# Patient Record
Sex: MALE | Race: WHITE | NOT HISPANIC OR LATINO
[De-identification: names, ages, dates, MRNs, and addresses within clinical notes are randomized per-mention and may not be internally consistent; named-entity substitution may affect disease eponyms.]

---

## 2017-01-09 ENCOUNTER — OTHER (OUTPATIENT)
Age: 67
End: 2017-01-09

## 2017-01-19 VITALS
HEART RATE: 63 BPM | WEIGHT: 189.93 LBS | OXYGEN SATURATION: 100 % | RESPIRATION RATE: 16 BRPM | BODY MASS INDEX: 26.59 KG/M2 | SYSTOLIC BLOOD PRESSURE: 160 MMHG | HEIGHT: 71 IN | DIASTOLIC BLOOD PRESSURE: 83 MMHG

## 2017-01-26 ENCOUNTER — APPOINTMENT (OUTPATIENT)
Dept: HEMATOLOGY ONCOLOGY | Facility: CLINIC | Age: 67
End: 2017-01-26

## 2017-01-26 VITALS
HEART RATE: 60 BPM | OXYGEN SATURATION: 98 % | SYSTOLIC BLOOD PRESSURE: 138 MMHG | DIASTOLIC BLOOD PRESSURE: 66 MMHG | BODY MASS INDEX: 26.64 KG/M2 | TEMPERATURE: 97.4 F | WEIGHT: 191 LBS

## 2017-02-16 ENCOUNTER — APPOINTMENT (OUTPATIENT)
Dept: RADIATION ONCOLOGY | Facility: CLINIC | Age: 67
End: 2017-02-16

## 2017-02-16 VITALS
OXYGEN SATURATION: 100 % | SYSTOLIC BLOOD PRESSURE: 156 MMHG | WEIGHT: 191.8 LBS | BODY MASS INDEX: 26.75 KG/M2 | HEART RATE: 65 BPM | DIASTOLIC BLOOD PRESSURE: 84 MMHG | RESPIRATION RATE: 18 BRPM

## 2017-03-12 ENCOUNTER — FORM ENCOUNTER (OUTPATIENT)
Age: 67
End: 2017-03-12

## 2017-03-13 ENCOUNTER — OUTPATIENT (OUTPATIENT)
Dept: OUTPATIENT SERVICES | Facility: HOSPITAL | Age: 67
LOS: 1 days | End: 2017-03-13
Payer: MEDICARE

## 2017-03-13 ENCOUNTER — APPOINTMENT (OUTPATIENT)
Dept: CT IMAGING | Facility: HOSPITAL | Age: 67
End: 2017-03-13

## 2017-03-13 DIAGNOSIS — Z98.89 OTHER SPECIFIED POSTPROCEDURAL STATES: Chronic | ICD-10-CM

## 2017-03-13 PROCEDURE — 71250 CT THORAX DX C-: CPT

## 2017-03-20 ENCOUNTER — APPOINTMENT (OUTPATIENT)
Dept: RADIATION ONCOLOGY | Facility: CLINIC | Age: 67
End: 2017-03-20

## 2017-03-20 VITALS
SYSTOLIC BLOOD PRESSURE: 154 MMHG | OXYGEN SATURATION: 98 % | HEART RATE: 65 BPM | DIASTOLIC BLOOD PRESSURE: 84 MMHG | BODY MASS INDEX: 26.64 KG/M2 | RESPIRATION RATE: 16 BRPM | WEIGHT: 191 LBS

## 2017-03-21 ENCOUNTER — LABORATORY RESULT (OUTPATIENT)
Age: 67
End: 2017-03-21

## 2017-03-21 LAB
BASOPHILS # BLD AUTO: 0.03 K/UL
BASOPHILS NFR BLD AUTO: 0.6 %
CALCIUM SERPL-MCNC: 9.7 MG/DL
CREAT SERPL-MCNC: 1.17 MG/DL
EOSINOPHIL # BLD AUTO: 0.08 K/UL
EOSINOPHIL NFR BLD AUTO: 1.6 %
HCT VFR BLD CALC: 46.5 %
HGB BLD-MCNC: 15 G/DL
IMM GRANULOCYTES NFR BLD AUTO: 0.8 %
LDH SERPL-CCNC: 192 U/L
LYMPHOCYTES # BLD AUTO: 0.86 K/UL
LYMPHOCYTES NFR BLD AUTO: 16.9 %
MAN DIFF?: NORMAL
MCHC RBC-ENTMCNC: 30.9 PG
MCHC RBC-ENTMCNC: 32.3 GM/DL
MCV RBC AUTO: 95.9 FL
MONOCYTES # BLD AUTO: 0.62 K/UL
MONOCYTES NFR BLD AUTO: 12.2 %
NEUTROPHILS # BLD AUTO: 3.47 K/UL
NEUTROPHILS NFR BLD AUTO: 67.9 %
PLATELET # BLD AUTO: 169 K/UL
RBC # BLD: 4.85 M/UL
RBC # FLD: 13.9 %
WBC # FLD AUTO: 5.1 K/UL

## 2017-03-22 LAB
DEPRECATED KAPPA LC FREE/LAMBDA SER: 16.14 RATIO
DEPRECATED KAPPA LC FREE/LAMBDA SER: 16.14 RATIO
IGA SER QL IEP: 55 MG/DL
IGG SER QL IEP: 562 MG/DL
IGM SER QL IEP: 1520 MG/DL
KAPPA LC CSF-MCNC: 0.35 MG/DL
KAPPA LC CSF-MCNC: 0.35 MG/DL
KAPPA LC SERPL-MCNC: 5.65 MG/DL
KAPPA LC SERPL-MCNC: 5.65 MG/DL

## 2017-03-27 ENCOUNTER — APPOINTMENT (OUTPATIENT)
Dept: THORACIC SURGERY | Facility: CLINIC | Age: 67
End: 2017-03-27

## 2017-03-27 VITALS
BODY MASS INDEX: 26.74 KG/M2 | OXYGEN SATURATION: 99 % | HEART RATE: 72 BPM | DIASTOLIC BLOOD PRESSURE: 68 MMHG | RESPIRATION RATE: 16 BRPM | WEIGHT: 191 LBS | HEIGHT: 71 IN | SYSTOLIC BLOOD PRESSURE: 120 MMHG

## 2017-03-29 ENCOUNTER — APPOINTMENT (OUTPATIENT)
Dept: HEMATOLOGY ONCOLOGY | Facility: CLINIC | Age: 67
End: 2017-03-29

## 2017-03-29 VITALS
SYSTOLIC BLOOD PRESSURE: 132 MMHG | WEIGHT: 194 LBS | BODY MASS INDEX: 27.06 KG/M2 | DIASTOLIC BLOOD PRESSURE: 62 MMHG | TEMPERATURE: 97.9 F | HEART RATE: 76 BPM

## 2017-05-30 LAB
BASOPHILS # BLD AUTO: 0.02 K/UL
BASOPHILS NFR BLD AUTO: 0.4 %
CALCIUM SERPL-MCNC: 9.3 MG/DL
CREAT SERPL-MCNC: 1.09 MG/DL
EOSINOPHIL # BLD AUTO: 0.08 K/UL
EOSINOPHIL NFR BLD AUTO: 1.6 %
HCT VFR BLD CALC: 47.8 %
HGB BLD-MCNC: 15.3 G/DL
IMM GRANULOCYTES NFR BLD AUTO: 0.4 %
LDH SERPL-CCNC: 156 U/L
LYMPHOCYTES # BLD AUTO: 0.97 K/UL
LYMPHOCYTES NFR BLD AUTO: 18.8 %
MAN DIFF?: NORMAL
MCHC RBC-ENTMCNC: 30.7 PG
MCHC RBC-ENTMCNC: 32 GM/DL
MCV RBC AUTO: 96 FL
MONOCYTES # BLD AUTO: 0.74 K/UL
MONOCYTES NFR BLD AUTO: 14.4 %
NEUTROPHILS # BLD AUTO: 3.32 K/UL
NEUTROPHILS NFR BLD AUTO: 64.4 %
PLATELET # BLD AUTO: 176 K/UL
RBC # BLD: 4.98 M/UL
RBC # FLD: 14.2 %
WBC # FLD AUTO: 5.15 K/UL

## 2017-05-31 LAB
DEPRECATED KAPPA LC FREE/LAMBDA SER: 18.5 RATIO
DEPRECATED KAPPA LC FREE/LAMBDA SER: 18.5 RATIO
FLOW H2O: 49.1 SEC
FLOW PT: 89.9 SEC
IGA SER QL IEP: 55 MG/DL
IGG SER QL IEP: 578 MG/DL
IGM SER QL IEP: 1760 MG/DL
KAPPA LC CSF-MCNC: 0.28 MG/DL
KAPPA LC CSF-MCNC: 0.28 MG/DL
KAPPA LC SERPL-MCNC: 5.18 MG/DL
KAPPA LC SERPL-MCNC: 5.18 MG/DL
VISC SER QL: 1.8 RATIO

## 2017-06-05 ENCOUNTER — APPOINTMENT (OUTPATIENT)
Dept: HEMATOLOGY ONCOLOGY | Facility: CLINIC | Age: 67
End: 2017-06-05

## 2017-06-05 VITALS
WEIGHT: 195 LBS | TEMPERATURE: 98.3 F | BODY MASS INDEX: 27.2 KG/M2 | SYSTOLIC BLOOD PRESSURE: 138 MMHG | DIASTOLIC BLOOD PRESSURE: 70 MMHG | HEART RATE: 74 BPM | OXYGEN SATURATION: 99 %

## 2017-07-09 ENCOUNTER — FORM ENCOUNTER (OUTPATIENT)
Age: 67
End: 2017-07-09

## 2017-07-10 ENCOUNTER — OUTPATIENT (OUTPATIENT)
Dept: OUTPATIENT SERVICES | Facility: HOSPITAL | Age: 67
LOS: 1 days | End: 2017-07-10
Payer: MEDICARE

## 2017-07-10 ENCOUNTER — APPOINTMENT (OUTPATIENT)
Dept: CT IMAGING | Facility: HOSPITAL | Age: 67
End: 2017-07-10

## 2017-07-10 DIAGNOSIS — Z98.89 OTHER SPECIFIED POSTPROCEDURAL STATES: Chronic | ICD-10-CM

## 2017-07-10 PROCEDURE — 71250 CT THORAX DX C-: CPT

## 2017-07-17 ENCOUNTER — APPOINTMENT (OUTPATIENT)
Dept: THORACIC SURGERY | Facility: CLINIC | Age: 67
End: 2017-07-17

## 2017-07-17 VITALS
WEIGHT: 195 LBS | OXYGEN SATURATION: 98 % | SYSTOLIC BLOOD PRESSURE: 150 MMHG | HEIGHT: 71 IN | DIASTOLIC BLOOD PRESSURE: 70 MMHG | RESPIRATION RATE: 16 BRPM | HEART RATE: 72 BPM | BODY MASS INDEX: 27.3 KG/M2

## 2017-10-03 ENCOUNTER — LABORATORY RESULT (OUTPATIENT)
Age: 67
End: 2017-10-03

## 2017-10-03 LAB
BASOPHILS # BLD AUTO: 0.02 K/UL
BASOPHILS NFR BLD AUTO: 0.4 %
CALCIUM SERPL-MCNC: 9.7 MG/DL
CREAT SERPL-MCNC: 1.18 MG/DL
EOSINOPHIL # BLD AUTO: 0.13 K/UL
EOSINOPHIL NFR BLD AUTO: 2.7 %
HCT VFR BLD CALC: 45.2 %
HGB BLD-MCNC: 14.5 G/DL
IMM GRANULOCYTES NFR BLD AUTO: 0.8 %
LDH SERPL-CCNC: 149 U/L
LYMPHOCYTES # BLD AUTO: 0.89 K/UL
LYMPHOCYTES NFR BLD AUTO: 18.3 %
MAN DIFF?: NORMAL
MCHC RBC-ENTMCNC: 30.5 PG
MCHC RBC-ENTMCNC: 32.1 GM/DL
MCV RBC AUTO: 95.2 FL
MONOCYTES # BLD AUTO: 0.7 K/UL
MONOCYTES NFR BLD AUTO: 14.4 %
NEUTROPHILS # BLD AUTO: 3.08 K/UL
NEUTROPHILS NFR BLD AUTO: 63.4 %
PLATELET # BLD AUTO: 177 K/UL
RBC # BLD: 4.75 M/UL
RBC # FLD: 14.5 %
WBC # FLD AUTO: 4.86 K/UL

## 2017-10-04 LAB
DEPRECATED KAPPA LC FREE/LAMBDA SER: 6.7 RATIO
DEPRECATED KAPPA LC FREE/LAMBDA SER: 6.7 RATIO
IGA SER QL IEP: 56 MG/DL
IGG SER QL IEP: 595 MG/DL
IGM SER QL IEP: 1560 MG/DL
KAPPA LC CSF-MCNC: 0.92 MG/DL
KAPPA LC CSF-MCNC: 0.92 MG/DL
KAPPA LC SERPL-MCNC: 6.16 MG/DL
KAPPA LC SERPL-MCNC: 6.16 MG/DL

## 2017-10-12 ENCOUNTER — APPOINTMENT (OUTPATIENT)
Dept: HEMATOLOGY ONCOLOGY | Facility: CLINIC | Age: 67
End: 2017-10-12
Payer: MEDICARE

## 2017-10-12 VITALS
HEART RATE: 72 BPM | SYSTOLIC BLOOD PRESSURE: 120 MMHG | DIASTOLIC BLOOD PRESSURE: 72 MMHG | BODY MASS INDEX: 27.62 KG/M2 | WEIGHT: 198 LBS | TEMPERATURE: 97.6 F

## 2017-10-12 PROCEDURE — 99214 OFFICE O/P EST MOD 30 MIN: CPT

## 2017-11-08 ENCOUNTER — OUTPATIENT (OUTPATIENT)
Dept: OUTPATIENT SERVICES | Facility: HOSPITAL | Age: 67
LOS: 1 days | End: 2017-11-08
Payer: MEDICARE

## 2017-11-08 ENCOUNTER — APPOINTMENT (OUTPATIENT)
Dept: CT IMAGING | Facility: HOSPITAL | Age: 67
End: 2017-11-08
Payer: MEDICARE

## 2017-11-08 DIAGNOSIS — Z98.89 OTHER SPECIFIED POSTPROCEDURAL STATES: Chronic | ICD-10-CM

## 2017-11-08 PROCEDURE — 71250 CT THORAX DX C-: CPT

## 2017-11-08 PROCEDURE — 71250 CT THORAX DX C-: CPT | Mod: 26

## 2017-11-20 ENCOUNTER — APPOINTMENT (OUTPATIENT)
Dept: THORACIC SURGERY | Facility: CLINIC | Age: 67
End: 2017-11-20
Payer: MEDICARE

## 2017-11-20 VITALS
HEIGHT: 71 IN | BODY MASS INDEX: 27.02 KG/M2 | WEIGHT: 193 LBS | DIASTOLIC BLOOD PRESSURE: 80 MMHG | SYSTOLIC BLOOD PRESSURE: 140 MMHG | OXYGEN SATURATION: 97 % | HEART RATE: 94 BPM

## 2017-11-20 PROCEDURE — 99214 OFFICE O/P EST MOD 30 MIN: CPT

## 2017-12-05 ENCOUNTER — APPOINTMENT (OUTPATIENT)
Dept: FAMILY MEDICINE | Facility: CLINIC | Age: 67
End: 2017-12-05
Payer: MEDICARE

## 2017-12-05 ENCOUNTER — APPOINTMENT (OUTPATIENT)
Dept: FAMILY MEDICINE | Facility: CLINIC | Age: 67
End: 2017-12-05

## 2017-12-05 VITALS
BODY MASS INDEX: 27.3 KG/M2 | DIASTOLIC BLOOD PRESSURE: 71 MMHG | SYSTOLIC BLOOD PRESSURE: 170 MMHG | WEIGHT: 195 LBS | HEART RATE: 86 BPM | HEIGHT: 71 IN

## 2017-12-05 PROCEDURE — G0008: CPT

## 2017-12-05 PROCEDURE — 90688 IIV4 VACCINE SPLT 0.5 ML IM: CPT

## 2017-12-05 PROCEDURE — 36415 COLL VENOUS BLD VENIPUNCTURE: CPT

## 2017-12-14 LAB
25(OH)D3 SERPL-MCNC: 20.9 NG/ML
ALBUMIN SERPL ELPH-MCNC: 4 G/DL
ALP BLD-CCNC: 64 U/L
ALT SERPL-CCNC: 9 U/L
ANION GAP SERPL CALC-SCNC: 14 MMOL/L
AST SERPL-CCNC: 12 U/L
BASOPHILS # BLD AUTO: 0.02 K/UL
BASOPHILS NFR BLD AUTO: 0.4 %
BILIRUB SERPL-MCNC: 0.3 MG/DL
BUN SERPL-MCNC: 15 MG/DL
CALCIUM SERPL-MCNC: 9.1 MG/DL
CHLORIDE SERPL-SCNC: 104 MMOL/L
CHOLEST SERPL-MCNC: 121 MG/DL
CHOLEST/HDLC SERPL: 3.7 RATIO
CO2 SERPL-SCNC: 26 MMOL/L
CREAT SERPL-MCNC: 0.93 MG/DL
EOSINOPHIL # BLD AUTO: 0.07 K/UL
EOSINOPHIL NFR BLD AUTO: 1.3 %
GLUCOSE SERPL-MCNC: 101 MG/DL
HBA1C MFR BLD HPLC: 5.9 %
HCT VFR BLD CALC: 40.7 %
HDLC SERPL-MCNC: 33 MG/DL
HGB BLD-MCNC: 12.8 G/DL
IMM GRANULOCYTES NFR BLD AUTO: 0.4 %
LDLC SERPL CALC-MCNC: 73 MG/DL
LYMPHOCYTES # BLD AUTO: 0.78 K/UL
LYMPHOCYTES NFR BLD AUTO: 14.1 %
MAN DIFF?: NORMAL
MCHC RBC-ENTMCNC: 30.5 PG
MCHC RBC-ENTMCNC: 31.4 GM/DL
MCV RBC AUTO: 96.9 FL
MONOCYTES # BLD AUTO: 0.65 K/UL
MONOCYTES NFR BLD AUTO: 11.8 %
NEUTROPHILS # BLD AUTO: 3.98 K/UL
NEUTROPHILS NFR BLD AUTO: 72 %
PLATELET # BLD AUTO: 233 K/UL
POTASSIUM SERPL-SCNC: 4.3 MMOL/L
PROT SERPL-MCNC: 7.6 G/DL
PSA FREE FLD-MCNC: 35.3
PSA FREE SERPL-MCNC: 0.23 NG/ML
PSA SERPL-MCNC: 0.65 NG/ML
RBC # BLD: 4.2 M/UL
RBC # FLD: 14.5 %
SODIUM SERPL-SCNC: 144 MMOL/L
TRIGL SERPL-MCNC: 74 MG/DL
TSH SERPL-ACNC: 2.69 UIU/ML
WBC # FLD AUTO: 5.52 K/UL

## 2018-01-05 ENCOUNTER — APPOINTMENT (OUTPATIENT)
Dept: FAMILY MEDICINE | Facility: CLINIC | Age: 68
End: 2018-01-05
Payer: MEDICARE

## 2018-01-05 VITALS
BODY MASS INDEX: 27.16 KG/M2 | SYSTOLIC BLOOD PRESSURE: 132 MMHG | HEART RATE: 78 BPM | RESPIRATION RATE: 20 BRPM | HEIGHT: 71 IN | DIASTOLIC BLOOD PRESSURE: 70 MMHG | WEIGHT: 194 LBS

## 2018-01-05 PROCEDURE — 99213 OFFICE O/P EST LOW 20 MIN: CPT | Mod: 25

## 2018-01-05 PROCEDURE — G0009: CPT

## 2018-01-05 PROCEDURE — 90670 PCV13 VACCINE IM: CPT

## 2018-01-31 ENCOUNTER — FORM ENCOUNTER (OUTPATIENT)
Age: 68
End: 2018-01-31

## 2018-02-01 ENCOUNTER — APPOINTMENT (OUTPATIENT)
Dept: NUCLEAR MEDICINE | Facility: CLINIC | Age: 68
End: 2018-02-01

## 2018-02-01 ENCOUNTER — OUTPATIENT (OUTPATIENT)
Dept: OUTPATIENT SERVICES | Facility: HOSPITAL | Age: 68
LOS: 1 days | End: 2018-02-01
Payer: MEDICARE

## 2018-02-01 DIAGNOSIS — Z00.8 ENCOUNTER FOR OTHER GENERAL EXAMINATION: ICD-10-CM

## 2018-02-01 DIAGNOSIS — Z98.89 OTHER SPECIFIED POSTPROCEDURAL STATES: Chronic | ICD-10-CM

## 2018-02-01 PROCEDURE — 78815 PET IMAGE W/CT SKULL-THIGH: CPT | Mod: 26,KX,PS

## 2018-02-01 PROCEDURE — 78815 PET IMAGE W/CT SKULL-THIGH: CPT

## 2018-02-01 PROCEDURE — A9552: CPT

## 2018-02-02 ENCOUNTER — LABORATORY RESULT (OUTPATIENT)
Age: 68
End: 2018-02-02

## 2018-02-02 LAB
BASOPHILS # BLD AUTO: 0.01 K/UL
BASOPHILS NFR BLD AUTO: 0.2 %
EOSINOPHIL # BLD AUTO: 0.13 K/UL
EOSINOPHIL NFR BLD AUTO: 2.6 %
HCT VFR BLD CALC: 44.8 %
HGB BLD-MCNC: 14.3 G/DL
IMM GRANULOCYTES NFR BLD AUTO: 0.6 %
LYMPHOCYTES # BLD AUTO: 0.81 K/UL
LYMPHOCYTES NFR BLD AUTO: 16.3 %
MAN DIFF?: NORMAL
MCHC RBC-ENTMCNC: 29.8 PG
MCHC RBC-ENTMCNC: 31.9 GM/DL
MCV RBC AUTO: 93.3 FL
MONOCYTES # BLD AUTO: 0.71 K/UL
MONOCYTES NFR BLD AUTO: 14.3 %
NEUTROPHILS # BLD AUTO: 3.28 K/UL
NEUTROPHILS NFR BLD AUTO: 66 %
PLATELET # BLD AUTO: 175 K/UL
RBC # BLD: 4.8 M/UL
RBC # FLD: 14.7 %
WBC # FLD AUTO: 4.97 K/UL

## 2018-02-03 LAB
ALBUMIN SERPL ELPH-MCNC: 4.3 G/DL
ALP BLD-CCNC: 78 U/L
ALT SERPL-CCNC: 8 U/L
ANION GAP SERPL CALC-SCNC: 15 MMOL/L
AST SERPL-CCNC: 11 U/L
BILIRUB SERPL-MCNC: 0.3 MG/DL
BUN SERPL-MCNC: 19 MG/DL
CALCIUM SERPL-MCNC: 9.6 MG/DL
CHLORIDE SERPL-SCNC: 105 MMOL/L
CO2 SERPL-SCNC: 25 MMOL/L
CREAT SERPL-MCNC: 1.26 MG/DL
DEPRECATED KAPPA LC FREE/LAMBDA SER: 8.76 RATIO
DEPRECATED KAPPA LC FREE/LAMBDA SER: 8.76 RATIO
GLUCOSE SERPL-MCNC: 104 MG/DL
IGA SER QL IEP: 58 MG/DL
IGG SER QL IEP: 687 MG/DL
IGM SER QL IEP: 1920 MG/DL
KAPPA LC CSF-MCNC: 0.72 MG/DL
KAPPA LC CSF-MCNC: 0.72 MG/DL
KAPPA LC SERPL-MCNC: 6.31 MG/DL
KAPPA LC SERPL-MCNC: 6.31 MG/DL
LDH SERPL-CCNC: 129 U/L
POTASSIUM SERPL-SCNC: 4.6 MMOL/L
PROT SERPL-MCNC: 7.8 G/DL
SODIUM SERPL-SCNC: 145 MMOL/L

## 2018-02-05 ENCOUNTER — APPOINTMENT (OUTPATIENT)
Dept: THORACIC SURGERY | Facility: CLINIC | Age: 68
End: 2018-02-05
Payer: MEDICARE

## 2018-02-05 VITALS
RESPIRATION RATE: 16 BRPM | OXYGEN SATURATION: 100 % | HEART RATE: 77 BPM | HEIGHT: 71 IN | WEIGHT: 196 LBS | SYSTOLIC BLOOD PRESSURE: 140 MMHG | DIASTOLIC BLOOD PRESSURE: 81 MMHG | BODY MASS INDEX: 27.44 KG/M2

## 2018-02-05 PROCEDURE — 99214 OFFICE O/P EST MOD 30 MIN: CPT

## 2018-02-09 ENCOUNTER — OUTPATIENT (OUTPATIENT)
Dept: OUTPATIENT SERVICES | Facility: HOSPITAL | Age: 68
LOS: 1 days | End: 2018-02-09
Payer: MEDICARE

## 2018-02-09 DIAGNOSIS — Z98.89 OTHER SPECIFIED POSTPROCEDURAL STATES: Chronic | ICD-10-CM

## 2018-02-09 DIAGNOSIS — C34.90 MALIGNANT NEOPLASM OF UNSPECIFIED PART OF UNSPECIFIED BRONCHUS OR LUNG: ICD-10-CM

## 2018-02-09 DIAGNOSIS — R91.1 SOLITARY PULMONARY NODULE: ICD-10-CM

## 2018-02-09 PROCEDURE — 94060 EVALUATION OF WHEEZING: CPT

## 2018-02-09 PROCEDURE — 94060 EVALUATION OF WHEEZING: CPT | Mod: 26

## 2018-02-12 ENCOUNTER — APPOINTMENT (OUTPATIENT)
Dept: HEMATOLOGY ONCOLOGY | Facility: CLINIC | Age: 68
End: 2018-02-12
Payer: MEDICARE

## 2018-02-12 VITALS
TEMPERATURE: 97.8 F | DIASTOLIC BLOOD PRESSURE: 58 MMHG | HEART RATE: 64 BPM | BODY MASS INDEX: 27.62 KG/M2 | SYSTOLIC BLOOD PRESSURE: 120 MMHG | WEIGHT: 198 LBS | RESPIRATION RATE: 16 BRPM

## 2018-02-12 PROCEDURE — 99214 OFFICE O/P EST MOD 30 MIN: CPT

## 2018-02-13 ENCOUNTER — APPOINTMENT (OUTPATIENT)
Dept: FAMILY MEDICINE | Facility: CLINIC | Age: 68
End: 2018-02-13
Payer: MEDICARE

## 2018-02-13 VITALS
SYSTOLIC BLOOD PRESSURE: 138 MMHG | HEART RATE: 69 BPM | DIASTOLIC BLOOD PRESSURE: 80 MMHG | WEIGHT: 194 LBS | TEMPERATURE: 98 F | OXYGEN SATURATION: 100 % | BODY MASS INDEX: 27.16 KG/M2 | HEIGHT: 71 IN

## 2018-02-13 PROCEDURE — 99214 OFFICE O/P EST MOD 30 MIN: CPT | Mod: 25

## 2018-02-13 PROCEDURE — 93000 ELECTROCARDIOGRAM COMPLETE: CPT | Mod: 59

## 2018-02-14 ENCOUNTER — OUTPATIENT (OUTPATIENT)
Dept: OUTPATIENT SERVICES | Facility: HOSPITAL | Age: 68
LOS: 1 days | End: 2018-02-14

## 2018-02-14 ENCOUNTER — APPOINTMENT (OUTPATIENT)
Dept: CARDIOLOGY | Facility: CLINIC | Age: 68
End: 2018-02-14
Payer: MEDICARE

## 2018-02-14 ENCOUNTER — NON-APPOINTMENT (OUTPATIENT)
Age: 68
End: 2018-02-14

## 2018-02-14 VITALS
DIASTOLIC BLOOD PRESSURE: 78 MMHG | HEIGHT: 71.25 IN | SYSTOLIC BLOOD PRESSURE: 140 MMHG | OXYGEN SATURATION: 99 % | WEIGHT: 192.9 LBS | RESPIRATION RATE: 16 BRPM | TEMPERATURE: 98 F | HEART RATE: 70 BPM

## 2018-02-14 VITALS
BODY MASS INDEX: 37 KG/M2 | OXYGEN SATURATION: 99 % | SYSTOLIC BLOOD PRESSURE: 168 MMHG | WEIGHT: 196 LBS | HEART RATE: 70 BPM | RESPIRATION RATE: 17 BRPM | HEIGHT: 61 IN | DIASTOLIC BLOOD PRESSURE: 96 MMHG

## 2018-02-14 VITALS — DIASTOLIC BLOOD PRESSURE: 80 MMHG | HEART RATE: 72 BPM | SYSTOLIC BLOOD PRESSURE: 132 MMHG

## 2018-02-14 DIAGNOSIS — Z98.89 OTHER SPECIFIED POSTPROCEDURAL STATES: Chronic | ICD-10-CM

## 2018-02-14 DIAGNOSIS — C34.90 MALIGNANT NEOPLASM OF UNSPECIFIED PART OF UNSPECIFIED BRONCHUS OR LUNG: ICD-10-CM

## 2018-02-14 DIAGNOSIS — Z01.818 ENCOUNTER FOR OTHER PREPROCEDURAL EXAMINATION: ICD-10-CM

## 2018-02-14 DIAGNOSIS — N40.1 BENIGN PROSTATIC HYPERPLASIA WITH LOWER URINARY TRACT SYMPTOMS: ICD-10-CM

## 2018-02-14 DIAGNOSIS — R91.1 SOLITARY PULMONARY NODULE: ICD-10-CM

## 2018-02-14 DIAGNOSIS — Z98.890 OTHER SPECIFIED POSTPROCEDURAL STATES: Chronic | ICD-10-CM

## 2018-02-14 LAB
BLD GP AB SCN SERPL QL: NEGATIVE — SIGNIFICANT CHANGE UP
BUN SERPL-MCNC: 20 MG/DL — SIGNIFICANT CHANGE UP (ref 7–23)
CALCIUM SERPL-MCNC: 9.1 MG/DL — SIGNIFICANT CHANGE UP (ref 8.4–10.5)
CHLORIDE SERPL-SCNC: 103 MMOL/L — SIGNIFICANT CHANGE UP (ref 98–107)
CO2 SERPL-SCNC: 27 MMOL/L — SIGNIFICANT CHANGE UP (ref 22–31)
CREAT SERPL-MCNC: 1.01 MG/DL — SIGNIFICANT CHANGE UP (ref 0.5–1.3)
GLUCOSE SERPL-MCNC: 83 MG/DL — SIGNIFICANT CHANGE UP (ref 70–99)
HCT VFR BLD CALC: 46.4 % — SIGNIFICANT CHANGE UP (ref 39–50)
HGB BLD-MCNC: 14.7 G/DL — SIGNIFICANT CHANGE UP (ref 13–17)
MCHC RBC-ENTMCNC: 29.6 PG — SIGNIFICANT CHANGE UP (ref 27–34)
MCHC RBC-ENTMCNC: 31.7 % — LOW (ref 32–36)
MCV RBC AUTO: 93.4 FL — SIGNIFICANT CHANGE UP (ref 80–100)
NRBC # FLD: 0 — SIGNIFICANT CHANGE UP
PLATELET # BLD AUTO: 188 K/UL — SIGNIFICANT CHANGE UP (ref 150–400)
PMV BLD: 9.5 FL — SIGNIFICANT CHANGE UP (ref 7–13)
POTASSIUM SERPL-MCNC: 4.1 MMOL/L — SIGNIFICANT CHANGE UP (ref 3.5–5.3)
POTASSIUM SERPL-SCNC: 4.1 MMOL/L — SIGNIFICANT CHANGE UP (ref 3.5–5.3)
RBC # BLD: 4.97 M/UL — SIGNIFICANT CHANGE UP (ref 4.2–5.8)
RBC # FLD: 14.7 % — HIGH (ref 10.3–14.5)
RH IG SCN BLD-IMP: POSITIVE — SIGNIFICANT CHANGE UP
SODIUM SERPL-SCNC: 143 MMOL/L — SIGNIFICANT CHANGE UP (ref 135–145)
WBC # BLD: 5.79 K/UL — SIGNIFICANT CHANGE UP (ref 3.8–10.5)
WBC # FLD AUTO: 5.79 K/UL — SIGNIFICANT CHANGE UP (ref 3.8–10.5)

## 2018-02-14 PROCEDURE — 93306 TTE W/DOPPLER COMPLETE: CPT

## 2018-02-14 PROCEDURE — 99214 OFFICE O/P EST MOD 30 MIN: CPT

## 2018-02-14 PROCEDURE — 93000 ELECTROCARDIOGRAM COMPLETE: CPT

## 2018-02-14 RX ORDER — SODIUM CHLORIDE 9 MG/ML
1000 INJECTION, SOLUTION INTRAVENOUS
Qty: 0 | Refills: 0 | Status: DISCONTINUED | OUTPATIENT
Start: 2018-02-23 | End: 2018-02-24

## 2018-02-14 NOTE — H&P PST ADULT - NSANTHOSAYNRD_GEN_A_CORE
No. JANUARY screening performed.  STOP BANG Legend: 0-2 = LOW Risk; 3-4 = INTERMEDIATE Risk; 5-8 = HIGH Risk

## 2018-02-14 NOTE — H&P PST ADULT - PMH
BPH (benign prostatic hypertrophy)    Hydronephrosis    Lung cancer  Dx'd 2015 treated with neoadjuvant chemo/RT followed by pneumonectomy  Nephrolithiasis BPH (benign prostatic hypertrophy)    Hydronephrosis    Lung cancer  Dx'd 2015 treated with neoadjuvant chemo/RT followed by pneumonectomy (June 2015), SBRT to RUL nodule in Jan 2017  Lung nodule    Nephrolithiasis

## 2018-02-14 NOTE — H&P PST ADULT - HISTORY OF PRESENT ILLNESS
66 y/o male with history of lung cancer presents to [facility] today for presurgical evaluation  He had a follow up scan done which revealed a lung nodule. He denies any scheduled for     65 y/o M PMH obstructive BPH with hydronephrosis, nephrolithiasis, S/P lithotripsy many years ago, c/o left scapular pain starting 1/2015, 15 lb. weight loss over the past 4-5 months, found to left lung mass on CXR.  Presents today for laparoscopic robotic assisted left pneumonectomy, possible left thoracotomy. 66 y/o male with history of lung cancer presents to [facility] today for presurgical evaluation  He was diagnosed with lung cancer in 2015 and treated with neoadjuvant chemo/RT followed by left pneumonectomy in June 2015.  He was followed with CT Scans and was treated with SBRT for a right upper lobe nodule in January 2017.  Recent CT Scan revealed an increase in the size of the right upper lobe lung nodule.  PET Scan was done.  He denies any fever, chills, weight loss, fatigue, shortness of breath, cough and hemoptysis.  He is scheduled for right thoracotomy, right upper lobe wedge resection on 2/23/18.

## 2018-02-14 NOTE — H&P PST ADULT - PROBLEM SELECTOR PLAN 1
Pt. is scheduled for right thoracotomy, right upper lobe wedge resection on 2/23/18.  Preoperative instructions reviewed, pt verbalized understanding.  Preop Famotidine provided.  Lab results pending.  Pt. instructed to obtain medical clearance by surgeon, please obtain copy for PST chart

## 2018-02-20 ENCOUNTER — OTHER (OUTPATIENT)
Age: 68
End: 2018-02-20

## 2018-02-21 ENCOUNTER — OUTPATIENT (OUTPATIENT)
Dept: OUTPATIENT SERVICES | Facility: HOSPITAL | Age: 68
LOS: 1 days | End: 2018-02-21
Payer: MEDICARE

## 2018-02-21 DIAGNOSIS — R91.1 SOLITARY PULMONARY NODULE: ICD-10-CM

## 2018-02-21 DIAGNOSIS — Z98.89 OTHER SPECIFIED POSTPROCEDURAL STATES: Chronic | ICD-10-CM

## 2018-02-21 DIAGNOSIS — Z98.890 OTHER SPECIFIED POSTPROCEDURAL STATES: Chronic | ICD-10-CM

## 2018-02-21 DIAGNOSIS — C34.90 MALIGNANT NEOPLASM OF UNSPECIFIED PART OF UNSPECIFIED BRONCHUS OR LUNG: ICD-10-CM

## 2018-02-21 PROCEDURE — 94726 PLETHYSMOGRAPHY LUNG VOLUMES: CPT | Mod: 26

## 2018-02-21 PROCEDURE — 94726 PLETHYSMOGRAPHY LUNG VOLUMES: CPT

## 2018-02-21 PROCEDURE — 94060 EVALUATION OF WHEEZING: CPT | Mod: 26

## 2018-02-21 PROCEDURE — 94729 DIFFUSING CAPACITY: CPT

## 2018-02-21 PROCEDURE — 94729 DIFFUSING CAPACITY: CPT | Mod: 26

## 2018-02-21 PROCEDURE — 94060 EVALUATION OF WHEEZING: CPT

## 2018-02-23 ENCOUNTER — APPOINTMENT (OUTPATIENT)
Dept: THORACIC SURGERY | Facility: HOSPITAL | Age: 68
End: 2018-02-23
Payer: MEDICARE

## 2018-02-23 ENCOUNTER — INPATIENT (INPATIENT)
Facility: HOSPITAL | Age: 68
LOS: 1 days | Discharge: HOME | End: 2018-02-25
Attending: THORACIC SURGERY (CARDIOTHORACIC VASCULAR SURGERY) | Admitting: THORACIC SURGERY (CARDIOTHORACIC VASCULAR SURGERY)
Payer: MEDICARE

## 2018-02-23 ENCOUNTER — RESULT REVIEW (OUTPATIENT)
Age: 68
End: 2018-02-23

## 2018-02-23 VITALS
SYSTOLIC BLOOD PRESSURE: 150 MMHG | TEMPERATURE: 98 F | RESPIRATION RATE: 16 BRPM | OXYGEN SATURATION: 100 % | WEIGHT: 192.9 LBS | DIASTOLIC BLOOD PRESSURE: 76 MMHG | HEIGHT: 71.25 IN | HEART RATE: 73 BPM

## 2018-02-23 DIAGNOSIS — Z98.890 OTHER SPECIFIED POSTPROCEDURAL STATES: Chronic | ICD-10-CM

## 2018-02-23 DIAGNOSIS — C34.90 MALIGNANT NEOPLASM OF UNSPECIFIED PART OF UNSPECIFIED BRONCHUS OR LUNG: ICD-10-CM

## 2018-02-23 DIAGNOSIS — Z98.89 OTHER SPECIFIED POSTPROCEDURAL STATES: Chronic | ICD-10-CM

## 2018-02-23 LAB — RH IG SCN BLD-IMP: POSITIVE — SIGNIFICANT CHANGE UP

## 2018-02-23 PROCEDURE — 99233 SBSQ HOSP IP/OBS HIGH 50: CPT

## 2018-02-23 PROCEDURE — 88331 PATH CONSLTJ SURG 1 BLK 1SPC: CPT | Mod: 26

## 2018-02-23 PROCEDURE — 32666 THORACOSCOPY W/WEDGE RESECT: CPT | Mod: 80

## 2018-02-23 PROCEDURE — 88307 TISSUE EXAM BY PATHOLOGIST: CPT | Mod: 26

## 2018-02-23 PROCEDURE — 71045 X-RAY EXAM CHEST 1 VIEW: CPT | Mod: 26

## 2018-02-23 PROCEDURE — 88332 PATH CONSLTJ SURG EA ADD BLK: CPT | Mod: 26

## 2018-02-23 PROCEDURE — 32666 THORACOSCOPY W/WEDGE RESECT: CPT

## 2018-02-23 RX ORDER — HEPARIN SODIUM 5000 [USP'U]/ML
5000 INJECTION INTRAVENOUS; SUBCUTANEOUS EVERY 8 HOURS
Qty: 0 | Refills: 0 | Status: DISCONTINUED | OUTPATIENT
Start: 2018-02-23 | End: 2018-02-25

## 2018-02-23 RX ORDER — TAMSULOSIN HYDROCHLORIDE 0.4 MG/1
0.4 CAPSULE ORAL AT BEDTIME
Qty: 0 | Refills: 0 | Status: DISCONTINUED | OUTPATIENT
Start: 2018-02-23 | End: 2018-02-23

## 2018-02-23 RX ORDER — DIPHENHYDRAMINE HCL 50 MG
25 CAPSULE ORAL EVERY 4 HOURS
Qty: 0 | Refills: 0 | Status: DISCONTINUED | OUTPATIENT
Start: 2018-02-23 | End: 2018-02-25

## 2018-02-23 RX ORDER — ONDANSETRON 8 MG/1
4 TABLET, FILM COATED ORAL EVERY 6 HOURS
Qty: 0 | Refills: 0 | Status: DISCONTINUED | OUTPATIENT
Start: 2018-02-23 | End: 2018-02-25

## 2018-02-23 RX ORDER — NALOXONE HYDROCHLORIDE 4 MG/.1ML
0.1 SPRAY NASAL
Qty: 0 | Refills: 0 | Status: DISCONTINUED | OUTPATIENT
Start: 2018-02-23 | End: 2018-02-25

## 2018-02-23 RX ORDER — HYDROMORPHONE HYDROCHLORIDE 2 MG/ML
0.5 INJECTION INTRAMUSCULAR; INTRAVENOUS; SUBCUTANEOUS
Qty: 0 | Refills: 0 | Status: DISCONTINUED | OUTPATIENT
Start: 2018-02-23 | End: 2018-02-24

## 2018-02-23 RX ORDER — TAMSULOSIN HYDROCHLORIDE 0.4 MG/1
0.4 CAPSULE ORAL
Qty: 0 | Refills: 0 | Status: DISCONTINUED | OUTPATIENT
Start: 2018-02-23 | End: 2018-02-25

## 2018-02-23 RX ORDER — DOCUSATE SODIUM 100 MG
100 CAPSULE ORAL THREE TIMES A DAY
Qty: 0 | Refills: 0 | Status: DISCONTINUED | OUTPATIENT
Start: 2018-02-23 | End: 2018-02-25

## 2018-02-23 RX ORDER — HYDROMORPHONE HYDROCHLORIDE 2 MG/ML
30 INJECTION INTRAMUSCULAR; INTRAVENOUS; SUBCUTANEOUS
Qty: 0 | Refills: 0 | Status: DISCONTINUED | OUTPATIENT
Start: 2018-02-23 | End: 2018-02-24

## 2018-02-23 RX ADMIN — SODIUM CHLORIDE 10 MILLILITER(S): 9 INJECTION, SOLUTION INTRAVENOUS at 15:33

## 2018-02-23 RX ADMIN — Medication 100 MILLIGRAM(S): at 21:09

## 2018-02-23 RX ADMIN — TAMSULOSIN HYDROCHLORIDE 0.4 MILLIGRAM(S): 0.4 CAPSULE ORAL at 18:47

## 2018-02-23 RX ADMIN — HYDROMORPHONE HYDROCHLORIDE 30 MILLILITER(S): 2 INJECTION INTRAMUSCULAR; INTRAVENOUS; SUBCUTANEOUS at 19:12

## 2018-02-23 RX ADMIN — HEPARIN SODIUM 5000 UNIT(S): 5000 INJECTION INTRAVENOUS; SUBCUTANEOUS at 21:09

## 2018-02-23 RX ADMIN — Medication 100 MILLIGRAM(S): at 15:58

## 2018-02-23 RX ADMIN — SODIUM CHLORIDE 30 MILLILITER(S): 9 INJECTION, SOLUTION INTRAVENOUS at 14:03

## 2018-02-23 RX ADMIN — SODIUM CHLORIDE 10 MILLILITER(S): 9 INJECTION, SOLUTION INTRAVENOUS at 19:11

## 2018-02-23 RX ADMIN — HYDROMORPHONE HYDROCHLORIDE 30 MILLILITER(S): 2 INJECTION INTRAMUSCULAR; INTRAVENOUS; SUBCUTANEOUS at 14:03

## 2018-02-23 RX ADMIN — HEPARIN SODIUM 5000 UNIT(S): 5000 INJECTION INTRAVENOUS; SUBCUTANEOUS at 15:59

## 2018-02-23 NOTE — BRIEF OPERATIVE NOTE - PROCEDURE
<<-----Click on this checkbox to enter Procedure Wedge resection of lung with video-assisted thoracoscopic surgery (VATS)  02/23/2018  Right Lung  Active  [id number]

## 2018-02-23 NOTE — ASU PATIENT PROFILE, ADULT - PMH
BPH (benign prostatic hypertrophy)    Hydronephrosis    Lung cancer  Dx'd 2015 treated with neoadjuvant chemo/RT followed by pneumonectomy (June 2015), SBRT to RUL nodule in Jan 2017  Lung nodule    Nephrolithiasis

## 2018-02-23 NOTE — PROGRESS NOTE ADULT - SUBJECTIVE AND OBJECTIVE BOX
ALANNA JADE            [id number]         No Known Allergies                 HPI:  68 y/o male with history of lung cancer presents to [facility] today for presurgical evaluation  He was diagnosed with lung cancer in 2015 and treated with neoadjuvant chemo/RT followed by left pneumonectomy in June 2015.  He was followed with CT Scans and was treated with SBRT for a right upper lobe nodule in January 2017.  Recent CT Scan revealed an increase in the size of the right upper lobe lung nodule.  PET Scan was done.  He denies any fever, chills, weight loss, fatigue, shortness of breath, cough and hemoptysis.  He is scheduled for right thoracotomy, right upper lobe wedge resection on 2/23/18. (14 Feb 2018 11:02)      Procedure: RVATS / Wedge resection  POD# 0    Issues:  Lung nodule  Postop pain  BPH               Home Medications:  CoQ 10 100 mg orally  daily  last dose 2/16:  (23 Feb 2018 08:40)  tamsulosin 0.4 mg oral capsule: 1 cap(s) orally 2x day (23 Feb 2018 08:40)      PAST MEDICAL & SURGICAL HISTORY:  Lung nodule  Lung cancer: Dx&#x27;d 2015 treated with neoadjuvant chemo/RT followed by pneumonectomy (June 2015), SBRT to RUL nodule in Jan 2017  Hydronephrosis  Nephrolithiasis  BPH (benign prostatic hypertrophy)  H/O pneumonectomy: Left June 2015  S/P tonsillectomy  H/O lithotripsy        ICU Vital Signs Last 24 Hrs  T(C): 36.1 (23 Feb 2018 14:00), Max: 36.6 (23 Feb 2018 08:25)  T(F): 97 (23 Feb 2018 14:00), Max: 97.9 (23 Feb 2018 08:25)  HR: 67 (23 Feb 2018 15:30) (61 - 73)  BP: 127/78 (23 Feb 2018 15:00) (96/55 - 150/76)  BP(mean): 90 (23 Feb 2018 15:00) (63 - 90)  ABP: 126/59 (23 Feb 2018 15:30) (126/59 - 150/70)  ABP(mean): 80 (23 Feb 2018 15:30) (80 - 97)  RR: 21 (23 Feb 2018 15:30) (16 - 21)  SpO2: 96% (23 Feb 2018 15:30) (94% - 100%)    I&O's Detail    23 Feb 2018 07:01  -  23 Feb 2018 15:52  --------------------------------------------------------  IN:    lactated ringers.: 90 mL    Oral Fluid: 100 mL  Total IN: 190 mL    OUT:    Chest Tube: 15 mL  Total OUT: 15 mL    Total NET: 175 mL      CAPILLARY BLOOD GLUCOSE      Home Medications:  CoQ 10 100 mg orally  daily  last dose 2/16:  (23 Feb 2018 08:40)  tamsulosin 0.4 mg oral capsule: 1 cap(s) orally 2x day (23 Feb 2018 08:40)      MEDICATIONS  (STANDING):  docusate sodium 100 milliGRAM(s) Oral three times a day  heparin  Injectable 5000 Unit(s) SubCutaneous every 8 hours  HYDROmorphone PCA (1 mG/mL) 30 milliLiter(s) PCA Continuous PCA Continuous  lactated ringers. 1000 milliLiter(s) (10 mL/Hr) IV Continuous <Continuous>    MEDICATIONS  (PRN):  diphenhydrAMINE   Injectable 25 milliGRAM(s) IV Push every 4 hours PRN Pruritus  HYDROmorphone PCA (1 mG/mL) Rescue Clinician Bolus 0.5 milliGRAM(s) IV Push every 15 minutes PRN for Pain Scale GREATER THAN 6  naloxone Injectable 0.1 milliGRAM(s) IV Push every 3 minutes PRN For ANY of the following changes in patient status:  A. RR LESS THAN 10 breaths per minute, B. Oxygen saturation LESS THAN 90%, C. Sedation score of 6  ondansetron Injectable 4 milliGRAM(s) IV Push every 6 hours PRN Nausea        Physical exam:                             General:               Pt is awake, alert, OOB in chair not in any distress                                                  Neuro:                  Nonfocal                             Cardiovascular:   S1 & S2, regular                           Respiratory:         Air entry is fair and equal on both sides, has bilateral conducted sounds                           GI:                          Soft, nondistended and nontender, Bowel sounds active                            Ext:                        No cyanosis or edema     Labs:                                                                 CXR:  Left pneumonectomy changes. Rt chest tube in place. Mild congestion      Plan:    General: 67y Male s/p RVATS / Wedge resection POD#0  progressing well, experiencing  pain with deep breathing.                             Neuro:                                         Pain control with PCA / Tylenol IV                            Cardiovascular:                                          Continue hemodynamic monitoring.                            Respiratory:                                         Pt is on 2L  nasal canula,                                           Comfortable, not in any distress.                                         Using incentive spirometry                                          Monitor chest tube output.                                          Chest tube to water seal. Has some air leak                                                                  Continue bronchodilators, pulmonary toilet     Minimize IVF                            GI                                         On clear liquids, advance to regular diet as tolerated                                         Continue Zofran / Reglan for nausea - PRN	                                                                 Renal:                                         Continue LR 10cc/hr                                         Monitor I/Os and electrolytes                                         BPH: Continue Flomax 0.4mg BID                                                 Hem/ Onc:                                                                                  Monitor chest tube output &  signs of bleeding.                                          Follow CBC in AM                           Infectious disease:                                            No signs of infection. Monitor for fever / leukocytosis.                                          All surgical incision / chest tube  sites look clean                            Endocrine                                             Continue Accu-Checks with coverage    Pt is on SQ Heparin and Venodyne boots for DVT prophylaxis.     Pertinent clinical, laboratory, radiographic, hemodynamic, echocardiographic, respiratory data, microbiologic data and chart were reviewed and analyzed frequently throughout the course of the day and night  Patient seen, examined and plan discussed with CT Surgeon / CTICU team during rounds.    Pt's status discussed with family at bedside, updated status              Vincent Eduardo MD ALANNA JADE            [id number]         No Known Allergies                 HPI:  68 y/o male with history of lung cancer presents to [facility] today for presurgical evaluation  He was diagnosed with lung cancer in 2015 and treated with neoadjuvant chemo/RT followed by left pneumonectomy in June 2015.  He was followed with CT Scans and was treated with SBRT for a right upper lobe nodule in January 2017.  Recent CT Scan revealed an increase in the size of the right upper lobe lung nodule.  PET Scan was done.  He denies any fever, chills, weight loss, fatigue, shortness of breath, cough and hemoptysis.  He is scheduled for right thoracotomy, right upper lobe wedge resection on 2/23/18. (14 Feb 2018 11:02)      Procedure: RVATS / Wedge resection  POD# 0    Issues:  Lung nodule  Postop pain  Urinary retention  BPH               Home Medications:  CoQ 10 100 mg orally  daily  last dose 2/16:  (23 Feb 2018 08:40)  tamsulosin 0.4 mg oral capsule: 1 cap(s) orally 2x day (23 Feb 2018 08:40)      PAST MEDICAL & SURGICAL HISTORY:  Lung nodule  Lung cancer: Dx&#x27;d 2015 treated with neoadjuvant chemo/RT followed by pneumonectomy (June 2015), SBRT to RUL nodule in Jan 2017  Hydronephrosis  Nephrolithiasis  BPH (benign prostatic hypertrophy)  H/O pneumonectomy: Left June 2015  S/P tonsillectomy  H/O lithotripsy        ICU Vital Signs Last 24 Hrs  T(C): 36.1 (23 Feb 2018 14:00), Max: 36.6 (23 Feb 2018 08:25)  T(F): 97 (23 Feb 2018 14:00), Max: 97.9 (23 Feb 2018 08:25)  HR: 67 (23 Feb 2018 15:30) (61 - 73)  BP: 127/78 (23 Feb 2018 15:00) (96/55 - 150/76)  BP(mean): 90 (23 Feb 2018 15:00) (63 - 90)  ABP: 126/59 (23 Feb 2018 15:30) (126/59 - 150/70)  ABP(mean): 80 (23 Feb 2018 15:30) (80 - 97)  RR: 21 (23 Feb 2018 15:30) (16 - 21)  SpO2: 96% (23 Feb 2018 15:30) (94% - 100%)    I&O's Detail    23 Feb 2018 07:01  -  23 Feb 2018 15:52  --------------------------------------------------------  IN:    lactated ringers.: 90 mL    Oral Fluid: 100 mL  Total IN: 190 mL    OUT:    Chest Tube: 15 mL  Total OUT: 15 mL    Total NET: 175 mL      CAPILLARY BLOOD GLUCOSE      Home Medications:  CoQ 10 100 mg orally  daily  last dose 2/16:  (23 Feb 2018 08:40)  tamsulosin 0.4 mg oral capsule: 1 cap(s) orally 2x day (23 Feb 2018 08:40)      MEDICATIONS  (STANDING):  docusate sodium 100 milliGRAM(s) Oral three times a day  heparin  Injectable 5000 Unit(s) SubCutaneous every 8 hours  HYDROmorphone PCA (1 mG/mL) 30 milliLiter(s) PCA Continuous PCA Continuous  lactated ringers. 1000 milliLiter(s) (10 mL/Hr) IV Continuous <Continuous>    MEDICATIONS  (PRN):  diphenhydrAMINE   Injectable 25 milliGRAM(s) IV Push every 4 hours PRN Pruritus  HYDROmorphone PCA (1 mG/mL) Rescue Clinician Bolus 0.5 milliGRAM(s) IV Push every 15 minutes PRN for Pain Scale GREATER THAN 6  naloxone Injectable 0.1 milliGRAM(s) IV Push every 3 minutes PRN For ANY of the following changes in patient status:  A. RR LESS THAN 10 breaths per minute, B. Oxygen saturation LESS THAN 90%, C. Sedation score of 6  ondansetron Injectable 4 milliGRAM(s) IV Push every 6 hours PRN Nausea        Physical exam:                             General:               Pt is awake, alert, OOB in chair not in any distress                                                  Neuro:                  Nonfocal                             Cardiovascular:   S1 & S2, regular                           Respiratory:         Air entry is fair and equal on both sides, has bilateral conducted sounds                           GI:                          Soft, nondistended and nontender, Bowel sounds active                            Ext:                        No cyanosis or edema     Labs:                                                                 CXR:  Left pneumonectomy changes. Rt chest tube in place. Mild congestion      Plan:    General: 67y Male s/p RVATS / Wedge resection POD#0  progressing well, experiencing  pain with deep breathing.                             Neuro:                                         Pain control with PCA / Tylenol IV                            Cardiovascular:                                          Continue hemodynamic monitoring.                            Respiratory:                                         Pt is on 2L  nasal canula,                                           Comfortable, not in any distress.                                         Using incentive spirometry                                          Monitor chest tube output.                                          Chest tube to water seal. Has some air leak                                                                  Continue bronchodilators, pulmonary toilet     Minimize IVF                            GI                                         On clear liquids, advance to regular diet as tolerated                                         Continue Zofran / Reglan for nausea - PRN	                                                                 Renal:                                         Continue LR 10cc/hr                                         Monitor I/Os and electrolytes     Urinary retention: Crum inserted & evacuated 1100 cc                                          BPH: Continue Flomax 0.4mg BID                                                 Hem/ Onc:                                                                                  Monitor chest tube output &  signs of bleeding.                                          Follow CBC in AM                           Infectious disease:                                            No signs of infection. Monitor for fever / leukocytosis.                                          All surgical incision / chest tube  sites look clean                            Endocrine                                             Continue Accu-Checks with coverage    Pt is on SQ Heparin and Venodyne boots for DVT prophylaxis.     Pertinent clinical, laboratory, radiographic, hemodynamic, echocardiographic, respiratory data, microbiologic data and chart were reviewed and analyzed frequently throughout the course of the day and night  Patient seen, examined and plan discussed with CT Surgeon / CTICU team during rounds.    Pt's status discussed with family at bedside, updated status              Vincent Eduardo MD

## 2018-02-24 ENCOUNTER — TRANSCRIPTION ENCOUNTER (OUTPATIENT)
Age: 68
End: 2018-02-24

## 2018-02-24 LAB
BUN SERPL-MCNC: 16 MG/DL — SIGNIFICANT CHANGE UP (ref 7–23)
CALCIUM SERPL-MCNC: 8.1 MG/DL — LOW (ref 8.4–10.5)
CHLORIDE SERPL-SCNC: 104 MMOL/L — SIGNIFICANT CHANGE UP (ref 98–107)
CO2 SERPL-SCNC: 26 MMOL/L — SIGNIFICANT CHANGE UP (ref 22–31)
CREAT SERPL-MCNC: 1.05 MG/DL — SIGNIFICANT CHANGE UP (ref 0.5–1.3)
GLUCOSE SERPL-MCNC: 122 MG/DL — HIGH (ref 70–99)
HCT VFR BLD CALC: 40 % — SIGNIFICANT CHANGE UP (ref 39–50)
HGB BLD-MCNC: 12.2 G/DL — LOW (ref 13–17)
MCHC RBC-ENTMCNC: 28.2 PG — SIGNIFICANT CHANGE UP (ref 27–34)
MCHC RBC-ENTMCNC: 30.5 % — LOW (ref 32–36)
MCV RBC AUTO: 92.6 FL — SIGNIFICANT CHANGE UP (ref 80–100)
NRBC # FLD: 0 — SIGNIFICANT CHANGE UP
PLATELET # BLD AUTO: 155 K/UL — SIGNIFICANT CHANGE UP (ref 150–400)
PMV BLD: 9.2 FL — SIGNIFICANT CHANGE UP (ref 7–13)
POTASSIUM SERPL-MCNC: 4.1 MMOL/L — SIGNIFICANT CHANGE UP (ref 3.5–5.3)
POTASSIUM SERPL-SCNC: 4.1 MMOL/L — SIGNIFICANT CHANGE UP (ref 3.5–5.3)
RBC # BLD: 4.32 M/UL — SIGNIFICANT CHANGE UP (ref 4.2–5.8)
RBC # FLD: 15.1 % — HIGH (ref 10.3–14.5)
SODIUM SERPL-SCNC: 140 MMOL/L — SIGNIFICANT CHANGE UP (ref 135–145)
WBC # BLD: 6.55 K/UL — SIGNIFICANT CHANGE UP (ref 3.8–10.5)
WBC # FLD AUTO: 6.55 K/UL — SIGNIFICANT CHANGE UP (ref 3.8–10.5)

## 2018-02-24 PROCEDURE — 71045 X-RAY EXAM CHEST 1 VIEW: CPT | Mod: 26

## 2018-02-24 PROCEDURE — 99233 SBSQ HOSP IP/OBS HIGH 50: CPT

## 2018-02-24 RX ORDER — ACETAMINOPHEN 500 MG
650 TABLET ORAL EVERY 6 HOURS
Qty: 0 | Refills: 0 | Status: DISCONTINUED | OUTPATIENT
Start: 2018-02-24 | End: 2018-02-25

## 2018-02-24 RX ORDER — PHENYLEPHRINE HYDROCHLORIDE 10 MG/ML
0.5 INJECTION INTRAVENOUS
Qty: 80 | Refills: 0 | Status: DISCONTINUED | OUTPATIENT
Start: 2018-02-24 | End: 2018-02-24

## 2018-02-24 RX ORDER — OXYCODONE AND ACETAMINOPHEN 5; 325 MG/1; MG/1
1 TABLET ORAL EVERY 4 HOURS
Qty: 0 | Refills: 0 | Status: DISCONTINUED | OUTPATIENT
Start: 2018-02-24 | End: 2018-02-25

## 2018-02-24 RX ADMIN — HYDROMORPHONE HYDROCHLORIDE 30 MILLILITER(S): 2 INJECTION INTRAMUSCULAR; INTRAVENOUS; SUBCUTANEOUS at 07:27

## 2018-02-24 RX ADMIN — SODIUM CHLORIDE 10 MILLILITER(S): 9 INJECTION, SOLUTION INTRAVENOUS at 07:28

## 2018-02-24 RX ADMIN — Medication 100 MILLIGRAM(S): at 05:28

## 2018-02-24 RX ADMIN — HEPARIN SODIUM 5000 UNIT(S): 5000 INJECTION INTRAVENOUS; SUBCUTANEOUS at 05:28

## 2018-02-24 RX ADMIN — Medication 100 MILLIGRAM(S): at 21:16

## 2018-02-24 RX ADMIN — HYDROMORPHONE HYDROCHLORIDE 0.5 MILLIGRAM(S): 2 INJECTION INTRAMUSCULAR; INTRAVENOUS; SUBCUTANEOUS at 00:00

## 2018-02-24 RX ADMIN — TAMSULOSIN HYDROCHLORIDE 0.4 MILLIGRAM(S): 0.4 CAPSULE ORAL at 05:28

## 2018-02-24 RX ADMIN — TAMSULOSIN HYDROCHLORIDE 0.4 MILLIGRAM(S): 0.4 CAPSULE ORAL at 17:48

## 2018-02-24 RX ADMIN — HEPARIN SODIUM 5000 UNIT(S): 5000 INJECTION INTRAVENOUS; SUBCUTANEOUS at 13:01

## 2018-02-24 RX ADMIN — Medication 100 MILLIGRAM(S): at 13:02

## 2018-02-24 RX ADMIN — HEPARIN SODIUM 5000 UNIT(S): 5000 INJECTION INTRAVENOUS; SUBCUTANEOUS at 21:17

## 2018-02-24 NOTE — DISCHARGE NOTE ADULT - PATIENT PORTAL LINK FT
You can access the [facility] Patient Portal, offered by [facility], by registering with the following website: [email or web address]

## 2018-02-24 NOTE — DISCHARGE NOTE ADULT - CONDITIONS AT DISCHARGE
Patient alert and oriented. Stable condition. Incision site is clean, dry, and intact. IV discontinued.

## 2018-02-24 NOTE — DISCHARGE NOTE ADULT - MEDICATION SUMMARY - MEDICATIONS TO TAKE
I will START or STAY ON the medications listed below when I get home from the hospital:    CoQ 10 100 mg orally  daily  last dose 2/16  -- May resume  -- Indication: For supplement    acetaminophen 325 mg oral tablet  -- 2 tab(s) by mouth every 6 hours, As needed, Mild Pain (1 - 3)  -- Indication: For pain    oxyCODONE-acetaminophen 5 mg-325 mg oral tablet  -- 1 tab(s) by mouth every 4 hours, As needed, Moderate Pain (4 - 6) or severe  pain (7-10) MDD:6  -- Indication: For pain    tamsulosin 0.4 mg oral capsule  -- 1 cap(s) by mouth 2x day  -- Indication: For urinary retention    docusate sodium 100 mg oral capsule  -- 1 cap(s) by mouth 3 times a day  -- Indication: For constipation

## 2018-02-24 NOTE — DISCHARGE NOTE ADULT - CARE PROVIDER_API CALL
Jan Sims), Thoracic Surgery  [address] [place] [address]  [place] [address]  Phone: [phone]  Fax: [phone]    Kendrick Deleon), Medicine  [address] [id number]  Phone: [phone]  Fax: [phone]

## 2018-02-24 NOTE — DISCHARGE NOTE ADULT - CARE PROVIDERS DIRECT ADDRESSES
[email or web address]

## 2018-02-24 NOTE — PROGRESS NOTE ADULT - SUBJECTIVE AND OBJECTIVE BOX
ALANNA JADE            MRN-[id number]         No Known Allergies             66 y/o male with history of lung cancer presents to [facility] today for presurgical evaluation  He was diagnosed with lung cancer in 2015 and treated with neoadjuvant chemo/RT followed by left pneumonectomy in June 2015.  He was followed with CT Scans and was treated with SBRT for a right upper lobe nodule in January 2017.  Recent CT Scan revealed an increase in the size of the right upper lobe lung nodule.  PET Scan was done.  He denies any fever, chills, weight loss, fatigue, shortness of breath, cough and hemoptysis.  He is scheduled for right thoracotomy, right upper lobe wedge resection on 2/23/18. (14 Feb 2018 11:02)      Procedure: RVATS / Wedge resection  POD# 1    Issues:  Lung nodule  Postop pain  Urinary retention  BPH                   Home Medications:  CoQ 10 100 mg orally  daily  last dose 2/16:  (23 Feb 2018 08:40)  tamsulosin 0.4 mg oral capsule: 1 cap(s) orally 2x day (23 Feb 2018 08:40)      PAST MEDICAL & SURGICAL HISTORY:  Lung nodule  Lung cancer: Dx&#x27;d 2015 treated with neoadjuvant chemo/RT followed by pneumonectomy (June 2015), SBRT to RUL nodule in Jan 2017  Hydronephrosis  Nephrolithiasis  BPH (benign prostatic hypertrophy)  H/O pneumonectomy: Left June 2015  S/P tonsillectomy  H/O lithotripsy        ICU Vital Signs Last 24 Hrs  T(C): 36.6 (24 Feb 2018 04:00), Max: 37.3 (23 Feb 2018 20:00)  T(F): 97.9 (24 Feb 2018 04:00), Max: 99.2 (23 Feb 2018 20:00)  HR: 68 (24 Feb 2018 06:00) (61 - 90)  BP: 120/62 (24 Feb 2018 06:00) (96/55 - 150/76)  BP(mean): 77 (24 Feb 2018 06:00) (63 - 90)  ABP: 105/40 (24 Feb 2018 06:00) (96/38 - 150/70)  ABP(mean): 62 (24 Feb 2018 06:00) (57 - 97)  RR: 16 (24 Feb 2018 06:00) (14 - 23)  SpO2: 94% (24 Feb 2018 06:00) (92% - 100%)    I&O's Detail    23 Feb 2018 07:01  -  24 Feb 2018 06:24  --------------------------------------------------------  IN:    lactated ringers.: 280 mL    Oral Fluid: 920 mL  Total IN: 1200 mL    OUT:    Chest Tube: 60 mL    Indwelling Catheter - Urethral: 1400 mL  Total OUT: 1460 mL    Total NET: -260 mL      CAPILLARY BLOOD GLUCOSE        Home Medications:  CoQ 10 100 mg orally  daily  last dose 2/16:  (23 Feb 2018 08:40)  tamsulosin 0.4 mg oral capsule: 1 cap(s) orally 2x day (23 Feb 2018 08:40)      MEDICATIONS  (STANDING):  docusate sodium 100 milliGRAM(s) Oral three times a day  heparin  Injectable 5000 Unit(s) SubCutaneous every 8 hours  HYDROmorphone PCA (1 mG/mL) 30 milliLiter(s) PCA Continuous PCA Continuous  lactated ringers. 1000 milliLiter(s) (10 mL/Hr) IV Continuous <Continuous>  phenylephrine    Infusion 0.5 MICROgram(s)/kG/Min (16.406 mL/Hr) IV Continuous <Continuous>  tamsulosin 0.4 milliGRAM(s) Oral two times a day    MEDICATIONS  (PRN):  diphenhydrAMINE   Injectable 25 milliGRAM(s) IV Push every 4 hours PRN Pruritus  HYDROmorphone PCA (1 mG/mL) Rescue Clinician Bolus 0.5 milliGRAM(s) IV Push every 15 minutes PRN for Pain Scale GREATER THAN 6  naloxone Injectable 0.1 milliGRAM(s) IV Push every 3 minutes PRN For ANY of the following changes in patient status:  A. RR LESS THAN 10 breaths per minute, B. Oxygen saturation LESS THAN 90%, C. Sedation score of 6  ondansetron Injectable 4 milliGRAM(s) IV Push every 6 hours PRN Nausea          Physical exam:                  General:               Pt is awake, alert, OOB in chair not in any distress                                                  Neuro:                  Nonfocal                             Cardiovascular:   S1 & S2, regular                           Respiratory:         Air entry is fair on right side, has some conducted sounds. No BS on the left                           GI:                          Soft, nondistended and nontender, Bowel sounds active                            Ext:                        No cyanosis or edema              Labs:                                                                           12.2   6.55  )-----------( 155      ( 24 Feb 2018 04:36 )             40.0             02-24    140  |  104  |  16  ----------------------------<  122<H>  4.1   |  26  |  1.05    Ca    8.1<L>      24 Feb 2018 04:36      CXR:  Postop changes. Left chest is consistent with pneumonectomy.      Plan:    General: 67y Male s/p RVATS / Wedge resection POD#1  progressing well, experiencing  pain with deep breathing.                             Neuro:                                         Pain control with PCA / Tylenol IV                            Cardiovascular:                                          Continue hemodynamic monitoring.                            Respiratory:                                         Pt is on 2L  nasal canula,                                           Comfortable, not in any distress.                                         Using incentive spirometry                                          Monitor chest tube output.                                          Chest tube to water seal. Has some air leak                                                                  Continue bronchodilators, pulmonary toilet     Minimize IVF.                            GI                                         On clear liquids, advance to regular diet as tolerated                                         Continue Zofran / Reglan for nausea - PRN	                                                                 Renal:                                         Continue LR 10cc/hr                                         Monitor I/Os and electrolytes     Urinary retention: Crum inserted & evacuated 1100 cc                                          BPH: Continue Flomax 0.4mg BID                                                 Hem/ Onc:                                                                                  Monitor chest tube output &  signs of bleeding.                                          Follow CBC in AM                           Infectious disease:                                            No signs of infection. Monitor for fever / leukocytosis.                                          All surgical incision / chest tube  sites look clean                            Endocrine                                             Continue Accu-Checks with coverage    Pt is on SQ Heparin and Venodyne boots for DVT prophylaxis.     Pertinent clinical, laboratory, radiographic, hemodynamic, echocardiographic, respiratory data, microbiologic data and chart were reviewed and analyzed frequently throughout the course of the day and night  Patient seen, examined and plan discussed with CT Surgeon / CTICU team during rounds.        Vincent Eduardo MD

## 2018-02-24 NOTE — DISCHARGE NOTE ADULT - ADDITIONAL INSTRUCTIONS
Keep the wound clean and dry.  Wash with soap and water and monitor for signs of infection.  See Dr Sims in 2 weeks.  Call for an appointment and bring a new chest X-ray with it when you come to see him.  See your PCP as well. Keep the wound clean and dry.  Wash with soap and water and monitor for signs of infection.  See Dr Sims in 7-10 days weeks.  Call for an appointment and bring a new chest X-ray with it when you come to see him.  See your PCP as well.

## 2018-02-24 NOTE — PROGRESS NOTE ADULT - SUBJECTIVE AND OBJECTIVE BOX
Anesthesia Pain Management Service    SUBJECTIVE: Patient is doing well with IV PCA and no significant problems reported.    Pain Scale Score	At rest: ___ 	With Activity: ___ 	[X ] Refer to charted pain scores    THERAPY:    [ ] IV PCA Morphine		[ ] 5 mg/mL	[ ] 1 mg/mL  [X ] IV PCA Hydromorphone	[ ] 5 mg/mL	[X ] 1 mg/mL  [ ] IV PCA Fentanyl		[ ] 50 micrograms/mL    Demand dose __0.2_ lockout __6_ (minutes) Continuous Rate _0__ Total: _3.5__  Daily      MEDICATIONS  (STANDING):  docusate sodium 100 milliGRAM(s) Oral three times a day  heparin  Injectable 5000 Unit(s) SubCutaneous every 8 hours  HYDROmorphone PCA (1 mG/mL) 30 milliLiter(s) PCA Continuous PCA Continuous  lactated ringers. 1000 milliLiter(s) (10 mL/Hr) IV Continuous <Continuous>  tamsulosin 0.4 milliGRAM(s) Oral two times a day    MEDICATIONS  (PRN):  diphenhydrAMINE   Injectable 25 milliGRAM(s) IV Push every 4 hours PRN Pruritus  HYDROmorphone PCA (1 mG/mL) Rescue Clinician Bolus 0.5 milliGRAM(s) IV Push every 15 minutes PRN for Pain Scale GREATER THAN 6  naloxone Injectable 0.1 milliGRAM(s) IV Push every 3 minutes PRN For ANY of the following changes in patient status:  A. RR LESS THAN 10 breaths per minute, B. Oxygen saturation LESS THAN 90%, C. Sedation score of 6  ondansetron Injectable 4 milliGRAM(s) IV Push every 6 hours PRN Nausea      OBJECTIVE:    Sedation Score:	[ X] Alert	[ ] Drowsy 	[ ] Arousable	[ ] Asleep	[ ] Unresponsive    Side Effects:	[X ] None	[ ] Nausea	[ ] Vomiting	[ ] Pruritus  		[ ] Other:    Vital Signs Last 24 Hrs  T(C): 36.8 (24 Feb 2018 08:00), Max: 37.3 (23 Feb 2018 20:00)  T(F): 98.2 (24 Feb 2018 08:00), Max: 99.2 (23 Feb 2018 20:00)  HR: 79 (24 Feb 2018 08:00) (61 - 90)  BP: 130/59 (24 Feb 2018 08:00) (96/55 - 139/63)  BP(mean): 76 (24 Feb 2018 08:00) (63 - 90)  RR: 23 (24 Feb 2018 08:00) (14 - 23)  SpO2: 95% (24 Feb 2018 08:00) (92% - 100%)    ASSESSMENT/ PLAN    Therapy to  be:	[ X] Continue   [ ] Discontinued   [ ] Change to prn Analgesics    Documentation and Verification of current medications:   [X] Done	[ ] Not done, not elligible    Comments:

## 2018-02-24 NOTE — DISCHARGE NOTE ADULT - INSTRUCTIONS
Resume usual diet If you are having feelings of chest pain or shortness of breath, call 911 immediately. Follow up with all doctor appointments as explained above. Do not do any heavy lifting. Continue to keep site clean and dry.

## 2018-02-24 NOTE — DISCHARGE NOTE ADULT - CARE PLAN
Principal Discharge DX:	Lung nodule  Goal:	Wound healing; Follow up final pathology for treatment plan development  Assessment and plan of treatment:	pending final pathology report Principal Discharge DX:	Lung nodule  Goal:	Wound healing; Follow up final pathology for treatment plan development  Assessment and plan of treatment:	Walk 4-5 x per day. Increase as tolerated. You may climb stairs. Continue to use incentive spirometer.   You can remove all dressings and keep wound uncovered. Suture will be removed in office. You may shower.   See Dr. Sims in office in 7-10 days. Call for an apt. [phone]. Have chest x ray prior to that apt and then bring those images with you.

## 2018-02-25 VITALS
DIASTOLIC BLOOD PRESSURE: 74 MMHG | TEMPERATURE: 98 F | HEART RATE: 82 BPM | SYSTOLIC BLOOD PRESSURE: 150 MMHG | RESPIRATION RATE: 18 BRPM | OXYGEN SATURATION: 96 %

## 2018-02-25 LAB
BUN SERPL-MCNC: 15 MG/DL — SIGNIFICANT CHANGE UP (ref 7–23)
CALCIUM SERPL-MCNC: 8.4 MG/DL — SIGNIFICANT CHANGE UP (ref 8.4–10.5)
CHLORIDE SERPL-SCNC: 102 MMOL/L — SIGNIFICANT CHANGE UP (ref 98–107)
CO2 SERPL-SCNC: 23 MMOL/L — SIGNIFICANT CHANGE UP (ref 22–31)
CREAT SERPL-MCNC: 0.88 MG/DL — SIGNIFICANT CHANGE UP (ref 0.5–1.3)
GLUCOSE SERPL-MCNC: 104 MG/DL — HIGH (ref 70–99)
POTASSIUM SERPL-MCNC: 3.8 MMOL/L — SIGNIFICANT CHANGE UP (ref 3.5–5.3)
POTASSIUM SERPL-SCNC: 3.8 MMOL/L — SIGNIFICANT CHANGE UP (ref 3.5–5.3)
SODIUM SERPL-SCNC: 139 MMOL/L — SIGNIFICANT CHANGE UP (ref 135–145)

## 2018-02-25 PROCEDURE — 71045 X-RAY EXAM CHEST 1 VIEW: CPT | Mod: 26

## 2018-02-25 PROCEDURE — 99238 HOSP IP/OBS DSCHRG MGMT 30/<: CPT

## 2018-02-25 RX ORDER — DOCUSATE SODIUM 100 MG
1 CAPSULE ORAL
Qty: 0 | Refills: 0 | DISCHARGE
Start: 2018-02-25

## 2018-02-25 RX ORDER — ACETAMINOPHEN 500 MG
2 TABLET ORAL
Qty: 0 | Refills: 0 | DISCHARGE
Start: 2018-02-25

## 2018-02-25 RX ADMIN — HEPARIN SODIUM 5000 UNIT(S): 5000 INJECTION INTRAVENOUS; SUBCUTANEOUS at 05:12

## 2018-02-25 RX ADMIN — Medication 100 MILLIGRAM(S): at 14:21

## 2018-02-25 RX ADMIN — TAMSULOSIN HYDROCHLORIDE 0.4 MILLIGRAM(S): 0.4 CAPSULE ORAL at 05:11

## 2018-02-25 RX ADMIN — Medication 100 MILLIGRAM(S): at 05:11

## 2018-02-25 RX ADMIN — HEPARIN SODIUM 5000 UNIT(S): 5000 INJECTION INTRAVENOUS; SUBCUTANEOUS at 14:21

## 2018-02-27 LAB — SURGICAL PATHOLOGY STUDY: SIGNIFICANT CHANGE UP

## 2018-02-28 ENCOUNTER — OUTPATIENT (OUTPATIENT)
Dept: OUTPATIENT SERVICES | Facility: HOSPITAL | Age: 68
LOS: 1 days | End: 2018-02-28
Payer: MEDICARE

## 2018-02-28 ENCOUNTER — APPOINTMENT (OUTPATIENT)
Dept: RADIOLOGY | Facility: HOSPITAL | Age: 68
End: 2018-02-28

## 2018-02-28 ENCOUNTER — TRANSCRIPTION ENCOUNTER (OUTPATIENT)
Age: 68
End: 2018-02-28

## 2018-02-28 DIAGNOSIS — Z98.89 OTHER SPECIFIED POSTPROCEDURAL STATES: Chronic | ICD-10-CM

## 2018-02-28 DIAGNOSIS — Z00.8 ENCOUNTER FOR OTHER GENERAL EXAMINATION: ICD-10-CM

## 2018-02-28 DIAGNOSIS — Z98.890 OTHER SPECIFIED POSTPROCEDURAL STATES: Chronic | ICD-10-CM

## 2018-02-28 PROCEDURE — 71046 X-RAY EXAM CHEST 2 VIEWS: CPT | Mod: 26

## 2018-02-28 PROCEDURE — 71046 X-RAY EXAM CHEST 2 VIEWS: CPT

## 2018-03-05 ENCOUNTER — APPOINTMENT (OUTPATIENT)
Dept: THORACIC SURGERY | Facility: CLINIC | Age: 68
End: 2018-03-05
Payer: MEDICARE

## 2018-03-05 VITALS
HEART RATE: 78 BPM | BODY MASS INDEX: 26.88 KG/M2 | RESPIRATION RATE: 15 BRPM | TEMPERATURE: 98.3 F | DIASTOLIC BLOOD PRESSURE: 78 MMHG | SYSTOLIC BLOOD PRESSURE: 149 MMHG | HEIGHT: 71 IN | WEIGHT: 192 LBS | OXYGEN SATURATION: 98 %

## 2018-03-05 DIAGNOSIS — R59.9 ENLARGED LYMPH NODES, UNSPECIFIED: ICD-10-CM

## 2018-03-05 PROCEDURE — 99024 POSTOP FOLLOW-UP VISIT: CPT

## 2018-03-21 ENCOUNTER — APPOINTMENT (OUTPATIENT)
Dept: HEMATOLOGY ONCOLOGY | Facility: CLINIC | Age: 68
End: 2018-03-21
Payer: MEDICARE

## 2018-03-21 VITALS
TEMPERATURE: 97.5 F | DIASTOLIC BLOOD PRESSURE: 74 MMHG | BODY MASS INDEX: 27.34 KG/M2 | WEIGHT: 196 LBS | SYSTOLIC BLOOD PRESSURE: 130 MMHG | HEART RATE: 72 BPM | OXYGEN SATURATION: 97 %

## 2018-03-21 PROCEDURE — 99214 OFFICE O/P EST MOD 30 MIN: CPT

## 2018-06-27 ENCOUNTER — FORM ENCOUNTER (OUTPATIENT)
Age: 68
End: 2018-06-27

## 2018-06-28 ENCOUNTER — OUTPATIENT (OUTPATIENT)
Dept: OUTPATIENT SERVICES | Facility: HOSPITAL | Age: 68
LOS: 1 days | End: 2018-06-28
Payer: MEDICARE

## 2018-06-28 ENCOUNTER — APPOINTMENT (OUTPATIENT)
Dept: CT IMAGING | Facility: HOSPITAL | Age: 68
End: 2018-06-28
Payer: MEDICARE

## 2018-06-28 DIAGNOSIS — Z98.89 OTHER SPECIFIED POSTPROCEDURAL STATES: Chronic | ICD-10-CM

## 2018-06-28 DIAGNOSIS — C34.90 MALIGNANT NEOPLASM OF UNSPECIFIED PART OF UNSPECIFIED BRONCHUS OR LUNG: ICD-10-CM

## 2018-06-28 DIAGNOSIS — Z98.890 OTHER SPECIFIED POSTPROCEDURAL STATES: Chronic | ICD-10-CM

## 2018-06-28 DIAGNOSIS — R91.1 SOLITARY PULMONARY NODULE: ICD-10-CM

## 2018-06-28 PROCEDURE — 71250 CT THORAX DX C-: CPT | Mod: 26

## 2018-06-28 PROCEDURE — 71250 CT THORAX DX C-: CPT

## 2018-06-29 ENCOUNTER — MESSAGE (OUTPATIENT)
Age: 68
End: 2018-06-29

## 2018-07-03 LAB
BASOPHILS # BLD AUTO: 0.02 K/UL
BASOPHILS NFR BLD AUTO: 0.4 %
CALCIUM SERPL-MCNC: 9.7 MG/DL
CREAT SERPL-MCNC: 1.21 MG/DL
EOSINOPHIL # BLD AUTO: 0.15 K/UL
EOSINOPHIL NFR BLD AUTO: 2.7 %
HCT VFR BLD CALC: 43.3 %
HGB BLD-MCNC: 13.8 G/DL
IMM GRANULOCYTES NFR BLD AUTO: 0.5 %
LDH SERPL-CCNC: 235 U/L
LYMPHOCYTES # BLD AUTO: 1.08 K/UL
LYMPHOCYTES NFR BLD AUTO: 19.5 %
MAN DIFF?: NORMAL
MCHC RBC-ENTMCNC: 29.7 PG
MCHC RBC-ENTMCNC: 31.9 GM/DL
MCV RBC AUTO: 93.1 FL
MONOCYTES # BLD AUTO: 0.82 K/UL
MONOCYTES NFR BLD AUTO: 14.8 %
NEUTROPHILS # BLD AUTO: 3.43 K/UL
NEUTROPHILS NFR BLD AUTO: 62.1 %
PLATELET # BLD AUTO: 189 K/UL
RBC # BLD: 4.65 M/UL
RBC # FLD: 15 %
WBC # FLD AUTO: 5.53 K/UL

## 2018-07-04 LAB
DEPRECATED KAPPA LC FREE/LAMBDA SER: 9.85 RATIO
DEPRECATED KAPPA LC FREE/LAMBDA SER: 9.85 RATIO
IGA SER QL IEP: 57 MG/DL
IGG SER QL IEP: 603 MG/DL
IGM SER QL IEP: 1820 MG/DL
KAPPA LC CSF-MCNC: 0.75 MG/DL
KAPPA LC CSF-MCNC: 0.75 MG/DL
KAPPA LC SERPL-MCNC: 7.39 MG/DL
KAPPA LC SERPL-MCNC: 7.39 MG/DL

## 2018-07-08 LAB — VISCOSITY, SERUM: 1.9

## 2018-07-09 ENCOUNTER — APPOINTMENT (OUTPATIENT)
Dept: THORACIC SURGERY | Facility: CLINIC | Age: 68
End: 2018-07-09
Payer: MEDICARE

## 2018-07-09 VITALS
DIASTOLIC BLOOD PRESSURE: 89 MMHG | SYSTOLIC BLOOD PRESSURE: 150 MMHG | RESPIRATION RATE: 16 BRPM | OXYGEN SATURATION: 98 % | BODY MASS INDEX: 27.72 KG/M2 | WEIGHT: 198 LBS | TEMPERATURE: 98.5 F | HEART RATE: 67 BPM | HEIGHT: 71 IN

## 2018-07-09 PROCEDURE — 99214 OFFICE O/P EST MOD 30 MIN: CPT

## 2018-07-12 ENCOUNTER — APPOINTMENT (OUTPATIENT)
Dept: HEMATOLOGY ONCOLOGY | Facility: CLINIC | Age: 68
End: 2018-07-12
Payer: MEDICARE

## 2018-07-12 VITALS
SYSTOLIC BLOOD PRESSURE: 154 MMHG | WEIGHT: 198 LBS | HEART RATE: 70 BPM | OXYGEN SATURATION: 97 % | BODY MASS INDEX: 27.62 KG/M2 | TEMPERATURE: 97.5 F | DIASTOLIC BLOOD PRESSURE: 80 MMHG

## 2018-07-12 PROCEDURE — 99214 OFFICE O/P EST MOD 30 MIN: CPT

## 2018-07-17 PROBLEM — C34.90 MALIGNANT NEOPLASM OF UNSPECIFIED PART OF UNSPECIFIED BRONCHUS OR LUNG: Chronic | Status: ACTIVE | Noted: 2018-02-14

## 2018-09-18 PROBLEM — R91.1 SOLITARY PULMONARY NODULE: Chronic | Status: ACTIVE | Noted: 2018-02-14

## 2018-09-25 ENCOUNTER — APPOINTMENT (OUTPATIENT)
Dept: FAMILY MEDICINE | Facility: CLINIC | Age: 68
End: 2018-09-25
Payer: MEDICARE

## 2018-09-25 VITALS
SYSTOLIC BLOOD PRESSURE: 130 MMHG | WEIGHT: 202 LBS | BODY MASS INDEX: 28.28 KG/M2 | HEIGHT: 71 IN | RESPIRATION RATE: 20 BRPM | DIASTOLIC BLOOD PRESSURE: 70 MMHG | HEART RATE: 68 BPM

## 2018-09-25 DIAGNOSIS — Z00.00 ENCOUNTER FOR GENERAL ADULT MEDICAL EXAMINATION W/OUT ABNORMAL FINDINGS: ICD-10-CM

## 2018-09-25 DIAGNOSIS — Z87.891 PERSONAL HISTORY OF NICOTINE DEPENDENCE: ICD-10-CM

## 2018-09-25 DIAGNOSIS — Z23 ENCOUNTER FOR IMMUNIZATION: ICD-10-CM

## 2018-09-25 PROCEDURE — 99214 OFFICE O/P EST MOD 30 MIN: CPT | Mod: 25

## 2018-09-25 PROCEDURE — 36415 COLL VENOUS BLD VENIPUNCTURE: CPT

## 2018-09-25 PROCEDURE — 90686 IIV4 VACC NO PRSV 0.5 ML IM: CPT

## 2018-09-25 PROCEDURE — G0008: CPT

## 2018-09-25 NOTE — HEALTH RISK ASSESSMENT
[No falls in past year] : Patient reported no falls in the past year [Fully functional (bathing, dressing, toileting, transferring, walking, feeding)] : Fully functional (bathing, dressing, toileting, transferring, walking, feeding) [Fully functional (using the telephone, shopping, preparing meals, housekeeping, doing laundry, using] : Fully functional and needs no help or supervision to perform IADLs (using the telephone, shopping, preparing meals, housekeeping, doing laundry, using transportation, managing medications and managing finances) [Discussed at today's visit] : Advance Directives Discussed at today's visit [] : No [FreeTextEntry4] : has not designated HCP nor has decided on preferences

## 2018-09-25 NOTE — PHYSICAL EXAM
[No Acute Distress] : no acute distress [Normal Sclera/Conjunctiva] : normal sclera/conjunctiva [PERRL] : pupils equal round and reactive to light [EOMI] : extraocular movements intact [No JVD] : no jugular venous distention [Supple] : supple [No Lymphadenopathy] : no lymphadenopathy [Thyroid Normal, No Nodules] : the thyroid was normal and there were no nodules present [No Respiratory Distress] : no respiratory distress  [Clear to Auscultation] : lungs were clear to auscultation bilaterally [No Accessory Muscle Use] : no accessory muscle use [Normal Rate] : normal rate  [Regular Rhythm] : with a regular rhythm [Normal S1, S2] : normal S1 and S2 [No Murmur] : no murmur heard [No Edema] : there was no peripheral edema [Soft] : abdomen soft [Non Tender] : non-tender [Non-distended] : non-distended [No Masses] : no abdominal mass palpated [No HSM] : no HSM [Normal Bowel Sounds] : normal bowel sounds [Normal Posterior Cervical Nodes] : no posterior cervical lymphadenopathy [Normal Anterior Cervical Nodes] : no anterior cervical lymphadenopathy [No Focal Deficits] : no focal deficits [Alert and Oriented x3] : oriented to person, place, and time [de-identified] : decreased BS L lung field consistent with history

## 2018-09-25 NOTE — ASSESSMENT
[FreeTextEntry1] : Hemodynamically stable with acceptable BP\par Pulmonary exam stable\par Lab profiles sent\par Flu vaccine given L deltoid

## 2018-09-26 LAB
ALBUMIN SERPL ELPH-MCNC: 4.2 G/DL
ALP BLD-CCNC: 69 U/L
ALT SERPL-CCNC: 14 U/L
ANION GAP SERPL CALC-SCNC: 14 MMOL/L
AST SERPL-CCNC: 16 U/L
BASOPHILS # BLD AUTO: 0.01 K/UL
BASOPHILS NFR BLD AUTO: 0.2 %
BILIRUB SERPL-MCNC: 0.3 MG/DL
BUN SERPL-MCNC: 15 MG/DL
CALCIUM SERPL-MCNC: 9.3 MG/DL
CHLORIDE SERPL-SCNC: 107 MMOL/L
CHOLEST SERPL-MCNC: 119 MG/DL
CHOLEST/HDLC SERPL: 3.4 RATIO
CO2 SERPL-SCNC: 26 MMOL/L
CREAT SERPL-MCNC: 1.01 MG/DL
EOSINOPHIL # BLD AUTO: 0.12 K/UL
EOSINOPHIL NFR BLD AUTO: 2.6 %
GLUCOSE SERPL-MCNC: 138 MG/DL
HBA1C MFR BLD HPLC: 5.9 %
HCT VFR BLD CALC: 44.3 %
HCV AB SER QL: NONREACTIVE
HCV S/CO RATIO: 0.13 S/CO
HDLC SERPL-MCNC: 35 MG/DL
HGB BLD-MCNC: 14.3 G/DL
IMM GRANULOCYTES NFR BLD AUTO: 0.9 %
LDLC SERPL CALC-MCNC: 63 MG/DL
LYMPHOCYTES # BLD AUTO: 0.66 K/UL
LYMPHOCYTES NFR BLD AUTO: 14.4 %
MAN DIFF?: NORMAL
MCHC RBC-ENTMCNC: 31 PG
MCHC RBC-ENTMCNC: 32.3 GM/DL
MCV RBC AUTO: 95.9 FL
MONOCYTES # BLD AUTO: 0.72 K/UL
MONOCYTES NFR BLD AUTO: 15.7 %
NEUTROPHILS # BLD AUTO: 3.03 K/UL
NEUTROPHILS NFR BLD AUTO: 66.2 %
PLATELET # BLD AUTO: 194 K/UL
POTASSIUM SERPL-SCNC: 4.6 MMOL/L
PROT SERPL-MCNC: 7.4 G/DL
PSA FREE FLD-MCNC: 40
PSA FREE SERPL-MCNC: 0.26 NG/ML
PSA SERPL-MCNC: 0.65 NG/ML
RBC # BLD: 4.62 M/UL
RBC # FLD: 14.5 %
SODIUM SERPL-SCNC: 146 MMOL/L
TRIGL SERPL-MCNC: 107 MG/DL
WBC # FLD AUTO: 4.58 K/UL

## 2018-09-30 ENCOUNTER — FORM ENCOUNTER (OUTPATIENT)
Age: 68
End: 2018-09-30

## 2018-10-01 ENCOUNTER — OUTPATIENT (OUTPATIENT)
Dept: OUTPATIENT SERVICES | Facility: HOSPITAL | Age: 68
LOS: 1 days | End: 2018-10-01
Payer: MEDICARE

## 2018-10-01 ENCOUNTER — APPOINTMENT (OUTPATIENT)
Dept: CT IMAGING | Facility: HOSPITAL | Age: 68
End: 2018-10-01
Payer: MEDICARE

## 2018-10-01 DIAGNOSIS — Z98.890 OTHER SPECIFIED POSTPROCEDURAL STATES: Chronic | ICD-10-CM

## 2018-10-01 DIAGNOSIS — C34.90 MALIGNANT NEOPLASM OF UNSPECIFIED PART OF UNSPECIFIED BRONCHUS OR LUNG: ICD-10-CM

## 2018-10-01 DIAGNOSIS — Z98.89 OTHER SPECIFIED POSTPROCEDURAL STATES: Chronic | ICD-10-CM

## 2018-10-01 PROCEDURE — 71250 CT THORAX DX C-: CPT

## 2018-10-01 PROCEDURE — 71250 CT THORAX DX C-: CPT | Mod: 26

## 2018-10-23 NOTE — ASU PATIENT PROFILE, ADULT - BILL OF RIGHTS/ADMISSION INFORMATION PROVIDED TO:
"Requested Prescriptions   Pending Prescriptions Disp Refills     indomethacin (INDOCIN) 25 MG capsule 42 capsule 1    Last Written Prescription Date:  2/26/18  Last Fill Quantity: 42,  # refills: 0   Last office visit: 3/14/2018 with prescribing provider:  3/14/18 Haresh   Future Office Visit:   Sig: Take 2 tabs three times daily x2 days, then 1 tab three times daily until symptoms have resolved x48 hours    Gout Agents Protocol Failed    10/23/2018  4:27 PM       Failed - ALT on file in past 12 months    Recent Labs   Lab Test  04/19/11   0930   ALT  51            Failed - Has Uric Acid on file in past 12 months and value is less than 6    Recent Labs   Lab Test  09/04/18   1703   URIC  9.9*     If level is 6mg/dL or greater, ok to refill one time and refer to provider.          Failed - Normal serum creatinine on file in the past 12 months    Recent Labs   Lab Test  09/04/18   1703   CR  2.82*            Passed - CBC on file in past 12 months    Recent Labs   Lab Test  09/04/18   1703   WBC  5.3   RBC  3.82*   HGB  11.0*   HCT  33.8*   PLT  160                Passed - Recent (12 mo) or future (30 days) visit within the authorizing provider's specialty    Patient had office visit in the last 12 months or has a visit in the next 30 days with authorizing provider or within the authorizing provider's specialty.  See \"Patient Info\" tab in inbasket, or \"Choose Columns\" in Meds & Orders section of the refill encounter.             Passed - Patient is age 18 or older        " Patient

## 2018-11-07 LAB
BASOPHILS # BLD AUTO: 0.02 K/UL
BASOPHILS NFR BLD AUTO: 0.3 %
CALCIUM SERPL-MCNC: 9.2 MG/DL
CREAT SERPL-MCNC: 1.14 MG/DL
EOSINOPHIL # BLD AUTO: 0.13 K/UL
EOSINOPHIL NFR BLD AUTO: 2.2 %
HCT VFR BLD CALC: 45.4 %
HGB BLD-MCNC: 14.4 G/DL
IMM GRANULOCYTES NFR BLD AUTO: 0.7 %
LDH SERPL-CCNC: 148 U/L
LYMPHOCYTES # BLD AUTO: 1.06 K/UL
LYMPHOCYTES NFR BLD AUTO: 18.3 %
MAN DIFF?: NORMAL
MCHC RBC-ENTMCNC: 30.9 PG
MCHC RBC-ENTMCNC: 31.7 GM/DL
MCV RBC AUTO: 97.4 FL
MONOCYTES # BLD AUTO: 0.87 K/UL
MONOCYTES NFR BLD AUTO: 15.1 %
NEUTROPHILS # BLD AUTO: 3.66 K/UL
NEUTROPHILS NFR BLD AUTO: 63.4 %
PLATELET # BLD AUTO: 185 K/UL
RBC # BLD: 4.66 M/UL
RBC # FLD: 14.3 %
WBC # FLD AUTO: 5.78 K/UL

## 2018-11-08 LAB
DEPRECATED KAPPA LC FREE/LAMBDA SER: 12.23 RATIO
DEPRECATED KAPPA LC FREE/LAMBDA SER: 8.59 RATIO
IGA SER QL IEP: 45 MG/DL
IGG SER QL IEP: 578 MG/DL
IGM SER QL IEP: 1949 MG/DL
KAPPA LC CSF-MCNC: 0.66 MG/DL
KAPPA LC CSF-MCNC: 0.87 MG/DL
KAPPA LC SERPL-MCNC: 7.47 MG/DL
KAPPA LC SERPL-MCNC: 8.07 MG/DL

## 2018-11-10 LAB — VISCOSITY, SERUM: 1.9

## 2018-11-19 ENCOUNTER — APPOINTMENT (OUTPATIENT)
Dept: HEMATOLOGY ONCOLOGY | Facility: CLINIC | Age: 68
End: 2018-11-19
Payer: MEDICARE

## 2018-11-19 VITALS
TEMPERATURE: 97.9 F | SYSTOLIC BLOOD PRESSURE: 160 MMHG | HEART RATE: 68 BPM | WEIGHT: 205 LBS | BODY MASS INDEX: 28.59 KG/M2 | DIASTOLIC BLOOD PRESSURE: 84 MMHG

## 2018-11-19 PROCEDURE — 99214 OFFICE O/P EST MOD 30 MIN: CPT

## 2018-11-19 NOTE — ASSESSMENT
[FreeTextEntry1] : #1) lung cancer-clinically ARCHIE. Continue expectant surveillance.\par #2) Waldenström's macroglobulinemia-clinically stable. Labwork reviewed with patient. Continue hematologic surveillance.\par #3) history of anemia-hemoglobin currently within normal limits. Follow clinically.\par Patient was given the opportunity to ask questions. His questions have been answered to his satisfaction.

## 2018-11-19 NOTE — RESULTS/DATA
[FreeTextEntry1] : 10/2018-CT chest-postoperative changes. Patchy opacities within the right middle and right lower lobes no longer present, resolved.

## 2018-11-19 NOTE — HISTORY OF PRESENT ILLNESS
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 He received neoadjuvant radiation and Taxol/Carboplatin chemotherapy followed by left pneumonectomy-No residual tumor at time of surgery. \par 11/2016-PET/CT scan showed minimally hypermetabolic right apical lung nodule, increasing in size on serial diagnostic CT scans. Poor visualization/nonvisualization of ground glass right upper lung opacities.  Several hypermetabolic left internal mammary nodes, increased in size and number with new FDG activity compared to prior PET/CT scan 1/2017- FNA of left internal thoracic lymph node was negative for malignant cells. Cytology suggestive, but not diagnostic of reactive process.  Patient underwent SRS of right upper lung nodule.\par 2/2017-PET/CT scan-marked increased size and FDG avidity of right apical nodule, concerning for malignancy. The nodule has increased in solidity since CT chest 7/2017, and not significantly changed since 11/2017. Decreased FDG avidity of left internal mammary lymph node. No evidence of distant FDG avid metastatic disease.\par 2/2018- S/P right VATS lung wedge resection for enlarging right apical lung nodule on imaging with pathology revealing scar with reactive changes c/w radiation atypia.\par \par 10/2016-Serum immunofixation showed 3 IgM kappa bands, with urine immunofixation showing a weak IgM kappa band identified, and Bence-Steen protein kappa type.  Bone marrow biopsy, and bone marrow aspirate showed a patchy, normocellular bone marrow with trilineage hematopoiesis and maturation, iron stores present. 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  Cytogenetics showed a normal male karyotype.  [de-identified] : squamous cell cancer [de-identified] : Overall, feels well without new complaints.\par No current c/o SOB. No complaints of cough. No hemoptysis. No chest pain. No fevers. No LN complaints. No visual complaints.\par \par

## 2018-11-19 NOTE — CONSULT LETTER
[Dear  ___] : Dear  [unfilled], [Courtesy Letter:] : I had the pleasure of seeing your patient, [unfilled], in my office today. [Please see my note below.] : Please see my note below. [Consult Closing:] : Thank you very much for allowing me to participate in the care of this patient.  If you have any questions, please do not hesitate to contact me. [Sincerely,] : Sincerely, [FreeTextEntry3] : Clara Recio M.D.

## 2018-11-19 NOTE — PHYSICAL EXAM
[Normal] : affect appropriate [Fully active, able to carry on all pre-disease performance without restriction] : Status 0 - Fully active, able to carry on all pre-disease performance without restriction [de-identified] : no left breath sounds.  CTA on right

## 2019-01-01 ENCOUNTER — FORM ENCOUNTER (OUTPATIENT)
Age: 69
End: 2019-01-01

## 2019-01-02 ENCOUNTER — APPOINTMENT (OUTPATIENT)
Dept: CT IMAGING | Facility: HOSPITAL | Age: 69
End: 2019-01-02
Payer: MEDICARE

## 2019-01-02 ENCOUNTER — OUTPATIENT (OUTPATIENT)
Dept: OUTPATIENT SERVICES | Facility: HOSPITAL | Age: 69
LOS: 1 days | End: 2019-01-02
Payer: MEDICARE

## 2019-01-02 DIAGNOSIS — Z98.89 OTHER SPECIFIED POSTPROCEDURAL STATES: Chronic | ICD-10-CM

## 2019-01-02 DIAGNOSIS — C34.90 MALIGNANT NEOPLASM OF UNSPECIFIED PART OF UNSPECIFIED BRONCHUS OR LUNG: ICD-10-CM

## 2019-01-02 DIAGNOSIS — Z98.890 OTHER SPECIFIED POSTPROCEDURAL STATES: Chronic | ICD-10-CM

## 2019-01-02 PROCEDURE — 71250 CT THORAX DX C-: CPT

## 2019-01-02 PROCEDURE — 71250 CT THORAX DX C-: CPT | Mod: 26

## 2019-01-07 ENCOUNTER — APPOINTMENT (OUTPATIENT)
Dept: THORACIC SURGERY | Facility: CLINIC | Age: 69
End: 2019-01-07
Payer: MEDICARE

## 2019-01-07 PROCEDURE — 99214 OFFICE O/P EST MOD 30 MIN: CPT

## 2019-01-10 NOTE — CONSULT LETTER
[Dear  ___] : Dear  [unfilled], [Courtesy Letter:] : I had the pleasure of seeing your patient, [unfilled], in my office today. [Please see my note below.] : Please see my note below. [Sincerely,] : Sincerely, [FreeTextEntry2] : Dr. Clara Estrella\par Dr. Mauro Mckeon \par  [FreeTextEntry3] : Jan Sims MD \par Attending Surgeon \par Division of Thoracic Surgery \par , Cardiovascular and Thoracic Surgery \par [facility]\par

## 2019-01-10 NOTE — DATA REVIEWED
[FreeTextEntry1] : CT scan Chest 6/28/18:\par Left-sided pneumonectomy. Right upper lobe wedge resection with associated probable postsurgical changes. Patchy and nodular opacity within the right middle and lower lobe is new from the prior study. \par Images of the upper abdomen demonstrate cholelithiasis.\par \par

## 2019-01-10 NOTE — HISTORY OF PRESENT ILLNESS
[FreeTextEntry1] : 68 year old male with past medical history to include s/p Right VATS lung wedge resection on 2/23/18 with pathology revealing scar with reactive changes consistent with radiation atypia. 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 He received neoadjuvant radiation and Taxol/Carboplatin chemotherapy followed by left pneumonectomy via left thoracotomy in 2015.  The patient presents today for a follow up appointment.\par \par CT Scan of the Chest 1/2/2019 revealed:\par -6 mm nodular opacity, right minor fissure, unchanged\par -No new lung nodules\par \par The patient denies chest pain, shortness of breath, cough, hemoptysis, fever, palpitations, syncope, URI  or recent illness.\par    \par  \par

## 2019-01-10 NOTE — ASSESSMENT
[FreeTextEntry1] : 68 year old male with past medical history to include s/p Right VATS lung wedge resection on 2/23/18 with pathology revealing scar with reactive changes consistent with radiation atypia. 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 He received neoadjuvant radiation and Taxol/Carboplatin chemotherapy followed by left pneumonectomy via left thoracotomy in 2015.  The patient presents today for a follow up appointment.\par \par CT Scan of the Chest 1/2/2019 revealed:\par -6 mm nodular opacity, right minor fissure, unchanged\par -No new lung nodules\par \par I have reviewed the patient's medical records, diagnostic images during the time of this office consultation and have made the following recommendation.\par Plan:\par 1. The patient is doing well.    The CT Scan is stable.  I will see the patient in the office with a follow up CT Scan of the Chest in 6 months.\par \par Written by Duarte Craft NP, acting as a scribe for .\par “The documentation recorded by the scribe accurately reflects the service I personally performed and the decisions made by me.” Signature Jan Sims MD\par

## 2019-01-10 NOTE — PHYSICAL EXAM
[Sclera] : the sclera and conjunctiva were normal [Extraocular Movements] : extraocular movements were intact [Neck Appearance] : the appearance of the neck was normal [Jugular Venous Distention Increased] : there was no jugular-venous distention [Apical Impulse] : the apical impulse was normal [Heart Rate And Rhythm] : heart rate was normal and rhythm regular [Heart Sounds] : normal S1 and S2 [Examination Of The Chest] : the chest was normal in appearance [2+] : left 2+ [Bowel Sounds] : normal bowel sounds [Abdomen Soft] : soft [Abdomen Tenderness] : non-tender [Cervical Lymph Nodes Enlarged Posterior Bilaterally] : posterior cervical [Supraclavicular Lymph Nodes Enlarged Bilaterally] : supraclavicular [No CVA Tenderness] : no ~M costovertebral angle tenderness [Abnormal Walk] : normal gait [Musculoskeletal - Swelling] : no joint swelling seen [Motor Tone] : muscle strength and tone were normal [Skin Color & Pigmentation] : normal skin color and pigmentation [Skin Turgor] : normal skin turgor [] : no rash [No Focal Deficits] : no focal deficits [Oriented To Time, Place, And Person] : oriented to person, place, and time [Impaired Insight] : insight and judgment were intact [Affect] : the affect was normal [FreeTextEntry1] : Voids without difficulty

## 2019-01-15 ENCOUNTER — APPOINTMENT (OUTPATIENT)
Dept: FAMILY MEDICINE | Facility: CLINIC | Age: 69
End: 2019-01-15
Payer: MEDICARE

## 2019-01-15 PROCEDURE — 90732 PPSV23 VACC 2 YRS+ SUBQ/IM: CPT

## 2019-01-15 PROCEDURE — G0009: CPT

## 2019-03-20 LAB
BASOPHILS # BLD AUTO: 0.03 K/UL
BASOPHILS NFR BLD AUTO: 0.6 %
CALCIUM SERPL-MCNC: 9.4 MG/DL
CREAT SERPL-MCNC: 1.18 MG/DL
EOSINOPHIL # BLD AUTO: 0.08 K/UL
EOSINOPHIL NFR BLD AUTO: 1.6 %
HCT VFR BLD CALC: 47.3 %
HGB BLD-MCNC: 14.9 G/DL
IMM GRANULOCYTES NFR BLD AUTO: 0.8 %
LDH SERPL-CCNC: 149 U/L
LYMPHOCYTES # BLD AUTO: 0.95 K/UL
LYMPHOCYTES NFR BLD AUTO: 18.7 %
MAN DIFF?: NORMAL
MCHC RBC-ENTMCNC: 30.1 PG
MCHC RBC-ENTMCNC: 31.5 GM/DL
MCV RBC AUTO: 95.6 FL
MONOCYTES # BLD AUTO: 0.68 K/UL
MONOCYTES NFR BLD AUTO: 13.4 %
NEUTROPHILS # BLD AUTO: 3.3 K/UL
NEUTROPHILS NFR BLD AUTO: 64.9 %
PLATELET # BLD AUTO: 164 K/UL
RBC # BLD: 4.95 M/UL
RBC # FLD: 13.6 %
WBC # FLD AUTO: 5.08 K/UL

## 2019-03-21 LAB
DEPRECATED KAPPA LC FREE/LAMBDA SER: 8.45 RATIO
DEPRECATED KAPPA LC FREE/LAMBDA SER: 8.45 RATIO
IGA SER QL IEP: 34 MG/DL
IGG SER QL IEP: 554 MG/DL
IGM SER QL IEP: 2028 MG/DL
KAPPA LC CSF-MCNC: 0.76 MG/DL
KAPPA LC CSF-MCNC: 0.76 MG/DL
KAPPA LC SERPL-MCNC: 6.42 MG/DL
KAPPA LC SERPL-MCNC: 6.42 MG/DL

## 2019-03-22 LAB — VISCOSITY, SERUM: 2.1

## 2019-03-27 ENCOUNTER — APPOINTMENT (OUTPATIENT)
Dept: HEMATOLOGY ONCOLOGY | Facility: CLINIC | Age: 69
End: 2019-03-27
Payer: MEDICARE

## 2019-03-27 ENCOUNTER — OUTPATIENT (OUTPATIENT)
Dept: OUTPATIENT SERVICES | Facility: HOSPITAL | Age: 69
LOS: 1 days | Discharge: HOME | End: 2019-03-27

## 2019-03-27 VITALS
TEMPERATURE: 97.4 F | WEIGHT: 208.99 LBS | DIASTOLIC BLOOD PRESSURE: 81 MMHG | RESPIRATION RATE: 16 BRPM | BODY MASS INDEX: 29.15 KG/M2 | HEART RATE: 64 BPM | SYSTOLIC BLOOD PRESSURE: 209 MMHG | OXYGEN SATURATION: 98 %

## 2019-03-27 DIAGNOSIS — Z98.89 OTHER SPECIFIED POSTPROCEDURAL STATES: Chronic | ICD-10-CM

## 2019-03-27 DIAGNOSIS — C34.90 MALIGNANT NEOPLASM OF UNSPECIFIED PART OF UNSPECIFIED BRONCHUS OR LUNG: ICD-10-CM

## 2019-03-27 DIAGNOSIS — Z98.890 OTHER SPECIFIED POSTPROCEDURAL STATES: Chronic | ICD-10-CM

## 2019-03-27 PROCEDURE — 99214 OFFICE O/P EST MOD 30 MIN: CPT

## 2019-03-27 NOTE — HISTORY OF PRESENT ILLNESS
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 He received neoadjuvant radiation and Taxol/Carboplatin chemotherapy followed by left pneumonectomy-No residual tumor at time of surgery. \par 11/2016-PET/CT scan showed minimally hypermetabolic right apical lung nodule, increasing in size on serial diagnostic CT scans. Poor visualization/nonvisualization of ground glass right upper lung opacities.  Several hypermetabolic left internal mammary nodes, increased in size and number with new FDG activity compared to prior PET/CT scan 1/2017- FNA of left internal thoracic lymph node was negative for malignant cells. Cytology suggestive, but not diagnostic of reactive process.  Patient underwent SRS of right upper lung nodule.\par 2/2017-PET/CT scan-marked increased size and FDG avidity of right apical nodule, concerning for malignancy. The nodule has increased in solidity since CT chest 7/2017, and not significantly changed since 11/2017. Decreased FDG avidity of left internal mammary lymph node. No evidence of distant FDG avid metastatic disease.\par 2/2018- S/P right VATS lung wedge resection for enlarging right apical lung nodule on imaging with pathology revealing scar with reactive changes c/w radiation atypia.\par \par 10/2016-Serum immunofixation showed 3 IgM kappa bands, with urine immunofixation showing a weak IgM kappa band identified, and Bence-Steen protein kappa type.  Bone marrow biopsy, and bone marrow aspirate showed a patchy, normocellular bone marrow with trilineage hematopoiesis and maturation, iron stores present. 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  Cytogenetics showed a normal male karyotype.  [de-identified] : squamous cell cancer [de-identified] : Feels well . \par No current c/o SOB. No complaints of cough. No hemoptysis. No chest pain. No fevers. No LN complaints. No visual complaints. No abnormal bruising.\par \par

## 2019-03-27 NOTE — PHYSICAL EXAM
[Fully active, able to carry on all pre-disease performance without restriction] : Status 0 - Fully active, able to carry on all pre-disease performance without restriction [Normal] : affect appropriate [de-identified] : no left breath sounds.  CTA on right

## 2019-03-27 NOTE — ASSESSMENT
[FreeTextEntry1] : #1) lung cancer-clinically ARCHIE. Continue expectant surveillance. Patient will have next CAT scan of the chest 7/2019 per thoracic surgeon.\par #2) Waldenström's macroglobulinemia-lab work reviewed with patient-increased IgM/viscosity discussed with interval followup recommended. Holding on treatment for now. Patient without related symptoms currently.\par #3) history of anemia-hemoglobin remains within normal limits. Follow clinically.\par #4) patient advised of elevated blood pressure-followup PCP.\par Patient and his wife were given the opportunity to ask questions. Their questions have been answered to their apparent satisfaction at this time.\par

## 2019-05-23 ENCOUNTER — FORM ENCOUNTER (OUTPATIENT)
Age: 69
End: 2019-05-23

## 2019-05-24 ENCOUNTER — APPOINTMENT (OUTPATIENT)
Dept: CT IMAGING | Facility: CLINIC | Age: 69
End: 2019-05-24
Payer: MEDICARE

## 2019-05-24 ENCOUNTER — OUTPATIENT (OUTPATIENT)
Dept: OUTPATIENT SERVICES | Facility: HOSPITAL | Age: 69
LOS: 1 days | End: 2019-05-24
Payer: MEDICARE

## 2019-05-24 DIAGNOSIS — Z00.8 ENCOUNTER FOR OTHER GENERAL EXAMINATION: ICD-10-CM

## 2019-05-24 DIAGNOSIS — Z98.890 OTHER SPECIFIED POSTPROCEDURAL STATES: Chronic | ICD-10-CM

## 2019-05-24 DIAGNOSIS — Z98.89 OTHER SPECIFIED POSTPROCEDURAL STATES: Chronic | ICD-10-CM

## 2019-05-24 PROCEDURE — 71250 CT THORAX DX C-: CPT

## 2019-05-24 PROCEDURE — 71250 CT THORAX DX C-: CPT | Mod: 26

## 2019-06-03 ENCOUNTER — APPOINTMENT (OUTPATIENT)
Dept: THORACIC SURGERY | Facility: CLINIC | Age: 69
End: 2019-06-03
Payer: MEDICARE

## 2019-06-03 VITALS
SYSTOLIC BLOOD PRESSURE: 170 MMHG | HEART RATE: 70 BPM | OXYGEN SATURATION: 99 % | RESPIRATION RATE: 15 BRPM | DIASTOLIC BLOOD PRESSURE: 100 MMHG

## 2019-06-03 PROCEDURE — 99214 OFFICE O/P EST MOD 30 MIN: CPT

## 2019-06-04 NOTE — PATIENT PROFILE ADULT. - [facility]
medications for this encounter. Pre-Sedation:    Pre-Sedation Documentation and Exam:  I have assessed the patient and agree with the H&P present on the chart. Prior History of Anesthesia Complications:   none    Modified Mallampati:  II (soft palate, uvula, fauces visible)    ASA Classification:  Class 3 - A patient with severe systemic disease that limits activity but is not incapacitating    Karina Scale: Activity:  2 - Able to move 4 extremities voluntarily on command  Respiration:  2 - Able to breathe deeply and cough freely  Circulation:  2 - BP+/- 20mmHg of normal  Consciousness:  2 - Fully awake  Oxygen Saturation (color):  2 - Able to maintain oxygen saturation >92% on room air    Sedation/Anesthesia Plan:  Guard the patient's safety and welfare. Minimize physical discomfort and pain. Minimize negative psychological responses to treatment by providing sedation and analgesia and maximize the potential amnesia. Patient to meet pre-procedure discharge plan.     Medication Planned:  midazolam intravenously, fentanyl intravenously    Patient is an appropriate candidate for plan of sedation: yes      Electronically signed by Ed Grey MD on 6/4/2019 at 12:49 PM none

## 2019-06-06 NOTE — PHYSICAL EXAM
[Sclera] : the sclera and conjunctiva were normal [PERRL With Normal Accommodation] : pupils were equal in size, round, and reactive to light [Neck Appearance] : the appearance of the neck was normal [] : the neck was supple [Heart Sounds] : normal S1 and S2 [Murmurs] : no murmurs [Abdomen Soft] : soft [Cervical Lymph Nodes Enlarged Posterior Bilaterally] : posterior cervical [Abdomen Tenderness] : non-tender [Cervical Lymph Nodes Enlarged Anterior Bilaterally] : anterior cervical [Supraclavicular Lymph Nodes Enlarged Bilaterally] : supraclavicular [Abnormal Walk] : normal gait [Skin Color & Pigmentation] : normal skin color and pigmentation [Skin Turgor] : normal skin turgor [No Focal Deficits] : no focal deficits [Oriented To Time, Place, And Person] : oriented to person, place, and time [Affect] : the affect was normal [Mood] : the mood was normal [FreeTextEntry1] : clear to auscultation on right, absent breath sounds on left consistent with pneumonectomy

## 2019-06-06 NOTE — CONSULT LETTER
[Courtesy Letter:] : I had the pleasure of seeing your patient, [unfilled], in my office today. [Please see my note below.] : Please see my note below. [Sincerely,] : Sincerely, [FreeTextEntry3] : \par \par \par Jan Sims MD\par Attending Surgeon\par Division of Thoracic Surgery\par , Cardiovascular and Thoracic Surgery\par [facility] [FreeTextEntry2] : Dr. Clara Estrella (Onc)\par Dr. Mauro Mckeon (RadOnc)\par Dr. Kendrick Deleon (PCP)

## 2019-06-06 NOTE — HISTORY OF PRESENT ILLNESS
[FreeTextEntry1] : Mr. Treadwell is a 68 year old male who presents today for follow up.  He has a history of squamous cell lung Ca stage III (T3 N2) of LETY with neoadjuvant chemo/RT, followed by left thoracotomy, pneumonectomy on 6/23/15. He is s/p right VATS, RUL lung wedge resection on 2/23/18, pathology revealed scar with reactive changes consistent with radiation atypia.\par \par  CT Chest on 5/24/19:\par - Stable postsurgical changes\par - Stable 4 mm nodule vs. intrapulmonary lymph node along right minor fissure\par - Emphysema\par \par He feels well and denies any fever, chills, cough, shortness of breath, chest pain, hemoptysis, or recent illness.

## 2019-06-06 NOTE — ASSESSMENT
[FreeTextEntry1] : 68 year old male, follow up.  History of squamous cell lung Ca stage III (T3 N2) of LETY with neoadjuvant chemo/RT, followed by left thoracotomy, pneumonectomy on 6/23/15. He is s/p right VATS, RUL lung wedge resection on 2/23/18, pathology revealed scar with reactive changes consistent with radiation atypia.\par \par  CT Chest on 5/24/19:\par - Stable postsurgical changes\par - Stable 4 mm nodule vs. intrapulmonary lymph node along right minor fissure\par - Emphysema\par \par I have reviewed the patient's medical records and diagnostic images during the time of this office visit, and I have made the following recommendation: \par 1.  Return to office for follow up with CT Chest in 6 months\par 2.  Follow up with PCP for hypertension\par \par \par Written by Juliet Manning NP, acting as a scribe for Lauren Joseph MD.\par \par The documentation recorded by the scribe accurately reflects the service I personally performed and the decisions made by me. LAUREN JOSEPH MD

## 2019-07-09 ENCOUNTER — LABORATORY RESULT (OUTPATIENT)
Age: 69
End: 2019-07-09

## 2019-07-11 ENCOUNTER — OUTPATIENT (OUTPATIENT)
Dept: OUTPATIENT SERVICES | Facility: HOSPITAL | Age: 69
LOS: 1 days | Discharge: HOME | End: 2019-07-11

## 2019-07-11 DIAGNOSIS — Z98.890 OTHER SPECIFIED POSTPROCEDURAL STATES: Chronic | ICD-10-CM

## 2019-07-11 DIAGNOSIS — Z98.89 OTHER SPECIFIED POSTPROCEDURAL STATES: Chronic | ICD-10-CM

## 2019-07-11 DIAGNOSIS — C34.90 MALIGNANT NEOPLASM OF UNSPECIFIED PART OF UNSPECIFIED BRONCHUS OR LUNG: ICD-10-CM

## 2019-07-16 ENCOUNTER — APPOINTMENT (OUTPATIENT)
Dept: HEMATOLOGY ONCOLOGY | Facility: CLINIC | Age: 69
End: 2019-07-16
Payer: MEDICARE

## 2019-07-16 VITALS
BODY MASS INDEX: 29.21 KG/M2 | TEMPERATURE: 97.8 F | SYSTOLIC BLOOD PRESSURE: 174 MMHG | DIASTOLIC BLOOD PRESSURE: 79 MMHG | HEART RATE: 74 BPM | RESPIRATION RATE: 16 BRPM | OXYGEN SATURATION: 96 % | WEIGHT: 209.44 LBS

## 2019-07-16 PROCEDURE — 99214 OFFICE O/P EST MOD 30 MIN: CPT

## 2019-07-16 NOTE — PHYSICAL EXAM
[Fully active, able to carry on all pre-disease performance without restriction] : Status 0 - Fully active, able to carry on all pre-disease performance without restriction [Normal] : affect appropriate [de-identified] : no left breath sounds.  CTA on right

## 2019-07-16 NOTE — HISTORY OF PRESENT ILLNESS
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 He received neoadjuvant radiation and Taxol/Carboplatin chemotherapy followed by left pneumonectomy-No residual tumor at time of surgery. \par 11/2016-PET/CT scan showed minimally hypermetabolic right apical lung nodule, increasing in size on serial diagnostic CT scans. Poor visualization/nonvisualization of ground glass right upper lung opacities.  Several hypermetabolic left internal mammary nodes, increased in size and number with new FDG activity compared to prior PET/CT scan 1/2017- FNA of left internal thoracic lymph node was negative for malignant cells. Cytology suggestive, but not diagnostic of reactive process.  Patient underwent SRS of right upper lung nodule.\par 2/2017-PET/CT scan-marked increased size and FDG avidity of right apical nodule, concerning for malignancy. The nodule has increased in solidity since CT chest 7/2017, and not significantly changed since 11/2017. Decreased FDG avidity of left internal mammary lymph node. No evidence of distant FDG avid metastatic disease.\par 2/2018- S/P right VATS lung wedge resection for enlarging right apical lung nodule on imaging with pathology revealing scar with reactive changes c/w radiation atypia.\par \par 10/2016-Serum immunofixation showed 3 IgM kappa bands, with urine immunofixation showing a weak IgM kappa band identified, and Bence-Steen protein kappa type.  Bone marrow biopsy, and bone marrow aspirate showed a patchy, normocellular bone marrow with trilineage hematopoiesis and maturation, iron stores present. 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  Cytogenetics showed a normal male karyotype.  [de-identified] : squamous cell cancer [de-identified] : Saw chest surgeon in f/u-told evaluation fine. Has next CT chest in 6 months.\par Overall, feels well . \par No current c/o SOB. No complaints of cough. No hemoptysis. No chest pain. No fevers. No LN complaints. No visual complaints. No abnormal bruising.\par \par

## 2019-07-16 NOTE — ASSESSMENT
[FreeTextEntry1] : #1) lung cancer-clinically ARCHIE. Continue expectant surveillance.\par #2) WM-labs reviewed with patient-clinically stable at present. Continue heme surveillance.\par #3) F/U with PCP for BP/primary care issues.\par Patient was given the opportunity to ask questions. His questions have been answered to his satisfaction at this time.

## 2019-10-07 ENCOUNTER — APPOINTMENT (OUTPATIENT)
Dept: FAMILY MEDICINE | Facility: CLINIC | Age: 69
End: 2019-10-07
Payer: MEDICARE

## 2019-10-07 VITALS
SYSTOLIC BLOOD PRESSURE: 132 MMHG | RESPIRATION RATE: 20 BRPM | DIASTOLIC BLOOD PRESSURE: 75 MMHG | TEMPERATURE: 98 F | HEART RATE: 78 BPM

## 2019-10-07 DIAGNOSIS — Z87.09 PERSONAL HISTORY OF OTHER DISEASES OF THE RESPIRATORY SYSTEM: ICD-10-CM

## 2019-10-07 PROCEDURE — 99213 OFFICE O/P EST LOW 20 MIN: CPT

## 2019-10-07 NOTE — PHYSICAL EXAM
[No Acute Distress] : no acute distress [Normal Outer Ear/Nose] : the outer ears and nose were normal in appearance [Normal Nasal Mucosa] : the nasal mucosa was normal [Supple] : supple [No Respiratory Distress] : no respiratory distress  [No Accessory Muscle Use] : no accessory muscle use [Normal] : normal rate, regular rhythm, normal S1 and S2 and no murmur heard [No Edema] : there was no peripheral edema [Soft] : abdomen soft [Non Tender] : non-tender [Normal Posterior Cervical Nodes] : no posterior cervical lymphadenopathy [Normal Anterior Cervical Nodes] : no anterior cervical lymphadenopathy [de-identified] : TMs and pharynx congested [de-identified] : scattered rhonchi noted

## 2019-10-07 NOTE — ASSESSMENT
[FreeTextEntry1] : Bronchitis - Doxycycline 100mg twice daily for 7 days; Phenergan DM as needed; encourage fluids\par Would defer flu vaccine until resolved - advised F/U in 10 days

## 2019-10-07 NOTE — HISTORY OF PRESENT ILLNESS
[FreeTextEntry8] : Had originally scheduled a routine F/U visit with flu vaccine, however reports about 5 days of a respiratory infection; began with sore throat and now has persistent congested cough.  Denies SOB, fever, chills.

## 2019-10-17 ENCOUNTER — APPOINTMENT (OUTPATIENT)
Dept: FAMILY MEDICINE | Facility: CLINIC | Age: 69
End: 2019-10-17
Payer: MEDICARE

## 2019-10-17 VITALS
BODY MASS INDEX: 28.84 KG/M2 | HEIGHT: 71 IN | WEIGHT: 206 LBS | RESPIRATION RATE: 20 BRPM | SYSTOLIC BLOOD PRESSURE: 126 MMHG | DIASTOLIC BLOOD PRESSURE: 70 MMHG | HEART RATE: 76 BPM

## 2019-10-17 PROCEDURE — 36415 COLL VENOUS BLD VENIPUNCTURE: CPT

## 2019-10-17 PROCEDURE — G0008: CPT

## 2019-10-17 PROCEDURE — 90686 IIV4 VACC NO PRSV 0.5 ML IM: CPT

## 2019-10-17 PROCEDURE — 99214 OFFICE O/P EST MOD 30 MIN: CPT | Mod: 25

## 2019-10-17 NOTE — PHYSICAL EXAM
[No Acute Distress] : no acute distress [No Edema] : there was no peripheral edema [Normal] : no respiratory distress, lungs were clear to auscultation bilaterally and no accessory muscle use [Soft] : abdomen soft [Non Tender] : non-tender [Normal Posterior Cervical Nodes] : no posterior cervical lymphadenopathy [Coordination Grossly Intact] : coordination grossly intact [Normal Anterior Cervical Nodes] : no anterior cervical lymphadenopathy [No Focal Deficits] : no focal deficits [Normal Gait] : normal gait [de-identified] : decreased BS L lung field consistent with history [Alert and Oriented x3] : oriented to person, place, and time

## 2019-10-17 NOTE — ASSESSMENT
[FreeTextEntry1] : Hemodynamically stable with acceptable BP\par Pulmonary status stable\par Lab profiles sent\par Flu vaccine given L deltoid

## 2019-10-17 NOTE — HISTORY OF PRESENT ILLNESS
[de-identified] : Presents for BP check, labs, and general follow-up; also requests flu vaccine.  States feeling much improved (see last visit).

## 2019-10-18 LAB
ALBUMIN SERPL ELPH-MCNC: 4.2 G/DL
ALP BLD-CCNC: 69 U/L
ALT SERPL-CCNC: 17 U/L
ANION GAP SERPL CALC-SCNC: 14 MMOL/L
AST SERPL-CCNC: 17 U/L
BASOPHILS # BLD AUTO: 0.05 K/UL
BASOPHILS NFR BLD AUTO: 1 %
BILIRUB SERPL-MCNC: 0.3 MG/DL
BUN SERPL-MCNC: 19 MG/DL
CALCIUM SERPL-MCNC: 9.1 MG/DL
CHLORIDE SERPL-SCNC: 105 MMOL/L
CHOLEST SERPL-MCNC: 131 MG/DL
CHOLEST/HDLC SERPL: 3.4 RATIO
CO2 SERPL-SCNC: 26 MMOL/L
CREAT SERPL-MCNC: 0.99 MG/DL
EOSINOPHIL # BLD AUTO: 0.12 K/UL
EOSINOPHIL NFR BLD AUTO: 2.3 %
ESTIMATED AVERAGE GLUCOSE: 128 MG/DL
FOLATE SERPL-MCNC: 11.4 NG/ML
GLUCOSE SERPL-MCNC: 97 MG/DL
HBA1C MFR BLD HPLC: 6.1 %
HCT VFR BLD CALC: 48.8 %
HDLC SERPL-MCNC: 39 MG/DL
HGB BLD-MCNC: 15.6 G/DL
IMM GRANULOCYTES NFR BLD AUTO: 1.5 %
LDLC SERPL CALC-MCNC: 71 MG/DL
LYMPHOCYTES # BLD AUTO: 0.99 K/UL
LYMPHOCYTES NFR BLD AUTO: 19 %
MAN DIFF?: NORMAL
MCHC RBC-ENTMCNC: 31.5 PG
MCHC RBC-ENTMCNC: 32 GM/DL
MCV RBC AUTO: 98.6 FL
MONOCYTES # BLD AUTO: 0.71 K/UL
MONOCYTES NFR BLD AUTO: 13.7 %
NEUTROPHILS # BLD AUTO: 3.25 K/UL
NEUTROPHILS NFR BLD AUTO: 62.5 %
PLATELET # BLD AUTO: 240 K/UL
POTASSIUM SERPL-SCNC: 4.6 MMOL/L
PROT SERPL-MCNC: 7.8 G/DL
PSA FREE FLD-MCNC: 37 %
PSA FREE SERPL-MCNC: 0.3 NG/ML
PSA SERPL-MCNC: 0.79 NG/ML
RBC # BLD: 4.95 M/UL
RBC # FLD: 13.3 %
SODIUM SERPL-SCNC: 144 MMOL/L
T4 FREE SERPL-MCNC: 1.5 NG/DL
TRIGL SERPL-MCNC: 104 MG/DL
TSH SERPL-ACNC: 4.12 UIU/ML
VIT B12 SERPL-MCNC: 491 PG/ML
WBC # FLD AUTO: 5.2 K/UL

## 2019-11-13 ENCOUNTER — OUTPATIENT (OUTPATIENT)
Dept: OUTPATIENT SERVICES | Facility: HOSPITAL | Age: 69
LOS: 1 days | Discharge: HOME | End: 2019-11-13

## 2019-11-13 DIAGNOSIS — Z98.890 OTHER SPECIFIED POSTPROCEDURAL STATES: Chronic | ICD-10-CM

## 2019-11-13 DIAGNOSIS — C34.90 MALIGNANT NEOPLASM OF UNSPECIFIED PART OF UNSPECIFIED BRONCHUS OR LUNG: ICD-10-CM

## 2019-11-13 DIAGNOSIS — Z98.89 OTHER SPECIFIED POSTPROCEDURAL STATES: Chronic | ICD-10-CM

## 2019-11-14 LAB
BASOPHILS # BLD AUTO: 0.04 K/UL
BASOPHILS NFR BLD AUTO: 0.8 %
CALCIUM SERPL-MCNC: 9.8 MG/DL
CREAT SERPL-MCNC: 1.06 MG/DL
DEPRECATED KAPPA LC FREE/LAMBDA SER: 10.7 RATIO
DEPRECATED KAPPA LC FREE/LAMBDA SER: 10.7 RATIO
EOSINOPHIL # BLD AUTO: 0.13 K/UL
EOSINOPHIL NFR BLD AUTO: 2.5 %
HCT VFR BLD CALC: 48.4 %
HGB BLD-MCNC: 15.7 G/DL
IGA SER QL IEP: 68 MG/DL
IGG SER QL IEP: 617 MG/DL
IGM SER QL IEP: 2174 MG/DL
IMM GRANULOCYTES NFR BLD AUTO: 0.6 %
KAPPA LC CSF-MCNC: 0.8 MG/DL
KAPPA LC CSF-MCNC: 0.8 MG/DL
KAPPA LC SERPL-MCNC: 8.56 MG/DL
KAPPA LC SERPL-MCNC: 8.56 MG/DL
LDH SERPL-CCNC: 152 U/L
LYMPHOCYTES # BLD AUTO: 1.17 K/UL
LYMPHOCYTES NFR BLD AUTO: 22.7 %
M PROTEIN SPEC IFE-MCNC: NORMAL
MAN DIFF?: NORMAL
MCHC RBC-ENTMCNC: 32.2 PG
MCHC RBC-ENTMCNC: 32.4 GM/DL
MCV RBC AUTO: 99.2 FL
MONOCYTES # BLD AUTO: 0.81 K/UL
MONOCYTES NFR BLD AUTO: 15.7 %
NEUTROPHILS # BLD AUTO: 2.97 K/UL
NEUTROPHILS NFR BLD AUTO: 57.7 %
PLATELET # BLD AUTO: 196 K/UL
RBC # BLD: 4.88 M/UL
RBC # FLD: 13.7 %
WBC # FLD AUTO: 5.15 K/UL

## 2019-11-17 LAB — VISCOSITY, SERUM: 2.1

## 2019-11-18 ENCOUNTER — OUTPATIENT (OUTPATIENT)
Dept: OUTPATIENT SERVICES | Facility: HOSPITAL | Age: 69
LOS: 1 days | End: 2019-11-18
Payer: MEDICARE

## 2019-11-18 ENCOUNTER — APPOINTMENT (OUTPATIENT)
Dept: CT IMAGING | Facility: HOSPITAL | Age: 69
End: 2019-11-18
Payer: MEDICARE

## 2019-11-18 DIAGNOSIS — Z98.89 OTHER SPECIFIED POSTPROCEDURAL STATES: Chronic | ICD-10-CM

## 2019-11-18 DIAGNOSIS — Z98.890 OTHER SPECIFIED POSTPROCEDURAL STATES: Chronic | ICD-10-CM

## 2019-11-18 DIAGNOSIS — C34.90 MALIGNANT NEOPLASM OF UNSPECIFIED PART OF UNSPECIFIED BRONCHUS OR LUNG: ICD-10-CM

## 2019-11-18 PROCEDURE — 71250 CT THORAX DX C-: CPT

## 2019-11-18 PROCEDURE — 71250 CT THORAX DX C-: CPT | Mod: 26

## 2019-11-20 ENCOUNTER — APPOINTMENT (OUTPATIENT)
Dept: HEMATOLOGY ONCOLOGY | Facility: CLINIC | Age: 69
End: 2019-11-20
Payer: MEDICARE

## 2019-11-20 VITALS
WEIGHT: 210.54 LBS | OXYGEN SATURATION: 95 % | RESPIRATION RATE: 20 BRPM | TEMPERATURE: 97.8 F | BODY MASS INDEX: 29.36 KG/M2 | SYSTOLIC BLOOD PRESSURE: 168 MMHG | HEART RATE: 73 BPM | DIASTOLIC BLOOD PRESSURE: 89 MMHG

## 2019-11-20 PROCEDURE — 99214 OFFICE O/P EST MOD 30 MIN: CPT

## 2019-11-20 NOTE — RESULTS/DATA
[FreeTextEntry1] : CT chest-\par IMPRESSION: Bilateral lung surgeries as above with stable postoperative \par changes. \par \par Cluster of tree-in-bud opacities within the right upper lobe new since May \par 24, 2019 representing foci of impacted distal airways may be due to mucous. \par 3-month follow-up noncontrast chest CT is recommended to ensure stability or \par resolution. \par

## 2019-11-20 NOTE — ASSESSMENT
[FreeTextEntry1] : #1) lung cancer-clinically stable. Continue expectant surveillance. Reviewed CT chest report with patient-he expressed his understanding of recommended followup imaging in 3 months.\par #2) Waldenström's macroglobulinemia-lab work reviewed with patient-increased IgM/viscosity discussed with interval followup recommended. Continuing to hold on treatment for now. Patient without related symptoms currently.\par #3) history of anemia-hemoglobin remains within normal limits. Follow clinically.\par Patient and his wife were given the opportunity to ask questions. Their questions have been answered to their apparent satisfaction at this time. Patient expressed his understanding and willingness to comply with recommended followup.\par

## 2019-11-20 NOTE — PHYSICAL EXAM
[Fully active, able to carry on all pre-disease performance without restriction] : Status 0 - Fully active, able to carry on all pre-disease performance without restriction [Normal] : affect appropriate [de-identified] : no left breath sounds.  CTA on right

## 2019-11-20 NOTE — HISTORY OF PRESENT ILLNESS
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 He received neoadjuvant radiation and Taxol/Carboplatin chemotherapy followed by left pneumonectomy-No residual tumor at time of surgery. \par 11/2016-PET/CT scan showed minimally hypermetabolic right apical lung nodule, increasing in size on serial diagnostic CT scans. Poor visualization/nonvisualization of ground glass right upper lung opacities.  Several hypermetabolic left internal mammary nodes, increased in size and number with new FDG activity compared to prior PET/CT scan 1/2017- FNA of left internal thoracic lymph node was negative for malignant cells. Cytology suggestive, but not diagnostic of reactive process.  Patient underwent SRS of right upper lung nodule.\par 2/2017-PET/CT scan-marked increased size and FDG avidity of right apical nodule, concerning for malignancy. The nodule has increased in solidity since CT chest 7/2017, and not significantly changed since 11/2017. Decreased FDG avidity of left internal mammary lymph node. No evidence of distant FDG avid metastatic disease.\par 2/2018- S/P right VATS lung wedge resection for enlarging right apical lung nodule on imaging with pathology revealing scar with reactive changes c/w radiation atypia.\par \par 10/2016-Serum immunofixation showed 3 IgM kappa bands, with urine immunofixation showing a weak IgM kappa band identified, and Bence-Steen protein kappa type.  Bone marrow biopsy, and bone marrow aspirate showed a patchy, normocellular bone marrow with trilineage hematopoiesis and maturation, iron stores present. 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  Cytogenetics showed a normal male karyotype.  [de-identified] : Overall, feeling well.\par Continues to see Dr. Sims in thoracic surgical followup- is ordering surveillance CAT scans of the chest.\par No current c/o SOB. No complaints of cough. No hemoptysis. No chest pain. No fevers. No LN complaints. No visual complaints. No abnormal bruising.\par \par  [de-identified] : squamous cell cancer

## 2019-12-02 ENCOUNTER — APPOINTMENT (OUTPATIENT)
Dept: THORACIC SURGERY | Facility: CLINIC | Age: 69
End: 2019-12-02
Payer: MEDICARE

## 2019-12-09 ENCOUNTER — APPOINTMENT (OUTPATIENT)
Dept: THORACIC SURGERY | Facility: CLINIC | Age: 69
End: 2019-12-09
Payer: MEDICARE

## 2019-12-09 VITALS
DIASTOLIC BLOOD PRESSURE: 80 MMHG | BODY MASS INDEX: 29.4 KG/M2 | HEART RATE: 72 BPM | SYSTOLIC BLOOD PRESSURE: 170 MMHG | RESPIRATION RATE: 16 BRPM | WEIGHT: 210 LBS | HEIGHT: 71 IN | OXYGEN SATURATION: 98 % | TEMPERATURE: 97.6 F

## 2019-12-09 DIAGNOSIS — R91.1 SOLITARY PULMONARY NODULE: ICD-10-CM

## 2019-12-09 PROCEDURE — 99214 OFFICE O/P EST MOD 30 MIN: CPT

## 2019-12-11 NOTE — ASSESSMENT
[FreeTextEntry1] : 69 year old male who presents today for follow up. He has a history of squamous cell lung Ca stage III (T3 N2) of LETY with neoadjuvant chemo/RT, followed by left thoracotomy, pneumonectomy on 6/23/15. He is s/p right VATS, RUL lung wedge resection on 2/23/18, pathology revealed scar with reactive changes consistent with radiation atypia.\par \par CT Chest on 11/18/19 revealed:\par - No enlarged axillary, mediastinal or hilar lymph nodes nodes.  \par - Evaluation of the lungs demonstrate the patient to be status post left pneumonectomy with stable post operative changes including fluid throughout the left pneumonectomy space unchanged. Status post right upper lobe wedge resection with stable postoperative changes. \par - Cluster of tree-in-bud opacities or 2 mm nodules within the right upper lobe adjacent to the anterior mediastinum new since May 24, 2019 representing foci of impacted distal airways, maybe due to mucous. Unchanged previously described 4 mm nodule associated with the minor fissure. \par - Emphysema. \par \par I have reviewed the patient's medical records and diagnostic images during the time of this office visit, and I have made the following recommendation:\par 1.  Follow up in 3 months with noncontrast Chest CT.\par \par Written by Elina Walker NP, acting as a scribe for Lauren Joseph MD.\par \par The documentation recorded by the scribe accurately reflects the service I personally performed and the decisions made by me. LAUREN JOSEPH MD\par

## 2019-12-11 NOTE — HISTORY OF PRESENT ILLNESS
[FreeTextEntry1] : Mr. Treadwell is a 69 year old male who presents today for follow up. He has a history of squamous cell lung Ca stage III (T3 N2) of LETY with neoadjuvant chemo/RT, followed by left thoracotomy, pneumonectomy on 6/23/15. He is s/p right VATS, RUL lung wedge resection on 2/23/18, pathology revealed scar with reactive changes consistent with radiation atypia.\par \par CT Chest on 11/18/19 revealed:\par - No enlarged axillary, mediastinal or hilar lymph nodes nodes. \par - Evaluation of the upper abdominal organs demonstrate cholelithiasis. Suggestion of hepatic steatosis. Submucosal fatty deposition involving the proximal and midportion of the transverse colon extending into the hepatic flexure and the partially imaged ascending colon, sequela of prior inflammation. 1.6 cm hyperdense left renal lesion unchanged since the PET/CT of February 1, 2018. Partially imaged large right renal cyst with mild peripheral wall calcification. \par - Evaluation of the lungs demonstrate the patient to be status post left pneumonectomy with stable post operative changes including fluid throughout the left pneumonectomy space unchanged. Status post right upper lobe wedge resection with stable postoperative changes. \par - Cluster of tree-in-bud opacities or 2 mm nodules within the right upper lobe adjacent to the anterior mediastinum new since May 24, 2019 representing foci of impacted distal airways, maybe due to mucous. Unchanged previously described 4 mm nodule associated with the minor fissure. \par - Emphysema. \par - Degenerative changes of the spine. Left rib stable postoperative changes. \par \par He returns today and reports that he feels well.  He denies any fever, chills, cough, shortness of breath, chest pain, hemoptysis, or recent illness.  He reports recent URI, which he was treated with antibiotics and symptoms now resolved.

## 2019-12-11 NOTE — PHYSICAL EXAM
[Sclera] : the sclera and conjunctiva were normal [Neck Appearance] : the appearance of the neck was normal [] : the neck was supple [Neck Cervical Mass (___cm)] : no neck mass was observed [Heart Rate And Rhythm] : heart rate was normal and rhythm regular [Heart Sounds] : normal S1 and S2 [Examination Of The Chest] : the chest was normal in appearance [Abdomen Soft] : soft [Abdomen Tenderness] : non-tender [Cervical Lymph Nodes Enlarged Posterior Bilaterally] : posterior cervical [Supraclavicular Lymph Nodes Enlarged Bilaterally] : supraclavicular [No CVA Tenderness] : no ~M costovertebral angle tenderness [Cervical Lymph Nodes Enlarged Anterior Bilaterally] : anterior cervical [Skin Color & Pigmentation] : normal skin color and pigmentation [Abnormal Walk] : normal gait [Oriented To Time, Place, And Person] : oriented to person, place, and time [No Focal Deficits] : no focal deficits [Affect] : the affect was normal [Impaired Insight] : insight and judgment were intact [FreeTextEntry1] : clear breath sounds on right, absent on left side

## 2019-12-11 NOTE — CONSULT LETTER
[Dear  ___] : Dear  [unfilled], [Sincerely,] : Sincerely, [Courtesy Letter:] : I had the pleasure of seeing your patient, [unfilled], in my office today. [Please see my note below.] : Please see my note below. [FreeTextEntry2] : Dr. Clara Estrella (Onc)\par Dr. Mauro Mckeon (RadOnc)\par Dr. Kendrick Deleon (PCP) \par   [FreeTextEntry3] : \par \par \par Jan Sims MD\par Attending Surgeon\par Division of Thoracic Surgery\par , Cardiovascular and Thoracic Surgery\par [facility] [employer] [facility]

## 2020-02-18 NOTE — DISCHARGE NOTE ADULT - PLAN OF CARE
[FreeTextEntry1] : Spirometry pre-and postbronchodilator therapy shows moderate to significant obstructive lung disease. FEV1 is 1.42 L which is 42% predicted. FEV1 percent is 59%. There is no significant bronchodilator response. Wound healing; Follow up final pathology for treatment plan development pending final pathology report Walk 4-5 x per day. Increase as tolerated. You may climb stairs. Continue to use incentive spirometer.   You can remove all dressings and keep wound uncovered. Suture will be removed in office. You may shower.   See Dr. Sims in office in 7-10 days. Call for an apt. [phone]. Have chest x ray prior to that apt and then bring those images with you.

## 2020-02-25 ENCOUNTER — APPOINTMENT (OUTPATIENT)
Dept: CT IMAGING | Facility: HOSPITAL | Age: 70
End: 2020-02-25
Payer: MEDICARE

## 2020-02-25 ENCOUNTER — OUTPATIENT (OUTPATIENT)
Dept: OUTPATIENT SERVICES | Facility: HOSPITAL | Age: 70
LOS: 1 days | End: 2020-02-25
Payer: MEDICARE

## 2020-02-25 DIAGNOSIS — C34.90 MALIGNANT NEOPLASM OF UNSPECIFIED PART OF UNSPECIFIED BRONCHUS OR LUNG: ICD-10-CM

## 2020-02-25 DIAGNOSIS — Z98.890 OTHER SPECIFIED POSTPROCEDURAL STATES: Chronic | ICD-10-CM

## 2020-02-25 DIAGNOSIS — Z98.89 OTHER SPECIFIED POSTPROCEDURAL STATES: Chronic | ICD-10-CM

## 2020-02-25 PROCEDURE — 71250 CT THORAX DX C-: CPT | Mod: 26

## 2020-02-25 PROCEDURE — 71250 CT THORAX DX C-: CPT

## 2020-03-02 ENCOUNTER — APPOINTMENT (OUTPATIENT)
Dept: THORACIC SURGERY | Facility: CLINIC | Age: 70
End: 2020-03-02
Payer: MEDICARE

## 2020-03-02 VITALS
SYSTOLIC BLOOD PRESSURE: 146 MMHG | OXYGEN SATURATION: 98 % | DIASTOLIC BLOOD PRESSURE: 80 MMHG | BODY MASS INDEX: 29.57 KG/M2 | TEMPERATURE: 97.6 F | HEART RATE: 78 BPM | WEIGHT: 212 LBS | RESPIRATION RATE: 16 BRPM

## 2020-03-02 PROCEDURE — 99214 OFFICE O/P EST MOD 30 MIN: CPT

## 2020-03-02 RX ORDER — PROMETHAZINE HYDROCHLORIDE AND DEXTROMETHORPHAN HYDROBROMIDE ORAL SOLUTION 15; 6.25 MG/5ML; MG/5ML
6.25-15 SOLUTION ORAL EVERY 6 HOURS
Qty: 1 | Refills: 2 | Status: DISCONTINUED | COMMUNITY
Start: 2019-10-07 | End: 2020-03-02

## 2020-03-02 RX ORDER — DOXYCYCLINE 100 MG/1
100 TABLET, FILM COATED ORAL TWICE DAILY
Qty: 14 | Refills: 0 | Status: DISCONTINUED | COMMUNITY
Start: 2019-10-07 | End: 2020-03-02

## 2020-03-02 NOTE — PHYSICAL EXAM
[Sclera] : the sclera and conjunctiva were normal [Extraocular Movements] : extraocular movements were intact [Neck Appearance] : the appearance of the neck was normal [Neck Cervical Mass (___cm)] : no neck mass was observed [Jugular Venous Distention Increased] : there was no jugular-venous distention [] : no respiratory distress [Respiration, Rhythm And Depth] : normal respiratory rhythm and effort [Exaggerated Use Of Accessory Muscles For Inspiration] : no accessory muscle use [Auscultation Breath Sounds / Voice Sounds] : lungs were clear to auscultation bilaterally [Heart Rate And Rhythm] : heart rate was normal and rhythm regular [Diminished Respiratory Excursion] : normal chest expansion [Abdomen Soft] : soft [Bowel Sounds] : normal bowel sounds [Abdomen Tenderness] : non-tender [Cervical Lymph Nodes Enlarged Posterior Bilaterally] : posterior cervical [Cervical Lymph Nodes Enlarged Anterior Bilaterally] : anterior cervical [Supraclavicular Lymph Nodes Enlarged Bilaterally] : supraclavicular [Abnormal Walk] : normal gait [No Focal Deficits] : no focal deficits [Skin Color & Pigmentation] : normal skin color and pigmentation [Oriented To Time, Place, And Person] : oriented to person, place, and time [Impaired Insight] : insight and judgment were intact [Affect] : the affect was normal [Mood] : the mood was normal

## 2020-03-06 NOTE — ASSESSMENT
[FreeTextEntry1] : 69 year old male who presents today for follow up. He has a history of squamous cell lung Ca stage III (T3 N2) of LETY with neoadjuvant chemo/RT, followed by left thoracotomy, pneumonectomy on 6/23/15, pathology revealed [id number]. In January of 2017 he completed SBRT to a RUL nodule. On follow up, CT Chest and PET showed enlargement of this lesion. He is s/p right VATS, RUL lung wedge resection on 2/23/18, pathology revealed scar with reactive changes consistent with radiation atypia.\par \par CT Chest on 2/25/2020 reveals prior RUL clustered nodules first noted on prior study have nearly resolved. Unchanged 4mm nodule along left major fissure. \par \par I have reviewed the patient's medical records and diagnostic images at time of this office consultation and have made the following recommendation:\par 1. I have recommended that the patient follow up in 6 months with a CT scan of the chest. \par \par Written by Misty Hoff NP, acting as a scribe for Jan Sims MD.\par The documentation recorded by the scribe accurately reflects the service I personally performed and the decisions made by me. Jan Smis MD\par

## 2020-03-06 NOTE — CONSULT LETTER
[Consult Closing:] : Thank you very much for allowing me to participate in the care of this patient.  If you have any questions, please do not hesitate to contact me. [Dear  ___] : Dear  [unfilled], [FreeTextEntry2] : Dr. Clara Estrella (Onc)\par Dr. Mauro Mckeon (RadOnc)\par Dr. Kendrick Deleon (PCP) \par  [FreeTextEntry3] : Jan Sims MD\par Attending Surgeon\par Division of Thoracic Surgery\par , Cardiovascular and Thoracic Surgery\par [facility]

## 2020-03-10 ENCOUNTER — LABORATORY RESULT (OUTPATIENT)
Age: 70
End: 2020-03-10

## 2020-03-12 ENCOUNTER — OUTPATIENT (OUTPATIENT)
Dept: OUTPATIENT SERVICES | Facility: HOSPITAL | Age: 70
LOS: 1 days | Discharge: HOME | End: 2020-03-12

## 2020-03-12 DIAGNOSIS — Z98.89 OTHER SPECIFIED POSTPROCEDURAL STATES: Chronic | ICD-10-CM

## 2020-03-12 DIAGNOSIS — C34.90 MALIGNANT NEOPLASM OF UNSPECIFIED PART OF UNSPECIFIED BRONCHUS OR LUNG: ICD-10-CM

## 2020-03-12 DIAGNOSIS — Z98.890 OTHER SPECIFIED POSTPROCEDURAL STATES: Chronic | ICD-10-CM

## 2020-06-19 ENCOUNTER — OUTPATIENT (OUTPATIENT)
Dept: OUTPATIENT SERVICES | Facility: HOSPITAL | Age: 70
LOS: 1 days | Discharge: HOME | End: 2020-06-19

## 2020-06-19 DIAGNOSIS — C34.90 MALIGNANT NEOPLASM OF UNSPECIFIED PART OF UNSPECIFIED BRONCHUS OR LUNG: ICD-10-CM

## 2020-06-19 DIAGNOSIS — Z98.89 OTHER SPECIFIED POSTPROCEDURAL STATES: Chronic | ICD-10-CM

## 2020-06-19 DIAGNOSIS — Z98.890 OTHER SPECIFIED POSTPROCEDURAL STATES: Chronic | ICD-10-CM

## 2020-06-23 LAB
BASOPHILS # BLD AUTO: 0.03 K/UL
BASOPHILS NFR BLD AUTO: 0.6 %
CALCIUM SERPL-MCNC: 9.4 MG/DL
CREAT SERPL-MCNC: 1.26 MG/DL
DEPRECATED KAPPA LC FREE/LAMBDA SER: 20.15 RATIO
DEPRECATED KAPPA LC FREE/LAMBDA SER: 20.15 RATIO
EOSINOPHIL # BLD AUTO: 0.09 K/UL
EOSINOPHIL NFR BLD AUTO: 1.7 %
HCT VFR BLD CALC: 49.9 %
HGB BLD-MCNC: 15.5 G/DL
IGA SER QL IEP: 42 MG/DL
IGG SER QL IEP: 459 MG/DL
IGM SER QL IEP: 2696 MG/DL
IMM GRANULOCYTES NFR BLD AUTO: 0.4 %
KAPPA LC CSF-MCNC: 0.4 MG/DL
KAPPA LC CSF-MCNC: 0.4 MG/DL
KAPPA LC SERPL-MCNC: 8.06 MG/DL
KAPPA LC SERPL-MCNC: 8.06 MG/DL
LDH SERPL-CCNC: 146 U/L
LYMPHOCYTES # BLD AUTO: 1.23 K/UL
LYMPHOCYTES NFR BLD AUTO: 22.7 %
MAN DIFF?: NORMAL
MCHC RBC-ENTMCNC: 30.5 PG
MCHC RBC-ENTMCNC: 31.1 GM/DL
MCV RBC AUTO: 98.2 FL
MONOCYTES # BLD AUTO: 0.86 K/UL
MONOCYTES NFR BLD AUTO: 15.9 %
NEUTROPHILS # BLD AUTO: 3.18 K/UL
NEUTROPHILS NFR BLD AUTO: 58.7 %
PLATELET # BLD AUTO: 153 K/UL
RBC # BLD: 5.08 M/UL
RBC # FLD: 13.8 %
WBC # FLD AUTO: 5.41 K/UL

## 2020-06-24 ENCOUNTER — RESULT REVIEW (OUTPATIENT)
Age: 70
End: 2020-06-24

## 2020-06-24 ENCOUNTER — APPOINTMENT (OUTPATIENT)
Dept: HEMATOLOGY ONCOLOGY | Facility: CLINIC | Age: 70
End: 2020-06-24
Payer: MEDICARE

## 2020-06-24 VITALS
DIASTOLIC BLOOD PRESSURE: 91 MMHG | OXYGEN SATURATION: 98 % | BODY MASS INDEX: 28.78 KG/M2 | SYSTOLIC BLOOD PRESSURE: 163 MMHG | HEART RATE: 75 BPM | RESPIRATION RATE: 17 BRPM | WEIGHT: 206.35 LBS | TEMPERATURE: 98.3 F

## 2020-06-24 LAB
BASOPHILS # BLD AUTO: 0 K/UL — SIGNIFICANT CHANGE UP (ref 0–0.2)
BASOPHILS NFR BLD AUTO: 0 % — SIGNIFICANT CHANGE UP (ref 0–2)
EOSINOPHIL # BLD AUTO: 0.05 K/UL — SIGNIFICANT CHANGE UP (ref 0–0.5)
EOSINOPHIL NFR BLD AUTO: 1 % — SIGNIFICANT CHANGE UP (ref 0–6)
HCT VFR BLD CALC: 43.1 % — SIGNIFICANT CHANGE UP (ref 39–50)
HGB BLD-MCNC: 14.2 G/DL — SIGNIFICANT CHANGE UP (ref 13–17)
LYMPHOCYTES # BLD AUTO: 1.2 K/UL — SIGNIFICANT CHANGE UP (ref 1–3.3)
LYMPHOCYTES # BLD AUTO: 24 % — SIGNIFICANT CHANGE UP (ref 13–44)
MCHC RBC-ENTMCNC: 31.1 PG — SIGNIFICANT CHANGE UP (ref 27–34)
MCHC RBC-ENTMCNC: 32.9 GM/DL — SIGNIFICANT CHANGE UP (ref 32–36)
MCV RBC AUTO: 94.5 FL — SIGNIFICANT CHANGE UP (ref 80–100)
MONOCYTES # BLD AUTO: 0.4 K/UL — SIGNIFICANT CHANGE UP (ref 0–0.9)
MONOCYTES NFR BLD AUTO: 8 % — SIGNIFICANT CHANGE UP (ref 2–14)
NEUTROPHILS # BLD AUTO: 3.35 K/UL — SIGNIFICANT CHANGE UP (ref 1.8–7.4)
NEUTROPHILS NFR BLD AUTO: 67 % — SIGNIFICANT CHANGE UP (ref 43–77)
NRBC # BLD: 0 /100 — SIGNIFICANT CHANGE UP (ref 0–0)
NRBC # BLD: SIGNIFICANT CHANGE UP /100 WBCS (ref 0–0)
PLAT MORPH BLD: NORMAL — SIGNIFICANT CHANGE UP
PLATELET # BLD AUTO: 145 K/UL — LOW (ref 150–400)
RBC # BLD: 4.56 M/UL — SIGNIFICANT CHANGE UP (ref 4.2–5.8)
RBC # FLD: 13.5 % — SIGNIFICANT CHANGE UP (ref 10.3–14.5)
RBC BLD AUTO: SIGNIFICANT CHANGE UP
WBC # BLD: 5 K/UL — SIGNIFICANT CHANGE UP (ref 3.8–10.5)
WBC # FLD AUTO: 5 K/UL — SIGNIFICANT CHANGE UP (ref 3.8–10.5)

## 2020-06-24 PROCEDURE — 99214 OFFICE O/P EST MOD 30 MIN: CPT

## 2020-06-24 NOTE — CONSULT LETTER
[Dear  ___] : Dear  [unfilled], [Courtesy Letter:] : I had the pleasure of seeing your patient, [unfilled], in my office today. [Please see my note below.] : Please see my note below. [Consult Closing:] : Thank you very much for allowing me to participate in the care of this patient.  If you have any questions, please do not hesitate to contact me. [Sincerely,] : Sincerely, [FreeTextEntry3] : Clara Recio MD

## 2020-06-24 NOTE — RESULTS/DATA
[FreeTextEntry1] : 2/2020-CT chest-IMPRESSION: \par Right upper lobe partial resection with associated postsurgical changes that \par are stable. Prior left pneumonectomy with associated postsurgical changes. \par Prior right upper lobe clustered nodules first noted on the most recent \par prior study have nearly resolved.

## 2020-06-24 NOTE — HISTORY OF PRESENT ILLNESS
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 He received neoadjuvant radiation and Taxol/Carboplatin chemotherapy followed by left pneumonectomy-No residual tumor at time of surgery. \par 11/2016-PET/CT scan showed minimally hypermetabolic right apical lung nodule, increasing in size on serial diagnostic CT scans. Poor visualization/nonvisualization of ground glass right upper lung opacities.  Several hypermetabolic left internal mammary nodes, increased in size and number with new FDG activity compared to prior PET/CT scan 1/2017- FNA of left internal thoracic lymph node was negative for malignant cells. Cytology suggestive, but not diagnostic of reactive process.  Patient underwent SRS of right upper lung nodule.\par 2/2017-PET/CT scan-marked increased size and FDG avidity of right apical nodule, concerning for malignancy. The nodule has increased in solidity since CT chest 7/2017, and not significantly changed since 11/2017. Decreased FDG avidity of left internal mammary lymph node. No evidence of distant FDG avid metastatic disease.\par 2/2018- S/P right VATS lung wedge resection for enlarging right apical lung nodule on imaging with pathology revealing scar with reactive changes c/w radiation atypia.\par \par 10/2016-Serum immunofixation showed 3 IgM kappa bands, with urine immunofixation showing a weak IgM kappa band identified, and Bence-Steen protein kappa type.  Bone marrow biopsy, and bone marrow aspirate showed a patchy, normocellular bone marrow with trilineage hematopoiesis and maturation, iron stores present. 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  Cytogenetics showed a normal male karyotype.  [de-identified] : squamous cell cancer [de-identified] : Continues to see Dr. Sims in thoracic surgical followup (last saw him in 3/2020)- is ordering surveillance CAT scans of the chest.\par No current c/o SOB. No complaints of cough. No hemoptysis. No chest pain. No fevers. No LN complaints. No visual complaints. No abnormal bruising. Good appetite.\par States has not smoked in 5 years!\par \par

## 2020-06-24 NOTE — PHYSICAL EXAM
[Fully active, able to carry on all pre-disease performance without restriction] : Status 0 - Fully active, able to carry on all pre-disease performance without restriction [Normal] : affect appropriate [de-identified] : no left breath sounds.  CTA on right

## 2020-06-25 LAB — VISCOSITY, SERUM: 2.1

## 2020-06-28 LAB — VISCOSITY, SERUM: 2.1

## 2020-08-19 NOTE — H&P PST ADULT - NS ABD PE RECTAL EXAM
C/o low back pain x2 days. Denies injury. Denies radiation of pain.   
pt recieved rx and discharge instructions and verbalied understanding. pt denies any discomfort or distress at this time. pt denies any questions or concerns for RN or MD at this time. vss. pt IV discharge. pt is axo to baseline upon discharge. pt is ambulatory at discharge. pt is is going home with family.  
patient refused

## 2020-09-08 ENCOUNTER — RESULT REVIEW (OUTPATIENT)
Age: 70
End: 2020-09-08

## 2020-09-08 ENCOUNTER — LABORATORY RESULT (OUTPATIENT)
Age: 70
End: 2020-09-08

## 2020-09-08 ENCOUNTER — APPOINTMENT (OUTPATIENT)
Dept: CT IMAGING | Facility: HOSPITAL | Age: 70
End: 2020-09-08

## 2020-09-08 LAB
BASOPHILS # BLD AUTO: 0 K/UL
BASOPHILS NFR BLD AUTO: 0 %
CALCIUM SERPL-MCNC: 9.4 MG/DL
CREAT SERPL-MCNC: 1.11 MG/DL
DEPRECATED KAPPA LC FREE/LAMBDA SER: 12.66 RATIO
DEPRECATED KAPPA LC FREE/LAMBDA SER: 12.66 RATIO
EOSINOPHIL # BLD AUTO: 0.04 K/UL
EOSINOPHIL NFR BLD AUTO: 0.9 %
HCT VFR BLD CALC: 47.5 %
HGB BLD-MCNC: 15.3 G/DL
IGA SER QL IEP: 36 MG/DL
IGG SER QL IEP: 476 MG/DL
IGM SER QL IEP: 2316 MG/DL
KAPPA LC CSF-MCNC: 0.65 MG/DL
KAPPA LC CSF-MCNC: 0.65 MG/DL
KAPPA LC SERPL-MCNC: 8.23 MG/DL
KAPPA LC SERPL-MCNC: 8.23 MG/DL
LDH SERPL-CCNC: 125 U/L
LYMPHOCYTES # BLD AUTO: 1.16 K/UL
LYMPHOCYTES NFR BLD AUTO: 25.6 %
MAN DIFF?: NORMAL
MCHC RBC-ENTMCNC: 31.4 PG
MCHC RBC-ENTMCNC: 32.2 GM/DL
MCV RBC AUTO: 97.3 FL
MONOCYTES # BLD AUTO: 0.74 K/UL
MONOCYTES NFR BLD AUTO: 16.2 %
NEUTROPHILS # BLD AUTO: 2.45 K/UL
NEUTROPHILS NFR BLD AUTO: 53.9 %
PLATELET # BLD AUTO: 148 K/UL
RBC # BLD: 4.88 M/UL
RBC # FLD: 13.8 %
WBC # FLD AUTO: 4.55 K/UL

## 2020-09-08 PROCEDURE — 71250 CT THORAX DX C-: CPT | Mod: 26

## 2020-09-11 LAB — VISCOSITY, SERUM: 2

## 2020-09-14 ENCOUNTER — APPOINTMENT (OUTPATIENT)
Dept: THORACIC SURGERY | Facility: CLINIC | Age: 70
End: 2020-09-14
Payer: MEDICARE

## 2020-09-14 VITALS
OXYGEN SATURATION: 97 % | WEIGHT: 200 LBS | HEART RATE: 70 BPM | DIASTOLIC BLOOD PRESSURE: 83 MMHG | BODY MASS INDEX: 27.89 KG/M2 | SYSTOLIC BLOOD PRESSURE: 171 MMHG | RESPIRATION RATE: 16 BRPM

## 2020-09-14 PROCEDURE — 99214 OFFICE O/P EST MOD 30 MIN: CPT

## 2020-09-17 NOTE — ASSESSMENT
[FreeTextEntry1] : 70 year old male who presents today for follow up. He has a history of squamous cell lung Ca stage III (T3 N2) of LETY with neoadjuvant chemo/RT, followed by left thoracotomy, pneumonectomy on 6/23/15, pathology revealed [id number]. In January of 2017 he completed SBRT to a RUL nodule. On follow up, CT Chest and PET showed enlargement of this lesion. He is s/p right VATS, RUL lung wedge resection on 2/23/18, pathology revealed scar with reactive changes consistent with radiation atypia.\par \par CT chest scan 9/8/2020: No right lung nodules are noted. A small nodule noted involving the right minor fissure is unchanged when compared to previous exam. No right pleural effusion is noted. Once again, loculated fluid is present in the left hemithorax. \par \par I have reviewed the patient's medical records and diagnostic images at time of this office consultation and have made the following recommendation:\par 1. CT scan showed no evidence of recurrence, recommended patient to return to office in 6mo with CT Chest w/o contrast.\par \par \par I personally performed the services described in the documentation, reviewed the documentation recorded by the scribe in my presence and it accurately and completely records my words and actions.\par \par I, Jonathon Mallory NP, am scribing for and the presence of LAUREN Leach, the following sections HISTORY OF PRESENT ILLNESS, PAST MEDICAL/FAMILY/SOCIAL HISTORY; REVIEW OF SYSTEMS; VITAL SIGNS; PHYSICAL EXAM; DISPOSITION.\par \par

## 2020-09-17 NOTE — PHYSICAL EXAM
[Auscultation Breath Sounds / Voice Sounds] : lungs were clear to auscultation bilaterally [Heart Sounds Gallop] : no gallops [Heart Rate And Rhythm] : heart rate was normal and rhythm regular [Heart Sounds] : normal S1 and S2 [Heart Sounds Pericardial Friction Rub] : no pericardial rub [Examination Of The Chest] : the chest was normal in appearance [Murmurs] : no murmurs [Chest Visual Inspection Thoracic Asymmetry] : no chest asymmetry [Diminished Respiratory Excursion] : normal chest expansion [Abdomen Soft] : soft [Bowel Sounds] : normal bowel sounds [Abdomen Tenderness] : non-tender [Abdomen Mass (___ Cm)] : no abdominal mass palpated [Skin Turgor] : normal skin turgor [Skin Color & Pigmentation] : normal skin color and pigmentation [] : no rash [Sensation] : the sensory exam was normal to light touch and pinprick [Deep Tendon Reflexes (DTR)] : deep tendon reflexes were 2+ and symmetric [No Focal Deficits] : no focal deficits [Impaired Insight] : insight and judgment were intact [Oriented To Time, Place, And Person] : oriented to person, place, and time [Affect] : the affect was normal [FreeTextEntry1] : absent Lt lung BS s/p pneumonectomy

## 2020-09-17 NOTE — HISTORY OF PRESENT ILLNESS
[FreeTextEntry1] : 70 year old male who presents today for follow up. 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 In January of 2017 he completed SBRT to a RUL nodule. On follow up, CT Chest and PET showed enlargement of this lesion. He is s/p right VATS, RUL lung wedge resection on 2/23/18, pathology revealed scar with reactive changes consistent with radiation atypia.\par \par CT Chest on 11/18/19 revealed new RUL cluster of tree-in-bud opacities or 2 mm nodules adjacent to the anterior mediastinum. Stable 4 mm nodule along right minor fissure\par \par CT Chest on 2/25/2020 reveals prior RUL clustered nodules first noted on prior study have nearly resolved. Unchanged 4mm nodule along left major fissure. \par \par CT chest scan 9/8/2020: No right lung nodules are noted. A small nodule noted involving the right minor fissure is unchanged when compared to previous exam. No right pleural effusion is noted. Once again, loculated fluid is present in the left hemithorax. \par \par Patient is here today for a follow up. Denies SOB, CP, cough.

## 2020-09-17 NOTE — CONSULT LETTER
[Courtesy Letter:] : I had the pleasure of seeing your patient, [unfilled], in my office today. [Please see my note below.] : Please see my note below. [Sincerely,] : Sincerely, [Consult Closing:] : Thank you very much for allowing me to participate in the care of this patient.  If you have any questions, please do not hesitate to contact me. [FreeTextEntry2] : Dr. Clara Estrella (Onc)\par Dr. Mauro Mckeon (RadOnc)\par Dr. Kendrick Deleon (PCP) \par  \par  [FreeTextEntry3] : Jan Sims MD \par Attending Surgeon \par Division of Thoracic Surgery \par , Cardiovascular and Thoracic Surgery \par [facility]\par

## 2020-09-18 ENCOUNTER — OUTPATIENT (OUTPATIENT)
Dept: OUTPATIENT SERVICES | Facility: HOSPITAL | Age: 70
LOS: 1 days | Discharge: HOME | End: 2020-09-18

## 2020-09-18 DIAGNOSIS — Z98.89 OTHER SPECIFIED POSTPROCEDURAL STATES: Chronic | ICD-10-CM

## 2020-09-18 DIAGNOSIS — C34.90 MALIGNANT NEOPLASM OF UNSPECIFIED PART OF UNSPECIFIED BRONCHUS OR LUNG: ICD-10-CM

## 2020-09-18 DIAGNOSIS — Z98.890 OTHER SPECIFIED POSTPROCEDURAL STATES: Chronic | ICD-10-CM

## 2020-09-23 ENCOUNTER — APPOINTMENT (OUTPATIENT)
Dept: HEMATOLOGY ONCOLOGY | Facility: CLINIC | Age: 70
End: 2020-09-23
Payer: MEDICARE

## 2020-09-23 VITALS
BODY MASS INDEX: 28.92 KG/M2 | OXYGEN SATURATION: 99 % | WEIGHT: 206.57 LBS | SYSTOLIC BLOOD PRESSURE: 185 MMHG | HEIGHT: 70.98 IN | HEART RATE: 68 BPM | DIASTOLIC BLOOD PRESSURE: 94 MMHG | TEMPERATURE: 97.8 F | RESPIRATION RATE: 15 BRPM

## 2020-09-23 PROCEDURE — 99214 OFFICE O/P EST MOD 30 MIN: CPT

## 2020-09-23 NOTE — RESULTS/DATA
[FreeTextEntry1] : CT chest–status post left pneumonectomy as well as wedge resection in the right upper lobe.  Compared to the previous exam of 2/2020, no significant change.

## 2020-09-23 NOTE — PHYSICAL EXAM
[Fully active, able to carry on all pre-disease performance without restriction] : Status 0 - Fully active, able to carry on all pre-disease performance without restriction [Normal] : affect appropriate [de-identified] : no left breath sounds.  CTA on right

## 2020-09-23 NOTE — ASSESSMENT
[FreeTextEntry1] : #1) lung cancer-clinically ARCHIE. Continue expectant surveillance. Patient will have next CAT scan of the chest 3/2021 per thoracic surgeon.\par #2) Waldenström's macroglobulinemia-lab work reviewed with patient-holding on treatment for now with close continued surveillance. Patient without related symptoms currently. Advised patient of recommended ophtho.surveillance as well.\par Potential complications of WM reviewed.\par #3) history of anemia-hemoglobin remains within normal limits. Follow clinically.\par #4) mild thrombocytopenia–clinically stable at present in this regard.  Continue to monitor CBC with differential.  Should hematologic scenario change/worsen--> further evaluation warranted (for example, f/u BMB).\par Patient was given the opportunity to ask questions. His questions have been answered to his apparent satisfaction at this time. He is agreeable to recommended f/u.\par

## 2020-09-23 NOTE — HISTORY OF PRESENT ILLNESS
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 He received neoadjuvant radiation and Taxol/Carboplatin chemotherapy followed by left pneumonectomy-No residual tumor at time of surgery. \par 11/2016-PET/CT scan showed minimally hypermetabolic right apical lung nodule, increasing in size on serial diagnostic CT scans. Poor visualization/nonvisualization of ground glass right upper lung opacities.  Several hypermetabolic left internal mammary nodes, increased in size and number with new FDG activity compared to prior PET/CT scan 1/2017- FNA of left internal thoracic lymph node was negative for malignant cells. Cytology suggestive, but not diagnostic of reactive process.  Patient underwent SRS of right upper lung nodule.\par 2/2017-PET/CT scan-marked increased size and FDG avidity of right apical nodule, concerning for malignancy. The nodule has increased in solidity since CT chest 7/2017, and not significantly changed since 11/2017. Decreased FDG avidity of left internal mammary lymph node. No evidence of distant FDG avid metastatic disease.\par 2/2018- S/P right VATS lung wedge resection for enlarging right apical lung nodule on imaging with pathology revealing scar with reactive changes c/w radiation atypia.\par \par 10/2016-Serum immunofixation showed 3 IgM kappa bands, with urine immunofixation showing a weak IgM kappa band identified, and Bence-Steen protein kappa type.  Bone marrow biopsy, and bone marrow aspirate showed a patchy, normocellular bone marrow with trilineage hematopoiesis and maturation, iron stores present. 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  Cytogenetics showed a normal male karyotype.  [de-identified] : squamous cell cancer [de-identified] : BP elevated-plans to see PCP for this. No c/o H/A, CP, SOB or LE edema.\par Continues to see Dr. Sims in thoracic surgical followup (saw him this month) and told evaluation "good"- is ordering surveillance CAT scans of the chest, with next one in 6 months.\par No complaints of cough. No hemoptysis. No fevers. No LN complaints. No visual complaints. No abnormal bruising. Good appetite.\par \par \par

## 2020-10-21 ENCOUNTER — APPOINTMENT (OUTPATIENT)
Dept: FAMILY MEDICINE | Facility: CLINIC | Age: 70
End: 2020-10-21
Payer: MEDICARE

## 2020-10-21 VITALS
BODY MASS INDEX: 28.7 KG/M2 | RESPIRATION RATE: 20 BRPM | HEART RATE: 76 BPM | DIASTOLIC BLOOD PRESSURE: 70 MMHG | SYSTOLIC BLOOD PRESSURE: 130 MMHG | HEIGHT: 71 IN | WEIGHT: 205 LBS

## 2020-10-21 PROCEDURE — 36415 COLL VENOUS BLD VENIPUNCTURE: CPT

## 2020-10-21 PROCEDURE — 90686 IIV4 VACC NO PRSV 0.5 ML IM: CPT

## 2020-10-21 PROCEDURE — G0008: CPT

## 2020-10-21 PROCEDURE — 99213 OFFICE O/P EST LOW 20 MIN: CPT | Mod: 25

## 2020-10-21 NOTE — ASSESSMENT
[FreeTextEntry1] : Hemodynamically stable with acceptable BP\par Lab profiles drawn in office and sent\par Flu vaccine given L deltoid

## 2020-10-21 NOTE — HISTORY OF PRESENT ILLNESS
[de-identified] : Presents for BP check, labs, and general follow-up; also requests flu vaccine.  States feeling generally well, trying to watch diet (eating ice cream).

## 2020-10-22 LAB
ALBUMIN SERPL ELPH-MCNC: 4.5 G/DL
ALP BLD-CCNC: 76 U/L
ALT SERPL-CCNC: 14 U/L
ANION GAP SERPL CALC-SCNC: 13 MMOL/L
AST SERPL-CCNC: 12 U/L
BASOPHILS # BLD AUTO: 0.03 K/UL
BASOPHILS NFR BLD AUTO: 0.6 %
BILIRUB SERPL-MCNC: 0.5 MG/DL
BUN SERPL-MCNC: 21 MG/DL
CALCIUM SERPL-MCNC: 9.4 MG/DL
CHLORIDE SERPL-SCNC: 104 MMOL/L
CHOLEST SERPL-MCNC: 125 MG/DL
CO2 SERPL-SCNC: 24 MMOL/L
CREAT SERPL-MCNC: 1.15 MG/DL
EOSINOPHIL # BLD AUTO: 0.08 K/UL
EOSINOPHIL NFR BLD AUTO: 1.7 %
ESTIMATED AVERAGE GLUCOSE: 120 MG/DL
FOLATE SERPL-MCNC: 8.4 NG/ML
GLUCOSE SERPL-MCNC: 91 MG/DL
HBA1C MFR BLD HPLC: 5.8 %
HCT VFR BLD CALC: 48.2 %
HDLC SERPL-MCNC: 38 MG/DL
HGB BLD-MCNC: 15.3 G/DL
IMM GRANULOCYTES NFR BLD AUTO: 0.6 %
LDLC SERPL CALC-MCNC: 70 MG/DL
LYMPHOCYTES # BLD AUTO: 1.17 K/UL
LYMPHOCYTES NFR BLD AUTO: 24.5 %
MAN DIFF?: NORMAL
MCHC RBC-ENTMCNC: 31.4 PG
MCHC RBC-ENTMCNC: 31.7 GM/DL
MCV RBC AUTO: 99 FL
MONOCYTES # BLD AUTO: 0.86 K/UL
MONOCYTES NFR BLD AUTO: 18 %
NEUTROPHILS # BLD AUTO: 2.6 K/UL
NEUTROPHILS NFR BLD AUTO: 54.6 %
NONHDLC SERPL-MCNC: 86 MG/DL
PLATELET # BLD AUTO: 145 K/UL
POTASSIUM SERPL-SCNC: 4.7 MMOL/L
PROT SERPL-MCNC: 8.2 G/DL
PSA FREE FLD-MCNC: 42 %
PSA FREE SERPL-MCNC: 0.33 NG/ML
PSA SERPL-MCNC: 0.78 NG/ML
RBC # BLD: 4.87 M/UL
RBC # FLD: 13.8 %
SODIUM SERPL-SCNC: 141 MMOL/L
TRIGL SERPL-MCNC: 80 MG/DL
VIT B12 SERPL-MCNC: 367 PG/ML
WBC # FLD AUTO: 4.77 K/UL

## 2020-12-13 NOTE — PATIENT PROFILE ADULT. - HAS THE PATIENT HAD A SIGNIFICANT CHANGE IN FUNCTIONAL STATUS DUE TO CVA, HEAD TRAUMA, ORTHOPEDIC TRAUMA/SURGERY, OR FALL, WITH THE WEEK PRIOR TO ADMISSION
COVID positive on exam. No resp distress at present was very fatigued 12/11 now improved  Pt just d/c 12/7, was trated w/ dexamehtasone at that time, remdesevir not given  - no increase oxygenation requirement at this time continue to monitor no

## 2020-12-16 ENCOUNTER — LABORATORY RESULT (OUTPATIENT)
Age: 70
End: 2020-12-16

## 2020-12-16 LAB
BASOPHILS # BLD AUTO: 0.04 K/UL
BASOPHILS NFR BLD AUTO: 0.8 %
CALCIUM SERPL-MCNC: 9.2 MG/DL
CREAT SERPL-MCNC: 1.43 MG/DL
EOSINOPHIL # BLD AUTO: 0.04 K/UL
EOSINOPHIL NFR BLD AUTO: 0.8 %
HCT VFR BLD CALC: 46.8 %
HGB BLD-MCNC: 14.7 G/DL
IMM GRANULOCYTES NFR BLD AUTO: 0.6 %
LDH SERPL-CCNC: 139 U/L
LYMPHOCYTES # BLD AUTO: 1.22 K/UL
LYMPHOCYTES NFR BLD AUTO: 24.5 %
MAN DIFF?: NORMAL
MCHC RBC-ENTMCNC: 30.8 PG
MCHC RBC-ENTMCNC: 31.4 GM/DL
MCV RBC AUTO: 97.9 FL
MONOCYTES # BLD AUTO: 1.04 K/UL
MONOCYTES NFR BLD AUTO: 20.9 %
NEUTROPHILS # BLD AUTO: 2.61 K/UL
NEUTROPHILS NFR BLD AUTO: 52.4 %
PLATELET # BLD AUTO: 127 K/UL
RBC # BLD: 4.78 M/UL
RBC # FLD: 13.1 %
WBC # FLD AUTO: 4.98 K/UL

## 2020-12-17 LAB
DEPRECATED KAPPA LC FREE/LAMBDA SER: 14.57 RATIO
DEPRECATED KAPPA LC FREE/LAMBDA SER: 14.57 RATIO
IGA SER QL IEP: 47 MG/DL
IGG SER QL IEP: 549 MG/DL
IGM SER QL IEP: 2916 MG/DL
KAPPA LC CSF-MCNC: 0.6 MG/DL
KAPPA LC CSF-MCNC: 0.6 MG/DL
KAPPA LC SERPL-MCNC: 8.74 MG/DL
KAPPA LC SERPL-MCNC: 8.74 MG/DL

## 2020-12-18 ENCOUNTER — OUTPATIENT (OUTPATIENT)
Dept: OUTPATIENT SERVICES | Facility: HOSPITAL | Age: 70
LOS: 1 days | Discharge: HOME | End: 2020-12-18

## 2020-12-18 DIAGNOSIS — Z98.890 OTHER SPECIFIED POSTPROCEDURAL STATES: Chronic | ICD-10-CM

## 2020-12-18 DIAGNOSIS — C34.90 MALIGNANT NEOPLASM OF UNSPECIFIED PART OF UNSPECIFIED BRONCHUS OR LUNG: ICD-10-CM

## 2020-12-18 DIAGNOSIS — Z98.89 OTHER SPECIFIED POSTPROCEDURAL STATES: Chronic | ICD-10-CM

## 2020-12-19 LAB — VISCOSITY, SERUM: 2.1

## 2020-12-21 PROBLEM — Z87.09 HISTORY OF ACUTE BRONCHITIS: Status: RESOLVED | Noted: 2019-10-07 | Resolved: 2020-12-21

## 2020-12-22 ENCOUNTER — APPOINTMENT (OUTPATIENT)
Dept: HEMATOLOGY ONCOLOGY | Facility: CLINIC | Age: 70
End: 2020-12-22
Payer: MEDICARE

## 2020-12-22 VITALS
BODY MASS INDEX: 28.75 KG/M2 | WEIGHT: 206.13 LBS | RESPIRATION RATE: 17 BRPM | DIASTOLIC BLOOD PRESSURE: 82 MMHG | SYSTOLIC BLOOD PRESSURE: 179 MMHG | HEART RATE: 64 BPM | TEMPERATURE: 96.8 F | OXYGEN SATURATION: 98 %

## 2020-12-22 PROCEDURE — 99214 OFFICE O/P EST MOD 30 MIN: CPT

## 2020-12-22 NOTE — CONSULT LETTER
[Dear  ___] : Dear  [unfilled], [Courtesy Letter:] : I had the pleasure of seeing your patient, [unfilled], in my office today. [Please see my note below.] : Please see my note below. [Consult Closing:] : Thank you very much for allowing me to participate in the care of this patient.  If you have any questions, please do not hesitate to contact me. [Sincerely,] : Sincerely, [DrChuck  ___] : Dr. GROVES [FreeTextEntry3] : Clara Recio MD

## 2020-12-22 NOTE — PHYSICAL EXAM
[Fully active, able to carry on all pre-disease performance without restriction] : Status 0 - Fully active, able to carry on all pre-disease performance without restriction [Normal] : affect appropriate [de-identified] : no left breath sounds.  CTA on right

## 2020-12-22 NOTE — ASSESSMENT
[FreeTextEntry1] : #1) lung cancer-clinically ARCHIE. Continue expectant surveillance. Patient will have next CAT scan of the chest 3/2021 per thoracic surgeon.\par #2) Waldenström's macroglobulinemia-lab work reviewed with patient-holding on treatment for now with close continued surveillance. Patient without related symptoms currently. \par Potential complications of WM reviewed.\par #3) history of anemia-hemoglobin remains within normal limits. Follow clinically.\par #4) thrombocytopenia–continue to monitor CBC with differential.  Increased monocytes discussed as well. Should hematologic scenario change/worsen--> may consider f/u BMB to further evaluate for evolving process, which was d/w patient today.\par #5) elevated creatinine-d/w patient recommended f/u-he will increase p.o. fluid hydration and have short interval repeat renal panel.  Recommend follow-up with PCP/urology.\par Patient was given the opportunity to ask questions. His questions have been answered to his apparent satisfaction at this time. He is agreeable to recommended f/u.\par

## 2020-12-22 NOTE — HISTORY OF PRESENT ILLNESS
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 He received neoadjuvant radiation and Taxol/Carboplatin chemotherapy followed by left pneumonectomy-No residual tumor at time of surgery. \par 11/2016-PET/CT scan showed minimally hypermetabolic right apical lung nodule, increasing in size on serial diagnostic CT scans. Poor visualization/nonvisualization of ground glass right upper lung opacities.  Several hypermetabolic left internal mammary nodes, increased in size and number with new FDG activity compared to prior PET/CT scan 1/2017- FNA of left internal thoracic lymph node was negative for malignant cells. Cytology suggestive, but not diagnostic of reactive process.  Patient underwent SRS of right upper lung nodule.\par 2/2017-PET/CT scan-marked increased size and FDG avidity of right apical nodule, concerning for malignancy. The nodule has increased in solidity since CT chest 7/2017, and not significantly changed since 11/2017. Decreased FDG avidity of left internal mammary lymph node. No evidence of distant FDG avid metastatic disease.\par 2/2018- S/P right VATS lung wedge resection for enlarging right apical lung nodule on imaging with pathology revealing scar with reactive changes c/w radiation atypia.\par \par 10/2016-Serum immunofixation showed 3 IgM kappa bands, with urine immunofixation showing a weak IgM kappa band identified, and Bence-Steen protein kappa type.  Bone marrow biopsy, and bone marrow aspirate showed a patchy, normocellular bone marrow with trilineage hematopoiesis and maturation, iron stores present. 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  Cytogenetics showed a normal male karyotype.  [de-identified] : squamous cell cancer [de-identified] : Continues to see Dr. Sims in thoracic surgical followup- is ordering surveillance CAT scans of the chest, with next one in 3/2021.\par No complaints of cough. No hemoptysis. No fevers. No LN complaints. No visual complaints. No abnormal bruising. \par Has ELTON MCCORD f/u scheduled early 2021.\par \par \par

## 2020-12-30 ENCOUNTER — LABORATORY RESULT (OUTPATIENT)
Age: 70
End: 2020-12-30

## 2020-12-31 ENCOUNTER — NON-APPOINTMENT (OUTPATIENT)
Age: 70
End: 2020-12-31

## 2021-03-02 ENCOUNTER — OUTPATIENT (OUTPATIENT)
Dept: OUTPATIENT SERVICES | Facility: HOSPITAL | Age: 71
LOS: 1 days | End: 2021-03-02
Payer: MEDICARE

## 2021-03-02 ENCOUNTER — APPOINTMENT (OUTPATIENT)
Dept: CT IMAGING | Facility: HOSPITAL | Age: 71
End: 2021-03-02
Payer: MEDICARE

## 2021-03-02 ENCOUNTER — RESULT REVIEW (OUTPATIENT)
Age: 71
End: 2021-03-02

## 2021-03-02 DIAGNOSIS — Z98.89 OTHER SPECIFIED POSTPROCEDURAL STATES: Chronic | ICD-10-CM

## 2021-03-02 DIAGNOSIS — Z98.890 OTHER SPECIFIED POSTPROCEDURAL STATES: Chronic | ICD-10-CM

## 2021-03-02 DIAGNOSIS — C34.90 MALIGNANT NEOPLASM OF UNSPECIFIED PART OF UNSPECIFIED BRONCHUS OR LUNG: ICD-10-CM

## 2021-03-02 PROCEDURE — G1004: CPT

## 2021-03-02 PROCEDURE — 71250 CT THORAX DX C-: CPT

## 2021-03-02 PROCEDURE — 71250 CT THORAX DX C-: CPT | Mod: 26,ME

## 2021-03-08 NOTE — H&P PST ADULT - GUM GEN PE MLT EXAM PC
Pt brought to ED by parent with c/o left arm pain post fall at 1730. Sling and ice applied at triage  
patient refused

## 2021-03-15 ENCOUNTER — LABORATORY RESULT (OUTPATIENT)
Age: 71
End: 2021-03-15

## 2021-03-15 ENCOUNTER — APPOINTMENT (OUTPATIENT)
Dept: THORACIC SURGERY | Facility: CLINIC | Age: 71
End: 2021-03-15
Payer: MEDICARE

## 2021-03-15 VITALS
SYSTOLIC BLOOD PRESSURE: 200 MMHG | HEIGHT: 71 IN | WEIGHT: 205 LBS | DIASTOLIC BLOOD PRESSURE: 106 MMHG | OXYGEN SATURATION: 99 % | TEMPERATURE: 97.7 F | BODY MASS INDEX: 28.7 KG/M2 | HEART RATE: 71 BPM

## 2021-03-15 LAB
BASOPHILS # BLD AUTO: 0 K/UL
BASOPHILS NFR BLD AUTO: 0 %
CALCIUM SERPL-MCNC: 9.4 MG/DL
CREAT SERPL-MCNC: 1.15 MG/DL
EOSINOPHIL # BLD AUTO: 0.08 K/UL
EOSINOPHIL NFR BLD AUTO: 1.7 %
HCT VFR BLD CALC: 47.1 %
HGB BLD-MCNC: 14.8 G/DL
LDH SERPL-CCNC: 154 U/L
LYMPHOCYTES # BLD AUTO: 1.12 K/UL
LYMPHOCYTES NFR BLD AUTO: 22.6 %
MAN DIFF?: NORMAL
MCHC RBC-ENTMCNC: 30.9 PG
MCHC RBC-ENTMCNC: 31.4 GM/DL
MCV RBC AUTO: 98.3 FL
MONOCYTES # BLD AUTO: 1.17 K/UL
MONOCYTES NFR BLD AUTO: 23.5 %
NEUTROPHILS # BLD AUTO: 2.59 K/UL
NEUTROPHILS NFR BLD AUTO: 52.2 %
PLATELET # BLD AUTO: 127 K/UL
RBC # BLD: 4.79 M/UL
RBC # FLD: 13.3 %
WBC # FLD AUTO: 4.96 K/UL

## 2021-03-15 PROCEDURE — 99213 OFFICE O/P EST LOW 20 MIN: CPT

## 2021-03-15 NOTE — PHYSICAL EXAM
[] : no respiratory distress [Respiration, Rhythm And Depth] : normal respiratory rhythm and effort [Heart Sounds] : normal S1 and S2 [Examination Of The Chest] : the chest was normal in appearance [Abdomen Soft] : soft [Abdomen Tenderness] : non-tender [Cervical Lymph Nodes Enlarged Posterior Bilaterally] : posterior cervical [Cervical Lymph Nodes Enlarged Anterior Bilaterally] : anterior cervical [Supraclavicular Lymph Nodes Enlarged Bilaterally] : supraclavicular [No Spinal Tenderness] : no spinal tenderness [Abnormal Walk] : normal gait [Skin Turgor] : normal skin turgor [No Focal Deficits] : no focal deficits [Affect] : the affect was normal

## 2021-03-16 LAB
DEPRECATED KAPPA LC FREE/LAMBDA SER: 15.16 RATIO
DEPRECATED KAPPA LC FREE/LAMBDA SER: 15.16 RATIO
IGA SER QL IEP: 56 MG/DL
IGG SER QL IEP: 630 MG/DL
IGM SER QL IEP: 3234 MG/DL
KAPPA LC CSF-MCNC: 0.82 MG/DL
KAPPA LC CSF-MCNC: 0.82 MG/DL
KAPPA LC SERPL-MCNC: 12.43 MG/DL
KAPPA LC SERPL-MCNC: 12.43 MG/DL

## 2021-03-17 ENCOUNTER — OUTPATIENT (OUTPATIENT)
Dept: OUTPATIENT SERVICES | Facility: HOSPITAL | Age: 71
LOS: 1 days | Discharge: HOME | End: 2021-03-17

## 2021-03-17 DIAGNOSIS — Z98.89 OTHER SPECIFIED POSTPROCEDURAL STATES: Chronic | ICD-10-CM

## 2021-03-17 DIAGNOSIS — Z98.890 OTHER SPECIFIED POSTPROCEDURAL STATES: Chronic | ICD-10-CM

## 2021-03-17 DIAGNOSIS — C34.90 MALIGNANT NEOPLASM OF UNSPECIFIED PART OF UNSPECIFIED BRONCHUS OR LUNG: ICD-10-CM

## 2021-03-18 LAB
ALBUMIN MFR SERPL ELPH: 46.9 %
ALBUMIN SERPL-MCNC: 3.7 G/DL
ALBUMIN/GLOB SERPL: 0.9 RATIO
ALPHA1 GLOB MFR SERPL ELPH: 3.9 %
ALPHA1 GLOB SERPL ELPH-MCNC: 0.3 G/DL
ALPHA2 GLOB MFR SERPL ELPH: 9.2 %
ALPHA2 GLOB SERPL ELPH-MCNC: 0.7 G/DL
B-GLOBULIN MFR SERPL ELPH: 7.9 %
B-GLOBULIN SERPL ELPH-MCNC: 0.6 G/DL
GAMMA GLOB FLD ELPH-MCNC: 2.5 G/DL
GAMMA GLOB MFR SERPL ELPH: 32.1 %
INTERPRETATION SERPL IEP-IMP: NORMAL
M PROTEIN MFR SERPL ELPH: 23.7 %
MONOCLON BAND OBS SERPL: 1.9 G/DL
PROT SERPL-MCNC: 7.9 G/DL
PROT SERPL-MCNC: 7.9 G/DL
VISCOSITY, SERUM: 2.2

## 2021-03-18 NOTE — ASSESSMENT
[FreeTextEntry1] : 70 year old male who presents today for follow up. He has a history of squamous cell lung Ca stage III (T3 N2) of LETY with neoadjuvant chemo/RT, followed by left thoracotomy, pneumonectomy on 6/23/15, pathology revealed [id number]. In January of 2017 he completed SBRT to a RUL nodule.  He is s/p right VATS, RUL lung wedge resection on 2/23/18, pathology revealed scar with reactive changes consistent with radiation atypia.\par \par I have reviewed the patient's medical records and diagnostic images at time of this office consultation and have made the following recommendation:\par 1. CT chest reviewed and explained to patient, CT Scan without recurrence of disease; patient is doing well. I recommended patient to return to office in 6 months with CT Chest without contrast. 	\par 2. I instructed the patient to follow up with PCP in regards to hypertension \par 3. Follow up with Dr. Estrella (HEME/ONC) \par \par I personally performed the services described in the documentation, reviewed the documentation recorded by the scribe in my presence and it accurately and completely records my words and actions.\par \par I, Maureen Masterson NP, am scribing for and in the presence of LAUREN Leach, the following sections HISTORY OF PRESENT ILLNESS, PAST MEDICAL/FAMILY/SOCIAL HISTORY; REVIEW OF SYSTEMS; VITAL SIGNS; PHYSICAL EXAM; DISPOSITION.\par   \par \par

## 2021-03-18 NOTE — DATA REVIEWED
[FreeTextEntry1] : CT chest on 03/02/2021:\par - post-op changes\par - left chest cavity is filled with fluid, unchanged\par - 2 cm left renal hyperdense nodule possibly hemorrhagic cyst is unchanged.

## 2021-03-18 NOTE — CONSULT LETTER
[Dear  ___] : Dear  [unfilled], [Consult Letter:] : I had the pleasure of evaluating your patient, [unfilled]. [( Thank you for referring [unfilled] for consultation for _____ )] : Thank you for referring [unfilled] for consultation for [unfilled] [Please see my note below.] : Please see my note below. [Consult Closing:] : Thank you very much for allowing me to participate in the care of this patient.  If you have any questions, please do not hesitate to contact me. [Sincerely,] : Sincerely, [DrChuck  ___] : Dr. GROVES [DrChuck ___] : Dr. GROVES [FreeTextEntry2] : Dr. Clara Estrella (Onc)\par Dr. Mauro Mckeon (RadOnc)\par Dr. Kendrick Deleon (PCP)  [FreeTextEntry3] : Jan Sims MD \par Attending Surgeon \par Division of Thoracic Surgery \par , Cardiovascular and Thoracic Surgery \par [facility]\par \par

## 2021-03-18 NOTE — HISTORY OF PRESENT ILLNESS
[FreeTextEntry1] : 70 year old male who presents today for follow up. He has a history of squamous cell lung Ca stage III (T3 N2) of LETY with neoadjuvant chemo/RT, followed by left thoracotomy, pneumonectomy on 6/23/15, pathology revealed [id number]. In January of 2017 he completed SBRT to a RUL nodule.  He is s/p right VATS, RUL lung wedge resection on 2/23/18, pathology revealed scar with reactive changes consistent with radiation atypia.\par \par CT chest on 03/02/2021:\par - post-op changes\par - left chest cavity is filled with fluid, unchanged\par - 2 cm left renal hyperdense nodule possibly hemorrhagic cyst is unchanged.  \par \par Patient is here today for a follow up. He is hypertensive 200/106 HR 71, he denies shortness of breath, dizziness, headache, cough, chest pain, fever, and chills. He last saw his PCP Dr. Deleon in October 2020, BP was 130/70. Manual BP measured in office 145/96 HR 70.

## 2021-03-22 ENCOUNTER — APPOINTMENT (OUTPATIENT)
Dept: HEMATOLOGY ONCOLOGY | Facility: CLINIC | Age: 71
End: 2021-03-22
Payer: MEDICARE

## 2021-03-22 VITALS
HEART RATE: 82 BPM | HEIGHT: 70.98 IN | BODY MASS INDEX: 28.95 KG/M2 | DIASTOLIC BLOOD PRESSURE: 77 MMHG | OXYGEN SATURATION: 99 % | SYSTOLIC BLOOD PRESSURE: 188 MMHG | WEIGHT: 206.79 LBS | RESPIRATION RATE: 16 BRPM | TEMPERATURE: 96.6 F

## 2021-03-22 PROCEDURE — 99213 OFFICE O/P EST LOW 20 MIN: CPT

## 2021-03-22 NOTE — ASSESSMENT
[FreeTextEntry1] : #1) lung cancer-clinically ARCHIE. Continue expectant surveillance. Patient will have next CAT scan of the chest 9/2021 per thoracic surgeon.\par #2) Waldenström's macroglobulinemia-lab work reviewed with patient-holding on treatment for now with close continued surveillance. Patient without related symptoms currently. \par Potential complications of WM reviewed. Advised ophthalmologic evaluation.\par #3) thrombocytopenia–continue to monitor CBC with differential.  Increased monocytes noted as well. Should hematologic scenario change/worsen--> may consider f/u BMB to further evaluate for evolving process.\par \par Patient was given the opportunity to ask questions. His questions have been answered to his apparent satisfaction at this time. He is agreeable to recommended f/u.\par

## 2021-03-22 NOTE — HISTORY OF PRESENT ILLNESS
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 He received neoadjuvant radiation and Taxol/Carboplatin chemotherapy followed by left pneumonectomy-No residual tumor at time of surgery. \par 11/2016-PET/CT scan showed minimally hypermetabolic right apical lung nodule, increasing in size on serial diagnostic CT scans. Poor visualization/nonvisualization of ground glass right upper lung opacities.  Several hypermetabolic left internal mammary nodes, increased in size and number with new FDG activity compared to prior PET/CT scan 1/2017- FNA of left internal thoracic lymph node was negative for malignant cells. Cytology suggestive, but not diagnostic of reactive process.  Patient underwent SRS of right upper lung nodule.\par 2/2017-PET/CT scan-marked increased size and FDG avidity of right apical nodule, concerning for malignancy. The nodule has increased in solidity since CT chest 7/2017, and not significantly changed since 11/2017. Decreased FDG avidity of left internal mammary lymph node. No evidence of distant FDG avid metastatic disease.\par 2/2018- S/P right VATS lung wedge resection for enlarging right apical lung nodule on imaging with pathology revealing scar with reactive changes c/w radiation atypia.\par \par 10/2016-Serum immunofixation showed 3 IgM kappa bands, with urine immunofixation showing a weak IgM kappa band identified, and Bence-Steen protein kappa type.  Bone marrow biopsy, and bone marrow aspirate showed a patchy, normocellular bone marrow with trilineage hematopoiesis and maturation, iron stores present. 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  Cytogenetics showed a normal male karyotype.  [de-identified] : squamous cell cancer [de-identified] : Continues to see Dr. Sims in thoracic surgical followup (last saw him 3/15/21)- is ordering surveillance CAT scans of the chest, with last one just done.\par No complaints of cough. No hemoptysis. No fevers. No LN complaints. No visual complaints. No abnormal bruising. \par Had COVID vaccines.\par \par \par

## 2021-03-22 NOTE — PHYSICAL EXAM
[Fully active, able to carry on all pre-disease performance without restriction] : Status 0 - Fully active, able to carry on all pre-disease performance without restriction [Normal] : affect appropriate [de-identified] : no left breath sounds.  CTA on right

## 2021-04-21 NOTE — H&P PST ADULT - NS PRO AD NO ADVANCE DIRECTIVE
Occupational Therapy  Visit Type: treatment  Precautions:  Medical precautions:  fall risk; standard precautions.  Recent March sx for prostate; please use caution w/ placement of gait belt; incont.    4/19 s/p  I and D L 4th digit RF ORIF; open fx w/ flexor tendon rupture.  Lines:     Basic: IV      Lines in chart and on patient reviewed, cautions maintained throughout session.  Upper Extremity:    Left: weight bearing as tolerated (L IF laceration w/ dressing )      Right: non weight bearing (NWB R hand; pt allowed elbow and shoulder WB on platform)  Lower Extremity:    Left:  weight bearing: as tolerated.    Right:  weight bearing: as tolerated (in high CAM BOOT).  Hearing: no hearing deficits  Vision:     Current vision: no visual deficits (laceration L lower eye lid with bruising, eschar- pt denies vision impairment)  Safety Measures: bed alarm (pt received in bed)    SUBJECTIVE  Patient agreed to participate in therapy this date.  Pt stated he will purchase cast bags for RUE and RLE and understands sponge bathing advised until first MD follow up.  Pt states he has access to a shower chair.  Patient / Family Goal: maximize function and return home    Pain     Location: RH; no pain RLE todays session    At onset of session (out of 10): 6    OBJECTIVE     VSS per EMR    Affect/Behavior: alert, calm, cooperative and pleasant  Pt activity tolerance: pt planning for d/c and making sure to cover all instructions with OT for clarification; engaged in health management.  Functional Communication/Cognition    Overall status:  Within functional limits  Incision/Wound:   - Location: not assessed RH and RLE dressings intact., covered by dressing, intact and dry    Hand Dominance: right  Upper Extremity Function: : pt educated not to use RH for any functional tasks at this time; pt attempting to use for pinching velcro on cam boot.  Friend will assist.; pt demonstrates understanding.  Range of Motion (measured in degrees  unless otherwise indicated)  ROM Details: Full R shoulder/elbow AROM and pt advised to perform 5-10 rep shoulder press w/ elbow flex ex to allow for decreased edema and lymphatic flow in addition to elevation; NWB RH  Balance  Sitting:    Static:  independent double lower extremity support     Dynamic:  independent double lower extremity support  Standing - Firm Surface - Eyes Open:     Static: independent    Dynamic:  modified independent (increased awareness and improved safety; unilateral UE support advised) single upper extremity support  Balance Details: NO LOB. Pt w/o platform walker in BR space. Good safety. OT consulted w/ PTA regarding balance progress functionally    Bed mobility:      Supine to sit: independent (HOB slightly elevated)    Sit to supine: independent  Training completed:      Education details: body mechanics, patient able to maintain precautions and patient demonstrates understanding  Transfers:    Assistive devices: gait belt (without R platform walker today)    Sit to stand: independent    Stand to sit: independent    Activities of Daily Living (ADLs):  Lower Body Dressing:     Assist: set up (assist w/ R cam boot only d/t pt tendency to try to use RH; pt able to verbally direct don/doff cam and wear at all times.)  Toilet transfer:     Assist: independent    Device: gait belt  Toileting:     Assist: independent  Tub Transfer:     Assist: modified independent    Pattern: side stepping    Equipment: grab bar and tub chair  Bathing:     Assist: set up and supervision (Cam boot and RUE cast bag recommended DO NOT GET DRESSING WET: CAM BOOT ON AT ALL TIMES.)  Activities of daily living training:   As stated in report      Interventions    Training provided: ADL training, balance retraining, body mechanics, compensatory techniques, functional ambulation, safety training and use of adaptive equipmentRehab Safety  Therapist donned the following PPE for this patient encounter:  Gloves, Surgical  Mask and Face Shield    Therapy aide or other healthcare professional present during this patient encounter:   No  If yes, that healthcare professional wore the following PPE: Not Applicable    The patient was wearing a mask during this session:  No      Skilled input: verbal instruction/cues and tactile instruction/cues  Verbal Consent: Writer verbally educated and received verbal consent for hand placement, positioning of patient, and techniques to be performed today from patient for therapist position for techniques as described above and how they are pertinent to the patient's plan of care.        ASSESSMENT    Impairments: pain, safety awareness and range of motion (RLE cam boot; RUE NWB)  : maximal function achieved today w/ current ortho limitations require further healing.     Discharge Recommendations:   Recommendations for Discharge: OT IL: Patient requires intermittent nonskilled assistance to perform mobility and/or ADLs safely     PT/OT Mobility Equipment for Discharge: platform walker (pending pt progress)    Planned discharge today.         Pain at end of session:  6/10, location:     End of Session:   Location: in bed  Safety measures: alarm system in place/re-engaged, call light within reach, equipment intact and lines intact  Handoff to: nurse    PLAN  Suggestions for next session as indicated: OT Frequency: 5 days/week  Frequency Comments: 04/21 LS KS O . No OT needs d/c planned . Recommend OP OT hand specialty  Agreement to plan and goals: patient agrees with goals and treatment plan      GOALS  Long Term Goals: (to be met by time of discharge from hospital)  State precautions: Patient able to state precautions independent.  Status: met   Maintain precautions: Patient able to maintain precautions modified independent.  Status: met   Grooming: Patient will complete grooming tasks in standing modified independent.  Status: met   Lower body dressing: Patient will complete lower body dressing in  sitting modified independent.  Status: met   Tub/shower transfer: Patient will complete tub/shower transfer with gait belt (platform walker) shower chair, modified independent.   Status: met   Item retrieval: Patient will complete item retrieval modified independent.   Status: met         Documented in the chart in the following areas: Assessment. Plan. Patient Education.      Therapy procedure time and total treatment time can be found documented on the Time Entry flowsheet   No

## 2021-06-09 LAB
ALBUMIN MFR SERPL ELPH: 48.2 %
ALBUMIN SERPL-MCNC: 3.9 G/DL
ALBUMIN/GLOB SERPL: 1 RATIO
ALPHA1 GLOB MFR SERPL ELPH: 4.2 %
ALPHA1 GLOB SERPL ELPH-MCNC: 0.3 G/DL
ALPHA2 GLOB MFR SERPL ELPH: 8.7 %
ALPHA2 GLOB SERPL ELPH-MCNC: 0.7 G/DL
B-GLOBULIN MFR SERPL ELPH: 20.4 %
B-GLOBULIN SERPL ELPH-MCNC: 1.6 G/DL
BASOPHILS # BLD AUTO: 0.02 K/UL
BASOPHILS NFR BLD AUTO: 0.4 %
CALCIUM SERPL-MCNC: 9.2 MG/DL
CREAT SERPL-MCNC: 1.28 MG/DL
DEPRECATED KAPPA LC FREE/LAMBDA SER: 10.82 RATIO
DEPRECATED KAPPA LC FREE/LAMBDA SER: 10.82 RATIO
EOSINOPHIL # BLD AUTO: 0.04 K/UL
EOSINOPHIL NFR BLD AUTO: 0.8 %
GAMMA GLOB FLD ELPH-MCNC: 1.5 G/DL
GAMMA GLOB MFR SERPL ELPH: 18.5 %
HCT VFR BLD CALC: 43.4 %
HGB BLD-MCNC: 13.6 G/DL
IGA SER QL IEP: 48 MG/DL
IGG SER QL IEP: 574 MG/DL
IGM SER QL IEP: 2757 MG/DL
IMM GRANULOCYTES NFR BLD AUTO: 0.6 %
INTERPRETATION SERPL IEP-IMP: NORMAL
KAPPA LC CSF-MCNC: 0.83 MG/DL
KAPPA LC CSF-MCNC: 0.83 MG/DL
KAPPA LC SERPL-MCNC: 8.98 MG/DL
KAPPA LC SERPL-MCNC: 8.98 MG/DL
LDH SERPL-CCNC: 151 U/L
LYMPHOCYTES # BLD AUTO: 1.3 K/UL
LYMPHOCYTES NFR BLD AUTO: 26.2 %
M PROTEIN MFR SERPL ELPH: 24.4 %
MAN DIFF?: NORMAL
MCHC RBC-ENTMCNC: 31.1 PG
MCHC RBC-ENTMCNC: 31.3 GM/DL
MCV RBC AUTO: 99.3 FL
MONOCLON BAND OBS SERPL: 2 G/DL
MONOCYTES # BLD AUTO: 1.08 K/UL
MONOCYTES NFR BLD AUTO: 21.8 %
NEUTROPHILS # BLD AUTO: 2.49 K/UL
NEUTROPHILS NFR BLD AUTO: 50.2 %
PLATELET # BLD AUTO: 126 K/UL
PROT SERPL-MCNC: 8 G/DL
PROT SERPL-MCNC: 8 G/DL
RBC # BLD: 4.37 M/UL
RBC # FLD: 13.9 %
WBC # FLD AUTO: 4.96 K/UL

## 2021-06-14 LAB — VISCOSITY, SERUM: 2.1

## 2021-06-16 ENCOUNTER — OUTPATIENT (OUTPATIENT)
Dept: OUTPATIENT SERVICES | Facility: HOSPITAL | Age: 71
LOS: 1 days | Discharge: HOME | End: 2021-06-16

## 2021-06-16 DIAGNOSIS — Z98.89 OTHER SPECIFIED POSTPROCEDURAL STATES: Chronic | ICD-10-CM

## 2021-06-16 DIAGNOSIS — C34.90 MALIGNANT NEOPLASM OF UNSPECIFIED PART OF UNSPECIFIED BRONCHUS OR LUNG: ICD-10-CM

## 2021-06-16 DIAGNOSIS — Z98.890 OTHER SPECIFIED POSTPROCEDURAL STATES: Chronic | ICD-10-CM

## 2021-06-17 ENCOUNTER — APPOINTMENT (OUTPATIENT)
Dept: HEMATOLOGY ONCOLOGY | Facility: CLINIC | Age: 71
End: 2021-06-17
Payer: MEDICARE

## 2021-06-17 VITALS
TEMPERATURE: 97.7 F | SYSTOLIC BLOOD PRESSURE: 170 MMHG | RESPIRATION RATE: 17 BRPM | WEIGHT: 204.99 LBS | DIASTOLIC BLOOD PRESSURE: 88 MMHG | BODY MASS INDEX: 28.7 KG/M2 | HEIGHT: 71 IN | OXYGEN SATURATION: 99 % | HEART RATE: 68 BPM

## 2021-06-17 PROCEDURE — 99214 OFFICE O/P EST MOD 30 MIN: CPT

## 2021-06-17 NOTE — PHYSICAL EXAM
[Fully active, able to carry on all pre-disease performance without restriction] : Status 0 - Fully active, able to carry on all pre-disease performance without restriction [Normal] : affect appropriate [de-identified] : no left breath sounds.  CTA on right

## 2021-06-17 NOTE — HISTORY OF PRESENT ILLNESS
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 He received neoadjuvant radiation and Taxol/Carboplatin chemotherapy followed by left pneumonectomy-No residual tumor at time of surgery. \par 11/2016-PET/CT scan showed minimally hypermetabolic right apical lung nodule, increasing in size on serial diagnostic CT scans. Poor visualization/nonvisualization of ground glass right upper lung opacities.  Several hypermetabolic left internal mammary nodes, increased in size and number with new FDG activity compared to prior PET/CT scan 1/2017- FNA of left internal thoracic lymph node was negative for malignant cells. Cytology suggestive, but not diagnostic of reactive process.  Patient underwent SRS of right upper lung nodule.\par 2/2017-PET/CT scan-marked increased size and FDG avidity of right apical nodule, concerning for malignancy. The nodule has increased in solidity since CT chest 7/2017, and not significantly changed since 11/2017. Decreased FDG avidity of left internal mammary lymph node. No evidence of distant FDG avid metastatic disease.\par 2/2018- S/P right VATS lung wedge resection for enlarging right apical lung nodule on imaging with pathology revealing scar with reactive changes c/w radiation atypia.\par \par 10/2016-Serum immunofixation showed 3 IgM kappa bands, with urine immunofixation showing a weak IgM kappa band identified, and Bence-Steen protein kappa type.  Bone marrow biopsy, and bone marrow aspirate showed a patchy, normocellular bone marrow with trilineage hematopoiesis and maturation, iron stores present. 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  Cytogenetics showed a normal male karyotype.  [de-identified] : squamous cell cancer [de-identified] : Continues to see Dr. Sims in thoracic surgical followup (last saw him 3/15/21)- is ordering surveillance CAT scans of the chest, with next one in 9/2021.\par No complaints of cough. No hemoptysis. No fevers. No LN complaints. No visual complaints. No abnormal bruising. \par Had COVID vaccines (Moderna).\par Plans to visit son in [place] in near future.\par \par \par

## 2021-06-17 NOTE — HISTORY OF PRESENT ILLNESS
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 He received neoadjuvant radiation and Taxol/Carboplatin chemotherapy followed by left pneumonectomy-No residual tumor at time of surgery. \par 11/2016-PET/CT scan showed minimally hypermetabolic right apical lung nodule, increasing in size on serial diagnostic CT scans. Poor visualization/nonvisualization of ground glass right upper lung opacities.  Several hypermetabolic left internal mammary nodes, increased in size and number with new FDG activity compared to prior PET/CT scan 1/2017- FNA of left internal thoracic lymph node was negative for malignant cells. Cytology suggestive, but not diagnostic of reactive process.  Patient underwent SRS of right upper lung nodule.\par 2/2017-PET/CT scan-marked increased size and FDG avidity of right apical nodule, concerning for malignancy. The nodule has increased in solidity since CT chest 7/2017, and not significantly changed since 11/2017. Decreased FDG avidity of left internal mammary lymph node. No evidence of distant FDG avid metastatic disease.\par 2/2018- S/P right VATS lung wedge resection for enlarging right apical lung nodule on imaging with pathology revealing scar with reactive changes c/w radiation atypia.\par \par 10/2016-Serum immunofixation showed 3 IgM kappa bands, with urine immunofixation showing a weak IgM kappa band identified, and Bence-Steen protein kappa type.  Bone marrow biopsy, and bone marrow aspirate showed a patchy, normocellular bone marrow with trilineage hematopoiesis and maturation, iron stores present. 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  Cytogenetics showed a normal male karyotype.  [de-identified] : squamous cell cancer [de-identified] : Continues to see Dr. Sims in thoracic surgical followup (last saw him 3/15/21)- is ordering surveillance CAT scans of the chest, with next one in 9/2021.\par No complaints of cough. No hemoptysis. No fevers. No LN complaints. No visual complaints. No abnormal bruising. \par Had COVID vaccines (Moderna).\par Plans to visit son in [place] in near future.\par \par \par

## 2021-06-17 NOTE — HISTORY OF PRESENT ILLNESS
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 He received neoadjuvant radiation and Taxol/Carboplatin chemotherapy followed by left pneumonectomy-No residual tumor at time of surgery. \par 11/2016-PET/CT scan showed minimally hypermetabolic right apical lung nodule, increasing in size on serial diagnostic CT scans. Poor visualization/nonvisualization of ground glass right upper lung opacities.  Several hypermetabolic left internal mammary nodes, increased in size and number with new FDG activity compared to prior PET/CT scan 1/2017- FNA of left internal thoracic lymph node was negative for malignant cells. Cytology suggestive, but not diagnostic of reactive process.  Patient underwent SRS of right upper lung nodule.\par 2/2017-PET/CT scan-marked increased size and FDG avidity of right apical nodule, concerning for malignancy. The nodule has increased in solidity since CT chest 7/2017, and not significantly changed since 11/2017. Decreased FDG avidity of left internal mammary lymph node. No evidence of distant FDG avid metastatic disease.\par 2/2018- S/P right VATS lung wedge resection for enlarging right apical lung nodule on imaging with pathology revealing scar with reactive changes c/w radiation atypia.\par \par 10/2016-Serum immunofixation showed 3 IgM kappa bands, with urine immunofixation showing a weak IgM kappa band identified, and Bence-Steen protein kappa type.  Bone marrow biopsy, and bone marrow aspirate showed a patchy, normocellular bone marrow with trilineage hematopoiesis and maturation, iron stores present. 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  Cytogenetics showed a normal male karyotype.  [de-identified] : squamous cell cancer [de-identified] : Continues to see Dr. Sims in thoracic surgical followup (last saw him 3/15/21)- is ordering surveillance CAT scans of the chest, with next one in 9/2021.\par No complaints of cough. No hemoptysis. No fevers. No LN complaints. No visual complaints. No abnormal bruising. \par Had COVID vaccines (Moderna).\par Plans to visit son in [place] in near future.\par \par \par

## 2021-06-17 NOTE — PHYSICAL EXAM
[Fully active, able to carry on all pre-disease performance without restriction] : Status 0 - Fully active, able to carry on all pre-disease performance without restriction [Normal] : affect appropriate [de-identified] : no left breath sounds.  CTA on right

## 2021-06-17 NOTE — PHYSICAL EXAM
[Fully active, able to carry on all pre-disease performance without restriction] : Status 0 - Fully active, able to carry on all pre-disease performance without restriction [Normal] : affect appropriate [de-identified] : no left breath sounds.  CTA on right

## 2021-06-17 NOTE — ASSESSMENT
[FreeTextEntry1] : Lab work, CT chest results reviewed.\par #1) lung cancer-clinically ARCHIE. Continue expectant surveillance. Patient will have next CAT scan of the chest 9/2021 per thoracic surgeon.\par \par #2) Waldenström's macroglobulinemia-holding on treatment for now with close continued surveillance. Patient without related symptoms currently. \par Potential complications of WM reviewed. \par \par #3) thrombocytopenia–continue to monitor CBC with differential.  Increased monocytes noted as well. Should hematologic scenario change/worsen--> may consider f/u BMB to further evaluate for evolving process.\par \par Patient was given the opportunity to ask questions. His questions have been answered to his apparent satisfaction at this time. He is agreeable to recommended f/u.\par

## 2021-06-23 NOTE — BRIEF OPERATIVE NOTE - PRE-OP DX
Addended byDemario Garcia on: 6/23/2021 11:04 AM     Modules accepted: Orders Lung nodule  02/23/2018    Active  Kumar Colunga

## 2021-09-14 ENCOUNTER — OUTPATIENT (OUTPATIENT)
Dept: OUTPATIENT SERVICES | Facility: HOSPITAL | Age: 71
LOS: 1 days | End: 2021-09-14
Payer: MEDICARE

## 2021-09-14 ENCOUNTER — APPOINTMENT (OUTPATIENT)
Dept: CT IMAGING | Facility: HOSPITAL | Age: 71
End: 2021-09-14
Payer: MEDICARE

## 2021-09-14 DIAGNOSIS — Z98.89 OTHER SPECIFIED POSTPROCEDURAL STATES: Chronic | ICD-10-CM

## 2021-09-14 DIAGNOSIS — C34.90 MALIGNANT NEOPLASM OF UNSPECIFIED PART OF UNSPECIFIED BRONCHUS OR LUNG: ICD-10-CM

## 2021-09-14 DIAGNOSIS — Z98.890 OTHER SPECIFIED POSTPROCEDURAL STATES: Chronic | ICD-10-CM

## 2021-09-14 PROCEDURE — 71250 CT THORAX DX C-: CPT | Mod: 26,MH

## 2021-09-14 PROCEDURE — 71250 CT THORAX DX C-: CPT

## 2021-09-20 ENCOUNTER — APPOINTMENT (OUTPATIENT)
Dept: THORACIC SURGERY | Facility: CLINIC | Age: 71
End: 2021-09-20
Payer: MEDICARE

## 2021-09-20 VITALS
HEART RATE: 86 BPM | WEIGHT: 205 LBS | OXYGEN SATURATION: 98 % | RESPIRATION RATE: 18 BRPM | DIASTOLIC BLOOD PRESSURE: 90 MMHG | TEMPERATURE: 98.6 F | HEIGHT: 71 IN | BODY MASS INDEX: 28.7 KG/M2 | SYSTOLIC BLOOD PRESSURE: 180 MMHG

## 2021-09-20 PROCEDURE — 99213 OFFICE O/P EST LOW 20 MIN: CPT

## 2021-09-20 NOTE — DISCHARGE NOTE ADULT - HOSPITAL COURSE
Is This A New Presentation, Or A Follow-Up?: Skin Lesions How Severe Is Your Skin Lesion?: mild Have Your Skin Lesions Been Treated?: not been treated This 67 year old male with a history of lung cancer presents with a right lung nodule  He had been diagnosed with lung cancer in 2015 and treated with neoadjuvant chemo/RT and then a left pneumonectomy.  He has been followed with CT scans and was treated with SBRT for a right upper lobe nodule in January of 2017.  However, a recent CT scan revealed an increase in the size of that right upper lobe lung nodule. A PET scan was also done.   He underwent a FB, R VATS, RUL wedge resection on 2/23/2018.  His chest tube was removed on 2/24 and he was transferred to .  A Crum that had been placed for post-op urinary retention was removed on 2/25, prior to discharge home. This 67 year old male with a history of lung cancer presents with a right lung nodule  He had been diagnosed with lung cancer in 2015 and treated with neoadjuvant chemo/RT and then a left pneumonectomy.  He has been followed with CT scans and was treated with SBRT for a right upper lobe nodule in January of 2017.  However, a recent CT scan revealed an increase in the size of that right upper lobe lung nodule. A PET scan was also done.   He underwent a FB, R VATS, RUL wedge resection on 2/23/2018.  His chest tube was removed on 2/24 and he was transferred to .  A Crum that had been placed for post-op urinary retention was removed on 2/25, pt. subsequently voided 400cc total. VATS site c/d/i. Feels well. AMbulating frequently. Rt. lung CTA. No edema.   Vital Signs Last 24 Hrs  T(C): 36.7 (25 Feb 2018 12:06), Max: 37 (25 Feb 2018 00:18)  T(F): 98 (25 Feb 2018 12:06), Max: 98.6 (25 Feb 2018 00:18)  HR: 82 (25 Feb 2018 12:06) (76 - 85)  BP: 150/74 (25 Feb 2018 12:06) (137/65 - 157/67)  BP(mean): --  RR: 18 (25 Feb 2018 12:06) (18 - 18)  SpO2: 96% (25 Feb 2018 12:06) (93% - 99%)  Cleared for discharge home by Dr. Scales.

## 2021-09-22 NOTE — CONSULT LETTER
[Dear  ___] : Dear  [unfilled], [Consult Letter:] : I had the pleasure of evaluating your patient, [unfilled]. [( Thank you for referring [unfilled] for consultation for _____ )] : Thank you for referring [unfilled] for consultation for [unfilled] [Please see my note below.] : Please see my note below. [Consult Closing:] : Thank you very much for allowing me to participate in the care of this patient.  If you have any questions, please do not hesitate to contact me. [Sincerely,] : Sincerely, [DrChuck  ___] : Dr. GROVES [DrChuck ___] : Dr. GROVES [FreeTextEntry2] : Dr. Clara Estrella (Onc)\par Dr. Mauro Mckeon (RadOnc)\par Dr. Kendrick Deleon (PCP)  [FreeTextEntry3] : Jan Sims MD \par Attending Surgeon \par Division of Thoracic Surgery \par , Cardiovascular and Thoracic Surgery \par [facility]\par \par

## 2021-09-22 NOTE — ASSESSMENT
[FreeTextEntry1] : 71 year old male. He has a history of squamous cell lung Ca stage III (T3 N2) of LETY with neoadjuvant chemo/RT, followed by Left VATS, left thoracotomy, pneumonectomy, resection of first, second, third ribs on 06/23/2015, pathology revealed [id number]. In January of 2017 he completed SBRT to a RUL nodule.  He is s/p right VATS, RUL lung wedge resection on 2/23/18, pathology revealed scar with reactive changes consistent with radiation atypia.\par \par I have reviewed the patient's medical records and diagnostic images at time of this office consultation and have made the following recommendation:\par 1. CT chest reviewed and explained to patient, I recommended patient to return to office in 12 months with CT Chest without contrast.\par 2. Will discuss the kidneys findings with Radiologist, will contact patient after. \par \par I personally performed the services described in the documentation, reviewed the documentation recorded by the scribe in my presence and it accurately and completely records my words and actions.\par \par SARABJIT, Milka Su NP, am scribing for and the presence of LAUREN Leach, the following sections HISTORY OF PRESENT ILLNESS, PAST MEDICAL/FAMILY/SOCIAL HISTORY; REVIEW OF SYSTEMS; VITAL SIGNS; PHYSICAL EXAM; DISPOSITION.

## 2021-09-22 NOTE — HISTORY OF PRESENT ILLNESS
[FreeTextEntry1] : 71 year old male. He has a history of squamous cell lung Ca stage III (T3 N2) of LETY with neoadjuvant chemo/RT, followed by Left VATS, left thoracotomy, pneumonectomy, resection of first, second, third ribs on 06/23/2015, pathology revealed [id number]. In January of 2017 he completed SBRT to a RUL nodule.  He is s/p right VATS, RUL lung wedge resection on 2/23/18, pathology revealed scar with reactive changes consistent with radiation atypia.\par \par CT chest on 09/14/2021: \par - Patient is status post left pneumonectomy as well as wedge resection in the right upper lobe. Centrilobular emphysema is noted in the right lung. \par - Low-attenuation lesion in the right kidney and a high attenuation lesion in the left kidney which are indeterminate based on this exam.\par \par Patient is here today for a follow up. Patient denies shortness of breath, cough, chest pain, fever, chills.\par \par

## 2021-09-24 ENCOUNTER — NON-APPOINTMENT (OUTPATIENT)
Age: 71
End: 2021-09-24

## 2021-09-24 DIAGNOSIS — N28.9 DISORDER OF KIDNEY AND URETER, UNSPECIFIED: ICD-10-CM

## 2021-10-01 ENCOUNTER — LABORATORY RESULT (OUTPATIENT)
Age: 71
End: 2021-10-01

## 2021-10-01 ENCOUNTER — APPOINTMENT (OUTPATIENT)
Dept: FAMILY MEDICINE | Facility: CLINIC | Age: 71
End: 2021-10-01
Payer: MEDICARE

## 2021-10-01 VITALS
HEIGHT: 71 IN | DIASTOLIC BLOOD PRESSURE: 76 MMHG | HEART RATE: 76 BPM | SYSTOLIC BLOOD PRESSURE: 130 MMHG | BODY MASS INDEX: 28.7 KG/M2 | WEIGHT: 205 LBS | RESPIRATION RATE: 20 BRPM

## 2021-10-01 DIAGNOSIS — K90.49 MALABSORPTION DUE TO INTOLERANCE, NOT ELSEWHERE CLASSIFIED: ICD-10-CM

## 2021-10-01 PROCEDURE — 36415 COLL VENOUS BLD VENIPUNCTURE: CPT

## 2021-10-01 PROCEDURE — G0008: CPT

## 2021-10-01 PROCEDURE — 99214 OFFICE O/P EST MOD 30 MIN: CPT | Mod: 25

## 2021-10-01 PROCEDURE — 90686 IIV4 VACC NO PRSV 0.5 ML IM: CPT

## 2021-10-01 NOTE — ASSESSMENT
[FreeTextEntry1] : Hemodynamically stable with acceptable BP\par Pulmonary status stable\par Lab profiles drawn in office and sent\par Flu vaccine given L deltoid

## 2021-10-01 NOTE — HISTORY OF PRESENT ILLNESS
[de-identified] : Presents for BP check, labs, and general follow-up; also requests flu vaccine.  States feeling generally well; trying to watch diet and remain active - mowed lawn yesterday; of note denies breathing issues.  Following with Thoracic Surgery and Heme-Onc.

## 2021-10-04 LAB
ALBUMIN SERPL ELPH-MCNC: 4.3 G/DL
ALP BLD-CCNC: 70 U/L
ALT SERPL-CCNC: 12 U/L
ANION GAP SERPL CALC-SCNC: 12 MMOL/L
AST SERPL-CCNC: 16 U/L
BASOPHILS # BLD AUTO: 0.02 K/UL
BASOPHILS NFR BLD AUTO: 0.6 %
BILIRUB SERPL-MCNC: 0.6 MG/DL
BUN SERPL-MCNC: 21 MG/DL
CALCIUM SERPL-MCNC: 9.1 MG/DL
CHLORIDE SERPL-SCNC: 106 MMOL/L
CHOLEST SERPL-MCNC: 108 MG/DL
CO2 SERPL-SCNC: 25 MMOL/L
CREAT SERPL-MCNC: 1.1 MG/DL
EOSINOPHIL # BLD AUTO: 0.03 K/UL
EOSINOPHIL NFR BLD AUTO: 0.8 %
ESTIMATED AVERAGE GLUCOSE: 117 MG/DL
FOLATE SERPL-MCNC: 8.1 NG/ML
GLUCOSE SERPL-MCNC: 135 MG/DL
HBA1C MFR BLD HPLC: 5.7 %
HCT VFR BLD CALC: 43 %
HDLC SERPL-MCNC: 36 MG/DL
HGB BLD-MCNC: 13.5 G/DL
IMM GRANULOCYTES NFR BLD AUTO: 0.8 %
LDLC SERPL CALC-MCNC: 60 MG/DL
LYMPHOCYTES # BLD AUTO: 0.87 K/UL
LYMPHOCYTES NFR BLD AUTO: 24.4 %
MAN DIFF?: NORMAL
MCHC RBC-ENTMCNC: 31.4 GM/DL
MCHC RBC-ENTMCNC: 31.6 PG
MCV RBC AUTO: 100.7 FL
MONOCYTES # BLD AUTO: 0.76 K/UL
MONOCYTES NFR BLD AUTO: 21.3 %
NEUTROPHILS # BLD AUTO: 1.85 K/UL
NEUTROPHILS NFR BLD AUTO: 52.1 %
NONHDLC SERPL-MCNC: 73 MG/DL
PLATELET # BLD AUTO: 110 K/UL
POTASSIUM SERPL-SCNC: 4.7 MMOL/L
PROT SERPL-MCNC: 8 G/DL
PSA SERPL-MCNC: 0.56 NG/ML
RBC # BLD: 4.27 M/UL
RBC # FLD: 14.1 %
SODIUM SERPL-SCNC: 143 MMOL/L
T4 FREE SERPL-MCNC: 1.2 NG/DL
TRIGL SERPL-MCNC: 64 MG/DL
TSH SERPL-ACNC: 5.94 UIU/ML
VIT B12 SERPL-MCNC: 369 PG/ML
WBC # FLD AUTO: 3.56 K/UL

## 2021-10-07 ENCOUNTER — LABORATORY RESULT (OUTPATIENT)
Age: 71
End: 2021-10-07

## 2021-10-08 LAB
CALCIUM SERPL-MCNC: 8.7 MG/DL
CREAT SERPL-MCNC: 1.14 MG/DL
DEPRECATED KAPPA LC FREE/LAMBDA SER: 10.14 RATIO
KAPPA LC CSF-MCNC: 0.88 MG/DL
KAPPA LC SERPL-MCNC: 8.92 MG/DL
LDH SERPL-CCNC: 220 U/L

## 2021-10-09 LAB
ALBUMIN MFR SERPL ELPH: 47.4 %
ALBUMIN SERPL-MCNC: 3.8 G/DL
ALBUMIN/GLOB SERPL: 0.9 RATIO
ALPHA1 GLOB MFR SERPL ELPH: 4.2 %
ALPHA1 GLOB SERPL ELPH-MCNC: 0.3 G/DL
ALPHA2 GLOB MFR SERPL ELPH: 9.6 %
ALPHA2 GLOB SERPL ELPH-MCNC: 0.8 G/DL
B-GLOBULIN MFR SERPL ELPH: 8.3 %
B-GLOBULIN SERPL ELPH-MCNC: 0.7 G/DL
BASOPHILS # BLD AUTO: 0.03 K/UL
BASOPHILS NFR BLD AUTO: 0.7 %
EOSINOPHIL # BLD AUTO: 0.05 K/UL
EOSINOPHIL NFR BLD AUTO: 1.1 %
GAMMA GLOB FLD ELPH-MCNC: 2.4 G/DL
GAMMA GLOB MFR SERPL ELPH: 30.5 %
HCT VFR BLD CALC: 41.8 %
HGB BLD-MCNC: 13.2 G/DL
IMM GRANULOCYTES NFR BLD AUTO: 0.7 %
INTERPRETATION SERPL IEP-IMP: NORMAL
LYMPHOCYTES # BLD AUTO: 1.12 K/UL
LYMPHOCYTES NFR BLD AUTO: 25.5 %
M PROTEIN MFR SERPL ELPH: 22.8 %
MAN DIFF?: NORMAL
MCHC RBC-ENTMCNC: 31.6 GM/DL
MCHC RBC-ENTMCNC: 32.1 PG
MCV RBC AUTO: 101.7 FL
MONOCLON BAND OBS SERPL: 1.8 G/DL
MONOCYTES # BLD AUTO: 0.97 K/UL
MONOCYTES NFR BLD AUTO: 22.1 %
NEUTROPHILS # BLD AUTO: 2.19 K/UL
NEUTROPHILS NFR BLD AUTO: 49.9 %
PLATELET # BLD AUTO: 103 K/UL
PROT SERPL-MCNC: 8 G/DL
PROT SERPL-MCNC: 8 G/DL
RBC # BLD: 4.11 M/UL
RBC # FLD: 14.3 %
WBC # FLD AUTO: 4.39 K/UL

## 2021-10-12 ENCOUNTER — OUTPATIENT (OUTPATIENT)
Dept: OUTPATIENT SERVICES | Facility: HOSPITAL | Age: 71
LOS: 1 days | Discharge: HOME | End: 2021-10-12

## 2021-10-12 DIAGNOSIS — C34.90 MALIGNANT NEOPLASM OF UNSPECIFIED PART OF UNSPECIFIED BRONCHUS OR LUNG: ICD-10-CM

## 2021-10-12 DIAGNOSIS — Z98.89 OTHER SPECIFIED POSTPROCEDURAL STATES: Chronic | ICD-10-CM

## 2021-10-12 DIAGNOSIS — Z98.890 OTHER SPECIFIED POSTPROCEDURAL STATES: Chronic | ICD-10-CM

## 2021-10-12 LAB — VISCOSITY, SERUM: 2.1

## 2021-10-14 ENCOUNTER — APPOINTMENT (OUTPATIENT)
Dept: HEMATOLOGY ONCOLOGY | Facility: CLINIC | Age: 71
End: 2021-10-14
Payer: MEDICARE

## 2021-10-14 VITALS
OXYGEN SATURATION: 99 % | HEIGHT: 71 IN | TEMPERATURE: 97.2 F | HEART RATE: 72 BPM | SYSTOLIC BLOOD PRESSURE: 169 MMHG | DIASTOLIC BLOOD PRESSURE: 83 MMHG | WEIGHT: 205.91 LBS | RESPIRATION RATE: 16 BRPM | BODY MASS INDEX: 28.83 KG/M2

## 2021-10-14 PROCEDURE — 99214 OFFICE O/P EST MOD 30 MIN: CPT

## 2021-10-14 NOTE — PHYSICAL EXAM
[Fully active, able to carry on all pre-disease performance without restriction] : Status 0 - Fully active, able to carry on all pre-disease performance without restriction [Normal] : affect appropriate [de-identified] : no left breath sounds.  CTA on right

## 2021-10-14 NOTE — HISTORY OF PRESENT ILLNESS
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 He received neoadjuvant radiation and Taxol/Carboplatin chemotherapy followed by left pneumonectomy-No residual tumor at time of surgery. \par 11/2016-PET/CT scan showed minimally hypermetabolic right apical lung nodule, increasing in size on serial diagnostic CT scans. Poor visualization/nonvisualization of ground glass right upper lung opacities.  Several hypermetabolic left internal mammary nodes, increased in size and number with new FDG activity compared to prior PET/CT scan 1/2017- FNA of left internal thoracic lymph node was negative for malignant cells. Cytology suggestive, but not diagnostic of reactive process.  Patient underwent SRS of right upper lung nodule.\par 2/2017-PET/CT scan-marked increased size and FDG avidity of right apical nodule, concerning for malignancy. The nodule has increased in solidity since CT chest 7/2017, and not significantly changed since 11/2017. Decreased FDG avidity of left internal mammary lymph node. No evidence of distant FDG avid metastatic disease.\par 2/2018- S/P right VATS lung wedge resection for enlarging right apical lung nodule on imaging with pathology revealing scar with reactive changes c/w radiation atypia.\par \par 10/2016-Serum immunofixation showed 3 IgM kappa bands, with urine immunofixation showing a weak IgM kappa band identified, and Bence-Steen protein kappa type.  Bone marrow biopsy, and bone marrow aspirate showed a patchy, normocellular bone marrow with trilineage hematopoiesis and maturation, iron stores present. 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  Cytogenetics showed a normal male karyotype.  [de-identified] : squamous cell cancer [de-identified] : Continues to see Dr. Sims in thoracic surgical followup (last saw him 9/2021)- is ordering surveillance CAT scans of the chest, with next one in 9/2022.\par Referred to urologist for renal lesion-saw Dr. Love with f/u planned.\par No complaints of cough. No hemoptysis. No fevers. No LN complaints. No visual complaints. No abnormal bruising. \par Had flu shot.\par Had COVID vaccines (Moderna).\par \par \par \par

## 2021-10-14 NOTE — ASSESSMENT
[FreeTextEntry1] : Lab work, CT chest results reviewed.\par #1) lung cancer-clinically ARCHIE. Continue expectant surveillance. Patient will have next CAT scan of the chest 9/2022 per thoracic surgeon.\par \par #2) Waldenström's macroglobulinemia-holding on treatment for now with close continued surveillance. Patient without related symptoms currently. \par Potential complications of WM reviewed. \par \par #3) thrombocytopenia–continue to monitor CBC with differential.  Increased monocytes noted as well. Should hematologic scenario change/worsen--> may consider f/u BMB to further evaluate for evolving process. No overt bleeding noted clinically at present.\par \par #4) Discussed ACP–PEACE study and that the patient was identified as meeting the eligibility criteria (65 years and older with serious illness).  Patient has considered and is agreeable to participate.\par \par #5) renal lesion on imaging-to f/u with urology as planned.\par \par Patient was given the opportunity to ask questions. His questions have been answered to his apparent satisfaction at this time. He is agreeable to recommended f/u.\par

## 2021-11-15 ENCOUNTER — EMERGENCY (EMERGENCY)
Facility: HOSPITAL | Age: 71
LOS: 1 days | Discharge: HOME | End: 2021-11-15
Attending: EMERGENCY MEDICINE | Admitting: EMERGENCY MEDICINE
Payer: MEDICARE

## 2021-11-15 ENCOUNTER — APPOINTMENT (OUTPATIENT)
Dept: FAMILY MEDICINE | Facility: CLINIC | Age: 71
End: 2021-11-15

## 2021-11-15 VITALS
SYSTOLIC BLOOD PRESSURE: 177 MMHG | TEMPERATURE: 98 F | OXYGEN SATURATION: 99 % | RESPIRATION RATE: 24 BRPM | DIASTOLIC BLOOD PRESSURE: 72 MMHG | HEIGHT: 71.25 IN | WEIGHT: 199.96 LBS | HEART RATE: 121 BPM

## 2021-11-15 VITALS — HEART RATE: 96 BPM

## 2021-11-15 DIAGNOSIS — Z98.89 OTHER SPECIFIED POSTPROCEDURAL STATES: Chronic | ICD-10-CM

## 2021-11-15 DIAGNOSIS — Z98.890 OTHER SPECIFIED POSTPROCEDURAL STATES: Chronic | ICD-10-CM

## 2021-11-15 LAB
ALBUMIN SERPL ELPH-MCNC: 3.3 G/DL — SIGNIFICANT CHANGE UP (ref 3.3–5)
ALP SERPL-CCNC: 66 U/L — SIGNIFICANT CHANGE UP (ref 40–120)
ALT FLD-CCNC: 16 U/L — SIGNIFICANT CHANGE UP (ref 10–45)
ANION GAP SERPL CALC-SCNC: 9 MMOL/L — SIGNIFICANT CHANGE UP (ref 5–17)
AST SERPL-CCNC: 16 U/L — SIGNIFICANT CHANGE UP (ref 10–40)
BASOPHILS # BLD AUTO: 0.02 K/UL — SIGNIFICANT CHANGE UP (ref 0–0.2)
BASOPHILS NFR BLD AUTO: 0.2 % — SIGNIFICANT CHANGE UP (ref 0–2)
BILIRUB SERPL-MCNC: 0.5 MG/DL — SIGNIFICANT CHANGE UP (ref 0.2–1.2)
BUN SERPL-MCNC: 16 MG/DL — SIGNIFICANT CHANGE UP (ref 7–23)
CALCIUM SERPL-MCNC: 8.4 MG/DL — SIGNIFICANT CHANGE UP (ref 8.4–10.5)
CHLORIDE SERPL-SCNC: 103 MMOL/L — SIGNIFICANT CHANGE UP (ref 96–108)
CO2 SERPL-SCNC: 27 MMOL/L — SIGNIFICANT CHANGE UP (ref 22–31)
CREAT SERPL-MCNC: 1.32 MG/DL — HIGH (ref 0.5–1.3)
EOSINOPHIL # BLD AUTO: 0.02 K/UL — SIGNIFICANT CHANGE UP (ref 0–0.5)
EOSINOPHIL NFR BLD AUTO: 0.2 % — SIGNIFICANT CHANGE UP (ref 0–6)
GLUCOSE SERPL-MCNC: 145 MG/DL — HIGH (ref 70–99)
HCT VFR BLD CALC: 38.7 % — LOW (ref 39–50)
HGB BLD-MCNC: 12.5 G/DL — LOW (ref 13–17)
IMM GRANULOCYTES NFR BLD AUTO: 0.3 % — SIGNIFICANT CHANGE UP (ref 0–1.5)
LYMPHOCYTES # BLD AUTO: 0.46 K/UL — LOW (ref 1–3.3)
LYMPHOCYTES # BLD AUTO: 5.1 % — LOW (ref 13–44)
MCHC RBC-ENTMCNC: 31.8 PG — SIGNIFICANT CHANGE UP (ref 27–34)
MCHC RBC-ENTMCNC: 32.3 GM/DL — SIGNIFICANT CHANGE UP (ref 32–36)
MCV RBC AUTO: 98.5 FL — SIGNIFICANT CHANGE UP (ref 80–100)
MONOCYTES # BLD AUTO: 0.9 K/UL — SIGNIFICANT CHANGE UP (ref 0–0.9)
MONOCYTES NFR BLD AUTO: 9.9 % — SIGNIFICANT CHANGE UP (ref 2–14)
NEUTROPHILS # BLD AUTO: 7.64 K/UL — HIGH (ref 1.8–7.4)
NEUTROPHILS NFR BLD AUTO: 84.3 % — HIGH (ref 43–77)
NRBC # BLD: 0 /100 WBCS — SIGNIFICANT CHANGE UP (ref 0–0)
NT-PROBNP SERPL-SCNC: 198 PG/ML — SIGNIFICANT CHANGE UP (ref 0–300)
PLATELET # BLD AUTO: 101 K/UL — LOW (ref 150–400)
POTASSIUM SERPL-MCNC: 3.9 MMOL/L — SIGNIFICANT CHANGE UP (ref 3.5–5.3)
POTASSIUM SERPL-SCNC: 3.9 MMOL/L — SIGNIFICANT CHANGE UP (ref 3.5–5.3)
PROT SERPL-MCNC: 8.5 G/DL — HIGH (ref 6–8.3)
RBC # BLD: 3.93 M/UL — LOW (ref 4.2–5.8)
RBC # FLD: 13.5 % — SIGNIFICANT CHANGE UP (ref 10.3–14.5)
SARS-COV-2 RNA SPEC QL NAA+PROBE: SIGNIFICANT CHANGE UP
SODIUM SERPL-SCNC: 139 MMOL/L — SIGNIFICANT CHANGE UP (ref 135–145)
TROPONIN I, HIGH SENSITIVITY RESULT: 17.4 NG/L — SIGNIFICANT CHANGE UP
TROPONIN I, HIGH SENSITIVITY RESULT: 25.8 NG/L — SIGNIFICANT CHANGE UP
WBC # BLD: 9.07 K/UL — SIGNIFICANT CHANGE UP (ref 3.8–10.5)
WBC # FLD AUTO: 9.07 K/UL — SIGNIFICANT CHANGE UP (ref 3.8–10.5)

## 2021-11-15 PROCEDURE — 85025 COMPLETE CBC W/AUTO DIFF WBC: CPT

## 2021-11-15 PROCEDURE — 71275 CT ANGIOGRAPHY CHEST: CPT | Mod: 26,MA

## 2021-11-15 PROCEDURE — 87635 SARS-COV-2 COVID-19 AMP PRB: CPT

## 2021-11-15 PROCEDURE — 83880 ASSAY OF NATRIURETIC PEPTIDE: CPT

## 2021-11-15 PROCEDURE — 93005 ELECTROCARDIOGRAM TRACING: CPT

## 2021-11-15 PROCEDURE — 99284 EMERGENCY DEPT VISIT MOD MDM: CPT | Mod: 25

## 2021-11-15 PROCEDURE — 36415 COLL VENOUS BLD VENIPUNCTURE: CPT

## 2021-11-15 PROCEDURE — 71045 X-RAY EXAM CHEST 1 VIEW: CPT

## 2021-11-15 PROCEDURE — 99284 EMERGENCY DEPT VISIT MOD MDM: CPT | Mod: CS

## 2021-11-15 PROCEDURE — 71275 CT ANGIOGRAPHY CHEST: CPT | Mod: MA

## 2021-11-15 PROCEDURE — 71045 X-RAY EXAM CHEST 1 VIEW: CPT | Mod: 26

## 2021-11-15 PROCEDURE — 93010 ELECTROCARDIOGRAM REPORT: CPT

## 2021-11-15 PROCEDURE — 84484 ASSAY OF TROPONIN QUANT: CPT

## 2021-11-15 PROCEDURE — 80053 COMPREHEN METABOLIC PANEL: CPT

## 2021-11-15 NOTE — ED PROVIDER NOTE - NSFOLLOWUPINSTRUCTIONS_ED_ALL_ED_FT
Follow up with your primary physician for a post hospital within 48 hours, taking all results from the ER to be reviewed.     If any chest pain, shortness of breath, any worsening, concerning or new signs or symptoms return to the ER.    Follow up with carcinology and pulmonology as planned.   And continue to follow Kidney as discussed on CT results as planned.

## 2021-11-15 NOTE — ED PROVIDER NOTE - ATTENDING CONTRIBUTION TO CARE
I personally evaluated the patient. I reviewed the Resident’s or Physician Assistant’s note (as assigned above), and agree with the findings and plan except as documented in my note.  72 yo male, hx Lung cancer 2015 ( left pneumonectomy), comes to the ED co shortness of breath.   As per patient he started to feel short of breath while grabbing his news paper out front this morning. Sx lasted briefly and resolved completely. at this time.   Denied any chest pain, fevers, chills, n/v, palpitations, dizziness. cough,  or any other complaints.       no PE on CT,   Inc density kd on CT which patient is aware of and has been following,  will bring copies of his results to his doctors to continue to follow as discussed.    Repeat trop < 12 , Vitals wnl. Pt feeling well, asx. Will dc with pcp , pulm, cardio fu as planned.

## 2021-11-15 NOTE — ED PROVIDER NOTE - OBJECTIVE STATEMENT
72 yo male, hx Lung cancer 2015 ( left pneumonectomy), comes to the ED co shortness of breath.   As per patient he started to feel short of breath while grabbing his news paper out front this morning. Sx lasted briefly and resolved completely. at this time.   Denied any chest pain, fevers, chills, n/v, palpitations, dizziness. cough,  or any other complaints.

## 2021-11-15 NOTE — ED PROVIDER NOTE - CLINICAL SUMMARY MEDICAL DECISION MAKING FREE TEXT BOX
72 yo male, hx Lung cancer 2015 ( left pneumonectomy), comes to the ED co shortness of breath.   As per patient he started to feel short of breath while grabbing his news paper out front this morning. Sx lasted briefly and resolved completely. at this time.   Denied any chest pain, fevers, chills, n/v, palpitations, dizziness. cough,  or any other complaints. 72 yo male, hx Lung cancer 2015 ( left pneumonectomy), comes to the ED co shortness of breath.   As per patient he started to feel short of breath while grabbing his news paper out front this morning. Sx lasted briefly and resolved completely. at this time.   Denied any chest pain, fevers, chills, n/v, palpitations, dizziness. cough,  or any other complaints.       no PE on CT,   Inc density kd on CT which patient is aware of and has been following,  will bring copies of his results to his doctors to continue to follow as discussed.    Repeat trop < 12 , Vitals wnl. Pt feeling well, asx. Will dc with pcp , pulm, cardio fu as planned.

## 2021-11-15 NOTE — ED ADULT NURSE NOTE - OBJECTIVE STATEMENT
Patient presents to ED from home with shortness of breath. Patient states he was running errands this morning and felt normal but when he returned home he began to have difficulty breathing. Patient has history of left pneumonectomy s/p lung CA in 2015. Patient had CT scan done in september and no changes were noted. Patient denies cardiac history or any other medical problems at this time. Patient was fully vaccinated for Covid in January, planning to get Moderna booster next week.

## 2021-11-15 NOTE — ED ADULT NURSE NOTE - NSICDXFAMILYHX_GEN_ALL_CORE_FT
FAMILY HISTORY:  Father  Still living? No  Family history of lung cancer, Age at diagnosis: Age Unknown

## 2021-11-15 NOTE — ED PROVIDER NOTE - PATIENT PORTAL LINK FT
You can access the FollowMyHealth Patient Portal offered by [facility] by registering at the following website: [email or web address]. By joining [facility]’s FollowMyHealth portal, you will also be able to view your health information using other applications (apps) compatible with our system.

## 2021-11-15 NOTE — ED ADULT NURSE NOTE - NSICDXPASTMEDICALHX_GEN_ALL_CORE_FT
PAST MEDICAL HISTORY:  BPH (benign prostatic hypertrophy)     Hydronephrosis     Lung cancer Dx'd 2015 treated with neoadjuvant chemo/RT followed by pneumonectomy (June 2015), SBRT to RUL nodule in Jan 2017    Lung nodule     Nephrolithiasis

## 2021-11-15 NOTE — ED ADULT NURSE NOTE - NSICDXPASTSURGICALHX_GEN_ALL_CORE_FT
PAST SURGICAL HISTORY:  H/O lithotripsy     H/O pneumonectomy Left June 2015    S/P tonsillectomy

## 2021-11-19 NOTE — CHART NOTE - NSCHARTNOTEFT_GEN_A_CORE
SW spoke with patient to discuss and assist with follow up care as needed.  Patient presented to ED on 11/15/21 with SOB.  SW spoke with patient who reports he is feeling better.  Patient denied going to see pulm or card - denied assistance with SW scheduling appointment also.  Patient reports he does have follow up with Dr Deleon on 11/30/21.  Patient reports after seeing PMD he will see if he will need to go to card or pulm. SW encouraged patient to call SW if assistance is needed at a later date.  No safety concerns reported, confirms he has assistance at home if needed.

## 2021-11-23 ENCOUNTER — LABORATORY RESULT (OUTPATIENT)
Age: 71
End: 2021-11-23

## 2021-11-23 ENCOUNTER — APPOINTMENT (OUTPATIENT)
Dept: FAMILY MEDICINE | Facility: CLINIC | Age: 71
End: 2021-11-23
Payer: MEDICARE

## 2021-11-23 VITALS — RESPIRATION RATE: 20 BRPM | HEART RATE: 76 BPM | DIASTOLIC BLOOD PRESSURE: 70 MMHG | SYSTOLIC BLOOD PRESSURE: 130 MMHG

## 2021-11-23 DIAGNOSIS — G93.40 ENCEPHALOPATHY, UNSPECIFIED: ICD-10-CM

## 2021-11-23 PROCEDURE — 99213 OFFICE O/P EST LOW 20 MIN: CPT | Mod: 25

## 2021-11-23 PROCEDURE — 36415 COLL VENOUS BLD VENIPUNCTURE: CPT

## 2021-11-23 NOTE — PHYSICAL EXAM
[Normal] : normal rate, regular rhythm, normal S1 and S2 and no murmur heard [No Edema] : there was no peripheral edema [Soft] : abdomen soft [Non Tender] : non-tender [Normal Posterior Cervical Nodes] : no posterior cervical lymphadenopathy [Normal Anterior Cervical Nodes] : no anterior cervical lymphadenopathy [No Focal Deficits] : no focal deficits [Alert and Oriented x3] : oriented to person, place, and time [de-identified] : appears somewhat somnolent, but in no acute distress

## 2021-11-23 NOTE — ASSESSMENT
[FreeTextEntry1] : No specific focal findings, but pt is definitely altered\par Lab profiles drawn in office and sent\par Feel prudent to order a head CT

## 2021-11-23 NOTE — REVIEW OF SYSTEMS
[Fatigue] : fatigue [Negative] : Genitourinary [de-identified] : as in HPI [de-identified] : as in HPI

## 2021-11-24 ENCOUNTER — APPOINTMENT (OUTPATIENT)
Dept: CT IMAGING | Facility: HOSPITAL | Age: 71
End: 2021-11-24
Payer: MEDICARE

## 2021-11-24 ENCOUNTER — OUTPATIENT (OUTPATIENT)
Dept: OUTPATIENT SERVICES | Facility: HOSPITAL | Age: 71
LOS: 1 days | End: 2021-11-24
Payer: MEDICARE

## 2021-11-24 DIAGNOSIS — Z98.89 OTHER SPECIFIED POSTPROCEDURAL STATES: Chronic | ICD-10-CM

## 2021-11-24 DIAGNOSIS — G93.40 ENCEPHALOPATHY, UNSPECIFIED: ICD-10-CM

## 2021-11-24 DIAGNOSIS — Z98.890 OTHER SPECIFIED POSTPROCEDURAL STATES: Chronic | ICD-10-CM

## 2021-11-24 LAB
ALBUMIN SERPL ELPH-MCNC: 3.3 G/DL
ALP BLD-CCNC: 65 U/L
ALT SERPL-CCNC: 25 U/L
ANION GAP SERPL CALC-SCNC: 13 MMOL/L
AST SERPL-CCNC: 19 U/L
BASOPHILS # BLD AUTO: 0.02 K/UL
BASOPHILS NFR BLD AUTO: 0.5 %
BILIRUB SERPL-MCNC: 0.4 MG/DL
BUN SERPL-MCNC: 16 MG/DL
CALCIUM SERPL-MCNC: 8.6 MG/DL
CHLORIDE SERPL-SCNC: 103 MMOL/L
CO2 SERPL-SCNC: 25 MMOL/L
CREAT SERPL-MCNC: 1.07 MG/DL
CRP SERPL-MCNC: 194 MG/L
EOSINOPHIL # BLD AUTO: 0.02 K/UL
EOSINOPHIL NFR BLD AUTO: 0.5 %
ERYTHROCYTE [SEDIMENTATION RATE] IN BLOOD BY WESTERGREN METHOD: 97 MM/HR
FOLATE SERPL-MCNC: 10.4 NG/ML
GLUCOSE SERPL-MCNC: 116 MG/DL
HCT VFR BLD CALC: 36.9 %
HGB BLD-MCNC: 11.3 G/DL
IMM GRANULOCYTES NFR BLD AUTO: 0.5 %
LYMPHOCYTES # BLD AUTO: 0.57 K/UL
LYMPHOCYTES NFR BLD AUTO: 13.8 %
MAN DIFF?: NORMAL
MCHC RBC-ENTMCNC: 30.5 PG
MCHC RBC-ENTMCNC: 30.6 GM/DL
MCV RBC AUTO: 99.5 FL
MONOCYTES # BLD AUTO: 0.86 K/UL
MONOCYTES NFR BLD AUTO: 20.8 %
NEUTROPHILS # BLD AUTO: 2.65 K/UL
NEUTROPHILS NFR BLD AUTO: 63.9 %
PLATELET # BLD AUTO: 235 K/UL
POTASSIUM SERPL-SCNC: 3.8 MMOL/L
PROT SERPL-MCNC: 7.1 G/DL
RBC # BLD: 3.71 M/UL
RBC # FLD: 14.1 %
SODIUM SERPL-SCNC: 142 MMOL/L
T3FREE SERPL-MCNC: 2.71 PG/ML
T4 FREE SERPL-MCNC: 1.3 NG/DL
TESTOST SERPL-MCNC: 79.3 NG/DL
TSH SERPL-ACNC: 8.77 UIU/ML
VIT B12 SERPL-MCNC: 281 PG/ML
WBC # FLD AUTO: 4.14 K/UL

## 2021-11-24 PROCEDURE — G1004: CPT

## 2021-11-24 PROCEDURE — 70450 CT HEAD/BRAIN W/O DYE: CPT | Mod: 26,ME

## 2021-11-24 PROCEDURE — 70450 CT HEAD/BRAIN W/O DYE: CPT | Mod: ME

## 2021-11-26 LAB
B BURGDOR IGG+IGM SER QL IB: NORMAL
EBV EA AB SER IA-ACNC: >150 U/ML
EBV EA AB TITR SER IF: POSITIVE
EBV EA IGG SER QL IA: >600 U/ML
EBV EA IGG SER-ACNC: POSITIVE
EBV EA IGM SER IA-ACNC: NEGATIVE
EBV PATRN SPEC IB-IMP: NORMAL
EBV VCA IGG SER IA-ACNC: >750 U/ML
EBV VCA IGM SER QL IA: <10 U/ML
EPSTEIN-BARR VIRUS CAPSID ANTIGEN IGG: POSITIVE

## 2021-11-30 ENCOUNTER — APPOINTMENT (OUTPATIENT)
Dept: CT IMAGING | Facility: HOSPITAL | Age: 71
End: 2021-11-30
Payer: MEDICARE

## 2021-11-30 ENCOUNTER — RESULT REVIEW (OUTPATIENT)
Age: 71
End: 2021-11-30

## 2021-11-30 ENCOUNTER — OUTPATIENT (OUTPATIENT)
Dept: OUTPATIENT SERVICES | Facility: HOSPITAL | Age: 71
LOS: 1 days | End: 2021-11-30
Payer: MEDICARE

## 2021-11-30 ENCOUNTER — APPOINTMENT (OUTPATIENT)
Dept: FAMILY MEDICINE | Facility: CLINIC | Age: 71
End: 2021-11-30
Payer: MEDICARE

## 2021-11-30 VITALS
HEIGHT: 71 IN | DIASTOLIC BLOOD PRESSURE: 60 MMHG | RESPIRATION RATE: 20 BRPM | BODY MASS INDEX: 27.16 KG/M2 | HEART RATE: 76 BPM | WEIGHT: 194 LBS | SYSTOLIC BLOOD PRESSURE: 122 MMHG

## 2021-11-30 DIAGNOSIS — Z98.89 OTHER SPECIFIED POSTPROCEDURAL STATES: Chronic | ICD-10-CM

## 2021-11-30 DIAGNOSIS — G47.01 INSOMNIA DUE TO MEDICAL CONDITION: ICD-10-CM

## 2021-11-30 DIAGNOSIS — Z98.890 OTHER SPECIFIED POSTPROCEDURAL STATES: Chronic | ICD-10-CM

## 2021-11-30 DIAGNOSIS — Z00.8 ENCOUNTER FOR OTHER GENERAL EXAMINATION: ICD-10-CM

## 2021-11-30 PROCEDURE — 36415 COLL VENOUS BLD VENIPUNCTURE: CPT

## 2021-11-30 PROCEDURE — 99214 OFFICE O/P EST MOD 30 MIN: CPT | Mod: 25

## 2021-11-30 PROCEDURE — 74177 CT ABD & PELVIS W/CONTRAST: CPT

## 2021-11-30 PROCEDURE — 74177 CT ABD & PELVIS W/CONTRAST: CPT | Mod: 26,MH

## 2021-11-30 NOTE — PHYSICAL EXAM
[No Acute Distress] : no acute distress [Supple] : supple [No Edema] : there was no peripheral edema [Normal] : soft, non-tender, non-distended, no masses palpated, no HSM and normal bowel sounds [Normal Anterior Cervical Nodes] : no anterior cervical lymphadenopathy [Coordination Grossly Intact] : coordination grossly intact [No Focal Deficits] : no focal deficits [Normal Gait] : normal gait [Alert and Oriented x3] : oriented to person, place, and time

## 2021-11-30 NOTE — ASSESSMENT
[FreeTextEntry1] : Note significant wt loss, otherwise exam essentially unremarkable\par Updated lab profiles drawn in office and sent\par Will order CT imaging of abdomen and pelvis to R/O neoplasm

## 2021-11-30 NOTE — HISTORY OF PRESENT ILLNESS
[de-identified] : Presents for F/U from last visit - states no change - feeling weak, fatigued, unable to sleep.  Also note significant wt loss over the past month.  Reviewed again labs from last visit and again note markedly elevated inflammatory markers.  Reviewed all imaging thus far from the ER and also the head CT - no significant findings.

## 2021-12-01 ENCOUNTER — OUTPATIENT (OUTPATIENT)
Dept: OUTPATIENT SERVICES | Facility: HOSPITAL | Age: 71
LOS: 1 days | Discharge: HOME | End: 2021-12-01

## 2021-12-01 ENCOUNTER — LABORATORY RESULT (OUTPATIENT)
Age: 71
End: 2021-12-01

## 2021-12-01 DIAGNOSIS — Z98.89 OTHER SPECIFIED POSTPROCEDURAL STATES: Chronic | ICD-10-CM

## 2021-12-01 DIAGNOSIS — Z98.890 OTHER SPECIFIED POSTPROCEDURAL STATES: Chronic | ICD-10-CM

## 2021-12-01 DIAGNOSIS — C34.90 MALIGNANT NEOPLASM OF UNSPECIFIED PART OF UNSPECIFIED BRONCHUS OR LUNG: ICD-10-CM

## 2021-12-01 LAB
ALBUMIN SERPL ELPH-MCNC: 3.1 G/DL
ALP BLD-CCNC: 59 U/L
ALT SERPL-CCNC: 20 U/L
ANION GAP SERPL CALC-SCNC: 15 MMOL/L
AST SERPL-CCNC: 14 U/L
BASOPHILS # BLD AUTO: 0.01 K/UL
BASOPHILS NFR BLD AUTO: 0.3 %
BILIRUB SERPL-MCNC: 0.4 MG/DL
BUN SERPL-MCNC: 12 MG/DL
CALCIUM SERPL-MCNC: 8.7 MG/DL
CEA SERPL-MCNC: 1.2 NG/ML
CHLORIDE SERPL-SCNC: 103 MMOL/L
CO2 SERPL-SCNC: 24 MMOL/L
CREAT SERPL-MCNC: 1.03 MG/DL
CRP SERPL-MCNC: 129 MG/L
EOSINOPHIL # BLD AUTO: 0.01 K/UL
EOSINOPHIL NFR BLD AUTO: 0.3 %
ERYTHROCYTE [SEDIMENTATION RATE] IN BLOOD BY WESTERGREN METHOD: 103 MM/HR
GLUCOSE SERPL-MCNC: 191 MG/DL
HCT VFR BLD CALC: 35.2 %
HGB BLD-MCNC: 10.6 G/DL
IMM GRANULOCYTES NFR BLD AUTO: 0.3 %
LYMPHOCYTES # BLD AUTO: 0.52 K/UL
LYMPHOCYTES NFR BLD AUTO: 14.5 %
MAN DIFF?: NORMAL
MCHC RBC-ENTMCNC: 30.1 GM/DL
MCHC RBC-ENTMCNC: 30.3 PG
MCV RBC AUTO: 100.6 FL
MONOCYTES # BLD AUTO: 0.83 K/UL
MONOCYTES NFR BLD AUTO: 23.1 %
NEUTROPHILS # BLD AUTO: 2.21 K/UL
NEUTROPHILS NFR BLD AUTO: 61.5 %
PLATELET # BLD AUTO: 143 K/UL
POTASSIUM SERPL-SCNC: 4.2 MMOL/L
PROT SERPL-MCNC: 6.5 G/DL
RBC # BLD: 3.5 M/UL
RBC # FLD: 13.9 %
SODIUM SERPL-SCNC: 142 MMOL/L
WBC # FLD AUTO: 3.59 K/UL

## 2021-12-02 ENCOUNTER — APPOINTMENT (OUTPATIENT)
Dept: HEMATOLOGY ONCOLOGY | Facility: CLINIC | Age: 71
End: 2021-12-02
Payer: MEDICARE

## 2021-12-02 VITALS
TEMPERATURE: 97.5 F | HEART RATE: 97 BPM | SYSTOLIC BLOOD PRESSURE: 128 MMHG | OXYGEN SATURATION: 95 % | BODY MASS INDEX: 26.79 KG/M2 | WEIGHT: 191.36 LBS | RESPIRATION RATE: 18 BRPM | DIASTOLIC BLOOD PRESSURE: 74 MMHG | HEIGHT: 71 IN

## 2021-12-02 LAB
BASOPHILS # BLD AUTO: 0.02 K/UL
BASOPHILS NFR BLD AUTO: 0.5 %
DEPRECATED KAPPA LC FREE/LAMBDA SER: 4.06 RATIO
EOSINOPHIL # BLD AUTO: 0.02 K/UL
EOSINOPHIL NFR BLD AUTO: 0.5 %
FERRITIN SERPL-MCNC: 706 NG/ML
FOLATE SERPL-MCNC: 14.6 NG/ML
HCT VFR BLD CALC: 36.9 %
HGB BLD-MCNC: 11 G/DL
IGA SER QL IEP: 97 MG/DL
IGG SER QL IEP: 670 MG/DL
IGM SER QL IEP: 1753 MG/DL
IMM GRANULOCYTES NFR BLD AUTO: 0.5 %
IRON SATN MFR SERPL: 13 %
IRON SERPL-MCNC: 23 UG/DL
KAPPA LC CSF-MCNC: 2.06 MG/DL
KAPPA LC SERPL-MCNC: 8.36 MG/DL
LYMPHOCYTES # BLD AUTO: 0.93 K/UL
LYMPHOCYTES NFR BLD AUTO: 22.4 %
MAN DIFF?: NORMAL
MCHC RBC-ENTMCNC: 29.8 GM/DL
MCHC RBC-ENTMCNC: 30.1 PG
MCV RBC AUTO: 101.1 FL
MONOCYTES # BLD AUTO: 0.99 K/UL
MONOCYTES NFR BLD AUTO: 23.9 %
NEUTROPHILS # BLD AUTO: 2.17 K/UL
NEUTROPHILS NFR BLD AUTO: 52.2 %
PLATELET # BLD AUTO: 156 K/UL
RBC # BLD: 3.65 M/UL
RBC # BLD: 3.65 M/UL
RBC # FLD: 13.9 %
RETICS # AUTO: 1.3 %
RETICS AGGREG/RBC NFR: 46 K/UL
TIBC SERPL-MCNC: 179 UG/DL
UIBC SERPL-MCNC: 156 UG/DL
VIT B12 SERPL-MCNC: 307 PG/ML
WBC # FLD AUTO: 4.15 K/UL

## 2021-12-02 PROCEDURE — 99214 OFFICE O/P EST MOD 30 MIN: CPT

## 2021-12-02 NOTE — RESULTS/DATA
[FreeTextEntry1] : CT A/P-IMPRESSION:\par \par Retained fecal matter in the cecum limits evaluation for underlying mass. Recommend colonoscopy.\par \par Stable prominent periportal, aortocaval and retrocrural lymph nodes as above.\par \par Other chronic findings as above.\par \par 11/2021-CT angio chest-IMPRESSION:\par \par No acute pulmonary embolism demonstrated.\par \par Left pneumonectomy and wedge resection right upper lobe, stable from prior exam.\par \par Indeterminate cortical based lesion anterior interpolar left kidney. This demonstrates increased density on prior noncontrast CT examination.\par Recommend further characterization with nonemergent CT scan with renal mass protocol.\par

## 2021-12-02 NOTE — HISTORY OF PRESENT ILLNESS
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 He received neoadjuvant radiation and Taxol/Carboplatin chemotherapy followed by left pneumonectomy-No residual tumor at time of surgery. \par 11/2016-PET/CT scan showed minimally hypermetabolic right apical lung nodule, increasing in size on serial diagnostic CT scans. Poor visualization/nonvisualization of ground glass right upper lung opacities.  Several hypermetabolic left internal mammary nodes, increased in size and number with new FDG activity compared to prior PET/CT scan 1/2017- FNA of left internal thoracic lymph node was negative for malignant cells. Cytology suggestive, but not diagnostic of reactive process.  Patient underwent SRS of right upper lung nodule.\par 2/2017-PET/CT scan-marked increased size and FDG avidity of right apical nodule, concerning for malignancy. The nodule has increased in solidity since CT chest 7/2017, and not significantly changed since 11/2017. Decreased FDG avidity of left internal mammary lymph node. No evidence of distant FDG avid metastatic disease.\par 2/2018- S/P right VATS lung wedge resection for enlarging right apical lung nodule on imaging with pathology revealing scar with reactive changes c/w radiation atypia.\par \par 10/2016-Serum immunofixation showed 3 IgM kappa bands, with urine immunofixation showing a weak IgM kappa band identified, and Bence-Steen protein kappa type.  Bone marrow biopsy, and bone marrow aspirate showed a patchy, normocellular bone marrow with trilineage hematopoiesis and maturation, iron stores present. 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  Cytogenetics showed a normal male karyotype.  [de-identified] : squamous cell cancer [de-identified] : Hasn't been feeling "well" since 11/15/21-weak/fatigued, weight loss, decreased appetite.\par Referred to urologist for renal lesion-saw Dr. Love-states evaluation was unremarkable, revealing a cyst.\par No complaints of cough. No hemoptysis. No fevers. No LN complaints. No visual complaints. No abnormal bruising. \par \par Had flu shot.\par Had COVID vaccines (Moderna).\par \par \par \par

## 2021-12-02 NOTE — PHYSICAL EXAM
[Normal] : affect appropriate [Ambulatory and capable of all self care but unable to carry out any work activities] : Status 2- Ambulatory and capable of all self care but unable to carry out any work activities. Up and about more than 50% of waking hours [de-identified] : no left breath sounds.  CTA on right

## 2021-12-02 NOTE — ASSESSMENT
[FreeTextEntry1] : Lab work, CT C/A/P results, Dr. Deleon's 11/30/21 medicine note reviewed.\par #1) lung cancer-clinically stable. Continue expectant surveillance. Patient will have next CAT scan of the chest 9/2022 per thoracic surgeon.\par \par #2) Waldenström's macroglobulinemia-f/u viscosity pending.  With no progressive lymphadenopathy, hepatosplenomegaly on imaging, and decreasing IgM/kappa lambda free light chain ratio, current symptomatology not clearly related to patient's hematologic disorder at this time.  However, with constitutional symptoms, follow-up bone marrow evaluation warranted, to which patient consents.\par Potential complications of WM reviewed. \par \par #3) thrombocytopenia–platelet count currently WNL. Continue to monitor CBC with differential.  Increased monocytes noted as well. Recommend BM eval. as noted above.\par \par #4) Needs GI evaluation/colonoscopy-patient advised to call Dr. Sommer for appt., whom he has seen in past. I also LM for Dr. Sommer to call me if any questions for me regarding case.\par \par Patient was given the opportunity to ask questions. His questions have been answered to his apparent satisfaction at this time. He is agreeable to recommended f/u.\par

## 2021-12-03 LAB — VISCOSITY, SERUM: 2

## 2021-12-04 LAB — ANA SER IF-ACNC: NEGATIVE

## 2021-12-05 LAB
ALBUMIN MFR SERPL ELPH: 36.1 %
ALBUMIN SERPL-MCNC: 2.5 G/DL
ALBUMIN/GLOB SERPL: 0.6 RATIO
ALPHA1 GLOB MFR SERPL ELPH: 10.5 %
ALPHA1 GLOB SERPL ELPH-MCNC: 0.7 G/DL
ALPHA2 GLOB MFR SERPL ELPH: 18.5 %
ALPHA2 GLOB SERPL ELPH-MCNC: 1.3 G/DL
B-GLOBULIN MFR SERPL ELPH: 9.9 %
B-GLOBULIN SERPL ELPH-MCNC: 0.7 G/DL
GAMMA GLOB FLD ELPH-MCNC: 1.7 G/DL
GAMMA GLOB MFR SERPL ELPH: 25 %
INTERPRETATION SERPL IEP-IMP: NORMAL
M PROTEIN MFR SERPL ELPH: 15.1 %
MONOCLON BAND OBS SERPL: 1 G/DL
PROT SERPL-MCNC: 6.9 G/DL
PROT SERPL-MCNC: 6.9 G/DL

## 2021-12-06 ENCOUNTER — NON-APPOINTMENT (OUTPATIENT)
Age: 71
End: 2021-12-06

## 2022-01-06 ENCOUNTER — APPOINTMENT (OUTPATIENT)
Dept: FAMILY MEDICINE | Facility: CLINIC | Age: 72
End: 2022-01-06
Payer: MEDICARE

## 2022-01-06 ENCOUNTER — NON-APPOINTMENT (OUTPATIENT)
Age: 72
End: 2022-01-06

## 2022-01-06 VITALS
HEART RATE: 88 BPM | WEIGHT: 181 LBS | DIASTOLIC BLOOD PRESSURE: 70 MMHG | SYSTOLIC BLOOD PRESSURE: 125 MMHG | HEIGHT: 71 IN | RESPIRATION RATE: 20 BRPM | BODY MASS INDEX: 25.34 KG/M2

## 2022-01-06 DIAGNOSIS — R22.2 LOCALIZED SWELLING, MASS AND LUMP, TRUNK: ICD-10-CM

## 2022-01-06 PROCEDURE — 99214 OFFICE O/P EST MOD 30 MIN: CPT | Mod: 25

## 2022-01-06 PROCEDURE — 36415 COLL VENOUS BLD VENIPUNCTURE: CPT

## 2022-01-06 NOTE — ASSESSMENT
[FreeTextEntry1] : Again concern over wt loss and now tender fluctuant area at old surgical site\par Lab profiles drawn in office and sent\par Advised to contact Dr. Sims today for F/U\par Heme-Onc F/U as scheduled

## 2022-01-06 NOTE — HISTORY OF PRESENT ILLNESS
[de-identified] : Presents for F/U with attention to new complain to upper back pain "where I had the operation."  States this pain was not present at our last encounter but started since then; states preventing sleep; also affecting movement of LUE.  Denies trauma to the area.\par Also note very significant wt loss over the past month.

## 2022-01-06 NOTE — PHYSICAL EXAM
[Chronically Ill] : chronically ill [No Respiratory Distress] : no respiratory distress  [No Accessory Muscle Use] : no accessory muscle use [Clear to Auscultation] : lungs were clear to auscultation bilaterally [Normal] : normal rate, regular rhythm, normal S1 and S2 and no murmur heard [No Edema] : there was no peripheral edema [Soft] : abdomen soft [Non Tender] : non-tender [Normal Posterior Cervical Nodes] : no posterior cervical lymphadenopathy [Normal Anterior Cervical Nodes] : no anterior cervical lymphadenopathy [No Rash] : no rash [No Skin Lesions] : no skin lesions [No Focal Deficits] : no focal deficits [Alert and Oriented x3] : oriented to person, place, and time [de-identified] : decreased BS L lung field consistent with history [de-identified] : fluctuant mass in area of surgical scar L upper back - tender to palpation

## 2022-01-07 LAB
ALBUMIN SERPL ELPH-MCNC: 3 G/DL
ALP BLD-CCNC: 62 U/L
ALT SERPL-CCNC: 6 U/L
ANION GAP SERPL CALC-SCNC: 12 MMOL/L
AST SERPL-CCNC: 14 U/L
BASOPHILS # BLD AUTO: 0.01 K/UL
BASOPHILS NFR BLD AUTO: 0.2 %
BILIRUB SERPL-MCNC: 0.4 MG/DL
BUN SERPL-MCNC: 15 MG/DL
CALCIUM SERPL-MCNC: 8.8 MG/DL
CHLORIDE SERPL-SCNC: 103 MMOL/L
CO2 SERPL-SCNC: 28 MMOL/L
CREAT SERPL-MCNC: 0.89 MG/DL
EOSINOPHIL # BLD AUTO: 0.01 K/UL
EOSINOPHIL NFR BLD AUTO: 0.2 %
GLUCOSE SERPL-MCNC: 132 MG/DL
HCT VFR BLD CALC: 30.8 %
HGB BLD-MCNC: 8.8 G/DL
IMM GRANULOCYTES NFR BLD AUTO: 0.6 %
LYMPHOCYTES # BLD AUTO: 0.52 K/UL
LYMPHOCYTES NFR BLD AUTO: 7.8 %
MAN DIFF?: NORMAL
MCHC RBC-ENTMCNC: 26.7 PG
MCHC RBC-ENTMCNC: 28.6 GM/DL
MCV RBC AUTO: 93.6 FL
MONOCYTES # BLD AUTO: 1 K/UL
MONOCYTES NFR BLD AUTO: 15.1 %
NEUTROPHILS # BLD AUTO: 5.05 K/UL
NEUTROPHILS NFR BLD AUTO: 76.1 %
PLATELET # BLD AUTO: 196 K/UL
POTASSIUM SERPL-SCNC: 4.8 MMOL/L
PROT SERPL-MCNC: 6.7 G/DL
RBC # BLD: 3.29 M/UL
RBC # FLD: 15.8 %
SODIUM SERPL-SCNC: 143 MMOL/L
WBC # FLD AUTO: 6.63 K/UL

## 2022-01-10 ENCOUNTER — NON-APPOINTMENT (OUTPATIENT)
Age: 72
End: 2022-01-10

## 2022-01-10 ENCOUNTER — APPOINTMENT (OUTPATIENT)
Dept: THORACIC SURGERY | Facility: CLINIC | Age: 72
End: 2022-01-10
Payer: MEDICARE

## 2022-01-10 ENCOUNTER — INPATIENT (INPATIENT)
Facility: HOSPITAL | Age: 72
LOS: 8 days | Discharge: HOME HEALTH | End: 2022-01-19
Attending: THORACIC SURGERY (CARDIOTHORACIC VASCULAR SURGERY) | Admitting: THORACIC SURGERY (CARDIOTHORACIC VASCULAR SURGERY)
Payer: MEDICARE

## 2022-01-10 VITALS
TEMPERATURE: 98 F | DIASTOLIC BLOOD PRESSURE: 98 MMHG | OXYGEN SATURATION: 92 % | SYSTOLIC BLOOD PRESSURE: 116 MMHG | HEART RATE: 128 BPM | RESPIRATION RATE: 18 BRPM | HEIGHT: 71.25 IN

## 2022-01-10 VITALS
DIASTOLIC BLOOD PRESSURE: 71 MMHG | RESPIRATION RATE: 18 BRPM | HEART RATE: 106 BPM | OXYGEN SATURATION: 80 % | SYSTOLIC BLOOD PRESSURE: 119 MMHG | WEIGHT: 183 LBS | HEIGHT: 71 IN | BODY MASS INDEX: 25.62 KG/M2

## 2022-01-10 DIAGNOSIS — Z98.89 OTHER SPECIFIED POSTPROCEDURAL STATES: Chronic | ICD-10-CM

## 2022-01-10 DIAGNOSIS — Z98.890 OTHER SPECIFIED POSTPROCEDURAL STATES: Chronic | ICD-10-CM

## 2022-01-10 DIAGNOSIS — R60.9 EDEMA, UNSPECIFIED: ICD-10-CM

## 2022-01-10 DIAGNOSIS — Z90.2 ACQUIRED ABSENCE OF LUNG [PART OF]: Chronic | ICD-10-CM

## 2022-01-10 LAB
ALBUMIN SERPL ELPH-MCNC: 3 G/DL — LOW (ref 3.3–5)
ALP SERPL-CCNC: 63 U/L — SIGNIFICANT CHANGE UP (ref 40–120)
ALT FLD-CCNC: 12 U/L — SIGNIFICANT CHANGE UP (ref 4–41)
ANION GAP SERPL CALC-SCNC: 11 MMOL/L — SIGNIFICANT CHANGE UP (ref 7–14)
APTT BLD: 25.9 SEC — LOW (ref 27–36.3)
AST SERPL-CCNC: 12 U/L — SIGNIFICANT CHANGE UP (ref 4–40)
BASOPHILS # BLD AUTO: 0.02 K/UL — SIGNIFICANT CHANGE UP (ref 0–0.2)
BASOPHILS NFR BLD AUTO: 0.3 % — SIGNIFICANT CHANGE UP (ref 0–2)
BILIRUB SERPL-MCNC: 0.6 MG/DL — SIGNIFICANT CHANGE UP (ref 0.2–1.2)
BLOOD GAS VENOUS COMPREHENSIVE RESULT: SIGNIFICANT CHANGE UP
BUN SERPL-MCNC: 18 MG/DL — SIGNIFICANT CHANGE UP (ref 7–23)
CALCIUM SERPL-MCNC: 9.3 MG/DL — SIGNIFICANT CHANGE UP (ref 8.4–10.5)
CHLORIDE SERPL-SCNC: 102 MMOL/L — SIGNIFICANT CHANGE UP (ref 98–107)
CO2 SERPL-SCNC: 27 MMOL/L — SIGNIFICANT CHANGE UP (ref 22–31)
CREAT SERPL-MCNC: 0.9 MG/DL — SIGNIFICANT CHANGE UP (ref 0.5–1.3)
D DIMER BLD IA.RAPID-MCNC: 2275 NG/ML DDU — HIGH
EOSINOPHIL # BLD AUTO: 0.01 K/UL — SIGNIFICANT CHANGE UP (ref 0–0.5)
EOSINOPHIL NFR BLD AUTO: 0.1 % — SIGNIFICANT CHANGE UP (ref 0–6)
FLUAV AG NPH QL: SIGNIFICANT CHANGE UP
FLUBV AG NPH QL: SIGNIFICANT CHANGE UP
GLUCOSE SERPL-MCNC: 147 MG/DL — HIGH (ref 70–99)
HCT VFR BLD CALC: 30.5 % — LOW (ref 39–50)
HGB BLD-MCNC: 8.8 G/DL — LOW (ref 13–17)
IANC: 5.5 K/UL — SIGNIFICANT CHANGE UP (ref 1.5–8.5)
IMM GRANULOCYTES NFR BLD AUTO: 0.7 % — SIGNIFICANT CHANGE UP (ref 0–1.5)
INR BLD: 1.28 RATIO — HIGH (ref 0.88–1.16)
LYMPHOCYTES # BLD AUTO: 0.43 K/UL — LOW (ref 1–3.3)
LYMPHOCYTES # BLD AUTO: 6.3 % — LOW (ref 13–44)
MAGNESIUM SERPL-MCNC: 2.2 MG/DL — SIGNIFICANT CHANGE UP (ref 1.6–2.6)
MCHC RBC-ENTMCNC: 27.1 PG — SIGNIFICANT CHANGE UP (ref 27–34)
MCHC RBC-ENTMCNC: 28.9 GM/DL — LOW (ref 32–36)
MCV RBC AUTO: 93.8 FL — SIGNIFICANT CHANGE UP (ref 80–100)
MONOCYTES # BLD AUTO: 0.82 K/UL — SIGNIFICANT CHANGE UP (ref 0–0.9)
MONOCYTES NFR BLD AUTO: 12 % — SIGNIFICANT CHANGE UP (ref 2–14)
NEUTROPHILS # BLD AUTO: 5.5 K/UL — SIGNIFICANT CHANGE UP (ref 1.8–7.4)
NEUTROPHILS NFR BLD AUTO: 80.6 % — HIGH (ref 43–77)
NRBC # BLD: 0 /100 WBCS — SIGNIFICANT CHANGE UP
NRBC # FLD: 0 K/UL — SIGNIFICANT CHANGE UP
NT-PROBNP SERPL-SCNC: 424 PG/ML — HIGH
PHOSPHATE SERPL-MCNC: 3 MG/DL — SIGNIFICANT CHANGE UP (ref 2.5–4.5)
PLATELET # BLD AUTO: 150 K/UL — SIGNIFICANT CHANGE UP (ref 150–400)
POTASSIUM SERPL-MCNC: 4.4 MMOL/L — SIGNIFICANT CHANGE UP (ref 3.5–5.3)
POTASSIUM SERPL-SCNC: 4.4 MMOL/L — SIGNIFICANT CHANGE UP (ref 3.5–5.3)
PROT SERPL-MCNC: 7.3 G/DL — SIGNIFICANT CHANGE UP (ref 6–8.3)
PROTHROM AB SERPL-ACNC: 14.4 SEC — HIGH (ref 10.6–13.6)
RBC # BLD: 3.25 M/UL — LOW (ref 4.2–5.8)
RBC # FLD: 16.3 % — HIGH (ref 10.3–14.5)
RSV RNA NPH QL NAA+NON-PROBE: SIGNIFICANT CHANGE UP
SARS-COV-2 RNA SPEC QL NAA+PROBE: SIGNIFICANT CHANGE UP
SODIUM SERPL-SCNC: 140 MMOL/L — SIGNIFICANT CHANGE UP (ref 135–145)
TROPONIN T, HIGH SENSITIVITY RESULT: 28 NG/L — SIGNIFICANT CHANGE UP
WBC # BLD: 6.83 K/UL — SIGNIFICANT CHANGE UP (ref 3.8–10.5)
WBC # FLD AUTO: 6.83 K/UL — SIGNIFICANT CHANGE UP (ref 3.8–10.5)

## 2022-01-10 PROCEDURE — 99285 EMERGENCY DEPT VISIT HI MDM: CPT | Mod: GC

## 2022-01-10 PROCEDURE — 71045 X-RAY EXAM CHEST 1 VIEW: CPT | Mod: 26

## 2022-01-10 PROCEDURE — 99214 OFFICE O/P EST MOD 30 MIN: CPT

## 2022-01-10 PROCEDURE — 71275 CT ANGIOGRAPHY CHEST: CPT | Mod: 26

## 2022-01-10 RX ORDER — MORPHINE SULFATE 50 MG/1
4 CAPSULE, EXTENDED RELEASE ORAL ONCE
Refills: 0 | Status: DISCONTINUED | OUTPATIENT
Start: 2022-01-10 | End: 2022-01-10

## 2022-01-10 RX ORDER — PANTOPRAZOLE SODIUM 20 MG/1
40 TABLET, DELAYED RELEASE ORAL
Refills: 0 | Status: DISCONTINUED | OUTPATIENT
Start: 2022-01-10 | End: 2022-01-19

## 2022-01-10 RX ORDER — HYDROXYZINE HCL 10 MG
50 TABLET ORAL
Refills: 0 | Status: DISCONTINUED | OUTPATIENT
Start: 2022-01-10 | End: 2022-01-19

## 2022-01-10 RX ORDER — TAMSULOSIN HYDROCHLORIDE 0.4 MG/1
0.4 CAPSULE ORAL AT BEDTIME
Refills: 0 | Status: DISCONTINUED | OUTPATIENT
Start: 2022-01-10 | End: 2022-01-19

## 2022-01-10 RX ORDER — HEPARIN SODIUM 5000 [USP'U]/ML
5000 INJECTION INTRAVENOUS; SUBCUTANEOUS EVERY 8 HOURS
Refills: 0 | Status: DISCONTINUED | OUTPATIENT
Start: 2022-01-10 | End: 2022-01-19

## 2022-01-10 RX ORDER — ACETAMINOPHEN 500 MG
650 TABLET ORAL EVERY 6 HOURS
Refills: 0 | Status: DISCONTINUED | OUTPATIENT
Start: 2022-01-10 | End: 2022-01-19

## 2022-01-10 RX ORDER — OXYCODONE HYDROCHLORIDE 5 MG/1
5 TABLET ORAL EVERY 4 HOURS
Refills: 0 | Status: DISCONTINUED | OUTPATIENT
Start: 2022-01-10 | End: 2022-01-16

## 2022-01-10 RX ADMIN — HEPARIN SODIUM 5000 UNIT(S): 5000 INJECTION INTRAVENOUS; SUBCUTANEOUS at 21:17

## 2022-01-10 RX ADMIN — TAMSULOSIN HYDROCHLORIDE 0.4 MILLIGRAM(S): 0.4 CAPSULE ORAL at 21:17

## 2022-01-10 RX ADMIN — OXYCODONE HYDROCHLORIDE 5 MILLIGRAM(S): 5 TABLET ORAL at 21:10

## 2022-01-10 RX ADMIN — MORPHINE SULFATE 4 MILLIGRAM(S): 50 CAPSULE, EXTENDED RELEASE ORAL at 12:44

## 2022-01-10 NOTE — ED PROVIDER NOTE - CLINICAL SUMMARY MEDICAL DECISION MAKING FREE TEXT BOX
70y/o M w/ h/o squamous cell carcinoma s/p pneumonectomy 2015 p/w L sided back pain and SOB for the past 3wks, with fluctuant mass in back concern for PE vs seroma vs abscess. plan labs, ctpa and admission. 70y/o M w/ h/o squamous cell carcinoma s/p pneumonectomy 2015 p/w L sided back pain and SOB for the past 3wks, with fluctuant mass in back concern for PE vs seroma vs abscess. plan labs, ctpa and admission.  Flora: 72 yo with worsening chest and back pain at site of old pneumonectomy. Increasing SOB. fluctulant mass on back. Effusion, mets, PE all in differential. Pain level will require admission.

## 2022-01-10 NOTE — H&P ADULT - NSHPPHYSICALEXAM_GEN_ALL_CORE
Constitutional:  General appearance: chronically ill.   Neck:  no jugular venous distention, supple, no lymphadenopathy and the thyroid was normal and there were no nodules present.   Pulmonary: decreased BS L lung field consistent with history  no respiratory distress, no accessory muscle use, lungs were clear to auscultation bilaterally.   Cardiac:  normal rate, regular rhythm, normal S1 and S2 and no murmur heard.   Vascular:  there was no peripheral edema.   Abdomen:  abdomen soft and non-tender.   Lymphatic:  no posterior cervical lymphadenopathy, no anterior cervical lymphadenopathy.   Back: fluctuant mass in area of surgical scar L upper back - tender to palpation .   Skin:  no rash and no skin lesions.   Neurology:  no focal deficits.   Psychiatric:  oriented to person, place, and time.

## 2022-01-10 NOTE — ED PROVIDER NOTE - OBJECTIVE STATEMENT
72y/o M w/ h/o squamous cell carcinoma s/p pneumonectomy 2015 p/w L sided back pain and SOB for the 72y/o M w/ h/o squamous cell carcinoma s/p pneumonectomy 2015 p/w L sided back pain and SOB for the past 3wks, went to thoracic surgeon who referred to ED. Pt states the pain is constant and right over the site where they operated years ago, having difficulty sleeping. No exacerbating or alleviating factors and non radiating. +swelling in b/l lower extremities. No fevers, chills, nausea, vomiting, chest pain, cough, abdominal pain, back pain, dysuria, numbness, tingling.

## 2022-01-10 NOTE — ED PROVIDER NOTE - PHYSICAL EXAMINATION
Gen: NAD, non-toxic appearing  Head: normal appearing  HEENT: normal conjunctiva, oral mucosa moist  Lung: no respiratory distress, speaking in full sentences, CTA b/l with decreased breath sounds L upper lung  CV: tachycardic and regular rhythm, no murmurs, b/l 2+ pitting edema lower extremities  Abd: soft, non distended, non tender   MSK: +fluctuant swelling over L side back, no visible deformities  Neuro: No focal deficits, AAOx3  Skin: Warm  Psych: normal affect

## 2022-01-10 NOTE — H&P ADULT - NSHPREVIEWOFSYSTEMS_GEN_ALL_CORE
Review of Systems:   · Negative General Symptoms	no fever; no chills; no sweating; no weight loss;  +Fatigue, lethargy and weight loss  · Skin/Breast	negative  · Negative Ophthalmologic Symptoms	no diplopia; no blurred vision L; no blurred vision R  · Ophthalmologic Comments	reading glasses  · ENMT	negative  · Negative Respiratory and Thorax Symptoms	+ pain at L thoracotomy site, see HPI  no wheezing; no dyspnea; no cough; no hemoptysis; no pleuritic chest pain  · Negative Cardiovascular Symptoms	no chest pain; no palpitations; no dyspnea on exertion; no orthopnea; no paroxysmal nocturnal dyspnea; no peripheral edema; no claudication  · Gastrointestinal	negative  · Genitourinary	negative  · Musculoskeletal	negative  · Neurological	negative  · Psychiatric	negative  · Hematology/Lymphatics	negative  · Endocrine	negative  · Allergic/Immunologic	negative

## 2022-01-10 NOTE — ED PROVIDER NOTE - ATTENDING CONTRIBUTION TO CARE
I performed a history and physical exam of the patient and discussed their management with the resident and /or advanced care provider. I reviewed the resident and /or ACP's note and agree with the documented findings and plan of care. My medical decision making and observations are found above.  Lungs clear but pain with deep breath, Large mass below scapula on lt. not red or warm.

## 2022-01-10 NOTE — ED PROVIDER NOTE - NS ED ROS FT
GENERAL: No fever, no chills  EYES: no change in vision  HEENT: no trouble swallowing, no trouble speaking  CARDIAC: no chest pain, no palpitations  PULMONARY: no cough, +SOB  GI: no abdominal pain, no nausea, no vomiting, no diarrhea, no constipation  : no dysuria, no frequency, no change in appearance, no odor of urine  SKIN: no rashes  NEURO: no headache, no weakness  MSK: no joint pain

## 2022-01-10 NOTE — H&P ADULT - PROBLEM SELECTOR PLAN 1
admit to Dr Sims's service on 9T  CT chest, evaluate for drainage  continue current medications    Melania ABBOTT [id number]

## 2022-01-10 NOTE — H&P ADULT - NSICDXPASTSURGICALHX_GEN_ALL_CORE_FT
PAST SURGICAL HISTORY:  H/O lithotripsy     H/O pneumonectomy Left June 2015    S/P removal of lung RUL lung wedge resection on 2/23/18    S/P tonsillectomy

## 2022-01-10 NOTE — PATIENT PROFILE ADULT - FALL HARM RISK - UNIVERSAL INTERVENTIONS
Bed in lowest position, wheels locked, appropriate side rails in place/Call bell, personal items and telephone in reach/Instruct patient to call for assistance before getting out of bed or chair/Non-slip footwear when patient is out of bed/[place] to call system/Physically safe environment - no spills, clutter or unnecessary equipment/Purposeful Proactive Rounding/Room/bathroom lighting operational, light cord in reach

## 2022-01-10 NOTE — H&P ADULT - NSHPSOCIALHISTORY_GEN_ALL_CORE
· Marital Status	  · Occupation	works for 's Dept; retired   · Lives With	spouse    Substance Use History:  · Substance Use	caffeine  · Caffeine Type	coffee  · Caffeine Amount/Frequency	1-2 cups/cans per day  · Caffeine Withdrawal Pattern	denies    Alcohol Use History:  · Have you ever consumed alcohol	yes...  · Alcohol Type	beer  · Alcohol Frequency	daily  · Alcohol Amount	1-2 drinks  · Problems Related to Alcohol Use	no  · 1. Have you felt you ought to CUT down on your drinking?	no  · 2. Have people ANNOYED you by criticizing your drinking?	no  · 3. Have you ever felt bad or GUILTY about your drinking?	no  · 4. Have you ever needed an "EYE OPENER", a drink first thing in the morning to steady your nerves or get rid of a hangover?	no    Former smoker, 40 pack years, · Last Tobacco Use (dd-mmm-yy): 12-Jun-2015

## 2022-01-10 NOTE — ED ADULT NURSE REASSESSMENT NOTE - NS ED NURSE REASSESS COMMENT FT1
Report faxed at 22:18. Verbal report attempted 4x. NM Renny aware, and charge RN's Anny, and Francisca aware.   Report sheet and transmission verification report in chart.     fax report #: [id number] Report faxed. Verbal report attempted 4x. NM Renny aware, and charge RN's Anny, and Francisca aware.   Report sheet and transmission verification report in chart.     fax report #: [id number]

## 2022-01-10 NOTE — H&P ADULT - ATTENDING COMMENTS
Patient seen and examined agree with above note as modified, where appropriate, by me. left pneumonectomy for lung CA 2015 now presents with fluid in chest wall of unknown etiology. Will plan for IR drainage.

## 2022-01-10 NOTE — ED PROVIDER NOTE - CARE PLAN
1 Principal Discharge DX:	Swelling of surgical site, sequela   Principal Discharge DX:	Swelling of surgical site, sequela  Secondary Diagnosis:	Pleural effusion

## 2022-01-10 NOTE — ED ADULT NURSE NOTE - NSFALLRSKUNASSIST_ED_ALL_ED
no
no pain/no numbness/no fever/no vomiting/no tingling/no nausea/no weakness/no dizziness/no decreased eating/drinking/no chills

## 2022-01-10 NOTE — ED ADULT NURSE NOTE - OBJECTIVE STATEMENT
Received patient to room 3 for back pain. A&o4, ambulatory, pt sent by PCP, had pulmonectomy 6x years ago pt currently c/o pain by incision site from operation, worse when sitting up.  No open site, pt noted to be tachypneic and appears SOB, 100% O2 on RA at this time, RR rapid, NSR on monitor. MD Valenzuela at bedside for evaluation.

## 2022-01-10 NOTE — H&P ADULT - ASSESSMENT
Pt admitted from thoracic surgery office visit today with complaints of pain at L thoracotomy site.  Pt is a 71y male with history of squamous cell lung Ca stage III (T3 N2) of LETY with neoadjuvant chemo/RT, followed by Left VATS, left thoracotomy, pneumonectomy, resection of first, second, third ribs on 06/23/2015, pathology revealed [id number]. In January of 2017 he completed SBRT to a RUL nodule. He is s/p right VATS, RUL lung wedge resection on 2/23/18, pathology revealed scar with reactive changes consistent with radiation atypia. Pt followed with Dr Sims as an outpatient and at follow up visit today, he c/o lethargy, weight loss and pain at L thoracotomy site and was found to have fluctuant collection.

## 2022-01-10 NOTE — H&P ADULT - HISTORY OF PRESENT ILLNESS
Pt admitted from thoracic surgery office visit today with complaints of pain at L thoracotomy site.  Pt is a 71y male with history of squamous cell lung Ca stage III (T3 N2) of LETY with neoadjuvant chemo/RT, followed by Left VATS, left thoracotomy, pneumonectomy, resection of first, second, third ribs on 06/23/2015, pathology revealed [id number]. In January of 2017 he completed SBRT to a RUL nodule. He is s/p right VATS, RUL lung wedge resection on 2/23/18, pathology revealed scar with reactive changes consistent with radiation atypia. Pt followed with Dr Sims as an outpatient and at follow up visit today, he c/o lethargy, weight loss and pain at L thoracotomy site and was found to have fluctuant collection.

## 2022-01-10 NOTE — ED ADULT TRIAGE NOTE - CHIEF COMPLAINT QUOTE
p/t with hx squamous carcinoma of lung, c/o of increased lt sided pleuritic back pain and sob for few days, p/t sent by PMD for eval

## 2022-01-11 LAB
ANION GAP SERPL CALC-SCNC: 7 MMOL/L — SIGNIFICANT CHANGE UP (ref 7–14)
APTT BLD: 26.9 SEC — LOW (ref 27–36.3)
BUN SERPL-MCNC: 18 MG/DL — SIGNIFICANT CHANGE UP (ref 7–23)
CALCIUM SERPL-MCNC: 8.6 MG/DL — SIGNIFICANT CHANGE UP (ref 8.4–10.5)
CHLORIDE SERPL-SCNC: 102 MMOL/L — SIGNIFICANT CHANGE UP (ref 98–107)
CO2 SERPL-SCNC: 31 MMOL/L — SIGNIFICANT CHANGE UP (ref 22–31)
CREAT SERPL-MCNC: 0.88 MG/DL — SIGNIFICANT CHANGE UP (ref 0.5–1.3)
GLUCOSE SERPL-MCNC: 127 MG/DL — HIGH (ref 70–99)
HCT VFR BLD CALC: 26 % — LOW (ref 39–50)
HGB BLD-MCNC: 7.6 G/DL — LOW (ref 13–17)
INR BLD: 1.3 RATIO — HIGH (ref 0.88–1.16)
MAGNESIUM SERPL-MCNC: 2.2 MG/DL — SIGNIFICANT CHANGE UP (ref 1.6–2.6)
MCHC RBC-ENTMCNC: 27 PG — SIGNIFICANT CHANGE UP (ref 27–34)
MCHC RBC-ENTMCNC: 29.2 GM/DL — LOW (ref 32–36)
MCV RBC AUTO: 92.5 FL — SIGNIFICANT CHANGE UP (ref 80–100)
NRBC # BLD: 0 /100 WBCS — SIGNIFICANT CHANGE UP
NRBC # FLD: 0 K/UL — SIGNIFICANT CHANGE UP
PHOSPHATE SERPL-MCNC: 2.9 MG/DL — SIGNIFICANT CHANGE UP (ref 2.5–4.5)
PLATELET # BLD AUTO: 132 K/UL — LOW (ref 150–400)
POTASSIUM SERPL-MCNC: 4.6 MMOL/L — SIGNIFICANT CHANGE UP (ref 3.5–5.3)
POTASSIUM SERPL-SCNC: 4.6 MMOL/L — SIGNIFICANT CHANGE UP (ref 3.5–5.3)
PROTHROM AB SERPL-ACNC: 14.8 SEC — HIGH (ref 10.6–13.6)
RBC # BLD: 2.81 M/UL — LOW (ref 4.2–5.8)
RBC # FLD: 16.4 % — HIGH (ref 10.3–14.5)
SODIUM SERPL-SCNC: 140 MMOL/L — SIGNIFICANT CHANGE UP (ref 135–145)
WBC # BLD: 5.92 K/UL — SIGNIFICANT CHANGE UP (ref 3.8–10.5)
WBC # FLD AUTO: 5.92 K/UL — SIGNIFICANT CHANGE UP (ref 3.8–10.5)

## 2022-01-11 RX ORDER — OXYCODONE HYDROCHLORIDE 5 MG/1
10 TABLET ORAL EVERY 4 HOURS
Refills: 0 | Status: DISCONTINUED | OUTPATIENT
Start: 2022-01-11 | End: 2022-01-16

## 2022-01-11 RX ADMIN — OXYCODONE HYDROCHLORIDE 5 MILLIGRAM(S): 5 TABLET ORAL at 06:00

## 2022-01-11 RX ADMIN — OXYCODONE HYDROCHLORIDE 5 MILLIGRAM(S): 5 TABLET ORAL at 14:59

## 2022-01-11 RX ADMIN — OXYCODONE HYDROCHLORIDE 5 MILLIGRAM(S): 5 TABLET ORAL at 21:22

## 2022-01-11 RX ADMIN — TAMSULOSIN HYDROCHLORIDE 0.4 MILLIGRAM(S): 0.4 CAPSULE ORAL at 21:23

## 2022-01-11 RX ADMIN — HEPARIN SODIUM 5000 UNIT(S): 5000 INJECTION INTRAVENOUS; SUBCUTANEOUS at 06:00

## 2022-01-11 RX ADMIN — PANTOPRAZOLE SODIUM 40 MILLIGRAM(S): 20 TABLET, DELAYED RELEASE ORAL at 06:00

## 2022-01-11 RX ADMIN — HEPARIN SODIUM 5000 UNIT(S): 5000 INJECTION INTRAVENOUS; SUBCUTANEOUS at 21:22

## 2022-01-11 RX ADMIN — OXYCODONE HYDROCHLORIDE 5 MILLIGRAM(S): 5 TABLET ORAL at 22:22

## 2022-01-11 RX ADMIN — OXYCODONE HYDROCHLORIDE 5 MILLIGRAM(S): 5 TABLET ORAL at 13:56

## 2022-01-11 RX ADMIN — HEPARIN SODIUM 5000 UNIT(S): 5000 INJECTION INTRAVENOUS; SUBCUTANEOUS at 13:56

## 2022-01-11 RX ADMIN — OXYCODONE HYDROCHLORIDE 5 MILLIGRAM(S): 5 TABLET ORAL at 07:00

## 2022-01-11 NOTE — PROGRESS NOTE ADULT - SUBJECTIVE AND OBJECTIVE BOX
Subjective: Patient seen at bedside this AM. Patient denies acute onset pain, but does endorse tenderness to right upper back. Denies fevers, chills, NVD, SOB, mediastinal CP, but cannot lie supine and feels most comfortable while lying on right decubitus position.     Vital Signs:  Vital Signs Last 24 Hrs  T(C): 36.5 (01-11-22 @ 06:12), Max: 36.8 (01-10-22 @ 21:28)  T(F): 97.7 (01-11-22 @ 06:12), Max: 98.2 (01-10-22 @ 21:28)  HR: 74 (01-11-22 @ 06:12) (74 - 128)  BP: 121/43 (01-11-22 @ 06:12) (110/57 - 148/57)  RR: 18 (01-11-22 @ 06:12) (16 - 22)  SpO2: 100% (01-11-22 @ 06:12) (92% - 100%) on (O2)    Telemetry/Alarms:  General: WN/WD NAD  Neurology: Awake, nonfocal, KEY x 4  Eyes: Scleras clear, PERRLA/ EOMI, Gross vision intact  ENT:Gross hearing intact, grossly patent pharynx, no stridor  Neck: Neck supple, trachea midline, No JVD,   Respiratory:  decreased BS L lung field consistent with history  no respiratory distress, no accessory muscle use, lungs were clear to auscultation bilaterally.   CV: RRR, S1S2, no murmurs, rubs or gallops  Abdominal: Soft, NT, ND +BS,   Extremities: No edema, + peripheral pulses  Back: fluctuant mass in area of surgical scar L upper back - tender to palpation  Skin: No Rashes, Hematoma, Ecchymosis  Lymphatic: No Neck, axilla, groin LAD  Psych: Oriented x 3, normal affect  Incisions:   Tubes:  Relevant labs, radiology and Medications reviewed                        8.8    6.83  )-----------( 150      ( 10 Dejon 2022 12:31 )             30.5     01-10    140  |  102  |  18  ----------------------------<  147<H>  4.4   |  27  |  0.90    Ca    9.3      10 Dejon 2022 12:31  Phos  3.0     01-10  Mg     2.20     01-10    TPro  7.3  /  Alb  3.0<L>  /  TBili  0.6  /  DBili  x   /  AST  12  /  ALT  12  /  AlkPhos  63  01-10    PT/INR - ( 10 Dejon 2022 12:31 )   PT: 14.4 sec;   INR: 1.28 ratio         PTT - ( 10 Dejon 2022 12:31 )  PTT:25.9 sec  MEDICATIONS  (STANDING):  heparin   Injectable 5000 Unit(s) SubCutaneous every 8 hours  pantoprazole    Tablet 40 milliGRAM(s) Oral before breakfast  tamsulosin 0.4 milliGRAM(s) Oral at bedtime    MEDICATIONS  (PRN):  acetaminophen     Tablet .. 650 milliGRAM(s) Oral every 6 hours PRN Mild Pain (1 - 3)  hydrOXYzine hydrochloride 50 milliGRAM(s) Oral two times a day PRN Anxiety  oxyCODONE    IR 5 milliGRAM(s) Oral every 4 hours PRN Moderate Pain (4 - 6)    Pertinent Physical Exam  I&O's Summary      Assessment  71y Male  w/ PAST MEDICAL & SURGICAL HISTORY:  BPH (benign prostatic hypertrophy)    Nephrolithiasis    Hydronephrosis    Lung cancer  Dxd 2015 treated with neoadjuvant chemo/RT followed by pneumonectomy (June 2015), SBRT to RUL nodule in Jan 2017    Lung nodule    H/O lithotripsy    S/P tonsillectomy    H/O pneumonectomy  Left June 2015    S/P removal of lung  RUL lung wedge resection on 2/23/18    admitted with complaints of Patient is a 71y old  Male who presents with a chief complaint of .  On (Date), patient underwent . Postoperative course/issues:    PLAN  Neuro: Pain management  Pulm: f/u CT chest results; eval for possible IR drainage of collection at surgical site  Cardio: Monitor telemetry/alarms  GI: Tolerating diet. Continue stool softeners.  Renal: monitor urine output, supplement electrolytes as needed  Vasc: Heparin SC/SCDs for DVT prophylaxis  Heme: Stable H/H. .   Therapy: OOB/ambulate  Discussed with Cardiothoracic Team at AM rounds.

## 2022-01-12 ENCOUNTER — APPOINTMENT (OUTPATIENT)
Dept: HEMATOLOGY ONCOLOGY | Facility: CLINIC | Age: 72
End: 2022-01-12

## 2022-01-12 LAB
ANION GAP SERPL CALC-SCNC: 13 MMOL/L — SIGNIFICANT CHANGE UP (ref 7–14)
APTT BLD: 26.5 SEC — LOW (ref 27–36.3)
BUN SERPL-MCNC: 17 MG/DL — SIGNIFICANT CHANGE UP (ref 7–23)
CALCIUM SERPL-MCNC: 8.8 MG/DL — SIGNIFICANT CHANGE UP (ref 8.4–10.5)
CHLORIDE SERPL-SCNC: 102 MMOL/L — SIGNIFICANT CHANGE UP (ref 98–107)
CO2 SERPL-SCNC: 26 MMOL/L — SIGNIFICANT CHANGE UP (ref 22–31)
CREAT SERPL-MCNC: 0.96 MG/DL — SIGNIFICANT CHANGE UP (ref 0.5–1.3)
GLUCOSE SERPL-MCNC: 117 MG/DL — HIGH (ref 70–99)
GRAM STN FLD: SIGNIFICANT CHANGE UP
HCT VFR BLD CALC: 27.2 % — LOW (ref 39–50)
HGB BLD-MCNC: 7.8 G/DL — LOW (ref 13–17)
INR BLD: 1.22 RATIO — HIGH (ref 0.88–1.16)
MAGNESIUM SERPL-MCNC: 2.3 MG/DL — SIGNIFICANT CHANGE UP (ref 1.6–2.6)
MCHC RBC-ENTMCNC: 27.5 PG — SIGNIFICANT CHANGE UP (ref 27–34)
MCHC RBC-ENTMCNC: 28.7 GM/DL — LOW (ref 32–36)
MCV RBC AUTO: 95.8 FL — SIGNIFICANT CHANGE UP (ref 80–100)
NRBC # BLD: 0 /100 WBCS — SIGNIFICANT CHANGE UP
NRBC # FLD: 0 K/UL — SIGNIFICANT CHANGE UP
PHOSPHATE SERPL-MCNC: 2.8 MG/DL — SIGNIFICANT CHANGE UP (ref 2.5–4.5)
PLATELET # BLD AUTO: 132 K/UL — LOW (ref 150–400)
POTASSIUM SERPL-MCNC: 4.2 MMOL/L — SIGNIFICANT CHANGE UP (ref 3.5–5.3)
POTASSIUM SERPL-SCNC: 4.2 MMOL/L — SIGNIFICANT CHANGE UP (ref 3.5–5.3)
PROTHROM AB SERPL-ACNC: 13.9 SEC — HIGH (ref 10.6–13.6)
RBC # BLD: 2.84 M/UL — LOW (ref 4.2–5.8)
RBC # FLD: 16.5 % — HIGH (ref 10.3–14.5)
SODIUM SERPL-SCNC: 141 MMOL/L — SIGNIFICANT CHANGE UP (ref 135–145)
SPECIMEN SOURCE: SIGNIFICANT CHANGE UP
WBC # BLD: 6.5 K/UL — SIGNIFICANT CHANGE UP (ref 3.8–10.5)
WBC # FLD AUTO: 6.5 K/UL — SIGNIFICANT CHANGE UP (ref 3.8–10.5)

## 2022-01-12 PROCEDURE — 93970 EXTREMITY STUDY: CPT | Mod: 26

## 2022-01-12 PROCEDURE — 71045 X-RAY EXAM CHEST 1 VIEW: CPT | Mod: 26

## 2022-01-12 PROCEDURE — 10030 IMG GID FLU COLL DRG SFT TIS: CPT

## 2022-01-12 PROCEDURE — 93306 TTE W/DOPPLER COMPLETE: CPT | Mod: 26

## 2022-01-12 RX ORDER — PIPERACILLIN AND TAZOBACTAM 4; .5 G/20ML; G/20ML
3.38 INJECTION, POWDER, LYOPHILIZED, FOR SOLUTION INTRAVENOUS ONCE
Refills: 0 | Status: COMPLETED | OUTPATIENT
Start: 2022-01-12 | End: 2022-01-12

## 2022-01-12 RX ORDER — SODIUM CHLORIDE 9 MG/ML
500 INJECTION, SOLUTION INTRAVENOUS ONCE
Refills: 0 | Status: COMPLETED | OUTPATIENT
Start: 2022-01-12 | End: 2022-01-12

## 2022-01-12 RX ORDER — VANCOMYCIN HCL 1 G
VIAL (EA) INTRAVENOUS
Refills: 0 | Status: DISCONTINUED | OUTPATIENT
Start: 2022-01-12 | End: 2022-01-13

## 2022-01-12 RX ORDER — VANCOMYCIN HCL 1 G
VIAL (EA) INTRAVENOUS
Refills: 0 | Status: DISCONTINUED | OUTPATIENT
Start: 2022-01-12 | End: 2022-01-12

## 2022-01-12 RX ORDER — PIPERACILLIN AND TAZOBACTAM 4; .5 G/20ML; G/20ML
3.38 INJECTION, POWDER, LYOPHILIZED, FOR SOLUTION INTRAVENOUS EVERY 8 HOURS
Refills: 0 | Status: DISCONTINUED | OUTPATIENT
Start: 2022-01-12 | End: 2022-01-16

## 2022-01-12 RX ORDER — VANCOMYCIN HCL 1 G
1250 VIAL (EA) INTRAVENOUS ONCE
Refills: 0 | Status: COMPLETED | OUTPATIENT
Start: 2022-01-12 | End: 2022-01-12

## 2022-01-12 RX ORDER — VANCOMYCIN HCL 1 G
1250 VIAL (EA) INTRAVENOUS EVERY 12 HOURS
Refills: 0 | Status: DISCONTINUED | OUTPATIENT
Start: 2022-01-13 | End: 2022-01-13

## 2022-01-12 RX ADMIN — PIPERACILLIN AND TAZOBACTAM 25 GRAM(S): 4; .5 INJECTION, POWDER, LYOPHILIZED, FOR SOLUTION INTRAVENOUS at 18:11

## 2022-01-12 RX ADMIN — OXYCODONE HYDROCHLORIDE 5 MILLIGRAM(S): 5 TABLET ORAL at 05:31

## 2022-01-12 RX ADMIN — TAMSULOSIN HYDROCHLORIDE 0.4 MILLIGRAM(S): 0.4 CAPSULE ORAL at 21:59

## 2022-01-12 RX ADMIN — HEPARIN SODIUM 5000 UNIT(S): 5000 INJECTION INTRAVENOUS; SUBCUTANEOUS at 21:59

## 2022-01-12 RX ADMIN — OXYCODONE HYDROCHLORIDE 5 MILLIGRAM(S): 5 TABLET ORAL at 04:31

## 2022-01-12 RX ADMIN — PANTOPRAZOLE SODIUM 40 MILLIGRAM(S): 20 TABLET, DELAYED RELEASE ORAL at 06:57

## 2022-01-12 RX ADMIN — OXYCODONE HYDROCHLORIDE 10 MILLIGRAM(S): 5 TABLET ORAL at 19:11

## 2022-01-12 RX ADMIN — PIPERACILLIN AND TAZOBACTAM 200 GRAM(S): 4; .5 INJECTION, POWDER, LYOPHILIZED, FOR SOLUTION INTRAVENOUS at 11:20

## 2022-01-12 RX ADMIN — SODIUM CHLORIDE 1000 MILLILITER(S): 9 INJECTION, SOLUTION INTRAVENOUS at 11:55

## 2022-01-12 RX ADMIN — OXYCODONE HYDROCHLORIDE 10 MILLIGRAM(S): 5 TABLET ORAL at 18:11

## 2022-01-12 RX ADMIN — HEPARIN SODIUM 5000 UNIT(S): 5000 INJECTION INTRAVENOUS; SUBCUTANEOUS at 14:01

## 2022-01-12 RX ADMIN — Medication 166.67 MILLIGRAM(S): at 14:01

## 2022-01-12 NOTE — PROGRESS NOTE ADULT - SUBJECTIVE AND OBJECTIVE BOX
DATE OF SERVICE: 01-12-22 @ 07:14    Subjective: Patient seen and examined. No new events except as noted.     SUBJECTIVE/ROS:        MEDICATIONS:  MEDICATIONS  (STANDING):  heparin   Injectable 5000 Unit(s) SubCutaneous every 8 hours  pantoprazole    Tablet 40 milliGRAM(s) Oral before breakfast  tamsulosin 0.4 milliGRAM(s) Oral at bedtime      PHYSICAL EXAM:  T(C): 36.8 (01-12-22 @ 04:00), Max: 36.8 (01-11-22 @ 21:20)  HR: 84 (01-12-22 @ 04:00) (84 - 99)  BP: 139/54 (01-12-22 @ 04:00) (122/55 - 148/58)  RR: 16 (01-12-22 @ 04:00) (16 - 18)  SpO2: 100% (01-12-22 @ 04:00) (96% - 100%)  Wt(kg): --  I&O's Summary    11 Jan 2022 07:01  -  12 Jan 2022 07:00  --------------------------------------------------------  IN: 880 mL / OUT: 800 mL / NET: 80 mL            JVP: Normal  Neck: supple  Lung: clear   CV: S1 S2 , Murmur:  Abd: soft  Ext: No edema  neuro: Awake / alert  Psych: flat affect  Skin: normal``    LABS/DATA:    CARDIAC MARKERS:                                7.6    5.92  )-----------( 132      ( 11 Jan 2022 11:07 )             26.0     01-11    140  |  102  |  18  ----------------------------<  127<H>  4.6   |  31  |  0.88    Ca    8.6      11 Jan 2022 11:07  Phos  2.9     01-11  Mg     2.20     01-11    TPro  7.3  /  Alb  3.0<L>  /  TBili  0.6  /  DBili  x   /  AST  12  /  ALT  12  /  AlkPhos  63  01-10    proBNP:   Lipid Profile:   HgA1c:   TSH:     TELE:  EKG:

## 2022-01-12 NOTE — PROGRESS NOTE ADULT - SUBJECTIVE AND OBJECTIVE BOX
Subjective: Patient seen at bedside this AM. Patient denies acute onset pain, but does endorse tenderness to right upper back. Denies fevers, chills, NVD, SOB, mediastinal CP, but cannot lie supine.     Vital Signs:  Vital Signs Last 24 Hrs  T(C): 36.8 (01-12-22 @ 04:00), Max: 36.8 (01-11-22 @ 21:20)  T(F): 98.3 (01-12-22 @ 04:00), Max: 98.3 (01-12-22 @ 04:00)  HR: 84 (01-12-22 @ 04:00) (84 - 99)  BP: 139/54 (01-12-22 @ 04:00) (122/55 - 148/58)  RR: 16 (01-12-22 @ 04:00) (16 - 18)  SpO2: 100% (01-12-22 @ 04:00) (96% - 100%) on (O2)    Telemetry/Alarms:  General: WN/WD NAD  Neurology: Awake, nonfocal, KEY x 4  Eyes: Scleras clear, PERRLA/ EOMI, Gross vision intact  ENT:Gross hearing intact, grossly patent pharynx, no stridor  Neck: Neck supple, trachea midline, No JVD,   Respiratory:  decreased BS L lung field consistent with history  no respiratory distress, no accessory muscle use, lungs were clear to auscultation bilaterally.   CV: RRR, S1S2, no murmurs, rubs or gallops  Abdominal: Soft, NT, ND +BS,   Extremities: No edema, + peripheral pulses  Back: fluctuant mass in area of surgical scar L upper back - tender to palpation  Skin: No Rashes, Hematoma, Ecchymosis  Lymphatic: No Neck, axilla, groin LAD  Psych: Oriented x 3, normal affect  Incisions:   Tubes:  Relevant labs, radiology and Medications reviewed                                   7.8    6.50  )-----------( 132      ( 12 Jan 2022 07:32 )             27.2     01-11    140  |  102  |  18  ----------------------------<  127<H>  4.6   |  31  |  0.88    Ca    8.6      11 Jan 2022 11:07  Phos  2.9     01-11  Mg     2.20     01-11    TPro  7.3  /  Alb  3.0<L>  /  TBili  0.6  /  DBili  x   /  AST  12  /  ALT  12  /  AlkPhos  63  01-10    PT/INR - ( 11 Jan 2022 11:07 )   PT: 14.8 sec;   INR: 1.30 ratio         PTT - ( 11 Jan 2022 11:07 )  PTT:26.9 sec  MEDICATIONS  (STANDING):  heparin   Injectable 5000 Unit(s) SubCutaneous every 8 hours  pantoprazole    Tablet 40 milliGRAM(s) Oral before breakfast  tamsulosin 0.4 milliGRAM(s) Oral at bedtime    MEDICATIONS  (PRN):  acetaminophen     Tablet .. 650 milliGRAM(s) Oral every 6 hours PRN Mild Pain (1 - 3)  hydrOXYzine hydrochloride 50 milliGRAM(s) Oral two times a day PRN Anxiety  oxyCODONE    IR 5 milliGRAM(s) Oral every 4 hours PRN Moderate Pain (4 - 6)  oxyCODONE    IR 10 milliGRAM(s) Oral every 4 hours PRN Severe Pain (7 - 10)    Pertinent Physical Exam  I&O's Summary    11 Jan 2022 07:01  -  12 Jan 2022 07:00  --------------------------------------------------------  IN: 880 mL / OUT: 800 mL / NET: 80 mL        Assessment  71y Male  w/ PAST MEDICAL & SURGICAL HISTORY:  BPH (benign prostatic hypertrophy)    Nephrolithiasis    Hydronephrosis    Lung cancer  Dxd 2015 treated with neoadjuvant chemo/RT followed by pneumonectomy (June 2015), SBRT to RUL nodule in Jan 2017    Lung nodule    H/O lithotripsy    S/P tonsillectomy    H/O pneumonectomy  Left June 2015    S/P removal of lung  RUL lung wedge resection on 2/23/18    Patient is a 71y old  Male who presents with a chief complaint of  upper left back tenderness and fluctuance at surgical site      PLAN  Neuro: Pain management  Pulm: f/u CT chest results; IR drainage of collection today  Cardio: Monitor telemetry/alarms; TTE and VA duplex, f/u recs with Dr. Yeboah  GI: Tolerating diet. Continue stool softeners.  Renal: monitor urine output, supplement electrolytes as needed  Vasc: Heparin SC/SCDs for DVT prophylaxis  Heme: Stable H/H. .   Therapy: OOB/ambulate  Discussed with Cardiothoracic Team at AM rounds.

## 2022-01-12 NOTE — PROGRESS NOTE ADULT - ASSESSMENT
Edema   unclear etiology   ? due to mild low albumin   obtain echo to eval LV / RV function   obtain lower ext venous doppler     Wide pulse pressure  echo   rule out severe AR    DVT proph  on heparin      Advanced care planning was discussed with patient and family.  Risks, benefits and alternatives of the cardiac treatments and medical therapy including procedures were discussed in detail and all questions were answered. Importance of compliance with medical therapy and lifestyle modification to improve cardiovascular health were addressed. Appropriate forms and patient educational materials were reviewed. 30 minutes face to face spent.

## 2022-01-12 NOTE — PROCEDURE NOTE - PROCEDURE FINDINGS AND DETAILS
Left sided subcutaneous fluid collection identified via US. 10 fr drain placed. 500 cc aspirated. fluid sent for gram stain and culture.

## 2022-01-13 ENCOUNTER — TRANSCRIPTION ENCOUNTER (OUTPATIENT)
Age: 72
End: 2022-01-13

## 2022-01-13 LAB
ANION GAP SERPL CALC-SCNC: 7 MMOL/L — SIGNIFICANT CHANGE UP (ref 7–14)
APTT BLD: 26.6 SEC — LOW (ref 27–36.3)
BLD GP AB SCN SERPL QL: NEGATIVE — SIGNIFICANT CHANGE UP
BUN SERPL-MCNC: 17 MG/DL — SIGNIFICANT CHANGE UP (ref 7–23)
CALCIUM SERPL-MCNC: 8.5 MG/DL — SIGNIFICANT CHANGE UP (ref 8.4–10.5)
CHLORIDE SERPL-SCNC: 100 MMOL/L — SIGNIFICANT CHANGE UP (ref 98–107)
CO2 SERPL-SCNC: 31 MMOL/L — SIGNIFICANT CHANGE UP (ref 22–31)
CREAT SERPL-MCNC: 0.99 MG/DL — SIGNIFICANT CHANGE UP (ref 0.5–1.3)
GLUCOSE SERPL-MCNC: 102 MG/DL — HIGH (ref 70–99)
HCT VFR BLD CALC: 25 % — LOW (ref 39–50)
HGB BLD-MCNC: 7.5 G/DL — LOW (ref 13–17)
INR BLD: 1.23 RATIO — HIGH (ref 0.88–1.16)
MAGNESIUM SERPL-MCNC: 2.4 MG/DL — SIGNIFICANT CHANGE UP (ref 1.6–2.6)
MCHC RBC-ENTMCNC: 27.4 PG — SIGNIFICANT CHANGE UP (ref 27–34)
MCHC RBC-ENTMCNC: 30 GM/DL — LOW (ref 32–36)
MCV RBC AUTO: 91.2 FL — SIGNIFICANT CHANGE UP (ref 80–100)
NRBC # BLD: 0 /100 WBCS — SIGNIFICANT CHANGE UP
NRBC # FLD: 0 K/UL — SIGNIFICANT CHANGE UP
PHOSPHATE SERPL-MCNC: 3.5 MG/DL — SIGNIFICANT CHANGE UP (ref 2.5–4.5)
PLATELET # BLD AUTO: 131 K/UL — LOW (ref 150–400)
POTASSIUM SERPL-MCNC: 4.1 MMOL/L — SIGNIFICANT CHANGE UP (ref 3.5–5.3)
POTASSIUM SERPL-SCNC: 4.1 MMOL/L — SIGNIFICANT CHANGE UP (ref 3.5–5.3)
PROTHROM AB SERPL-ACNC: 14 SEC — HIGH (ref 10.6–13.6)
RBC # BLD: 2.74 M/UL — LOW (ref 4.2–5.8)
RBC # FLD: 16.7 % — HIGH (ref 10.3–14.5)
RH IG SCN BLD-IMP: POSITIVE — SIGNIFICANT CHANGE UP
SARS-COV-2 RNA SPEC QL NAA+PROBE: SIGNIFICANT CHANGE UP
SODIUM SERPL-SCNC: 138 MMOL/L — SIGNIFICANT CHANGE UP (ref 135–145)
TRIGL FLD-MCNC: 86 MG/DL — SIGNIFICANT CHANGE UP
VANCOMYCIN TROUGH SERPL-MCNC: 12.4 UG/ML — SIGNIFICANT CHANGE UP (ref 10–20)
WBC # BLD: 2.91 K/UL — LOW (ref 3.8–10.5)
WBC # FLD AUTO: 2.91 K/UL — LOW (ref 3.8–10.5)

## 2022-01-13 PROCEDURE — 99231 SBSQ HOSP IP/OBS SF/LOW 25: CPT

## 2022-01-13 PROCEDURE — 71045 X-RAY EXAM CHEST 1 VIEW: CPT | Mod: 26

## 2022-01-13 RX ADMIN — PANTOPRAZOLE SODIUM 40 MILLIGRAM(S): 20 TABLET, DELAYED RELEASE ORAL at 06:52

## 2022-01-13 RX ADMIN — OXYCODONE HYDROCHLORIDE 10 MILLIGRAM(S): 5 TABLET ORAL at 11:20

## 2022-01-13 RX ADMIN — OXYCODONE HYDROCHLORIDE 10 MILLIGRAM(S): 5 TABLET ORAL at 20:00

## 2022-01-13 RX ADMIN — OXYCODONE HYDROCHLORIDE 10 MILLIGRAM(S): 5 TABLET ORAL at 01:08

## 2022-01-13 RX ADMIN — HEPARIN SODIUM 5000 UNIT(S): 5000 INJECTION INTRAVENOUS; SUBCUTANEOUS at 21:40

## 2022-01-13 RX ADMIN — Medication 166.67 MILLIGRAM(S): at 21:40

## 2022-01-13 RX ADMIN — TAMSULOSIN HYDROCHLORIDE 0.4 MILLIGRAM(S): 0.4 CAPSULE ORAL at 22:31

## 2022-01-13 RX ADMIN — OXYCODONE HYDROCHLORIDE 10 MILLIGRAM(S): 5 TABLET ORAL at 02:00

## 2022-01-13 RX ADMIN — PIPERACILLIN AND TAZOBACTAM 25 GRAM(S): 4; .5 INJECTION, POWDER, LYOPHILIZED, FOR SOLUTION INTRAVENOUS at 11:20

## 2022-01-13 RX ADMIN — OXYCODONE HYDROCHLORIDE 10 MILLIGRAM(S): 5 TABLET ORAL at 18:56

## 2022-01-13 RX ADMIN — OXYCODONE HYDROCHLORIDE 10 MILLIGRAM(S): 5 TABLET ORAL at 12:20

## 2022-01-13 RX ADMIN — HEPARIN SODIUM 5000 UNIT(S): 5000 INJECTION INTRAVENOUS; SUBCUTANEOUS at 05:22

## 2022-01-13 RX ADMIN — HEPARIN SODIUM 5000 UNIT(S): 5000 INJECTION INTRAVENOUS; SUBCUTANEOUS at 15:37

## 2022-01-13 RX ADMIN — PIPERACILLIN AND TAZOBACTAM 25 GRAM(S): 4; .5 INJECTION, POWDER, LYOPHILIZED, FOR SOLUTION INTRAVENOUS at 20:02

## 2022-01-13 RX ADMIN — Medication 166.67 MILLIGRAM(S): at 06:47

## 2022-01-13 RX ADMIN — PIPERACILLIN AND TAZOBACTAM 25 GRAM(S): 4; .5 INJECTION, POWDER, LYOPHILIZED, FOR SOLUTION INTRAVENOUS at 03:25

## 2022-01-13 NOTE — ASSESSMENT
[FreeTextEntry1] : 71 year old male. 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 In January of 2017 he completed SBRT to a RUL nodule.  He is s/p right VATS, RUL lung wedge resection on 2/23/18, pathology revealed scar with reactive changes consistent with radiation atypia.\par \par I have reviewed the patient's medical records and diagnostic images at time of this office consultation and have made the following recommendation:\par 1. Tender fluctuant area noted at prior left thoracotomy site with severe pain, admit patient to ED and plan for a CT chest w/o contrast for further evaluation for possible drainage. \par \par I personally performed the services described in the documentation, reviewed the documentation recorded by the scribe in my presence and it accurately and completely records my words and actions.\par \par SARABJIT, Milka Su NP, am scribing for and the presence of LAUREN Leach, the following sections HISTORY OF PRESENT ILLNESS, PAST MEDICAL/FAMILY/SOCIAL HISTORY; REVIEW OF SYSTEMS; VITAL SIGNS; PHYSICAL EXAM; DISPOSITION.\par

## 2022-01-13 NOTE — PROGRESS NOTE ADULT - SUBJECTIVE AND OBJECTIVE BOX
HPI:  Pt admitted from thoracic surgery office visit today with complaints of pain at L thoracotomy site.  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 In January of 2017 he completed SBRT to a RUL nodule. He is s/p right VATS, RUL lung wedge resection on 2/23/18, pathology revealed scar with reactive changes consistent with radiation atypia. Pt followed with Dr Sims as an outpatient and at follow up visit today, he c/o lethargy, weight loss and pain at L thoracotomy site and was found to have fluctuant collection.    (10 Dejon 2022 12:33)    s/p L chest drain  Pt. denies abd. pain, N/V  + discomfort at drain site        Vital Signs Last 24 Hrs  T(C): 36.5 (13 Jan 2022 05:35), Max: 36.9 (12 Jan 2022 12:33)  T(F): 97.7 (13 Jan 2022 05:35), Max: 98.4 (12 Jan 2022 12:33)  HR: 75 (13 Jan 2022 05:35) (75 - 90)  BP: 131/50 (13 Jan 2022 05:35) (105/59 - 147/51)  BP(mean): --  RR: 18 (13 Jan 2022 05:35) (17 - 18)  SpO2: 96% (13 Jan 2022 05:35) (96% - 100%)    Focused Exam findings:    General: NAD  Chest: normal respiratory effort  posterior chest- drain in place  site c/d/i  +ttp at drain site  no hematoma        LABS:                        7.5    2.91  )-----------( 131      ( 13 Jan 2022 07:34 )             25.0     01-13    138  |  100  |  17  ----------------------------<  102<H>  4.1   |  31  |  0.99    Ca    8.5      13 Jan 2022 07:34  Phos  3.5     01-13  Mg     2.40     01-13      I&O's Detail    12 Jan 2022 07:01  -  13 Jan 2022 07:00  --------------------------------------------------------  IN:  Total IN: 0 mL    OUT:    Drain (mL): 320 mL    Voided (mL): 600 mL  Total OUT: 920 mL    Total NET: -920 mL

## 2022-01-13 NOTE — PROGRESS NOTE ADULT - SUBJECTIVE AND OBJECTIVE BOX
Subjective: 81 y/o male seen at bedside this am. No acute issues or events overnight. patient without complaints. Wondering when he will be able to go home.     24hrs: pt had IR drainage placed to thoracotomy site collections. Purulent appearing fluid in bag.    Vital Signs:  Vital Signs Last 24 Hrs  T(C): 36.5 (01-13-22 @ 05:35), Max: 36.9 (01-12-22 @ 12:33)  T(F): 97.7 (01-13-22 @ 05:35), Max: 98.4 (01-12-22 @ 12:33)  HR: 75 (01-13-22 @ 05:35) (75 - 90)  BP: 131/50 (01-13-22 @ 05:35) (105/59 - 147/51)  RR: 18 (01-13-22 @ 05:35) (17 - 18)  SpO2: 96% (01-13-22 @ 05:35) (96% - 100%) on (O2)    Pertinent Physical Exam:  Telemetry/Alarms: NSR  General: WN/WD NAD  Neurology: Awake, nonfocal, KEY x 4  Respiratory: CTA No wheezing, rales, rhonchi  CV: RRR  Skin: No Rashes, Hematoma, Ecchymosis  Lymphatic: No Neck, axilla, groin LAD  Psych: Oriented x 3, normal affect  Tubes: Left drain    I&O's Summary    12 Jan 2022 07:01  -  13 Jan 2022 07:00  --------------------------------------------------------  IN: 0 mL / OUT: 920 mL / NET: -920 mL        Relevant labs, radiology and Medications reviewed                        7.5    2.91  )-----------( 131      ( 13 Jan 2022 07:34 )             25.0     01-12    141  |  102  |  17  ----------------------------<  117<H>  4.2   |  26  |  0.96    Ca    8.8      12 Jan 2022 07:32  Phos  2.8     01-12  Mg     2.30     01-12      PT/INR - ( 13 Jan 2022 07:34 )   PT: 14.0 sec;   INR: 1.23 ratio         PTT - ( 13 Jan 2022 07:34 )  PTT:26.6 sec  MEDICATIONS  (STANDING):  heparin   Injectable 5000 Unit(s) SubCutaneous every 8 hours  pantoprazole    Tablet 40 milliGRAM(s) Oral before breakfast  piperacillin/tazobactam IVPB.. 3.375 Gram(s) IV Intermittent every 8 hours  tamsulosin 0.4 milliGRAM(s) Oral at bedtime  vancomycin  IVPB      vancomycin  IVPB 1250 milliGRAM(s) IV Intermittent every 12 hours    MEDICATIONS  (PRN):  acetaminophen     Tablet .. 650 milliGRAM(s) Oral every 6 hours PRN Mild Pain (1 - 3)  hydrOXYzine hydrochloride 50 milliGRAM(s) Oral two times a day PRN Anxiety  oxyCODONE    IR 5 milliGRAM(s) Oral every 4 hours PRN Moderate Pain (4 - 6)  oxyCODONE    IR 10 milliGRAM(s) Oral every 4 hours PRN Severe Pain (7 - 10)      Assessment  Patient is a 71y old  Male with Hx of lung CA s/p left thoracotomy and pneumonecotmy on 6/2015 who presents with a chief complaint of  upper left back tenderness and fluctuance at surgical site    1/13: s/p IR placement of drain to left chest wall collection. F/U cultures, ID cx. Send fluid for triglycerides       PLAN  Neuro: Pain management PRN Oxy  Pulm: Encourage coughing, deep breathing and use of incentive spirometry. Daily CXR. Monitor drainage output, f/u fluid cx. Send for triglycerides. Cont with IV abx. CT scan images reviewed by team. Will need OR for FB possible exploration of chest wall. Will discuss plan with Dr. Sims  Cardio: Monitor telemetry/alarms  GI: Tolerating diet. Continue stool softeners.  Renal: monitor urine output, supplement electrolytes as needed  Vasc: Heparin SC/SCDs for DVT prophylaxis  Heme: Stable H/H.  ID: Afebrile, no WBC, purulent drainage from chest wall abscess. F/U fluid from drain and blood cx. Cx ID. Cont with Vanc and zosyn for now   Therapy: OOB/ambulate  Tubes: Monitor Chest tube output  Disposition: Cont abx, ID cx and FB possibly tomorrow  Discussed with Cardiothoracic Team at AM rounds.

## 2022-01-13 NOTE — PROGRESS NOTE ADULT - ASSESSMENT
Edema   much less   on ankles   normal cardiac function   no evidence of DVT   likely has underlying venous insuffiencey   diuresis as needed     DVT proph  on heparin

## 2022-01-13 NOTE — PROGRESS NOTE ADULT - ASSESSMENT
71 year old Male with L chest wall collection s/p drain placement 1/12    Plan:  continue bedside drainage  monitor output  f/u Cx  CT Chest/tube check when output <10cc/24h

## 2022-01-13 NOTE — PROGRESS NOTE ADULT - SUBJECTIVE AND OBJECTIVE BOX
DATE OF SERVICE: 01-13-22 @ 10:16    Subjective: Patient seen and examined. No new events except as noted.     SUBJECTIVE/ROS:    No chest pain, dyspnea, palpitation, or dizziness.     MEDICATIONS:  MEDICATIONS  (STANDING):  heparin   Injectable 5000 Unit(s) SubCutaneous every 8 hours  pantoprazole    Tablet 40 milliGRAM(s) Oral before breakfast  piperacillin/tazobactam IVPB.. 3.375 Gram(s) IV Intermittent every 8 hours  tamsulosin 0.4 milliGRAM(s) Oral at bedtime  vancomycin  IVPB      vancomycin  IVPB 1250 milliGRAM(s) IV Intermittent every 12 hours      PHYSICAL EXAM:  T(C): 36.5 (01-13-22 @ 10:11), Max: 36.9 (01-12-22 @ 12:33)  HR: 83 (01-13-22 @ 10:11) (75 - 90)  BP: 113/45 (01-13-22 @ 10:11) (105/59 - 147/51)  RR: 17 (01-13-22 @ 10:11) (17 - 18)  SpO2: 100% (01-13-22 @ 10:11) (96% - 100%)  Wt(kg): --  I&O's Summary    12 Jan 2022 07:01  -  13 Jan 2022 07:00  --------------------------------------------------------  IN: 0 mL / OUT: 920 mL / NET: -920 mL    13 Jan 2022 07:01  -  13 Jan 2022 10:16  --------------------------------------------------------  IN: 0 mL / OUT: 350 mL / NET: -350 mL        Weight (kg): 83.915 (01-12 @ 12:33)      JVP: Normal  Neck: supple  Lung: clear   CV: S1 S2 , Murmur:  Abd: soft  Ext: No edema  neuro: Awake / alert  Psych: flat affect  Skin: normal``    LABS/DATA:    CARDIAC MARKERS:                                7.5    2.91  )-----------( 131      ( 13 Jan 2022 07:34 )             25.0     01-13    138  |  100  |  17  ----------------------------<  102<H>  4.1   |  31  |  0.99    Ca    8.5      13 Jan 2022 07:34  Phos  3.5     01-13  Mg     2.40     01-13      proBNP:   Lipid Profile:   HgA1c:   TSH:     TELE:  EKG:

## 2022-01-13 NOTE — CONSULT LETTER
[Dear  ___] : Dear  [unfilled], [Consult Letter:] : I had the pleasure of evaluating your patient, [unfilled]. [( Thank you for referring [unfilled] for consultation for _____ )] : Thank you for referring [unfilled] for consultation for [unfilled] [Please see my note below.] : Please see my note below. [Consult Closing:] : Thank you very much for allowing me to participate in the care of this patient.  If you have any questions, please do not hesitate to contact me. [Sincerely,] : Sincerely, [DrChuck  ___] : Dr. GROVES [DrChuck ___] : Dr. GROVES [FreeTextEntry2] : Dr. Clara Estrella (Onc)\par Dr. Mauro Mckeon (RadOnc)\par Dr. Kendrick Deleon (PCP)  [FreeTextEntry3] : Jan Sims MD \par Attending Surgeon \par Division of Thoracic Surgery \par , Cardiovascular and Thoracic Surgery \par [facility]\par \par

## 2022-01-14 ENCOUNTER — APPOINTMENT (OUTPATIENT)
Dept: THORACIC SURGERY | Facility: HOSPITAL | Age: 72
End: 2022-01-14

## 2022-01-14 LAB
ALBUMIN SERPL ELPH-MCNC: 2.6 G/DL — LOW (ref 3.3–5)
ALP SERPL-CCNC: 47 U/L — SIGNIFICANT CHANGE UP (ref 40–120)
ALT FLD-CCNC: 12 U/L — SIGNIFICANT CHANGE UP (ref 4–41)
ANION GAP SERPL CALC-SCNC: 10 MMOL/L — SIGNIFICANT CHANGE UP (ref 7–14)
APTT BLD: 25.3 SEC — LOW (ref 27–36.3)
AST SERPL-CCNC: 10 U/L — SIGNIFICANT CHANGE UP (ref 4–40)
BASOPHILS # BLD AUTO: 0.02 K/UL — SIGNIFICANT CHANGE UP (ref 0–0.2)
BASOPHILS NFR BLD AUTO: 0.7 % — SIGNIFICANT CHANGE UP (ref 0–2)
BILIRUB SERPL-MCNC: 0.4 MG/DL — SIGNIFICANT CHANGE UP (ref 0.2–1.2)
BLD GP AB SCN SERPL QL: NEGATIVE — SIGNIFICANT CHANGE UP
BUN SERPL-MCNC: 17 MG/DL — SIGNIFICANT CHANGE UP (ref 7–23)
CALCIUM SERPL-MCNC: 8.7 MG/DL — SIGNIFICANT CHANGE UP (ref 8.4–10.5)
CHLORIDE SERPL-SCNC: 100 MMOL/L — SIGNIFICANT CHANGE UP (ref 98–107)
CO2 SERPL-SCNC: 28 MMOL/L — SIGNIFICANT CHANGE UP (ref 22–31)
CREAT SERPL-MCNC: 1.16 MG/DL — SIGNIFICANT CHANGE UP (ref 0.5–1.3)
CRP SERPL-MCNC: 102.6 MG/L — HIGH
EOSINOPHIL # BLD AUTO: 0.02 K/UL — SIGNIFICANT CHANGE UP (ref 0–0.5)
EOSINOPHIL NFR BLD AUTO: 0.7 % — SIGNIFICANT CHANGE UP (ref 0–6)
ERYTHROCYTE [SEDIMENTATION RATE] IN BLOOD: 121 MM/HR — HIGH (ref 1–15)
GLUCOSE SERPL-MCNC: 116 MG/DL — HIGH (ref 70–99)
HCT VFR BLD CALC: 25 % — LOW (ref 39–50)
HGB BLD-MCNC: 7.6 G/DL — LOW (ref 13–17)
IANC: 1.95 K/UL — SIGNIFICANT CHANGE UP (ref 1.5–8.5)
IMM GRANULOCYTES NFR BLD AUTO: 1.3 % — SIGNIFICANT CHANGE UP (ref 0–1.5)
INR BLD: 1.24 RATIO — HIGH (ref 0.88–1.16)
LYMPHOCYTES # BLD AUTO: 0.53 K/UL — LOW (ref 1–3.3)
LYMPHOCYTES # BLD AUTO: 17.3 % — SIGNIFICANT CHANGE UP (ref 13–44)
MAGNESIUM SERPL-MCNC: 2.3 MG/DL — SIGNIFICANT CHANGE UP (ref 1.6–2.6)
MCHC RBC-ENTMCNC: 27.6 PG — SIGNIFICANT CHANGE UP (ref 27–34)
MCHC RBC-ENTMCNC: 30.4 GM/DL — LOW (ref 32–36)
MCV RBC AUTO: 90.9 FL — SIGNIFICANT CHANGE UP (ref 80–100)
MONOCYTES # BLD AUTO: 0.5 K/UL — SIGNIFICANT CHANGE UP (ref 0–0.9)
MONOCYTES NFR BLD AUTO: 16.3 % — HIGH (ref 2–14)
NEUTROPHILS # BLD AUTO: 1.95 K/UL — SIGNIFICANT CHANGE UP (ref 1.8–7.4)
NEUTROPHILS NFR BLD AUTO: 63.7 % — SIGNIFICANT CHANGE UP (ref 43–77)
NRBC # BLD: 0 /100 WBCS — SIGNIFICANT CHANGE UP
NRBC # FLD: 0 K/UL — SIGNIFICANT CHANGE UP
PHOSPHATE SERPL-MCNC: 3.4 MG/DL — SIGNIFICANT CHANGE UP (ref 2.5–4.5)
PLATELET # BLD AUTO: 131 K/UL — LOW (ref 150–400)
POTASSIUM SERPL-MCNC: 4.1 MMOL/L — SIGNIFICANT CHANGE UP (ref 3.5–5.3)
POTASSIUM SERPL-SCNC: 4.1 MMOL/L — SIGNIFICANT CHANGE UP (ref 3.5–5.3)
PROT SERPL-MCNC: 6.6 G/DL — SIGNIFICANT CHANGE UP (ref 6–8.3)
PROTHROM AB SERPL-ACNC: 14 SEC — HIGH (ref 10.6–13.6)
RBC # BLD: 2.75 M/UL — LOW (ref 4.2–5.8)
RBC # FLD: 16.9 % — HIGH (ref 10.3–14.5)
RH IG SCN BLD-IMP: POSITIVE — SIGNIFICANT CHANGE UP
SODIUM SERPL-SCNC: 138 MMOL/L — SIGNIFICANT CHANGE UP (ref 135–145)
WBC # BLD: 3.06 K/UL — LOW (ref 3.8–10.5)
WBC # FLD AUTO: 3.06 K/UL — LOW (ref 3.8–10.5)

## 2022-01-14 PROCEDURE — 31622 DX BRONCHOSCOPE/WASH: CPT

## 2022-01-14 PROCEDURE — 71045 X-RAY EXAM CHEST 1 VIEW: CPT | Mod: 26,77

## 2022-01-14 PROCEDURE — 71045 X-RAY EXAM CHEST 1 VIEW: CPT | Mod: 26

## 2022-01-14 PROCEDURE — 99233 SBSQ HOSP IP/OBS HIGH 50: CPT

## 2022-01-14 RX ORDER — ALBUMIN HUMAN 25 %
250 VIAL (ML) INTRAVENOUS ONCE
Refills: 0 | Status: COMPLETED | OUTPATIENT
Start: 2022-01-14 | End: 2022-01-14

## 2022-01-14 RX ORDER — POLYETHYLENE GLYCOL 3350 17 G/17G
17 POWDER, FOR SOLUTION ORAL DAILY
Refills: 0 | Status: DISCONTINUED | OUTPATIENT
Start: 2022-01-14 | End: 2022-01-19

## 2022-01-14 RX ORDER — SENNA PLUS 8.6 MG/1
2 TABLET ORAL AT BEDTIME
Refills: 0 | Status: DISCONTINUED | OUTPATIENT
Start: 2022-01-14 | End: 2022-01-19

## 2022-01-14 RX ADMIN — MORPHINE SULFATE 4 MILLIGRAM(S): 50 CAPSULE, EXTENDED RELEASE ORAL at 13:15

## 2022-01-14 RX ADMIN — TAMSULOSIN HYDROCHLORIDE 0.4 MILLIGRAM(S): 0.4 CAPSULE ORAL at 22:22

## 2022-01-14 RX ADMIN — Medication 650 MILLIGRAM(S): at 22:25

## 2022-01-14 RX ADMIN — HEPARIN SODIUM 5000 UNIT(S): 5000 INJECTION INTRAVENOUS; SUBCUTANEOUS at 07:05

## 2022-01-14 RX ADMIN — OXYCODONE HYDROCHLORIDE 10 MILLIGRAM(S): 5 TABLET ORAL at 18:33

## 2022-01-14 RX ADMIN — PANTOPRAZOLE SODIUM 40 MILLIGRAM(S): 20 TABLET, DELAYED RELEASE ORAL at 07:05

## 2022-01-14 RX ADMIN — PIPERACILLIN AND TAZOBACTAM 25 GRAM(S): 4; .5 INJECTION, POWDER, LYOPHILIZED, FOR SOLUTION INTRAVENOUS at 15:21

## 2022-01-14 RX ADMIN — PIPERACILLIN AND TAZOBACTAM 25 GRAM(S): 4; .5 INJECTION, POWDER, LYOPHILIZED, FOR SOLUTION INTRAVENOUS at 05:19

## 2022-01-14 RX ADMIN — SENNA PLUS 2 TABLET(S): 8.6 TABLET ORAL at 22:23

## 2022-01-14 RX ADMIN — OXYCODONE HYDROCHLORIDE 10 MILLIGRAM(S): 5 TABLET ORAL at 19:30

## 2022-01-14 RX ADMIN — Medication 650 MILLIGRAM(S): at 23:25

## 2022-01-14 RX ADMIN — HEPARIN SODIUM 5000 UNIT(S): 5000 INJECTION INTRAVENOUS; SUBCUTANEOUS at 15:21

## 2022-01-14 RX ADMIN — Medication 125 MILLILITER(S): at 21:30

## 2022-01-14 RX ADMIN — HEPARIN SODIUM 5000 UNIT(S): 5000 INJECTION INTRAVENOUS; SUBCUTANEOUS at 22:23

## 2022-01-14 NOTE — PROGRESS NOTE ADULT - ASSESSMENT
Edema   much less   on ankles   normal cardiac function   no evidence of DVT   likely has underlying venous insuffiencey   diuresis as needed     DVT proph  on heparin     anemia  consider transfusion   plan as per primary team

## 2022-01-14 NOTE — PROGRESS NOTE ADULT - ASSESSMENT
Pt admitted from thoracic surgery office visit today with complaints of pain at L thoracotomy site.  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 In January of 2017 he completed SBRT to a RUL nodule. He is s/p right VATS, RUL lung wedge resection on 2/23/18, pathology revealed scar with reactive changes consistent with radiation atypia. Pt followed with Dr Sims as an outpatient and at follow up visit today, he c/o lethargy, weight loss and pain at L thoracotomy site and was found to have fluctuant collection.   (10 Dejon 2022 12:33)    HOSP COURSE:    CT chest shows new 3.0 x 12.4 x 10.1 cm lesion centered along the left subscapularis muscle. The lesion extends along the chest wall posterior to and communicates with the pneumonectomy bed, just inferior to the second rib resection and along the posterior left third, fourth, and fifth rib interspaces.    Pt seen by IR, s/p placement of drain, 500cc purulent drainage, sent for cultures.      L posterior chest wall collection:    - Pt with soft tissue abscess in L posterior chest wall, with possible extension into L pneumonectomy bed.    - f/u all culture data.  Thus far growing Strep mitis.  No evidence for MRSA, agree with d/c vanco    - f/u WBC and temp curve.  Mild leukopenia, slightly improved (1/14).  s/p d/c vancomycin.   (?possible drug effect vs from sepsis/infectious process)    - Check blood cultures:  NGTD.  Anticipate eventual PICC line and long term abx.  This was d/w pt at bedside.    -  Cont to monitor drain output.  Further OR plan to be determined.  s/p bronch on 1/14 - no bronchopulmonary fistula.      -  ESR, CRP markers elevated.     Will follow,    Ирина Espinoza  [phone]

## 2022-01-14 NOTE — DIETITIAN INITIAL EVALUATION ADULT. - PERTINENT MEDS FT
MEDICATIONS  (STANDING):  heparin   Injectable 5000 Unit(s) SubCutaneous every 8 hours  pantoprazole    Tablet 40 milliGRAM(s) Oral before breakfast  piperacillin/tazobactam IVPB.. 3.375 Gram(s) IV Intermittent every 8 hours  polyethylene glycol 3350 17 Gram(s) Oral daily  senna 2 Tablet(s) Oral at bedtime  tamsulosin 0.4 milliGRAM(s) Oral at bedtime

## 2022-01-14 NOTE — PROGRESS NOTE ADULT - SUBJECTIVE AND OBJECTIVE BOX
Infectious Diseases progress note:    Subjective: Afebrile.  WBC 3.0 <-- 2.9    ROS:  CONSTITUTIONAL:  No fever, chills, rigors  CARDIOVASCULAR:  No chest pain or palpitations  RESPIRATORY:   No SOB, cough, dyspnea on exertion.  No wheezing  GASTROINTESTINAL:  No abd pain, N/V, diarrhea/constipation  EXTREMITIES:  No swelling or joint pain  GENITOURINARY:  No burning on urination, increased frequency or urgency.  No flank pain  NEUROLOGIC:  No HA, visual disturbances  SKIN: No rashes    Allergies    No Known Allergies    Intolerances        ANTIBIOTICS/RELEVANT:  antimicrobials  piperacillin/tazobactam IVPB.. 3.375 Gram(s) IV Intermittent every 8 hours    immunologic:    OTHER:  acetaminophen     Tablet .. 650 milliGRAM(s) Oral every 6 hours PRN  heparin   Injectable 5000 Unit(s) SubCutaneous every 8 hours  hydrOXYzine hydrochloride 50 milliGRAM(s) Oral two times a day PRN  oxyCODONE    IR 5 milliGRAM(s) Oral every 4 hours PRN  oxyCODONE    IR 10 milliGRAM(s) Oral every 4 hours PRN  pantoprazole    Tablet 40 milliGRAM(s) Oral before breakfast  polyethylene glycol 3350 17 Gram(s) Oral daily  senna 2 Tablet(s) Oral at bedtime  tamsulosin 0.4 milliGRAM(s) Oral at bedtime      Objective:  Vital Signs Last 24 Hrs  T(C): 36.7 (14 Jan 2022 20:00), Max: 36.7 (14 Jan 2022 09:51)  T(F): 98 (14 Jan 2022 20:00), Max: 98 (14 Jan 2022 09:51)  HR: 72 (14 Jan 2022 22:00) (64 - 83)  BP: 118/46 (14 Jan 2022 22:00) (98/43 - 138/87)  BP(mean): 60 (14 Jan 2022 22:00) (53 - 65)  RR: 19 (14 Jan 2022 22:00) (13 - 26)  SpO2: 99% (14 Jan 2022 22:00) (84% - 100%)    PHYSICAL EXAM:  Constitutional:NAD  Eyes:CHUCKY, EOMI  Ear/Nose/Throat: no thrush, mucositis.  Moist mucous membranes	  Neck:no JVD, no lymphadenopathy, supple  Respiratory: L pigtail cath  Cardiovascular: S1S2 RRR, no murmurs  Gastrointestinal:soft, nontender,  nondistended (+) BS  Extremities:no e/e/c  Skin:  no rashes, open wounds or ulcerations        LABS:                        7.6    3.06  )-----------( 131      ( 14 Jan 2022 07:16 )             25.0     01-14    138  |  100  |  17  ----------------------------<  116<H>  4.1   |  28  |  1.16    Ca    8.7      14 Jan 2022 07:16  Phos  3.4     01-14  Mg     2.30     01-14    TPro  6.6  /  Alb  2.6<L>  /  TBili  0.4  /  DBili  x   /  AST  10  /  ALT  12  /  AlkPhos  47  01-14    PT/INR - ( 14 Jan 2022 07:16 )   PT: 14.0 sec;   INR: 1.24 ratio         PTT - ( 14 Jan 2022 07:16 )  PTT:25.3 sec            Vancomycin Level, Trough: 12.4 ug/mL (01-13 @ 19:43)              MICROBIOLOGY:    Culture - Blood (01.13.22 @ 12:36)   Specimen Source: .Blood Blood-Venous   Culture Results:   No growth to date.       Culture - Blood (01.13.22 @ 12:35)   Specimen Source: .Blood Blood-Venous   Culture Results:   No growth to date.     Culture - Fungal, Other (01.12.22 @ 15:10)   Specimen Source: .Other LEFT CHEST WALL   Culture Results:   Testing in progress     Culture - Abscess with Gram Stain (01.12.22 @ 15:10)   Specimen Source: .Abscess LEFT CHEST WALL   Culture Results:   Moderate Streptococcus mitis/oralis group "Susceptibilities not performed"     RADIOLOGY & ADDITIONAL STUDIES:    < from: Xray Chest 1 View- PORTABLE-Urgent (Xray Chest 1 View- PORTABLE-Urgent .) (01.14.22 @ 13:36) >    IMPRESSION:  Redemonstrated changes related to left pneumonectomy.    The right lung is clear.    < end of copied text >

## 2022-01-14 NOTE — DIETITIAN INITIAL EVALUATION ADULT. - OTHER INFO
72 y/o male with hx lung ca admitted for abscess drainage. Pt is NPO s/p procedure. Receiving IVPB fluids. No GI distress with last BM 1/14. Pt followed a regular diet PTA. NKFA. No issues chewing/swallowing. Food preferences taken and menu alternatives discussed. Suggest resuming diet when medically feasible. Since pt had fair oral intake prior to NPO status, suggest Ensure Enlive 2x daily (700 danisha and 40 gm protein) with ordered diet. RDN services to remain available as needed.

## 2022-01-14 NOTE — PROGRESS NOTE ADULT - SUBJECTIVE AND OBJECTIVE BOX
CHIEF COMPLAINT: FOLLOW UP IN ICU FOR PATIENT WITH CHEST WALL ABSCESS AND POSSIBLE EMPYEMA      PROCEDURES:   - IR drainage of L posterior chest wall abscess 1/12/2022    ISSUES:   Chest wall abscess  Hx of lung cancer s/p neoadjuvant chemo and RT and then L pneumonectomy (6/2015) and SBRT to RUL nodule (2017) and then RUL wedge resection (2/2018)  BPH  Hydronephrosis      INTERVAL EVENTS:   OR today. Extubated in OR. Transferred to CTICU      HISTORY:   Patient reports moderate pain at chest wall incision sites which is worse with coughing and deep breathing without associated fever or dyspnea. Pain is improved with use of pain meds.     PHYSICAL EXAM:   Gen: Comfortable, No acute distress  Eyes: Sclera white, Conjunctiva normal, Eyelids normal, Pupils symmetrical   ENT: Mucous membranes moist,  ,  ,    Neck: Trachea midline,  ,  ,  ,  ,  ,    CV: Rate regular, Rhythm regular,  ,  ,    Resp: Breath sounds clear, No accessory muscles use, R chest tube in place,  ,    Abd: Soft, Non-distended, Non-tender, Bowel sounds normal,  ,  ,    Skin: Warm, No peripheral edema of lower extremities,  ,    : No carroll  Neuro: Moving all 4 extremities,    Psych: A&Ox3      ASSESSMENT AND PLAN:     NEURO:  Post-operative Pain - Stable. Pain control with oxycodone PO          RESPIRATORY:  Hypoxia - Wean nasal cannula for goal O2sat above 92. Obtain CXR. Incentive spirometry. Chest PT and frequent suctioning. Continue bronchodilators. OOB to chair & ambulate w/ assistance. Continuous pulse oximetry for support & to prevent decompensation.       Drain - Monitor bulb output.       CARDIOVASCULAR:  Hemodynamically stable - Not on pressors. Continue hemodynamic monitoring.  Telemetry (medical test) - Reviewed by me today independently. Normal sinus rhythm.          RENAL:  Stable - Monitor IOs and electrolytes. Keep K above 4.0 and Mg above 2.0.      BPH - stable. continue tamsulosin.         GASTROINTESTINAL:  GI prophylaxis not indicated  Zofran and Reglan IV PRN for nausea  Regular consistency diet        HEMATOLOGIC:  No signs of active bleeding. Monitor Hgb in CBC in AM  DVT prophylaxis with heparin subQ and SCDs.        INFECTIOUS DISEASE:  All surgical sites appear clean. Will monitor for fever and leukocytosis.  Chest wall abscess s/p IR drainage and possible Empyema with Strep Mitis - Stable. Zosyn IV. ID consult            ENDOCRINE:  Stable – Monitor glucose fingersticks for goal 120-180.     Pertinent clinical, laboratory, radiographic, hemodynamic, echocardiographic, respiratory data, microbiologic data and chart were reviewed by myself and analyzed frequently throughout the course of the day and night by myself.    Plan discussed at length with the CTICU staff and Attending CT Surgeon -   Dr Jan Sims.      Patient's status was discussed with patient at bedside.           ________________________________________________    _________________________  VITAL SIGNS:  Vital Signs Last 24 Hrs  T(C): 36.6 (14 Jan 2022 11:06), Max: 36.9 (13 Jan 2022 21:30)  T(F): 97.8 (14 Jan 2022 11:06), Max: 98.4 (13 Jan 2022 21:30)  HR: 78 (14 Jan 2022 11:06) (64 - 88)  BP: 104/47 (14 Jan 2022 11:06) (100/58 - 138/87)  BP(mean): --  RR: 18 (14 Jan 2022 11:06) (17 - 18)  SpO2: 100% (14 Jan 2022 11:06) (95% - 100%)  I/Os:   I&O's Detail    13 Jan 2022 07:01  -  14 Jan 2022 07:00  --------------------------------------------------------  IN:  Total IN: 0 mL    OUT:    Drain (mL): 425 mL    Voided (mL): 700 mL  Total OUT: 1125 mL    Total NET: -1125 mL      14 Jan 2022 07:01  -  14 Jan 2022 13:09  --------------------------------------------------------  IN:  Total IN: 0 mL    OUT:    Drain (mL): 80 mL    Voided (mL): 300 mL  Total OUT: 380 mL    Total NET: -380 mL              MEDICATIONS:  MEDICATIONS  (STANDING):  heparin   Injectable 5000 Unit(s) SubCutaneous every 8 hours  pantoprazole    Tablet 40 milliGRAM(s) Oral before breakfast  piperacillin/tazobactam IVPB.. 3.375 Gram(s) IV Intermittent every 8 hours  polyethylene glycol 3350 17 Gram(s) Oral daily  senna 2 Tablet(s) Oral at bedtime  tamsulosin 0.4 milliGRAM(s) Oral at bedtime    MEDICATIONS  (PRN):  acetaminophen     Tablet .. 650 milliGRAM(s) Oral every 6 hours PRN Mild Pain (1 - 3)  hydrOXYzine hydrochloride 50 milliGRAM(s) Oral two times a day PRN Anxiety  oxyCODONE    IR 5 milliGRAM(s) Oral every 4 hours PRN Moderate Pain (4 - 6)  oxyCODONE    IR 10 milliGRAM(s) Oral every 4 hours PRN Severe Pain (7 - 10)      LABS:  Laboratory data was independently reviewed by me today.                           7.6    3.06  )-----------( 131      ( 14 Jan 2022 07:16 )             25.0     01-14    138  |  100  |  17  ----------------------------<  116<H>  4.1   |  28  |  1.16    Ca    8.7      14 Jan 2022 07:16  Phos  3.4     01-14  Mg     2.30     01-14    TPro  6.6  /  Alb  2.6<L>  /  TBili  0.4  /  DBili  x   /  AST  10  /  ALT  12  /  AlkPhos  47  01-14    LIVER FUNCTIONS - ( 14 Jan 2022 07:16 )  Alb: 2.6 g/dL / Pro: 6.6 g/dL / ALK PHOS: 47 U/L / ALT: 12 U/L / AST: 10 U/L / GGT: x           PT/INR - ( 14 Jan 2022 07:16 )   PT: 14.0 sec;   INR: 1.24 ratio         PTT - ( 14 Jan 2022 07:16 )  PTT:25.3 sec        RADIOLOGY:   Radiology images were independently reviewed by me today. Reports were reviewed by me today.    Xray Chest 1 View- PORTABLE-Routine:   ACC: [id number] EXAM:  XR CHEST PORTABLE ROUTINE 1V                          PROCEDURE DATE:  01/13/2022          INTERPRETATION:  Chest one view    HISTORY: Postop pneumonectomy and extrathoracic catheter placement    COMPARISON STUDY: 1/12/2022    Frontal expiratory view of the chest shows clear right lung with left   hydrothorax. Pigtail catheter tip projects lateral to the chest wall.   There is no pneumothorax.    IMPRESSION:  Left chest findings as noted.        Thank you for the courtesy of this referral.    --- End of Report ---            PETER LUQUE MD; Attending Interventional Radiologist  This document has been electronically signed. Jan 14 2022 12:55PM (01-13-22 @ 08:00)  Xray Chest 1 View- PORTABLE-Routine:   ACC: [id number] EXAM:  XR CHEST PORTABLE ROUTINE 1V                          PROCEDURE DATE:  01/12/2022          INTERPRETATION:  Clinical history: 71-year-old male, status post   pneumonectomy.    Portable view of the chest is correlated with the chest CT of 1/10/2022.    FINDINGS: Complete opacification of the left hemithorax with ipsilateral   shift of the mediastinum, secondary to pneumonectomy.    Normal pulmonary vasculature in the right hemithorax with no effusion,   consolidation or acute osseous finding 1/10/2022.    IMPRESSION:  Complete opacification of the left hemithorax with ipsilateral shift of   the mediastinum, unchanged      --- End of Report ---            RANDAL IYER DO; Attending Radiologist  This document has been electronically signed. Jan 14 2022  6:59AM (01-12-22 @ 06:29)  Xray Chest 1 View- PORTABLE-Urgent:   ACC: [id number] EXAM:  XR CHEST PORTABLE URGENT 1V                          PROCEDURE DATE:  01/10/2022          INTERPRETATION:  EXAMINATION: XR CHEST URGENT    CLINICAL INDICATION: SOB    TECHNIQUE: Single frontal, portable view of the chest was obtained.    COMPARISON: Chest x-ray 11/15/2021.    FINDINGS:  Heart size cannot be assessed in this projection.  Redemonstrated left lung whiteout with volume loss and surgical clips   overlying the left hemithorax. The right lung is clear.  There is no pneumothorax or pleural effusion.  There are no acute osseous abnormalities.    IMPRESSION:  Left lung whiteout consistent with history of left pneumonectomy.    The right lung is clear.    --- End of Report ---          LEXUS FUENTES MD; Resident Radiologist  This document has been electronically signed.  MAI COOK MD; Attending Radiologist  This document has been electronically signed. Dejon 10 2022  6:28PM (01-10-22 @ 14:13)

## 2022-01-14 NOTE — PROGRESS NOTE ADULT - SUBJECTIVE AND OBJECTIVE BOX
DATE OF SERVICE: 01-14-22 @ 11:19    Subjective: Patient seen and examined. No new events except as noted.     SUBJECTIVE/ROS:  feels ok       MEDICATIONS:  MEDICATIONS  (STANDING):  heparin   Injectable 5000 Unit(s) SubCutaneous every 8 hours  pantoprazole    Tablet 40 milliGRAM(s) Oral before breakfast  piperacillin/tazobactam IVPB.. 3.375 Gram(s) IV Intermittent every 8 hours  tamsulosin 0.4 milliGRAM(s) Oral at bedtime      PHYSICAL EXAM:  T(C): 36.6 (01-14-22 @ 11:06), Max: 36.9 (01-13-22 @ 21:30)  HR: 78 (01-14-22 @ 11:06) (64 - 88)  BP: 104/47 (01-14-22 @ 11:06) (100/58 - 138/87)  RR: 18 (01-14-22 @ 11:06) (17 - 18)  SpO2: 100% (01-14-22 @ 11:06) (95% - 100%)  Wt(kg): --  I&O's Summary    13 Jan 2022 07:01  -  14 Jan 2022 07:00  --------------------------------------------------------  IN: 0 mL / OUT: 1125 mL / NET: -1125 mL    14 Jan 2022 07:01  -  14 Jan 2022 11:19  --------------------------------------------------------  IN: 0 mL / OUT: 380 mL / NET: -380 mL            JVP: Normal  Neck: supple  Lung: clear   CV: S1 S2 , Murmur:  Abd: soft  Ext: No edema  neuro: Awake / alert  Psych: flat affect  Skin: normal``    LABS/DATA:    CARDIAC MARKERS:                                7.6    3.06  )-----------( 131      ( 14 Jan 2022 07:16 )             25.0     01-14    138  |  100  |  17  ----------------------------<  116<H>  4.1   |  28  |  1.16    Ca    8.7      14 Jan 2022 07:16  Phos  3.4     01-14  Mg     2.30     01-14    TPro  6.6  /  Alb  2.6<L>  /  TBili  0.4  /  DBili  x   /  AST  10  /  ALT  12  /  AlkPhos  47  01-14    proBNP:   Lipid Profile:   HgA1c:   TSH:     TELE:  EKG:

## 2022-01-15 LAB
ALBUMIN SERPL ELPH-MCNC: 2.5 G/DL — LOW (ref 3.3–5)
ALP SERPL-CCNC: 39 U/L — LOW (ref 40–120)
ALT FLD-CCNC: 7 U/L — SIGNIFICANT CHANGE UP (ref 4–41)
ANION GAP SERPL CALC-SCNC: 10 MMOL/L — SIGNIFICANT CHANGE UP (ref 7–14)
AST SERPL-CCNC: 21 U/L — SIGNIFICANT CHANGE UP (ref 4–40)
BASOPHILS # BLD AUTO: 0.02 K/UL — SIGNIFICANT CHANGE UP (ref 0–0.2)
BASOPHILS NFR BLD AUTO: 0.8 % — SIGNIFICANT CHANGE UP (ref 0–2)
BILIRUB DIRECT SERPL-MCNC: <0.2 MG/DL — SIGNIFICANT CHANGE UP (ref 0–0.3)
BILIRUB INDIRECT FLD-MCNC: >0.2 MG/DL — SIGNIFICANT CHANGE UP (ref 0–1)
BILIRUB SERPL-MCNC: 0.4 MG/DL — SIGNIFICANT CHANGE UP (ref 0.2–1.2)
BUN SERPL-MCNC: 14 MG/DL — SIGNIFICANT CHANGE UP (ref 7–23)
CALCIUM SERPL-MCNC: 8 MG/DL — LOW (ref 8.4–10.5)
CHLORIDE SERPL-SCNC: 101 MMOL/L — SIGNIFICANT CHANGE UP (ref 98–107)
CO2 SERPL-SCNC: 29 MMOL/L — SIGNIFICANT CHANGE UP (ref 22–31)
CREAT SERPL-MCNC: 1.09 MG/DL — SIGNIFICANT CHANGE UP (ref 0.5–1.3)
EOSINOPHIL # BLD AUTO: 0.01 K/UL — SIGNIFICANT CHANGE UP (ref 0–0.5)
EOSINOPHIL NFR BLD AUTO: 0.4 % — SIGNIFICANT CHANGE UP (ref 0–6)
FERRITIN SERPL-MCNC: 455 NG/ML — HIGH (ref 30–400)
GLUCOSE SERPL-MCNC: 100 MG/DL — HIGH (ref 70–99)
HCT VFR BLD CALC: 23.4 % — LOW (ref 39–50)
HCT VFR BLD CALC: 23.5 % — LOW (ref 39–50)
HGB BLD-MCNC: 6.8 G/DL — CRITICAL LOW (ref 13–17)
HGB BLD-MCNC: 6.9 G/DL — CRITICAL LOW (ref 13–17)
IANC: 1.3 K/UL — LOW (ref 1.5–8.5)
IMM GRANULOCYTES NFR BLD AUTO: 3.2 % — HIGH (ref 0–1.5)
IRON SATN MFR SERPL: 24 UG/DL — LOW (ref 45–165)
LDH SERPL L TO P-CCNC: 409 U/L — HIGH (ref 135–225)
LYMPHOCYTES # BLD AUTO: 0.59 K/UL — LOW (ref 1–3.3)
LYMPHOCYTES # BLD AUTO: 23.9 % — SIGNIFICANT CHANGE UP (ref 13–44)
MAGNESIUM SERPL-MCNC: 2.3 MG/DL — SIGNIFICANT CHANGE UP (ref 1.6–2.6)
MCHC RBC-ENTMCNC: 26.7 PG — LOW (ref 27–34)
MCHC RBC-ENTMCNC: 27.9 PG — SIGNIFICANT CHANGE UP (ref 27–34)
MCHC RBC-ENTMCNC: 29.1 GM/DL — LOW (ref 32–36)
MCHC RBC-ENTMCNC: 29.4 GM/DL — LOW (ref 32–36)
MCV RBC AUTO: 91.1 FL — SIGNIFICANT CHANGE UP (ref 80–100)
MCV RBC AUTO: 95.9 FL — SIGNIFICANT CHANGE UP (ref 80–100)
MONOCYTES # BLD AUTO: 0.47 K/UL — SIGNIFICANT CHANGE UP (ref 0–0.9)
MONOCYTES NFR BLD AUTO: 19 % — HIGH (ref 2–14)
NEUTROPHILS # BLD AUTO: 1.3 K/UL — LOW (ref 1.8–7.4)
NEUTROPHILS NFR BLD AUTO: 52.7 % — SIGNIFICANT CHANGE UP (ref 43–77)
NRBC # BLD: 0 /100 WBCS — SIGNIFICANT CHANGE UP
NRBC # BLD: 0 /100 WBCS — SIGNIFICANT CHANGE UP
NRBC # FLD: 0 K/UL — SIGNIFICANT CHANGE UP
NRBC # FLD: 0 K/UL — SIGNIFICANT CHANGE UP
PHOSPHATE SERPL-MCNC: 3.4 MG/DL — SIGNIFICANT CHANGE UP (ref 2.5–4.5)
PLATELET # BLD AUTO: 113 K/UL — LOW (ref 150–400)
PLATELET # BLD AUTO: 116 K/UL — LOW (ref 150–400)
POTASSIUM SERPL-MCNC: 3.7 MMOL/L — SIGNIFICANT CHANGE UP (ref 3.5–5.3)
POTASSIUM SERPL-SCNC: 3.7 MMOL/L — SIGNIFICANT CHANGE UP (ref 3.5–5.3)
PROT SERPL-MCNC: 6 G/DL — SIGNIFICANT CHANGE UP (ref 6–8.3)
RBC # BLD: 2.44 M/UL — LOW (ref 4.2–5.8)
RBC # BLD: 2.44 M/UL — LOW (ref 4.2–5.8)
RBC # BLD: 2.58 M/UL — LOW (ref 4.2–5.8)
RBC # FLD: 17.1 % — HIGH (ref 10.3–14.5)
RBC # FLD: 17.2 % — HIGH (ref 10.3–14.5)
RETICS #: 83.2 K/UL — SIGNIFICANT CHANGE UP (ref 25–125)
RETICS/RBC NFR: 3.4 % — HIGH (ref 0.5–2.5)
SODIUM SERPL-SCNC: 140 MMOL/L — SIGNIFICANT CHANGE UP (ref 135–145)
WBC # BLD: 2.47 K/UL — LOW (ref 3.8–10.5)
WBC # BLD: 2.98 K/UL — LOW (ref 3.8–10.5)
WBC # FLD AUTO: 2.47 K/UL — LOW (ref 3.8–10.5)
WBC # FLD AUTO: 2.98 K/UL — LOW (ref 3.8–10.5)

## 2022-01-15 PROCEDURE — 71045 X-RAY EXAM CHEST 1 VIEW: CPT | Mod: 26

## 2022-01-15 PROCEDURE — 99232 SBSQ HOSP IP/OBS MODERATE 35: CPT

## 2022-01-15 RX ORDER — POTASSIUM CHLORIDE 20 MEQ
40 PACKET (EA) ORAL ONCE
Refills: 0 | Status: COMPLETED | OUTPATIENT
Start: 2022-01-15 | End: 2022-01-15

## 2022-01-15 RX ADMIN — OXYCODONE HYDROCHLORIDE 5 MILLIGRAM(S): 5 TABLET ORAL at 22:32

## 2022-01-15 RX ADMIN — OXYCODONE HYDROCHLORIDE 5 MILLIGRAM(S): 5 TABLET ORAL at 21:34

## 2022-01-15 RX ADMIN — HEPARIN SODIUM 5000 UNIT(S): 5000 INJECTION INTRAVENOUS; SUBCUTANEOUS at 13:34

## 2022-01-15 RX ADMIN — PANTOPRAZOLE SODIUM 40 MILLIGRAM(S): 20 TABLET, DELAYED RELEASE ORAL at 06:47

## 2022-01-15 RX ADMIN — Medication 40 MILLIEQUIVALENT(S): at 06:48

## 2022-01-15 RX ADMIN — POLYETHYLENE GLYCOL 3350 17 GRAM(S): 17 POWDER, FOR SOLUTION ORAL at 12:45

## 2022-01-15 RX ADMIN — PIPERACILLIN AND TAZOBACTAM 25 GRAM(S): 4; .5 INJECTION, POWDER, LYOPHILIZED, FOR SOLUTION INTRAVENOUS at 12:45

## 2022-01-15 RX ADMIN — OXYCODONE HYDROCHLORIDE 5 MILLIGRAM(S): 5 TABLET ORAL at 09:50

## 2022-01-15 RX ADMIN — PIPERACILLIN AND TAZOBACTAM 25 GRAM(S): 4; .5 INJECTION, POWDER, LYOPHILIZED, FOR SOLUTION INTRAVENOUS at 08:16

## 2022-01-15 RX ADMIN — HEPARIN SODIUM 5000 UNIT(S): 5000 INJECTION INTRAVENOUS; SUBCUTANEOUS at 21:34

## 2022-01-15 RX ADMIN — OXYCODONE HYDROCHLORIDE 5 MILLIGRAM(S): 5 TABLET ORAL at 09:20

## 2022-01-15 RX ADMIN — PIPERACILLIN AND TAZOBACTAM 25 GRAM(S): 4; .5 INJECTION, POWDER, LYOPHILIZED, FOR SOLUTION INTRAVENOUS at 20:18

## 2022-01-15 RX ADMIN — TAMSULOSIN HYDROCHLORIDE 0.4 MILLIGRAM(S): 0.4 CAPSULE ORAL at 21:35

## 2022-01-15 RX ADMIN — HEPARIN SODIUM 5000 UNIT(S): 5000 INJECTION INTRAVENOUS; SUBCUTANEOUS at 06:47

## 2022-01-15 RX ADMIN — PIPERACILLIN AND TAZOBACTAM 25 GRAM(S): 4; .5 INJECTION, POWDER, LYOPHILIZED, FOR SOLUTION INTRAVENOUS at 00:10

## 2022-01-15 NOTE — PROGRESS NOTE ADULT - SUBJECTIVE AND OBJECTIVE BOX
ALANNA JADE      71y   Male   [id number]         No Known Allergies             Daily     Daily Drug Dosing Weight  Height (cm): 181 (10 Dejon 2022 11:28)  Weight (kg): 83.915 (12 Jan 2022 12:33)  BMI (kg/m2): 25.6 (12 Jan 2022 12:33)  BSA (m2): 2.05 (12 Jan 2022 12:33)    HPI:  Pt admitted from thoracic surgery office visit today with complaints of pain at L thoracotomy site.  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 In January of 2017 he completed SBRT to a RUL nodule. He is s/p right VATS, RUL lung wedge resection on 2/23/18, pathology revealed scar with reactive changes consistent with radiation atypia. Pt followed with Dr Sims as an outpatient and at follow up visit today, he c/o lethargy, weight loss and pain at L thoracotomy site and was found to have fluctuant collection.    (10 Dejon 2022 12:33)    Procedure: IR drainage of L posterior chest wall abscess 1/12/2022                       Issues:  Chest wall abscess  Hx of lung cancer s/p neoadjuvant chemo and RT and then L pneumonectomy (6/2015) and SBRT to RUL nodule (2017) and then RUL wedge resection (2/2018)  BPH  Hydronephrosis              Home Medications:  acetaminophen 325 mg oral tablet: 2 tab(s) orally every 6 hours, As needed, Mild Pain (1 - 3) (25 Feb 2018 14:36)  CoQ 10 100 mg orally  daily  last dose 2/16: May resume (25 Feb 2018 14:36)  docusate sodium 100 mg oral capsule: 1 cap(s) orally 3 times a day (25 Feb 2018 14:36)  tamsulosin 0.4 mg oral capsule: 1 cap(s) orally 2x day (23 Feb 2018 08:40)    PAST MEDICAL & SURGICAL HISTORY:  BPH (benign prostatic hypertrophy)    Nephrolithiasis    Hydronephrosis    Lung cancer  Dx&#x27;d 2015 treated with neoadjuvant chemo/RT followed by pneumonectomy (June 2015), SBRT to RUL nodule in Jan 2017    Lung nodule    H/O lithotripsy    S/P tonsillectomy    H/O pneumonectomy  Left June 2015    S/P removal of lung  RUL lung wedge resection on 2/23/18      Vital Signs Last 24 Hrs  T(C): 36.7 (15 Dejon 2022 08:00), Max: 36.7 (14 Jan 2022 20:00)  T(F): 98 (15 Dejon 2022 08:00), Max: 98 (14 Jan 2022 20:00)  HR: 67 (15 Dejon 2022 10:00) (57 - 83)  BP: 102/44 (15 Dejon 2022 10:00) (93/36 - 138/46)  BP(mean): 58 (15 Dejon 2022 10:00) (50 - 73)  RR: 20 (15 Dejon 2022 10:00) (13 - 28)  SpO2: 97% (15 Dejon 2022 10:00) (84% - 100%)  I&O's Detail    14 Jan 2022 07:01  -  15 Dejon 2022 07:00  --------------------------------------------------------  IN:    Albumin 5%  - 250 mL: 250 mL    IV PiggyBack: 100 mL  Total IN: 350 mL    OUT:    Drain (mL): 165 mL    Voided (mL): 1300 mL  Total OUT: 1465 mL    Total NET: -1115 mL    CAPILLARY BLOOD GLUCOSE    Home Medications:  acetaminophen 325 mg oral tablet: 2 tab(s) orally every 6 hours, As needed, Mild Pain (1 - 3) (25 Feb 2018 14:36)  CoQ 10 100 mg orally  daily  last dose 2/16: May resume (25 Feb 2018 14:36)  docusate sodium 100 mg oral capsule: 1 cap(s) orally 3 times a day (25 Feb 2018 14:36)  tamsulosin 0.4 mg oral capsule: 1 cap(s) orally 2x day (23 Feb 2018 08:40)    MEDICATIONS  (STANDING):  heparin   Injectable 5000 Unit(s) SubCutaneous every 8 hours  pantoprazole    Tablet 40 milliGRAM(s) Oral before breakfast  piperacillin/tazobactam IVPB.. 3.375 Gram(s) IV Intermittent every 8 hours  polyethylene glycol 3350 17 Gram(s) Oral daily  senna 2 Tablet(s) Oral at bedtime  tamsulosin 0.4 milliGRAM(s) Oral at bedtime    MEDICATIONS  (PRN):  acetaminophen     Tablet .. 650 milliGRAM(s) Oral every 6 hours PRN Mild Pain (1 - 3)  hydrOXYzine hydrochloride 50 milliGRAM(s) Oral two times a day PRN Anxiety  oxyCODONE    IR 5 milliGRAM(s) Oral every 4 hours PRN Moderate Pain (4 - 6)  oxyCODONE    IR 10 milliGRAM(s) Oral every 4 hours PRN Severe Pain (7 - 10)        Physical exam:                             General:               Pt is awake, alert,  complaining of pain around catheter site                                               Neuro:                  Nonfocal                             Cardiovascular:   S1 & S2, regular                          Respiratory:         Air entry is fair on the right side                           GI:                          Soft, nondistended and nontender, Bowel sounds active                            Ext:                        No cyanosis or edema     Labs:                                                                           6.9    2.47  )-----------( 116      ( 15 Dejon 2022 04:28 )             23.5             01-15    140  |  101  |  14  ----------------------------<  100<H>  3.7   |  29  |  1.09    Ca    8.0<L>      15 Dejon 2022 04:28  Phos  3.4     01-15  Mg     2.30     01-15    TPro  6.6  /  Alb  2.6<L>  /  TBili  0.4  /  DBili  x   /  AST  10  /  ALT  12  /  AlkPhos  47  01-14                  PT/INR - ( 14 Jan 2022 07:16 )   PT: 14.0 sec;   INR: 1.24 ratio         PTT - ( 14 Jan 2022 07:16 )  PTT:25.3 sec  LIVER FUNCTIONS - ( 14 Jan 2022 07:16 )  Alb: 2.6 g/dL / Pro: 6.6 g/dL / ALK PHOS: 47 U/L / ALT: 12 U/L / AST: 10 U/L / GGT: x               Culture - Blood (collected 13 Jan 2022 12:36)  Source: .Blood Blood-Venous  Preliminary Report (14 Jan 2022 13:01):    No growth to date.    Culture - Blood (collected 13 Jan 2022 12:35)  Source: .Blood Blood-Venous  Preliminary Report (14 Jan 2022 13:01):    No growth to date.    Culture - Fungal, Other (collected 12 Jan 2022 15:10)  Source: .Other LEFT CHEST WALL  Preliminary Report (13 Jan 2022 12:01):    Testing in progress    Culture - Abscess with Gram Stain (collected 12 Jan 2022 15:10)  Source: .Abscess LEFT CHEST WALL  Preliminary Report (13 Jan 2022 15:20):    Moderate Streptococcus mitis/oralis group "Susceptibilities not performed"        CXR:  < from: Xray Chest 1 View- PORTABLE-Urgent (Xray Chest 1 View- PORTABLE-Urgent .) (01.14.22 @ 13:36) >  Left chest pigtail catheter. Surgical clips overlying the left upper   chest. Right apical chain sutures.  The heart is normal in size.  Left lung whiteout related to pneumonectomy. The right lung is clear.  There is no pneumothorax or pleural effusion.  There are no acute osseous abnormalities.    IMPRESSION:  Redemonstrated changes related to left pneumonectomy.    The right lung is clear.      Plan:  General: 71yMale s/p IR drainage of L posterior chest wall abscess 1/12/2022, experiencing  pain with deep breathing.                             Neuro:                                         Pain control with  Tylenol PRN                            Cardiovascular:                                          Continue hemodynamic monitoring.                            Respiratory:                                         Pt is on RA                                         Comfortable, not in any distress.                                         Encourage incentive spirometry                                          Monitor chest wall drain output                                                            Continue bronchodilators, pulmonary toilet                            GI                                         On regular diet as tolerated                                         Continue Zofran / Reglan for nausea - PRN                                         Continue bowel regimen	                                                                 Renal:                                         Continue LR  30cc/hr                                         Monitor I/Os and electrolytes     BPH: Continue Flomax                                                                                        Hem/ Onc:                                         DVT prophylaxis with SQ Heparin and SCDs                                         Monitor chest tube output &  signs of bleeding.                                          Follow CBC in AM                           Infectious disease:     Chest tube abscess: Growing Strep mitis, Continue Zosyn     Has leukopenia - Consider switching Abs                                         Monitor for fever                                          All surgical incision / chest tube  sites look clean                            Endocrine                                            Continue Accu-Checks with coverage       Pertinent clinical, laboratory, radiographic, hemodynamic, echocardiographic, respiratory data, microbiologic data and chart were reviewed and analyzed frequently throughout the course of the day and night  Patient seen, examined and plan discussed with CT Surgeon Dr. Sims / CTICU team during rounds.    OOB to chair and ambulate as tolerated.     Status discussed with patient /  patient's family and updated plan of care    I have spent 40 minutes with this patient including 20 minutes of time coordinating care, updating patient family.        Vincent Eduardo MD

## 2022-01-15 NOTE — PROGRESS NOTE ADULT - SUBJECTIVE AND OBJECTIVE BOX
DATE OF SERVICE: 01-15-22 @ 13:15    Subjective: Patient seen and examined. No new events except as noted.     SUBJECTIVE/ROS:  feels ok   No chest pain, dyspnea, palpitation, or dizziness.       MEDICATIONS:  MEDICATIONS  (STANDING):  heparin   Injectable 5000 Unit(s) SubCutaneous every 8 hours  pantoprazole    Tablet 40 milliGRAM(s) Oral before breakfast  piperacillin/tazobactam IVPB.. 3.375 Gram(s) IV Intermittent every 8 hours  polyethylene glycol 3350 17 Gram(s) Oral daily  senna 2 Tablet(s) Oral at bedtime  tamsulosin 0.4 milliGRAM(s) Oral at bedtime      PHYSICAL EXAM:  T(C): 36.4 (01-15-22 @ 12:00), Max: 36.7 (01-14-22 @ 20:00)  HR: 76 (01-15-22 @ 13:00) (57 - 82)  BP: 143/50 (01-15-22 @ 13:00) (93/36 - 143/50)  RR: 24 (01-15-22 @ 13:00) (17 - 28)  SpO2: 100% (01-15-22 @ 13:00) (96% - 100%)  Wt(kg): --  I&O's Summary    14 Jan 2022 07:01  -  15 Dejon 2022 07:00  --------------------------------------------------------  IN: 350 mL / OUT: 1465 mL / NET: -1115 mL    15 Dejon 2022 07:01  -  15 Jan 2022 13:15  --------------------------------------------------------  IN: 300 mL / OUT: 0 mL / NET: 300 mL            JVP: Normal  Neck: supple  Lung: clear   CV: S1 S2 , Murmur:  Abd: soft  Ext: No edema  neuro: Awake / alert  Psych: flat affect  Skin: normal``    LABS/DATA:    CARDIAC MARKERS:                                6.8    2.98  )-----------( 113      ( 15 Dejon 2022 09:57 )             23.4     01-15    140  |  101  |  14  ----------------------------<  100<H>  3.7   |  29  |  1.09    Ca    8.0<L>      15 Dejon 2022 04:28  Phos  3.4     01-15  Mg     2.30     01-15    TPro  6.0  /  Alb  2.5<L>  /  TBili  0.4  /  DBili  <0.2  /  AST  21  /  ALT  7   /  AlkPhos  39<L>  01-15    proBNP:   Lipid Profile:   HgA1c:   TSH:     TELE:  EKG:

## 2022-01-15 NOTE — PROGRESS NOTE ADULT - ASSESSMENT
Edema   much less   on ankles   normal cardiac function   no evidence of DVT   likely has underlying venous insuffiencey   diuresis as needed     DVT proph  on heparin     anemia  transfusion recommended

## 2022-01-16 LAB
ANION GAP SERPL CALC-SCNC: 8 MMOL/L — SIGNIFICANT CHANGE UP (ref 7–14)
BUN SERPL-MCNC: 11 MG/DL — SIGNIFICANT CHANGE UP (ref 7–23)
CALCIUM SERPL-MCNC: 8.2 MG/DL — LOW (ref 8.4–10.5)
CHLORIDE SERPL-SCNC: 104 MMOL/L — SIGNIFICANT CHANGE UP (ref 98–107)
CO2 SERPL-SCNC: 28 MMOL/L — SIGNIFICANT CHANGE UP (ref 22–31)
CREAT SERPL-MCNC: 1.15 MG/DL — SIGNIFICANT CHANGE UP (ref 0.5–1.3)
GLUCOSE SERPL-MCNC: 120 MG/DL — HIGH (ref 70–99)
HCT VFR BLD CALC: 27.3 % — LOW (ref 39–50)
HGB BLD-MCNC: 8.3 G/DL — LOW (ref 13–17)
MAGNESIUM SERPL-MCNC: 2.4 MG/DL — SIGNIFICANT CHANGE UP (ref 1.6–2.6)
MCHC RBC-ENTMCNC: 27.8 PG — SIGNIFICANT CHANGE UP (ref 27–34)
MCHC RBC-ENTMCNC: 30.4 GM/DL — LOW (ref 32–36)
MCV RBC AUTO: 91.3 FL — SIGNIFICANT CHANGE UP (ref 80–100)
NRBC # BLD: 0 /100 WBCS — SIGNIFICANT CHANGE UP
NRBC # FLD: 0 K/UL — SIGNIFICANT CHANGE UP
PHOSPHATE SERPL-MCNC: 2.5 MG/DL — SIGNIFICANT CHANGE UP (ref 2.5–4.5)
PLATELET # BLD AUTO: 113 K/UL — LOW (ref 150–400)
POTASSIUM SERPL-MCNC: 3.9 MMOL/L — SIGNIFICANT CHANGE UP (ref 3.5–5.3)
POTASSIUM SERPL-SCNC: 3.9 MMOL/L — SIGNIFICANT CHANGE UP (ref 3.5–5.3)
RBC # BLD: 2.99 M/UL — LOW (ref 4.2–5.8)
RBC # FLD: 17.3 % — HIGH (ref 10.3–14.5)
SODIUM SERPL-SCNC: 140 MMOL/L — SIGNIFICANT CHANGE UP (ref 135–145)
WBC # BLD: 2.81 K/UL — LOW (ref 3.8–10.5)
WBC # FLD AUTO: 2.81 K/UL — LOW (ref 3.8–10.5)

## 2022-01-16 PROCEDURE — 71045 X-RAY EXAM CHEST 1 VIEW: CPT | Mod: 26

## 2022-01-16 PROCEDURE — 71260 CT THORAX DX C+: CPT | Mod: 26

## 2022-01-16 PROCEDURE — 99233 SBSQ HOSP IP/OBS HIGH 50: CPT

## 2022-01-16 RX ORDER — POTASSIUM CHLORIDE 20 MEQ
20 PACKET (EA) ORAL DAILY
Refills: 0 | Status: DISCONTINUED | OUTPATIENT
Start: 2022-01-16 | End: 2022-01-16

## 2022-01-16 RX ORDER — OXYCODONE HYDROCHLORIDE 5 MG/1
5 TABLET ORAL EVERY 4 HOURS
Refills: 0 | Status: DISCONTINUED | OUTPATIENT
Start: 2022-01-16 | End: 2022-01-19

## 2022-01-16 RX ORDER — OXYCODONE HYDROCHLORIDE 5 MG/1
10 TABLET ORAL EVERY 4 HOURS
Refills: 0 | Status: DISCONTINUED | OUTPATIENT
Start: 2022-01-16 | End: 2022-01-19

## 2022-01-16 RX ORDER — CEFTRIAXONE 500 MG/1
2 INJECTION, POWDER, FOR SOLUTION INTRAMUSCULAR; INTRAVENOUS EVERY 24 HOURS
Refills: 0 | Status: DISCONTINUED | OUTPATIENT
Start: 2022-01-16 | End: 2022-01-19

## 2022-01-16 RX ORDER — CEFTRIAXONE 500 MG/1
INJECTION, POWDER, FOR SOLUTION INTRAMUSCULAR; INTRAVENOUS
Refills: 0 | Status: DISCONTINUED | OUTPATIENT
Start: 2022-01-16 | End: 2022-01-16

## 2022-01-16 RX ORDER — CEFTRIAXONE 500 MG/1
2 INJECTION, POWDER, FOR SOLUTION INTRAMUSCULAR; INTRAVENOUS ONCE
Refills: 0 | Status: DISCONTINUED | OUTPATIENT
Start: 2022-01-16 | End: 2022-01-16

## 2022-01-16 RX ORDER — SODIUM CHLORIDE 9 MG/ML
500 INJECTION, SOLUTION INTRAVENOUS ONCE
Refills: 0 | Status: COMPLETED | OUTPATIENT
Start: 2022-01-16 | End: 2022-01-16

## 2022-01-16 RX ADMIN — OXYCODONE HYDROCHLORIDE 5 MILLIGRAM(S): 5 TABLET ORAL at 22:00

## 2022-01-16 RX ADMIN — Medication 650 MILLIGRAM(S): at 09:21

## 2022-01-16 RX ADMIN — SODIUM CHLORIDE 250 MILLILITER(S): 9 INJECTION, SOLUTION INTRAVENOUS at 09:11

## 2022-01-16 RX ADMIN — HEPARIN SODIUM 5000 UNIT(S): 5000 INJECTION INTRAVENOUS; SUBCUTANEOUS at 22:00

## 2022-01-16 RX ADMIN — PIPERACILLIN AND TAZOBACTAM 25 GRAM(S): 4; .5 INJECTION, POWDER, LYOPHILIZED, FOR SOLUTION INTRAVENOUS at 05:00

## 2022-01-16 RX ADMIN — Medication 650 MILLIGRAM(S): at 10:03

## 2022-01-16 RX ADMIN — TAMSULOSIN HYDROCHLORIDE 0.4 MILLIGRAM(S): 0.4 CAPSULE ORAL at 22:00

## 2022-01-16 RX ADMIN — HEPARIN SODIUM 5000 UNIT(S): 5000 INJECTION INTRAVENOUS; SUBCUTANEOUS at 13:38

## 2022-01-16 RX ADMIN — PANTOPRAZOLE SODIUM 40 MILLIGRAM(S): 20 TABLET, DELAYED RELEASE ORAL at 06:23

## 2022-01-16 RX ADMIN — OXYCODONE HYDROCHLORIDE 5 MILLIGRAM(S): 5 TABLET ORAL at 23:00

## 2022-01-16 RX ADMIN — HEPARIN SODIUM 5000 UNIT(S): 5000 INJECTION INTRAVENOUS; SUBCUTANEOUS at 05:21

## 2022-01-16 RX ADMIN — CEFTRIAXONE 100 MILLIGRAM(S): 500 INJECTION, POWDER, FOR SOLUTION INTRAMUSCULAR; INTRAVENOUS at 09:11

## 2022-01-16 NOTE — PROGRESS NOTE ADULT - SUBJECTIVE AND OBJECTIVE BOX
CHIEF COMPLAINT: FOLLOW UP IN ICU FOR PATIENT WITH CHEST WALL ABSCESS AND POSSIBLE EMPYEMA      PROCEDURES:   - IR drainage of L posterior chest wall abscess 1/12/2022    ISSUES:   Chest wall abscess  Hx of lung cancer s/p neoadjuvant chemo and RT and then L pneumonectomy (6/2015) and SBRT to RUL nodule (2017) and then RUL wedge resection (2/2018)  BPH  Hydronephrosis      INTERVAL EVENTS:   Abx changed to ceftriaxone.  Awaiting CT chest.      HISTORY:   Patient reports moderate pain at chest wall incision sites which is worse with coughing and deep breathing without associated fever or dyspnea. Pain is improved with use of pain meds.     PHYSICAL EXAM:   Gen: Comfortable, No acute distress  Eyes: Sclera white, Conjunctiva normal, Eyelids normal, Pupils symmetrical   ENT: Mucous membranes moist,  ,  ,    Neck: Trachea midline,  ,  ,  ,  ,  ,    CV: Rate regular, Rhythm regular,  ,  ,    Resp: Breath sounds clear, No accessory muscles use, R chest tube in place,  ,    Abd: Soft, Non-distended, Non-tender, Bowel sounds normal,  ,  ,    Skin: Warm, No peripheral edema of lower extremities,  ,    : No carroll  Neuro: Moving all 4 extremities,    Psych: A&Ox3      ASSESSMENT AND PLAN:     NEURO:  Post-operative Pain - Stable. Pain control with oxycodone PO          RESPIRATORY:  Hypoxia - Wean nasal cannula for goal O2sat above 92. Obtain CXR. Incentive spirometry. Chest PT and frequent suctioning. Continue bronchodilators. OOB to chair & ambulate w/ assistance. Continuous pulse oximetry for support & to prevent decompensation.       Drain - Monitor bulb output.       CARDIOVASCULAR:  Hemodynamically stable - Not on pressors. Continue hemodynamic monitoring.  Telemetry (medical test) - Reviewed by me today independently. Normal sinus rhythm.          RENAL:  Stable - Monitor IOs and electrolytes. Keep K above 4.0 and Mg above 2.0.      BPH - stable. continue tamsulosin.         GASTROINTESTINAL:  GI prophylaxis not indicated  Zofran and Reglan IV PRN for nausea  Regular consistency diet        HEMATOLOGIC:  No signs of active bleeding. Monitor Hgb in CBC in AM  DVT prophylaxis with heparin subQ and SCDs.        INFECTIOUS DISEASE:  All surgical sites appear clean. Will monitor for fever and leukocytosis.    Chest wall abscess s/p IR drainage and possible Empyema with Strep Mitis - Stable. Ceftriaxone IV. ID consult. Obtain CT chest.            ENDOCRINE:  Stable – Monitor glucose fingersticks for goal 120-180.     Pertinent clinical, laboratory, radiographic, hemodynamic, echocardiographic, respiratory data, microbiologic data and chart were reviewed by myself and analyzed frequently throughout the course of the day and night by myself.    Plan discussed at length with the CTICU staff and Attending CT Surgeon -   Dr Jan Sims.      Patient's status was discussed with patient at bedside.    _________________________  VITAL SIGNS:  Vital Signs Last 24 Hrs  T(C): 36.4 (16 Jan 2022 08:00), Max: 36.8 (16 Jan 2022 04:00)  T(F): 97.6 (16 Jan 2022 08:00), Max: 98.2 (16 Jan 2022 04:00)  HR: 69 (16 Jan 2022 10:00) (60 - 80)  BP: 116/52 (16 Jan 2022 10:00) (108/45 - 147/55)  BP(mean): 68 (16 Jan 2022 10:00) (56 - 96)  RR: 25 (16 Jan 2022 10:00) (17 - 28)  SpO2: 100% (16 Jan 2022 10:00) (96% - 100%)  I/Os:   I&O's Detail    15 Dejon 2022 07:01  -  16 Jan 2022 07:00  --------------------------------------------------------  IN:    IV PiggyBack: 200 mL    Oral Fluid: 120 mL    PRBCs (Packed Red Blood Cells): 300 mL  Total IN: 620 mL    OUT:    Drain (mL): 140 mL    Voided (mL): 1400 mL  Total OUT: 1540 mL    Total NET: -920 mL              MEDICATIONS:  MEDICATIONS  (STANDING):  cefTRIAXone   IVPB 2 milliGRAM(s) IV Intermittent every 24 hours  heparin   Injectable 5000 Unit(s) SubCutaneous every 8 hours  pantoprazole    Tablet 40 milliGRAM(s) Oral before breakfast  polyethylene glycol 3350 17 Gram(s) Oral daily  senna 2 Tablet(s) Oral at bedtime  tamsulosin 0.4 milliGRAM(s) Oral at bedtime    MEDICATIONS  (PRN):  acetaminophen     Tablet .. 650 milliGRAM(s) Oral every 6 hours PRN Mild Pain (1 - 3)  hydrOXYzine hydrochloride 50 milliGRAM(s) Oral two times a day PRN Anxiety  oxyCODONE    IR 5 milliGRAM(s) Oral every 4 hours PRN Moderate Pain (4 - 6)  oxyCODONE    IR 10 milliGRAM(s) Oral every 4 hours PRN Severe Pain (7 - 10)      LABS:  Laboratory data was independently reviewed by me today.                           8.3    2.81  )-----------( 113      ( 16 Jan 2022 05:54 )             27.3     01-16    140  |  104  |  11  ----------------------------<  120<H>  3.9   |  28  |  1.15    Ca    8.2<L>      16 Jan 2022 05:54  Phos  2.5     01-16  Mg     2.40     01-16    TPro  6.0  /  Alb  2.5<L>  /  TBili  0.4  /  DBili  <0.2  /  AST  21  /  ALT  7   /  AlkPhos  39<L>  01-15    LIVER FUNCTIONS - ( 15 Dejon 2022 09:57 )  Alb: 2.5 g/dL / Pro: 6.0 g/dL / ALK PHOS: 39 U/L / ALT: 7 U/L / AST: 21 U/L / GGT: x                   RADIOLOGY:   Radiology images were independently reviewed by me today. Reports were reviewed by me today.    Xray Chest 1 View- PORTABLE-Routine:   ACC: [id number] EXAM:  XR CHEST PORTABLE ROUTINE 1V                          PROCEDURE DATE:  01/15/2022          INTERPRETATION:  HISTORY:  Admitting Dxs: R60.9 R60.9; s/p pneumonectomy;  TECHNIQUE: Portable frontal view of the chest, 1 view.  COMPARISON 1/14/2022.  FINDINGS/  IMPRESSION:    The heart is obscured. There is loss of volume on the left with deformity   of the left chest compatible with left pneumonectomy. Pigtail catheter   overlies left chest. Right lung clear.    No significant change.    --- End of Report ---            TABATHA WONG MD; Attending Interventional Radiologist  This document has been electronically signed. Dejon 15 2022 11:14AM (01-15-22 @ 05:03)  Xray Chest 1 View- PORTABLE-Urgent:   ACC: [id number] EXAM:  XR CHEST PORTABLE URGENT 1V                          PROCEDURE DATE:  01/14/2022          INTERPRETATION:  EXAMINATION: XR CHEST URGENT    CLINICAL INDICATION: Cough    TECHNIQUE: Single frontal, portable view of the chest was obtained.    COMPARISON: Chest x-ray 1/14/2022.    FINDINGS:  Left chest pigtail catheter. Surgical clips overlying the left upper   chest. Right apical chain sutures.  The heart is normal in size.  Left lung whiteout related to pneumonectomy. The right lung is clear.  There is no pneumothorax or pleural effusion.  There are no acute osseous abnormalities.    IMPRESSION:  Redemonstrated changes related to left pneumonectomy.    The right lung is clear.    --- End of Report ---          LEXUS FUENTES MD; Resident Radiologist  This document has been electronically signed.  MAI COOK MD; Attending Radiologist  This document has been electronically signed. Jan 14 2022  4:06PM (01-14-22 @ 13:36)  Xray Chest 1 View- PORTABLE-Routine:   ACC: [id number] EXAM:  XR CHEST PORTABLE ROUTINE 1V                          PROCEDURE DATE:  01/14/2022          INTERPRETATION:  Chest one view    HISTORY: Postop pneumonectomy    COMPARISON STUDY: 1/13/2022    Frontal expiratory view of the chest shows similar changes of left   pneumonectomy. Left pigtail catheter is in similar position. The right   lung is clear and mildly hyperinflated. There is no evidence of   pneumothorax or right effusion.    IMPRESSION:  Mild hyperinflation of right lung.        Thank you for the courtesy of this referral.    --- End of Report ---            PETER LUQUE MD; Attending Interventional Radiologist  This document has been electronically signed. Jan 14 2022  3:22PM (01-14-22 @ 06:30)

## 2022-01-16 NOTE — PROGRESS NOTE ADULT - SUBJECTIVE AND OBJECTIVE BOX
DATE OF SERVICE: 01-16-22 @ 11:01    Subjective: Patient seen and examined. No new events except as noted.     SUBJECTIVE/ROS:  Pt seen and examined early this am        MEDICATIONS:  MEDICATIONS  (STANDING):  cefTRIAXone   IVPB 2 milliGRAM(s) IV Intermittent every 24 hours  heparin   Injectable 5000 Unit(s) SubCutaneous every 8 hours  pantoprazole    Tablet 40 milliGRAM(s) Oral before breakfast  polyethylene glycol 3350 17 Gram(s) Oral daily  senna 2 Tablet(s) Oral at bedtime  tamsulosin 0.4 milliGRAM(s) Oral at bedtime      PHYSICAL EXAM:  T(C): 36.4 (01-16-22 @ 08:00), Max: 36.8 (01-16-22 @ 04:00)  HR: 69 (01-16-22 @ 10:00) (60 - 80)  BP: 116/52 (01-16-22 @ 10:00) (108/45 - 147/55)  RR: 25 (01-16-22 @ 10:00) (17 - 28)  SpO2: 100% (01-16-22 @ 10:00) (96% - 100%)  Wt(kg): --  I&O's Summary    15 Dejon 2022 07:01  -  16 Jan 2022 07:00  --------------------------------------------------------  IN: 620 mL / OUT: 1540 mL / NET: -920 mL            JVP: Normal  Neck: supple  Lung: clear   CV: S1 S2 , Murmur:  Abd: soft  Ext: No edema  neuro: Awake / alert  Psych: flat affect  Skin: normal``    LABS/DATA:    CARDIAC MARKERS:                                8.3    2.81  )-----------( 113      ( 16 Jan 2022 05:54 )             27.3     01-16    140  |  104  |  11  ----------------------------<  120<H>  3.9   |  28  |  1.15    Ca    8.2<L>      16 Jan 2022 05:54  Phos  2.5     01-16  Mg     2.40     01-16    TPro  6.0  /  Alb  2.5<L>  /  TBili  0.4  /  DBili  <0.2  /  AST  21  /  ALT  7   /  AlkPhos  39<L>  01-15    proBNP:   Lipid Profile:   HgA1c:   TSH:     TELE:  EKG:

## 2022-01-17 LAB
ANION GAP SERPL CALC-SCNC: 9 MMOL/L — SIGNIFICANT CHANGE UP (ref 7–14)
APTT BLD: 23.5 SEC — LOW (ref 27–36.3)
BLD GP AB SCN SERPL QL: NEGATIVE — SIGNIFICANT CHANGE UP
BUN SERPL-MCNC: 9 MG/DL — SIGNIFICANT CHANGE UP (ref 7–23)
CALCIUM SERPL-MCNC: 8.2 MG/DL — LOW (ref 8.4–10.5)
CHLORIDE SERPL-SCNC: 103 MMOL/L — SIGNIFICANT CHANGE UP (ref 98–107)
CO2 SERPL-SCNC: 27 MMOL/L — SIGNIFICANT CHANGE UP (ref 22–31)
CREAT SERPL-MCNC: 0.99 MG/DL — SIGNIFICANT CHANGE UP (ref 0.5–1.3)
CULTURE RESULTS: SIGNIFICANT CHANGE UP
GLUCOSE SERPL-MCNC: 105 MG/DL — HIGH (ref 70–99)
HCT VFR BLD CALC: 29.1 % — LOW (ref 39–50)
HGB BLD-MCNC: 8.5 G/DL — LOW (ref 13–17)
INR BLD: 1.16 RATIO — SIGNIFICANT CHANGE UP (ref 0.88–1.16)
MAGNESIUM SERPL-MCNC: 2.3 MG/DL — SIGNIFICANT CHANGE UP (ref 1.6–2.6)
MCHC RBC-ENTMCNC: 27.3 PG — SIGNIFICANT CHANGE UP (ref 27–34)
MCHC RBC-ENTMCNC: 29.2 GM/DL — LOW (ref 32–36)
MCV RBC AUTO: 93.6 FL — SIGNIFICANT CHANGE UP (ref 80–100)
NRBC # BLD: 0 /100 WBCS — SIGNIFICANT CHANGE UP
NRBC # FLD: 0 K/UL — SIGNIFICANT CHANGE UP
PHOSPHATE SERPL-MCNC: 2.3 MG/DL — LOW (ref 2.5–4.5)
PLATELET # BLD AUTO: 128 K/UL — LOW (ref 150–400)
POTASSIUM SERPL-MCNC: 3.7 MMOL/L — SIGNIFICANT CHANGE UP (ref 3.5–5.3)
POTASSIUM SERPL-SCNC: 3.7 MMOL/L — SIGNIFICANT CHANGE UP (ref 3.5–5.3)
PROTHROM AB SERPL-ACNC: 13.3 SEC — SIGNIFICANT CHANGE UP (ref 10.6–13.6)
RBC # BLD: 3.11 M/UL — LOW (ref 4.2–5.8)
RBC # FLD: 17.7 % — HIGH (ref 10.3–14.5)
RH IG SCN BLD-IMP: POSITIVE — SIGNIFICANT CHANGE UP
SODIUM SERPL-SCNC: 139 MMOL/L — SIGNIFICANT CHANGE UP (ref 135–145)
SPECIMEN SOURCE: SIGNIFICANT CHANGE UP
WBC # BLD: 2.86 K/UL — LOW (ref 3.8–10.5)
WBC # FLD AUTO: 2.86 K/UL — LOW (ref 3.8–10.5)

## 2022-01-17 PROCEDURE — 71045 X-RAY EXAM CHEST 1 VIEW: CPT | Mod: 26

## 2022-01-17 PROCEDURE — 99223 1ST HOSP IP/OBS HIGH 75: CPT

## 2022-01-17 PROCEDURE — 99233 SBSQ HOSP IP/OBS HIGH 50: CPT

## 2022-01-17 RX ORDER — SODIUM,POTASSIUM PHOSPHATES 278-250MG
2 POWDER IN PACKET (EA) ORAL ONCE
Refills: 0 | Status: COMPLETED | OUTPATIENT
Start: 2022-01-17 | End: 2022-01-17

## 2022-01-17 RX ORDER — POTASSIUM CHLORIDE 20 MEQ
20 PACKET (EA) ORAL ONCE
Refills: 0 | Status: COMPLETED | OUTPATIENT
Start: 2022-01-17 | End: 2022-01-17

## 2022-01-17 RX ADMIN — TAMSULOSIN HYDROCHLORIDE 0.4 MILLIGRAM(S): 0.4 CAPSULE ORAL at 23:09

## 2022-01-17 RX ADMIN — OXYCODONE HYDROCHLORIDE 5 MILLIGRAM(S): 5 TABLET ORAL at 23:18

## 2022-01-17 RX ADMIN — CEFTRIAXONE 100 MILLIGRAM(S): 500 INJECTION, POWDER, FOR SOLUTION INTRAMUSCULAR; INTRAVENOUS at 09:30

## 2022-01-17 RX ADMIN — PANTOPRAZOLE SODIUM 40 MILLIGRAM(S): 20 TABLET, DELAYED RELEASE ORAL at 06:08

## 2022-01-17 RX ADMIN — HEPARIN SODIUM 5000 UNIT(S): 5000 INJECTION INTRAVENOUS; SUBCUTANEOUS at 06:08

## 2022-01-17 RX ADMIN — HEPARIN SODIUM 5000 UNIT(S): 5000 INJECTION INTRAVENOUS; SUBCUTANEOUS at 23:09

## 2022-01-17 RX ADMIN — HEPARIN SODIUM 5000 UNIT(S): 5000 INJECTION INTRAVENOUS; SUBCUTANEOUS at 13:28

## 2022-01-17 RX ADMIN — Medication 2 TABLET(S): at 06:49

## 2022-01-17 RX ADMIN — Medication 20 MILLIEQUIVALENT(S): at 06:49

## 2022-01-17 RX ADMIN — OXYCODONE HYDROCHLORIDE 10 MILLIGRAM(S): 5 TABLET ORAL at 17:50

## 2022-01-17 NOTE — CONSULT NOTE ADULT - ATTENDING COMMENTS
Patient seen at bedside. Case discussed with Dr Brooks. Plan as above.   71m with Waldenstrom's Macroglobinemia on surveillance, with new constitutional symptoms, now presenting with a chest wall abscess. Drain in place and on antibiotics.  plan was for a BM Bx on 1/12 as outpt but pt now admitted. Hematology has been consulted given leukopenia and dx of WM.  Check SPEP, IF, serum free light chains and quantitative immunoglobulins  Monitor CBC daily  BmBx as planned  Will follow.

## 2022-01-17 NOTE — PROGRESS NOTE ADULT - SUBJECTIVE AND OBJECTIVE BOX
DATE OF SERVICE: 01-17-22 @ 10:58    Subjective: Patient seen and examined. No new events except as noted.     SUBJECTIVE/ROS:  No chest pain, dyspnea, palpitation, or dizziness.       MEDICATIONS:  MEDICATIONS  (STANDING):  cefTRIAXone   IVPB 2 milliGRAM(s) IV Intermittent every 24 hours  heparin   Injectable 5000 Unit(s) SubCutaneous every 8 hours  pantoprazole    Tablet 40 milliGRAM(s) Oral before breakfast  polyethylene glycol 3350 17 Gram(s) Oral daily  senna 2 Tablet(s) Oral at bedtime  tamsulosin 0.4 milliGRAM(s) Oral at bedtime      PHYSICAL EXAM:  T(C): 36.7 (01-17-22 @ 08:00), Max: 36.8 (01-17-22 @ 00:00)  HR: 67 (01-17-22 @ 10:00) (57 - 76)  BP: 109/49 (01-17-22 @ 10:00) (109/49 - 163/41)  RR: 22 (01-17-22 @ 10:00) (19 - 30)  SpO2: 100% (01-17-22 @ 10:00) (96% - 100%)  Wt(kg): --  I&O's Summary    16 Jan 2022 07:01  -  17 Jan 2022 07:00  --------------------------------------------------------  IN: 600 mL / OUT: 1560 mL / NET: -960 mL            JVP: Normal  Neck: supple  Lung: clear   CV: S1 S2 , Murmur:  Abd: soft  Ext: No edema  neuro: Awake / alert  Psych: flat affect  Skin: normal``    LABS/DATA:    CARDIAC MARKERS:                                8.5    2.86  )-----------( 128      ( 17 Jan 2022 04:41 )             29.1     01-17    139  |  103  |  9   ----------------------------<  105<H>  3.7   |  27  |  0.99    Ca    8.2<L>      17 Jan 2022 04:41  Phos  2.3     01-17  Mg     2.30     01-17      proBNP:   Lipid Profile:   HgA1c:   TSH:     TELE:  EKG:

## 2022-01-17 NOTE — PROGRESS NOTE ADULT - SUBJECTIVE AND OBJECTIVE BOX
CHIEF COMPLAINT: FOLLOW UP IN ICU FOR PATIENT WITH CHEST WALL ABSCESS AND POSSIBLE EMPYEMA      PROCEDURES:   - IR drainage of L posterior chest wall abscess 1/12/2022    ISSUES:   Chest wall abscess  Hx of lung cancer s/p neoadjuvant chemo and RT and then L pneumonectomy (6/2015) and SBRT to RUL nodule (2017) and then RUL wedge resection (2/2018)  BPH  Hydronephrosis      INTERVAL EVENTS:     Afebrile, WBC count remains low, Hb 8.5, responded 1 PRBC transfused yesterday  Spo2 98% on RA, MAP>65 off pressors  Tolerating diet  Pain controlled, Left chest IR drain with 160ml of purulent drainage in past 24h      HISTORY:   Patient reports moderate pain at chest wall incision sites which is worse with coughing and deep breathing without associated fever or dyspnea. Pain is improved with use of pain meds.     PHYSICAL EXAM:   Gen: Comfortable, No acute distress  Eyes: Sclera white, Conjunctiva normal, Eyelids normal, Pupils symmetrical   ENT: Mucous membranes moist,  ,  ,    Neck: Trachea midline,  ,  ,  ,  ,  ,    CV: Rate regular, Rhythm regular,  ,  ,    Resp: Breath sounds clear, No accessory muscles use, left chestwall tube in place,  ,    Abd: Soft, Non-distended, Non-tender, Bowel sounds normal,  ,  ,    Skin: Warm, No peripheral edema of lower extremities,  ,    : No carroll  Neuro: Moving all 4 extremities,    Psych: A&Ox3      ASSESSMENT AND PLAN:     NEURO:  Post-operative Pain - Stable. Pain control with oxycodone PO          RESPIRATORY:  Hypoxia - Wean nasal cannula for goal O2sat above 92. Obtain CXR. Incentive spirometry. Chest PT and frequent suctioning. Continue bronchodilators. OOB to chair & ambulate w/ assistance. Continuous pulse oximetry for support & to prevent decompensation.       Drain - Monitor bulb output.       CARDIOVASCULAR:  Hemodynamically stable - Not on pressors. Continue hemodynamic monitoring.  Telemetry (medical test) - Reviewed by me today independently. Normal sinus rhythm.          RENAL:  Stable - Monitor IOs and electrolytes. Keep K above 4.0 and Mg above 2.0.      BPH - stable. continue tamsulosin.         GASTROINTESTINAL:  GI prophylaxis not indicated  Zofran and Reglan IV PRN for nausea  Regular consistency diet        HEMATOLOGIC:  No signs of active bleeding. Monitor Hgb in CBC in AM  DVT prophylaxis with heparin subQ and SCDs.  Hematology consult for pancytopenia        INFECTIOUS DISEASE:  All surgical sites appear clean. Will monitor for fever and leukocytosis.    Chest wall abscess s/p IR drainage and possible Empyema with Strep Mitis - Stable. Ceftriaxone IV. F/u ID recs re: PICC line/duration of ABX. Recheck CRP/ESR            ENDOCRINE:  Stable – Monitor glucose fingersticks for goal 120-180.       Pertinent clinical, laboratory, radiographic, hemodynamic, echocardiographic, respiratory data, microbiologic data and chart were reviewed by myself and analyzed frequently throughout the course of the day and night by myself.    Plan discussed at length with the CTICU staff and Attending CT Surgeon -   Dr Jan Sism.    Plan discussed with patient.    _________________________  VITAL SIGNS:  Vital Signs Last 24 Hrs  T(C): 36.8 (17 Jan 2022 04:00), Max: 36.8 (17 Jan 2022 00:00)  T(F): 98.2 (17 Jan 2022 04:00), Max: 98.2 (17 Jan 2022 00:00)  HR: 76 (17 Jan 2022 09:00) (57 - 76)  BP: 126/47 (17 Jan 2022 09:00) (110/51 - 163/41)  BP(mean): 67 (17 Jan 2022 09:00) (64 - 88)  RR: 25 (17 Jan 2022 09:00) (19 - 30)  SpO2: 100% (17 Jan 2022 09:00) (96% - 100%)  I/Os:   I&O's Detail    16 Jan 2022 07:01  -  17 Jan 2022 07:00  --------------------------------------------------------  IN:    IV PiggyBack: 100 mL    Lactated Ringers Bolus: 500 mL  Total IN: 600 mL    OUT:    Drain (mL): 160 mL    Voided (mL): 1400 mL  Total OUT: 1560 mL    Total NET: -960 mL              MEDICATIONS:  MEDICATIONS  (STANDING):  cefTRIAXone   IVPB 2 milliGRAM(s) IV Intermittent every 24 hours  heparin   Injectable 5000 Unit(s) SubCutaneous every 8 hours  pantoprazole    Tablet 40 milliGRAM(s) Oral before breakfast  polyethylene glycol 3350 17 Gram(s) Oral daily  senna 2 Tablet(s) Oral at bedtime  tamsulosin 0.4 milliGRAM(s) Oral at bedtime    MEDICATIONS  (PRN):  acetaminophen     Tablet .. 650 milliGRAM(s) Oral every 6 hours PRN Mild Pain (1 - 3)  hydrOXYzine hydrochloride 50 milliGRAM(s) Oral two times a day PRN Anxiety  oxyCODONE    IR 5 milliGRAM(s) Oral every 4 hours PRN Moderate Pain (4 - 6)  oxyCODONE    IR 10 milliGRAM(s) Oral every 4 hours PRN Severe Pain (7 - 10)      LABS:  Laboratory data was independently reviewed by me today.                           8.5    2.86  )-----------( 128      ( 17 Jan 2022 04:41 )             29.1     01-17    139  |  103  |  9   ----------------------------<  105<H>  3.7   |  27  |  0.99    Ca    8.2<L>      17 Jan 2022 04:41  Phos  2.3     01-17  Mg     2.30     01-17    TPro  6.0  /  Alb  2.5<L>  /  TBili  0.4  /  DBili  <0.2  /  AST  21  /  ALT  7   /  AlkPhos  39<L>  01-15    LIVER FUNCTIONS - ( 15 Dejon 2022 09:57 )  Alb: 2.5 g/dL / Pro: 6.0 g/dL / ALK PHOS: 39 U/L / ALT: 7 U/L / AST: 21 U/L / GGT: x           PT/INR - ( 17 Jan 2022 04:41 )   PT: 13.3 sec;   INR: 1.16 ratio         PTT - ( 17 Jan 2022 04:41 )  PTT:23.5 sec        RADIOLOGY:   Radiology images were independently reviewed by me today. Reports were reviewed by me today.    Xray Chest 1 View- PORTABLE-Routine:   ACC: [id number] EXAM:  XR CHEST PORTABLE ROUTINE 1V                          PROCEDURE DATE:  01/16/2022          INTERPRETATION:  AP chest.    Clinical indications: Chest wall infection.    IMPRESSION: There remains diffuse opacification of the lefthemithorax   with a chest tube without change compared to 1/15/2022. The right lung is   clear. The heart size cannot be assessed.    --- End of Report ---            AVERY DOE MD; Attending Radiologist  This document has been electronically signed. Jan 16 2022 12:28PM (01-16-22 @ 06:06)  Xray Chest 1 View- PORTABLE-Routine:   ACC: [id number] EXAM:  XR CHEST PORTABLE ROUTINE 1V                          PROCEDURE DATE:  01/15/2022          INTERPRETATION:  HISTORY:  Admitting Dxs: R60.9 R60.9; s/p pneumonectomy;  TECHNIQUE: Portable frontal view of the chest, 1 view.  COMPARISON 1/14/2022.  FINDINGS/  IMPRESSION:    The heart is obscured. There is loss of volume on the left with deformity   of the left chest compatible with left pneumonectomy. Pigtail catheter   overlies left chest. Right lung clear.    No significant change.    --- End of Report ---            TABATHA WONG MD; Attending Interventional Radiologist  This document has been electronically signed. Dejon 15 2022 11:14AM (01-15-22 @ 05:03)  Xray Chest 1 View- PORTABLE-Urgent:   ACC: [id number] EXAM:  XR CHEST PORTABLE URGENT 1V                          PROCEDURE DATE:  01/14/2022          INTERPRETATION:  EXAMINATION: XR CHEST URGENT    CLINICAL INDICATION: Cough    TECHNIQUE: Single frontal, portable view of the chest was obtained.    COMPARISON: Chest x-ray 1/14/2022.    FINDINGS:  Left chest pigtail catheter. Surgical clips overlying the left upper   chest. Right apical chain sutures.  The heart is normal in size.  Left lung whiteout related to pneumonectomy. The right lung is clear.  There is no pneumothorax or pleural effusion.  There are no acute osseous abnormalities.    IMPRESSION:  Redemonstrated changes related to left pneumonectomy.    The right lung is clear.    --- End of Report ---          LEXUS FUENTES MD; Resident Radiologist  This document has been electronically signed.  MAI COOK MD; Attending Radiologist  This document has been electronically signed. Jan 14 2022  4:06PM (01-14-22 @ 13:36)        Patient's status was discussed with patient at bedside.

## 2022-01-17 NOTE — CONSULT NOTE ADULT - CONSULT REASON
Left chest wall aspiration
Leukopenia in patient with known Waldenstrom's Macroglobinemia
edema
Abx managment

## 2022-01-17 NOTE — CONSULT NOTE ADULT - ASSESSMENT
Pt admitted from thoracic surgery office visit today with complaints of pain at L thoracotomy site.  Pt is a 71y male with history of squamous cell lung Ca stage III (T3 N2) of LETY with neoadjuvant chemo/RT, followed by Left VATS, left thoracotomy, pneumonectomy, resection of first, second, third ribs on 06/23/2015, pathology revealed [id number]. In January of 2017 he completed SBRT to a RUL nodule. He is s/p right VATS, RUL lung wedge resection on 2/23/18, pathology revealed scar with reactive changes consistent with radiation atypia. Pt followed with Dr Sims as an outpatient and at follow up visit today, he c/o lethargy, weight loss and pain at L thoracotomy site and was found to have fluctuant collection.   (10 Dejon 2022 12:33)    HOSP COURSE:    CT chest shows new 3.0 x 12.4 x 10.1 cm lesion centered along the left subscapularis muscle. The lesion extends along the chest wall posterior to and communicates with the pneumonectomy bed, just inferior to the second rib resection and along the posterior left third, fourth, and fifth rib interspaces.    Pt seen by IR, s/p placement of drain, 500cc purulent drainage, sent for cultures.      L posterior chest wall collection:    - Pt with soft tissue abscess in L posterior chest wall, with possible extension into L pneumonectomy bed.    - f/u all culture data.  Thus far growing Strep mitis.  If no evidence for MRSA, will d/c vanco    - f/u WBC and temp curve.  Mild leukopenia today.  Cont to monitor, if worsens will consider switching abx (?possible drug effect vs from sepsis/infectious process)    - Check blood cultures.  Anticipate eventual PICC line and long term abx.  This was d/w pt at bedside.    -  Cont to monitor drain output.  Further OR plan to be determined.    - Check ESR, CRP    Will follow,    Ирина Espinoza  [phone]    
72 yo M with h/o squamous cell lung cancer(LETY) diagnosed 2/2015-T3 N2 (Stage IIIa). He received neoadjuvant radiation and Taxol/Carboplatin chemotherapy followed by left pneumonectomy-No residual tumor at time of surgery. In January of 2017 he completed SBRT to a RUL nodule. He is also S/P right VATS lung wedge resection for enlarging right apical lung nodule on imaging with pathology revealing scar with reactive changes c/w radiation atypia. In 2016,Serum immunofixation showed 3 IgM kappa bands, with urine immunofixation showing a weak IgM kappa band, and Bence-Steen protein kappa type. Bone marrow biopsy, and bone marrow aspirate at the time showed a patchy, normocellular bone marrow with trilineage hematopoiesis. He did have Monoclonal B cells less than 10% and plasma cells 5-10%, without forming aggregates. 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 Cytogenetics showed a normal male karyotype. Diagnosis of Waldenstrom's Macroglobinemia was made (unable to obtain MYD88 status from chart review). Patient has been on surveillance since and IgM has been trending down. Given new constitutional symptoms, plan was for a BM Bx on 1/12 however, patient was admitted to [facility]. He has a thoracic wall abscess at prior thoracotomy site and has a drain in place an is on antibiotics. Hematology has been consulted given leukopenia and dx of WM.     # Waldenstrom's Macroglobinemia   - History as above  - Please obtain:  SPEP, serum immunofixation, serum free light chains and quantitative immunoglobulin levels   - Numbers last checked 12/1/21: IgM 1753, Kappa 8.36, FLC ratio 4.06 (all improved from prior)  - Please check serum viscosity; this was normal (2.0) on 12/1/21  - Plan was for outpatient BM Bx given new constitutional symptoms but this was not done as patient admitted    # Pancytopenia  - Could be multifactorial from infection, zosyn use and WM (patient off zosyn as of 1/16)  - Monitor counts for now-- please ensure daily CBC WITH DIFF  - If no improvement in the next 1-2 days, plan for BM Bx    Ariel Brooks, PGY-5  Hematology-Oncology Fellow  [phone] ([place]) [phone] ([facility])  I can also be reached on Microsoft Teams  Please page fellow on call after 5pm and on weekends  
Edema   unclear etiology   ? due to mild low albumin   obtain echo to eval LV / RV function   obtain lower ext venous doppler     Wide pulse pressure  echo   rule out severe AR    DVT proph  on heparin      Advanced care planning was discussed with patient and family.  Risks, benefits and alternatives of the cardiac treatments and medical therapy including procedures were discussed in detail and all questions were answered. Importance of compliance with medical therapy and lifestyle modification to improve cardiovascular health were addressed. Appropriate forms and patient educational materials were reviewed. 30 minutes face to face spent.  
Interventional Radiology    Evaluate for Procedure: Aspiration of left chest wall collection    HPI: 71y Male with PMHx squamous cell lung cancer stage 3 of LETY with chemo/RT, followed by left VATS, left thoracotomy, pneumonectomy, resection of first, second, third ribs 6/23/2015, pathology revealed [id number]. In January of 2017 he completed SBRT to a RUL nodule. He is s/p right VATS, RUL lung wedge resection on 2/23/18, pathology revealed scar with reactive changes consistent with radiation atypia. He presented to his thoracic surgeon's office with pain at left thoracotomy site, found to have fluctuant collection. IR consulted for aspiration of collection.    Allergies: NKA    Medications (Abx/Cardiac/Anticoagulation/Blood Products)  heparin   Injectable: 5000 Unit(s) SubCutaneous (01-11 @ 13:56)  tamsulosin: 0.4 milliGRAM(s) Oral (01-10 @ 21:17)    Data:    T(C): 36.4  HR: 87  BP: 137/46  RR: 17  SpO2: 100%    -WBC 5.92 / HgB 7.6 / Hct 26.0 / Plt 132  -Na 140 / Cl 102 / BUN 18 / Glucose 127  -K 4.6 / CO2 31 / Cr 0.88  -ALT -- / Alk Phos -- / T.Bili --  -INR 1.30 / PTT 26.9      Radiology: reviewed    Assessment/Plan:     -- IR will plan to perform left chest wall collection aspiration on 1/12/22  -- NPO at midnight   -- hold a.m. anticoagulation on 1/12/22  -- please complete IR pre-procedure note  -- please place IR procedure request order under Dr. Rey    --  Aminata Jain MD  IR Pager [phone]

## 2022-01-17 NOTE — PROGRESS NOTE ADULT - ASSESSMENT
Edema   resolved   normal cardiac function   no evidence of DVT   likely has underlying venous insuffiencey   diuresis as needed     DVT proph  on heparin     anemia  transfusion as needed  monitor H/H

## 2022-01-17 NOTE — CONSULT NOTE ADULT - SUBJECTIVE AND OBJECTIVE BOX
Patient is a 71y old  Male who presents with a chief complaint of     HPI:    Pt admitted from thoracic surgery office visit today with complaints of pain at L thoracotomy site.  Pt is a 71y male with history of squamous cell lung Ca stage III (T3 N2) of LETY with neoadjuvant chemo/RT, followed by Left VATS, left thoracotomy, pneumonectomy, resection of first, second, third ribs on 06/23/2015, pathology revealed [id number]. In January of 2017 he completed SBRT to a RUL nodule. He is s/p right VATS, RUL lung wedge resection on 2/23/18, pathology revealed scar with reactive changes consistent with radiation atypia. Pt followed with Dr Sims as an outpatient and at follow up visit today, he c/o lethargy, weight loss and pain at L thoracotomy site and was found to have fluctuant collection.   (10 Dejon 2022 12:33)    HOSP COURSE:    CT chest shows new 3.0 x 12.4 x 10.1 cm lesion centered along the left subscapularis muscle. The lesion extends along the chest wall posterior to and communicates with the pneumonectomy bed, just inferior to the second rib resection and along the posterior left third, fourth, and fifth rib interspaces.    Pt seen by IR, s/p placement of drain, 500cc purulent drainage, sent for cultures.        REVIEW OF SYSTEMS:    CONSTITUTIONAL: No fever, weight loss, or fatigue  EYES: No eye pain, visual disturbances, or discharge  ENMT:  No sore throat  NECK: No pain or stiffness  RESPIRATORY: No cough, wheezing, chills or hemoptysis; No shortness of breath  CARDIOVASCULAR: No chest pain, palpitations, dizziness, or leg swelling  GASTROINTESTINAL: No abdominal or epigastric pain. No nausea, vomiting, or hematemesis; No diarrhea or constipation. No melena or hematochezia.  GENITOURINARY: No dysuria, frequency, hematuria, or incontinence  NEUROLOGICAL: No headaches, memory loss, loss of strength, numbness, or tremors  SKIN: No itching, burning, rashes, or lesions   LYMPH NODES: No enlarged glands  MUSCULOSKELETAL: No joint pain or swelling; No muscle, back, or extremity pain      PAST MEDICAL & SURGICAL HISTORY:  BPH (benign prostatic hypertrophy)    Nephrolithiasis    Hydronephrosis    Lung cancer  Dx&#x27;d 2015 treated with neoadjuvant chemo/RT followed by pneumonectomy (June 2015), SBRT to RUL nodule in Jan 2017    Lung nodule    H/O lithotripsy    S/P tonsillectomy    H/O pneumonectomy  Left June 2015    S/P removal of lung  RUL lung wedge resection on 2/23/18        Allergies    No Known Allergies    Intolerances        FAMILY HISTORY:  Family history of lung cancer (Father)        SOCIAL HISTORY:        MEDICATIONS  (STANDING):  heparin   Injectable 5000 Unit(s) SubCutaneous every 8 hours  pantoprazole    Tablet 40 milliGRAM(s) Oral before breakfast  piperacillin/tazobactam IVPB.. 3.375 Gram(s) IV Intermittent every 8 hours  tamsulosin 0.4 milliGRAM(s) Oral at bedtime  vancomycin  IVPB      vancomycin  IVPB 1250 milliGRAM(s) IV Intermittent every 12 hours    MEDICATIONS  (PRN):  acetaminophen     Tablet .. 650 milliGRAM(s) Oral every 6 hours PRN Mild Pain (1 - 3)  hydrOXYzine hydrochloride 50 milliGRAM(s) Oral two times a day PRN Anxiety  oxyCODONE    IR 10 milliGRAM(s) Oral every 4 hours PRN Severe Pain (7 - 10)  oxyCODONE    IR 5 milliGRAM(s) Oral every 4 hours PRN Moderate Pain (4 - 6)      Vital Signs Last 24 Hrs  T(C): 36.5 (13 Jan 2022 14:20), Max: 36.6 (12 Jan 2022 22:13)  T(F): 97.7 (13 Jan 2022 14:20), Max: 97.9 (12 Jan 2022 22:13)  HR: 82 (13 Jan 2022 14:20) (75 - 83)  BP: 100/58 (13 Jan 2022 14:20) (100/58 - 131/50)  BP(mean): --  RR: 17 (13 Jan 2022 14:20) (17 - 18)  SpO2: 100% (13 Jan 2022 14:20) (96% - 100%)    PHYSICAL EXAM:    GENERAL: NAD, well-groomed  HEAD:  Atraumatic, Normocephalic  EYES: EOMI, PERRLA, conjunctiva and sclera clear  ENMT: No tonsillar erythema, exudates, or enlargement; Moist mucous membranes  NECK: Supple, No JVD  CHEST/LUNG: Clear to percussion bilaterally; No rales, rhonchi, wheezing, or rubs  HEART: Regular rate and rhythm; No murmurs, rubs, or gallops  ABDOMEN: Soft, Nontender, Nondistended; Bowel sounds present  EXTREMITIES:  2+ Peripheral Pulses, No clubbing, cyanosis, or edema  LYMPH: No lymphadenopathy noted  SKIN: L posterior thorax dsg c/d/i, mild fluctuance    LABS:  CBC Full  -  ( 13 Jan 2022 07:34 )  WBC Count : 2.91 K/uL  RBC Count : 2.74 M/uL  Hemoglobin : 7.5 g/dL  Hematocrit : 25.0 %  Platelet Count - Automated : 131 K/uL  Mean Cell Volume : 91.2 fL  Mean Cell Hemoglobin : 27.4 pg  Mean Cell Hemoglobin Concentration : 30.0 gm/dL  Auto Neutrophil # : x  Auto Lymphocyte # : x  Auto Monocyte # : x  Auto Eosinophil # : x  Auto Basophil # : x  Auto Neutrophil % : x  Auto Lymphocyte % : x  Auto Monocyte % : x  Auto Eosinophil % : x  Auto Basophil % : x      01-13    138  |  100  |  17  ----------------------------<  102<H>  4.1   |  31  |  0.99    Ca    8.5      13 Jan 2022 07:34  Phos  3.5     01-13  Mg     2.40     01-13          MICROBIOLOGY:    Culture - Fungal, Other (01.12.22 @ 15:10)   Specimen Source: .Other LEFT CHEST WALL   Culture Results:   Testing in progress     Culture - Abscess with Gram Stain (01.12.22 @ 15:10)   Specimen Source: .Abscess LEFT CHEST WALL   Culture Results:   Moderate Streptococcus mitis/oralis group "Susceptibilities not performed"       RADIOLOGY:    < from: CT Angio Chest PE Protocol w/ IV Cont (01.10.22 @ 17:46) >  FINDINGS:    Pulmonary Artery:  There is no main, central, lobar or proximal segmental   pulmonary embolus. The mid to distal segmental and subsegmental divisions   are not well evaluated secondary to timing of the bolus.    Tubes/Lines: None.    Lungs, airways and pleura: Status post left pneumonectomy. Qualitatively,   the fluid in the pneumonectomy bed, has increased since 11/15/2021.    A complex, septated, rim-enhancing 3.0 x 12.4 x 10.1 cm lesion, centered   along the subscapularis muscle extends along the chest wall posterior to   and communicates with the pneumonectomy bed, just inferior to the second   rib resection and along the posterior left third, fourth, and fifth rib   interspaces.    Prior right upper lobe wedge resection. The right lung is otherwise clear.    Mediastinum: The thyroid gland is normal. The esophagus is unremarkable.   The left periaortic lymph nodes and lymph nodes along the left pleural   space within the extrapleural fat are unchanged line for differences in   technique.    The heart is normal in size. Coronary calcification. No pericardial   effusion.    Left-sided aortic arch and left-sided descending thoracic aorta. The   aortic root measures 4.0 cm at the sinuses of Valsalva. The remainder of   the aorta is tortuous and ectatic. Aortic calcifications.    Upper Abdomen: Cholelithiasis.    Bones And Soft Tissues: The bones are unremarkable.  The soft tissues are   unremarkable.      IMPRESSION:    1.  No pulmonary embolus.  2.  New 3.0 x 12.4 x 10.1 cm lesion centered along the left subscapularis   muscle. The lesion extends along the chest wall posterior to and   communicates with the pneumonectomy bed, just inferior to the second rib   resection and along the posterior left third, fourth, and fifth rib   interspaces.    < end of copied text >      < from: Xray Chest 1 View- PORTABLE-Urgent (Xray Chest 1 View- PORTABLE-Urgent .) (01.10.22 @ 14:13) >    FINDINGS:  Heart size cannot be assessed in this projection.  Redemonstrated left lung whiteout with volume loss and surgical clips   overlying the left hemithorax. The right lung is clear.  There is no pneumothorax or pleural effusion.  There are no acute osseous abnormalities.    IMPRESSION:  Left lung whiteout consistent with history of left pneumonectomy.    The right lung is clear.    < end of copied text >              
CHIEF COMPLAINT:Patient is a 71y old  Male who presents with a chief complaint of     HISTORY OF PRESENT ILLNESS:    71 female with history as below lung ca s/p pneumonectomy s/p chemo / RT / BPH   cardiology is called for evaluation of lower ext edema   pt denies any chest pain, palpitation, dizziness or syncope.   has ankle edema     PAST MEDICAL & SURGICAL HISTORY:  BPH (benign prostatic hypertrophy)    Nephrolithiasis    Hydronephrosis    Lung cancer  Dx&#x27;d 2015 treated with neoadjuvant chemo/RT followed by pneumonectomy (June 2015), SBRT to RUL nodule in Jan 2017    Lung nodule    H/O lithotripsy    S/P tonsillectomy    H/O pneumonectomy  Left June 2015    S/P removal of lung  RUL lung wedge resection on 2/23/18            MEDICATIONS:  heparin   Injectable 5000 Unit(s) SubCutaneous every 8 hours  tamsulosin 0.4 milliGRAM(s) Oral at bedtime        acetaminophen     Tablet .. 650 milliGRAM(s) Oral every 6 hours PRN  hydrOXYzine hydrochloride 50 milliGRAM(s) Oral two times a day PRN  oxyCODONE    IR 5 milliGRAM(s) Oral every 4 hours PRN  oxyCODONE    IR 10 milliGRAM(s) Oral every 4 hours PRN    pantoprazole    Tablet 40 milliGRAM(s) Oral before breakfast          FAMILY HISTORY:  Family history of lung cancer (Father)        Non-contributory    SOCIAL HISTORY:    No tobacco, drugs or etoh    Allergies    No Known Allergies    Intolerances    	    REVIEW OF SYSTEMS:  as above  The rest of the 14 points ROS reviewed and except above they are unremarkable.        PHYSICAL EXAM:  T(C): 36.4 (01-11-22 @ 14:01), Max: 36.8 (01-10-22 @ 21:28)  HR: 87 (01-11-22 @ 14:01) (74 - 99)  BP: 137/46 (01-11-22 @ 14:01) (110/57 - 137/46)  RR: 17 (01-11-22 @ 14:01) (16 - 20)  SpO2: 100% (01-11-22 @ 14:01) (95% - 100%)  Wt(kg): --  I&O's Summary    11 Jan 2022 07:01  -  11 Jan 2022 15:26  --------------------------------------------------------  IN: 580 mL / OUT: 600 mL / NET: -20 mL        JVP: Normal  Neck: supple  Lung: clear   CV: S1 S2 , Murmur:  Abd: soft  Ext: pos edema  neuro: Awake / alert  Psych: flat affect  Skin: normal      LABS/DATA:    TELEMETRY: 	    ECG:  	   	  CARDIAC MARKERS:                        28 <<== 01-10-22 @ 12:31                              7.6    5.92  )-----------( 132      ( 11 Jan 2022 11:07 )             26.0     01-11    140  |  102  |  18  ----------------------------<  127<H>  4.6   |  31  |  0.88    Ca    8.6      11 Jan 2022 11:07  Phos  2.9     01-11  Mg     2.20     01-11    TPro  7.3  /  Alb  3.0<L>  /  TBili  0.6  /  DBili  x   /  AST  12  /  ALT  12  /  AlkPhos  63  01-10    proBNP: Serum Pro-Brain Natriuretic Peptide: 424 pg/mL (01-10 @ 12:31)    Lipid Profile:   HgA1c:   TSH:           
HPI:  Pt admitted from thoracic surgery office visit 1/10 with complaints of pain at L thoracotomy site.  Pt is a 71y male with history of squamous cell lung Ca stage III (T3 N2) of LETY with neoadjuvant chemo/RT, followed by Left VATS, left thoracotomy, pneumonectomy, resection of first, second, third ribs on 06/23/2015, pathology revealed [id number]. In January of 2017 he completed SBRT to a RUL nodule. He is s/p right VATS, RUL lung wedge resection on 2/23/18, pathology revealed scar with reactive changes consistent with radiation atypia. Pt followed with Dr Sims as an outpatient and at follow up visit today, he c/o lethargy, weight loss and pain at L thoracotomy site and was found to have fluctuant collection.    (10 Dejon 2022 12:33)      14 point ROS otherwise negative    PAST MEDICAL & SURGICAL HISTORY:  BPH (benign prostatic hypertrophy)    Nephrolithiasis    Hydronephrosis    Lung cancer  Dx&#x27;d 2015 treated with neoadjuvant chemo/RT followed by pneumonectomy (June 2015), SBRT to RUL nodule in Jan 2017    Lung nodule    H/O lithotripsy    S/P tonsillectomy    H/O pneumonectomy  Left June 2015    S/P removal of lung  RUL lung wedge resection on 2/23/18        Allergies    No Known Allergies    Intolerances        MEDICATIONS  (STANDING):  cefTRIAXone   IVPB 2 milliGRAM(s) IV Intermittent every 24 hours  heparin   Injectable 5000 Unit(s) SubCutaneous every 8 hours  pantoprazole    Tablet 40 milliGRAM(s) Oral before breakfast  polyethylene glycol 3350 17 Gram(s) Oral daily  senna 2 Tablet(s) Oral at bedtime  tamsulosin 0.4 milliGRAM(s) Oral at bedtime    MEDICATIONS  (PRN):  acetaminophen     Tablet .. 650 milliGRAM(s) Oral every 6 hours PRN Mild Pain (1 - 3)  hydrOXYzine hydrochloride 50 milliGRAM(s) Oral two times a day PRN Anxiety  oxyCODONE    IR 5 milliGRAM(s) Oral every 4 hours PRN Moderate Pain (4 - 6)  oxyCODONE    IR 10 milliGRAM(s) Oral every 4 hours PRN Severe Pain (7 - 10)      FAMILY HISTORY:  Family history of lung cancer (Father)        SOCIAL HISTORY: No EtOH, no tobacco        VITALS:   T(F): 98.2 (01-17-22 @ 13:36), Max: 98.2 (01-17-22 @ 00:00)  HR: 78 (01-17-22 @ 13:36)  BP: 119/57 (01-17-22 @ 13:36)  RR: 18 (01-17-22 @ 13:36)  SpO2: 99% (01-17-22 @ 13:36)  Wt(kg): --    PHYSICAL EXAM    GENERAL: NAD, well-developed  HEAD:  Atraumatic, Normocephalic  EYES: EOMI, PERRLA, conjunctiva and sclera clear  NECK: Supple, No JVD  CHEST/LUNG: Clear to auscultation bilaterally; No wheeze  HEART: Regular rate and rhythm; No murmurs, rubs, or gallops  ABDOMEN: Soft, Nontender, Nondistended; Bowel sounds present  EXTREMITIES:  2+ Peripheral Pulses, No clubbing, cyanosis, or edema  NEUROLOGY: non-focal  SKIN: No rashes or lesions    LABS:                         8.5    2.86  )-----------( 128      ( 17 Jan 2022 04:41 )             29.1     01-17    139  |  103  |  9   ----------------------------<  105<H>  3.7   |  27  |  0.99    Ca    8.2<L>      17 Jan 2022 04:41  Phos  2.3     01-17  Mg     2.30     01-17      Magnesium, Serum: 2.30 mg/dL (01-17 @ 04:41)  Phosphorus Level, Serum: 2.3 mg/dL (01-17 @ 04:41)    PT/INR - ( 17 Jan 2022 04:41 )   PT: 13.3 sec;   INR: 1.16 ratio         PTT - ( 17 Jan 2022 04:41 )  PTT:23.5 sec  .Blood Blood-Venous  01-13 @ 12:36   No growth to date.  --  --      .Blood Blood-Venous  01-13 @ 12:35   No growth to date.  --  --      .Other LEFT CHEST WALL  01-12 @ 15:10   Testing in progress  --    Moderate polymorphonuclear leukocytes per low power field  Moderate Gram Positive Cocci in Pairs and Chains per oil power field      .Abscess LEFT CHEST WALL  01-12 @ 10:00   Moderate Streptococcus mitis/oralis group "Susceptibilities not performed"  --  --          IMAGING: Reviewed

## 2022-01-18 ENCOUNTER — NON-APPOINTMENT (OUTPATIENT)
Age: 72
End: 2022-01-18

## 2022-01-18 ENCOUNTER — RESULT REVIEW (OUTPATIENT)
Age: 72
End: 2022-01-18

## 2022-01-18 ENCOUNTER — TRANSCRIPTION ENCOUNTER (OUTPATIENT)
Age: 72
End: 2022-01-18

## 2022-01-18 LAB
ANION GAP SERPL CALC-SCNC: 7 MMOL/L — SIGNIFICANT CHANGE UP (ref 7–14)
ANISOCYTOSIS BLD QL: SIGNIFICANT CHANGE UP
BASOPHILS # BLD AUTO: 0.02 K/UL — SIGNIFICANT CHANGE UP (ref 0–0.2)
BASOPHILS NFR BLD AUTO: 0.9 % — SIGNIFICANT CHANGE UP (ref 0–2)
BUN SERPL-MCNC: 8 MG/DL — SIGNIFICANT CHANGE UP (ref 7–23)
CALCIUM SERPL-MCNC: 8.4 MG/DL — SIGNIFICANT CHANGE UP (ref 8.4–10.5)
CHLORIDE SERPL-SCNC: 103 MMOL/L — SIGNIFICANT CHANGE UP (ref 98–107)
CO2 SERPL-SCNC: 28 MMOL/L — SIGNIFICANT CHANGE UP (ref 22–31)
CREAT SERPL-MCNC: 0.94 MG/DL — SIGNIFICANT CHANGE UP (ref 0.5–1.3)
CULTURE RESULTS: SIGNIFICANT CHANGE UP
CULTURE RESULTS: SIGNIFICANT CHANGE UP
ELLIPTOCYTES BLD QL SMEAR: SLIGHT — SIGNIFICANT CHANGE UP
EOSINOPHIL # BLD AUTO: 0.05 K/UL — SIGNIFICANT CHANGE UP (ref 0–0.5)
EOSINOPHIL NFR BLD AUTO: 1.8 % — SIGNIFICANT CHANGE UP (ref 0–6)
GLUCOSE SERPL-MCNC: 110 MG/DL — HIGH (ref 70–99)
HAPTOGLOB SERPL-MCNC: 341 MG/DL — HIGH (ref 34–200)
HCT VFR BLD CALC: 31.9 % — LOW (ref 39–50)
HGB BLD-MCNC: 9.2 G/DL — LOW (ref 13–17)
HYPOCHROMIA BLD QL: SLIGHT — SIGNIFICANT CHANGE UP
IANC: 1.31 K/UL — LOW (ref 1.5–8.5)
IGA FLD-MCNC: 166 MG/DL — SIGNIFICANT CHANGE UP (ref 70–400)
IGG FLD-MCNC: 815 MG/DL — SIGNIFICANT CHANGE UP (ref 700–1600)
IGM SERPL-MCNC: 1489 MG/DL — HIGH (ref 40–230)
KAPPA LC SER QL IFE: 12.71 MG/DL — HIGH (ref 0.33–1.94)
KAPPA/LAMBDA FREE LIGHT CHAIN RATIO, SERUM: 4.72 RATIO — HIGH (ref 0.26–1.65)
LAMBDA LC SER QL IFE: 2.69 MG/DL — HIGH (ref 0.57–2.63)
LDH SERPL L TO P-CCNC: 147 U/L — SIGNIFICANT CHANGE UP (ref 135–225)
LYMPHOCYTES # BLD AUTO: 0.35 K/UL — LOW (ref 1–3.3)
LYMPHOCYTES # BLD AUTO: 13.4 % — SIGNIFICANT CHANGE UP (ref 13–44)
MACROCYTES BLD QL: SIGNIFICANT CHANGE UP
MANUAL SMEAR VERIFICATION: SIGNIFICANT CHANGE UP
MCHC RBC-ENTMCNC: 28.2 PG — SIGNIFICANT CHANGE UP (ref 27–34)
MCHC RBC-ENTMCNC: 28.8 GM/DL — LOW (ref 32–36)
MCV RBC AUTO: 97.9 FL — SIGNIFICANT CHANGE UP (ref 80–100)
MONOCYTES # BLD AUTO: 0.37 K/UL — SIGNIFICANT CHANGE UP (ref 0–0.9)
MONOCYTES NFR BLD AUTO: 14.3 % — HIGH (ref 2–14)
NEUTROPHILS # BLD AUTO: 1.69 K/UL — LOW (ref 1.8–7.4)
NEUTROPHILS NFR BLD AUTO: 60.7 % — SIGNIFICANT CHANGE UP (ref 43–77)
NEUTS BAND # BLD: 4.4 % — SIGNIFICANT CHANGE UP (ref 0–6)
PLAT MORPH BLD: NORMAL — SIGNIFICANT CHANGE UP
PLATELET # BLD AUTO: 132 K/UL — LOW (ref 150–400)
PLATELET COUNT - ESTIMATE: ABNORMAL
POIKILOCYTOSIS BLD QL AUTO: SLIGHT — SIGNIFICANT CHANGE UP
POLYCHROMASIA BLD QL SMEAR: SIGNIFICANT CHANGE UP
POTASSIUM SERPL-MCNC: 4 MMOL/L — SIGNIFICANT CHANGE UP (ref 3.5–5.3)
POTASSIUM SERPL-SCNC: 4 MMOL/L — SIGNIFICANT CHANGE UP (ref 3.5–5.3)
PROT SERPL-MCNC: 6.9 G/DL — SIGNIFICANT CHANGE UP (ref 6–8.3)
RBC # BLD: 3.26 M/UL — LOW (ref 4.2–5.8)
RBC # BLD: 3.26 M/UL — LOW (ref 4.2–5.8)
RBC # FLD: 17.7 % — HIGH (ref 10.3–14.5)
RBC BLD AUTO: ABNORMAL
RETICS #: 120 K/UL — SIGNIFICANT CHANGE UP (ref 25–125)
RETICS/RBC NFR: 3.7 % — HIGH (ref 0.5–2.5)
SODIUM SERPL-SCNC: 138 MMOL/L — SIGNIFICANT CHANGE UP (ref 135–145)
SPECIMEN SOURCE: SIGNIFICANT CHANGE UP
SPECIMEN SOURCE: SIGNIFICANT CHANGE UP
VARIANT LYMPHS # BLD: 4.5 % — SIGNIFICANT CHANGE UP (ref 0–6)
WBC # BLD: 2.59 K/UL — LOW (ref 3.8–10.5)
WBC # FLD AUTO: 2.59 K/UL — LOW (ref 3.8–10.5)

## 2022-01-18 PROCEDURE — 88342 IMHCHEM/IMCYTCHM 1ST ANTB: CPT | Mod: 26,59

## 2022-01-18 PROCEDURE — 88360 TUMOR IMMUNOHISTOCHEM/MANUAL: CPT | Mod: 26

## 2022-01-18 PROCEDURE — 88313 SPECIAL STAINS GROUP 2: CPT | Mod: 26

## 2022-01-18 PROCEDURE — 88367 INSITU HYBRIDIZATION AUTO: CPT | Mod: 26

## 2022-01-18 PROCEDURE — 84165 PROTEIN E-PHORESIS SERUM: CPT | Mod: 26

## 2022-01-18 PROCEDURE — 99232 SBSQ HOSP IP/OBS MODERATE 35: CPT | Mod: GC,25

## 2022-01-18 PROCEDURE — 88365 INSITU HYBRIDIZATION (FISH): CPT | Mod: 26,59

## 2022-01-18 PROCEDURE — 88189 FLOWCYTOMETRY/READ 16 & >: CPT

## 2022-01-18 PROCEDURE — 88305 TISSUE EXAM BY PATHOLOGIST: CPT | Mod: 26

## 2022-01-18 PROCEDURE — 86334 IMMUNOFIX E-PHORESIS SERUM: CPT | Mod: 26

## 2022-01-18 PROCEDURE — 88341 IMHCHEM/IMCYTCHM EA ADD ANTB: CPT | Mod: 26,59

## 2022-01-18 PROCEDURE — 71045 X-RAY EXAM CHEST 1 VIEW: CPT | Mod: 26

## 2022-01-18 PROCEDURE — 38222 DX BONE MARROW BX & ASPIR: CPT

## 2022-01-18 PROCEDURE — 88364 INSITU HYBRIDIZATION (FISH): CPT | Mod: 26

## 2022-01-18 RX ORDER — LIDOCAINE HCL 20 MG/ML
20 VIAL (ML) INJECTION ONCE
Refills: 0 | Status: COMPLETED | OUTPATIENT
Start: 2022-01-18 | End: 2022-01-18

## 2022-01-18 RX ORDER — CEFTRIAXONE 500 MG/1
2 INJECTION, POWDER, FOR SOLUTION INTRAMUSCULAR; INTRAVENOUS
Qty: 46 | Refills: 0
Start: 2022-01-18 | End: 2022-02-09

## 2022-01-18 RX ORDER — HEPARIN SODIUM 5000 [USP'U]/ML
5000 INJECTION INTRAVENOUS; SUBCUTANEOUS ONCE
Refills: 0 | Status: COMPLETED | OUTPATIENT
Start: 2022-01-18 | End: 2022-01-18

## 2022-01-18 RX ADMIN — OXYCODONE HYDROCHLORIDE 5 MILLIGRAM(S): 5 TABLET ORAL at 21:47

## 2022-01-18 RX ADMIN — OXYCODONE HYDROCHLORIDE 5 MILLIGRAM(S): 5 TABLET ORAL at 14:15

## 2022-01-18 RX ADMIN — HEPARIN SODIUM 5000 UNIT(S): 5000 INJECTION INTRAVENOUS; SUBCUTANEOUS at 13:23

## 2022-01-18 RX ADMIN — OXYCODONE HYDROCHLORIDE 5 MILLIGRAM(S): 5 TABLET ORAL at 01:19

## 2022-01-18 RX ADMIN — PANTOPRAZOLE SODIUM 40 MILLIGRAM(S): 20 TABLET, DELAYED RELEASE ORAL at 08:07

## 2022-01-18 RX ADMIN — TAMSULOSIN HYDROCHLORIDE 0.4 MILLIGRAM(S): 0.4 CAPSULE ORAL at 21:47

## 2022-01-18 RX ADMIN — OXYCODONE HYDROCHLORIDE 5 MILLIGRAM(S): 5 TABLET ORAL at 13:29

## 2022-01-18 RX ADMIN — HEPARIN SODIUM 5000 UNIT(S): 5000 INJECTION INTRAVENOUS; SUBCUTANEOUS at 10:30

## 2022-01-18 RX ADMIN — HEPARIN SODIUM 5000 UNIT(S): 5000 INJECTION INTRAVENOUS; SUBCUTANEOUS at 21:47

## 2022-01-18 RX ADMIN — CEFTRIAXONE 100 MILLIGRAM(S): 500 INJECTION, POWDER, FOR SOLUTION INTRAMUSCULAR; INTRAVENOUS at 10:31

## 2022-01-18 RX ADMIN — Medication 20 MILLILITER(S): at 10:30

## 2022-01-18 RX ADMIN — OXYCODONE HYDROCHLORIDE 5 MILLIGRAM(S): 5 TABLET ORAL at 22:30

## 2022-01-18 RX ADMIN — HEPARIN SODIUM 5000 UNIT(S): 5000 INJECTION INTRAVENOUS; SUBCUTANEOUS at 05:36

## 2022-01-18 NOTE — PROGRESS NOTE ADULT - ASSESSMENT
Pt admitted from thoracic surgery office visit today with complaints of pain at L thoracotomy site.  Pt is a 71y male with history of squamous cell lung Ca stage III (T3 N2) of LETY with neoadjuvant chemo/RT, followed by Left VATS, left thoracotomy, pneumonectomy, resection of first, second, third ribs on 06/23/2015, pathology revealed [id number]. In January of 2017 he completed SBRT to a RUL nodule. He is s/p right VATS, RUL lung wedge resection on 2/23/18, pathology revealed scar with reactive changes consistent with radiation atypia. Pt followed with Dr Sims as an outpatient and at follow up visit today, he c/o lethargy, weight loss and pain at L thoracotomy site and was found to have fluctuant collection.   (10 Dejon 2022 12:33)    HOSP COURSE:    CT chest shows new 3.0 x 12.4 x 10.1 cm lesion centered along the left subscapularis muscle. The lesion extends along the chest wall posterior to and communicates with the pneumonectomy bed, just inferior to the second rib resection and along the posterior left third, fourth, and fifth rib interspaces.    Pt seen by IR, s/p placement of drain, 500cc purulent drainage, sent for cultures.      L posterior chest wall collection:    - Pt with soft tissue abscess in L posterior chest wall, with possible extension into L pneumonectomy bed.    - f/u all culture data.  Thus far growing Strep mitis.  No evidence for MRSA, agree with d/c vanco    - f/u WBC and temp curve.  Mild leukopenia.  Abx narrowed to rocephin 2gm IV qdaily.     - Check blood cultures:  NGTD.  Anticipate eventual PICC line and long term abx.  This was d/w pt at bedside.    -  Cont to monitor drain output.   s/p bronch on 1/14 - no bronchopulmonary fistula.  No further plan for surgery at this time    -  ESR, CRP markers elevated.     Pancytopenia:    - Heme/Onc f/u appreciated.  Pt with h/o Waldenstrom's Macroglobinemia   - Cont to monitor cbc with diff.     * Plan for picc line and 4 week abx course.  Check weekly cbc with diff, cmp, esr, crp.  ID f/u in 1-2 weeks        Will follow,    Ирина Espinoza  [phone]

## 2022-01-18 NOTE — PROGRESS NOTE ADULT - ASSESSMENT
72 yo M with h/o squamous cell lung cancer(LETY) diagnosed 2/2015-T3 N2 (Stage IIIa) s/p neoadjuvant radiation and Taxol/Carboplatin chemotherapy followed by left pneumonectomy-No residual tumor found at time of surgery. In January 2017 he completed SBRT to a RUL nodule; also S/P right VATS lung wedge resection for enlarging right apical lung nodule on imaging with pathology revealing scar with reactive changes c/w radiation atypia.     To be noted, In 2016 Serum immunofixation showed 3 IgM kappa bands, with urine immunofixation showing a weak IgM kappa band, and Bence-Steen protein kappa type. Bone marrow biopsy, and bone marrow aspirate at the time showed a patchy, normocellular bone marrow with trilineage hematopoiesis. He did have Monoclonal B cells less than 10% and plasma cells 5-10%, without forming aggregates. 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 Cytogenetics showed a normal male karyotype. Diagnosis of Waldenstrom's Macroglobinemia was made (unable to obtain MYD88 status from chart review). Patient has been on surveillance since and IgM has been trending down. Given new constitutional symptoms, the original plan by his hematologist Dr. Griffith was for a BM Bx on 1/12 as outpatient. however, patient was admitted to [facility] due to thoracic wall abscess at prior thoracotomy site. After admisison, he had pleural drain in place and has been on antibiotics.     Hematology has been consulted given leukopenia and dx of WM.     # Waldenstrom's Macroglobinemia   -Bone marrow aspirate & biopsy performed this morning 11am after discussed with Dr. Griffith on the phone and patient. Consent signed by patient; pending pathology and flow cytometry   - History as above  - f/u SPEP, serum immunofixation, serum free light chains and quantitative immunoglobulin levels   - Numbers last checked 12/1/21: IgM 1753, Kappa 8.36, FLC ratio 4.06 (all improved from prior)  - f/u serum viscosity; which was normal (2.0) on 12/1/21    # Pancytopenia  - Could be multifactorial from infection, zosyn use and WM (patient off zosyn as of 1/16);   - Monitor counts for now-- please ensure daily CBC WITH DIFF; currently counts stable/slightly improve: today wbc 2.59; Hb 9.2 plt 132    Please contact with Hematology team if have more questions  Discussed with Attending Dr. Baker and fellow Dr. Jordan Verdin PGY4   Hematology-Oncology Fellow  [phone]  Please contact fellow on call after 5pm and on weekends.    72 yo M with h/o squamous cell lung cancer(LETY) diagnosed 2/2015-T3 N2 (Stage IIIa) s/p neoadjuvant radiation and Taxol/Carboplatin chemotherapy followed by left pneumonectomy-No residual tumor found at time of surgery. In January 2017 he completed SBRT to a RUL nodule; also S/P right VATS lung wedge resection for enlarging right apical lung nodule on imaging with pathology revealing scar with reactive changes c/w radiation atypia.     To be noted, In 2016 Serum immunofixation showed 3 IgM kappa bands, with urine immunofixation showing a weak IgM kappa band, and Bence-Steen protein kappa type. Bone marrow biopsy, and bone marrow aspirate at the time showed a patchy, normocellular bone marrow with trilineage hematopoiesis. He did have Monoclonal B cells less than 10% and plasma cells 5-10%, without forming aggregates. 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 Cytogenetics showed a normal male karyotype. Diagnosis of Waldenstrom's Macroglobinemia was made (unable to obtain MYD88 status from chart review). Patient has been on surveillance since and IgM has been trending down. Given new constitutional symptoms, the original plan by his hematologist Dr. Griffith was for a BM Bx on 1/12 as outpatient. however, patient was admitted to [facility] due to thoracic wall abscess at prior thoracotomy site. After admisison, he had thoracic wall abscess drain in place and has been on antibiotics.     Hematology has been consulted given leukopenia and dx of WM.     # Waldenstrom's Macroglobinemia   -Bone marrow aspirate & biopsy performed this morning 11am after discussed with Dr. Griffith on the phone and patient. Consent signed by patient; pending pathology and flow cytometry; may f/u results as outpatient.    - f/u SPEP, serum immunofixation, serum free light chains and quantitative immunoglobulin levels   - Numbers last checked 1/18/22 IgM 1489 compared to  12/1/21: IgM 1753, Kappa 12.71 compared to 8.36 12/1/21 , FLC ratio 4.72 compared to 4.06 12/1/21  - f/u serum viscosity; which was normal (2.0) on 12/1/21  -patient [place] [facility] [place] No chemo planned during this hospitalization.   -stable wbc/Hb/platelet; continue monitoring   -the rest of care per primary team including thoracic wall abscess drainage.     # Pancytopenia  - Could be multifactorial from infection, zosyn use and WM (patient off zosyn as of 1/16);   - Monitor counts for now-- please ensure daily CBC WITH DIFF; currently counts stable/slightly improve: today wbc 2.59; Hb 9.2 plt 132    Please contact with Hematology team if have more questions  Discussed with Attending Dr. Baker and fellow Dr. Jordan Verdin PGY4   Hematology-Oncology Fellow  [phone]  Please contact fellow on call after 5pm and on weekends.

## 2022-01-18 NOTE — DISCHARGE NOTE PROVIDER - NSDCFUSCHEDAPPT_GEN_ALL_CORE_FT
ALANNA JADE ; 02/14/2022 ; [facility]  ALANNA JADE ; 02/28/2022 ; [facility] [address]

## 2022-01-18 NOTE — PROGRESS NOTE ADULT - SUBJECTIVE AND OBJECTIVE BOX
DATE OF SERVICE: 01-18-22 @ 10:49    Subjective: Patient seen and examined. No new events except as noted.     SUBJECTIVE/ROS:        MEDICATIONS:  MEDICATIONS  (STANDING):  cefTRIAXone   IVPB 2 milliGRAM(s) IV Intermittent every 24 hours  heparin   Injectable 5000 Unit(s) IV Push once  heparin   Injectable 5000 Unit(s) SubCutaneous every 8 hours  lidocaine 2% Injectable 20 milliLiter(s) Local Injection once  pantoprazole    Tablet 40 milliGRAM(s) Oral before breakfast  polyethylene glycol 3350 17 Gram(s) Oral daily  senna 2 Tablet(s) Oral at bedtime  tamsulosin 0.4 milliGRAM(s) Oral at bedtime      PHYSICAL EXAM:  T(C): 36.7 (01-18-22 @ 10:21), Max: 36.8 (01-17-22 @ 13:36)  HR: 73 (01-18-22 @ 10:21) (68 - 78)  BP: 128/54 (01-18-22 @ 10:21) (114/53 - 141/63)  RR: 18 (01-18-22 @ 10:21) (16 - 20)  SpO2: 98% (01-18-22 @ 10:21) (95% - 100%)  Wt(kg): --  I&O's Summary    17 Jan 2022 07:01  -  18 Jan 2022 07:00  --------------------------------------------------------  IN: 50 mL / OUT: 110 mL / NET: -60 mL            JVP: Normal  Neck: supple  Lung: clear   CV: S1 S2 , Murmur:  Abd: soft  Ext: No edema  neuro: Awake / alert  Psych: flat affect  Skin: normal``    LABS/DATA:    CARDIAC MARKERS:                                9.2    2.59  )-----------( 132      ( 18 Jan 2022 06:47 )             31.9     01-18    138  |  103  |  8   ----------------------------<  110<H>  4.0   |  28  |  0.94    Ca    8.4      18 Jan 2022 06:47  Phos  2.3     01-17  Mg     2.30     01-17    TPro  6.9  /  Alb  x   /  TBili  x   /  DBili  x   /  AST  x   /  ALT  x   /  AlkPhos  x   01-18    proBNP:   Lipid Profile:   HgA1c:   TSH:     TELE:  EKG:

## 2022-01-18 NOTE — PROGRESS NOTE ADULT - SUBJECTIVE AND OBJECTIVE BOX
INTERVAL HPI/OVERNIGHT EVENTS:  Patient S&E at bedside. No o/n events, He sat on the chair comfortably. Denied fever/chills, worsening chest pain, SOB, N/V/D.   Discussed with pt about the necessity of bone marrow biopsy for further evaluation and treatment. He agreed with the plan and consented. Thereafter bone marrow biopsy & aspirate performed successfully around 11am. Samples were sent to pathology and flow cytometry.     VITAL SIGNS:  T(F): 98 (01-18-22 @ 10:21)  HR: 73 (01-18-22 @ 10:21)  BP: 128/54 (01-18-22 @ 10:21)  RR: 18 (01-18-22 @ 10:21)  SpO2: 98% (01-18-22 @ 10:21)  Wt(kg): --    PHYSICAL EXAM:    Constitutional: NAD; AAOX4, responded well verbally.   Eyes: EOMI, sclera non-icteric  Neck: supple, no cervical lymphadenopathy;   Respiratory: left pleural drain cath in placement, no wheezes or crackles   Cardiovascular: RRR  Gastrointestinal: soft, NTND, + BS  Extremities: no cyanosis, clubbing or edema   Neurological: awake and alert; no focal weakness, touch sensation intact.       MEDICATIONS  (STANDING):  cefTRIAXone   IVPB 2 milliGRAM(s) IV Intermittent every 24 hours  heparin   Injectable 5000 Unit(s) SubCutaneous every 8 hours  pantoprazole    Tablet 40 milliGRAM(s) Oral before breakfast  polyethylene glycol 3350 17 Gram(s) Oral daily  senna 2 Tablet(s) Oral at bedtime  tamsulosin 0.4 milliGRAM(s) Oral at bedtime    MEDICATIONS  (PRN):  acetaminophen     Tablet .. 650 milliGRAM(s) Oral every 6 hours PRN Mild Pain (1 - 3)  hydrOXYzine hydrochloride 50 milliGRAM(s) Oral two times a day PRN Anxiety  oxyCODONE    IR 5 milliGRAM(s) Oral every 4 hours PRN Moderate Pain (4 - 6)  oxyCODONE    IR 10 milliGRAM(s) Oral every 4 hours PRN Severe Pain (7 - 10)      Allergies    No Known Allergies    Intolerances        LABS:                        9.2    2.59  )-----------( 132      ( 18 Jan 2022 06:47 )             31.9     01-18    138  |  103  |  8   ----------------------------<  110<H>  4.0   |  28  |  0.94    Ca    8.4      18 Jan 2022 06:47  Phos  2.3     01-17  Mg     2.30     01-17    TPro  6.9  /  Alb  x   /  TBili  x   /  DBili  x   /  AST  x   /  ALT  x   /  AlkPhos  x   01-18    PT/INR - ( 17 Jan 2022 04:41 )   PT: 13.3 sec;   INR: 1.16 ratio         PTT - ( 17 Jan 2022 04:41 )  PTT:23.5 sec      RADIOLOGY & ADDITIONAL TESTS:  Studies reviewed.

## 2022-01-18 NOTE — PROGRESS NOTE ADULT - SUBJECTIVE AND OBJECTIVE BOX
Subjective: Patient seen at bedside this AM. Denies acute onset chest pain, NVD, fevers, chills, lightheadedness, SOB, CP     Vital Signs:  Vital Signs Last 24 Hrs  T(C): 36.7 (01-18-22 @ 10:21), Max: 36.8 (01-17-22 @ 13:36)  T(F): 98 (01-18-22 @ 10:21), Max: 98.2 (01-17-22 @ 13:36)  HR: 73 (01-18-22 @ 10:21) (68 - 78)  BP: 128/54 (01-18-22 @ 10:21) (114/53 - 141/63)  RR: 18 (01-18-22 @ 10:21) (16 - 20)  SpO2: 98% (01-18-22 @ 10:21) (95% - 100%) on (O2)    Telemetry/Alarms: NSR  General: WN/WD NAD  Neurology: Awake, nonfocal, KEY x 4  ENT:Gross hearing intact  Respiratory: no lung sound in left lung field 2/2 pneumonectomy  CV: RRR on tele  Skin: posterior thoracic left back collection significantly decreased with IR drain output sanguinopuruluent,placement c/d/i   Lymphatic: No Neck, axilla, groin LAD  Psych: Oriented x 3, normal affect  Incisions: IR placement site c/d/i  Tubes: L posterior thoracic back IR drain: sanguinopuruluent draining 110cc over 24H  Relevant labs, radiology and Medications reviewed                        9.2    2.59  )-----------( 132      ( 18 Jan 2022 06:47 )             31.9     01-18    138  |  103  |  8   ----------------------------<  110<H>  4.0   |  28  |  0.94    Ca    8.4      18 Jan 2022 06:47  Phos  2.3     01-17  Mg     2.30     01-17    TPro  6.9  /  Alb  x   /  TBili  x   /  DBili  x   /  AST  x   /  ALT  x   /  AlkPhos  x   01-18    PT/INR - ( 17 Jan 2022 04:41 )   PT: 13.3 sec;   INR: 1.16 ratio         PTT - ( 17 Jan 2022 04:41 )  PTT:23.5 sec  MEDICATIONS  (STANDING):  cefTRIAXone   IVPB 2 milliGRAM(s) IV Intermittent every 24 hours  heparin   Injectable 5000 Unit(s) SubCutaneous every 8 hours  heparin   Injectable 5000 Unit(s) IV Push once  lidocaine 2% Injectable 20 milliLiter(s) Local Injection once  pantoprazole    Tablet 40 milliGRAM(s) Oral before breakfast  polyethylene glycol 3350 17 Gram(s) Oral daily  senna 2 Tablet(s) Oral at bedtime  tamsulosin 0.4 milliGRAM(s) Oral at bedtime    MEDICATIONS  (PRN):  acetaminophen     Tablet .. 650 milliGRAM(s) Oral every 6 hours PRN Mild Pain (1 - 3)  hydrOXYzine hydrochloride 50 milliGRAM(s) Oral two times a day PRN Anxiety  oxyCODONE    IR 5 milliGRAM(s) Oral every 4 hours PRN Moderate Pain (4 - 6)  oxyCODONE    IR 10 milliGRAM(s) Oral every 4 hours PRN Severe Pain (7 - 10)    Pertinent Physical Exam  I&O's Summary    17 Jan 2022 07:01  -  18 Jan 2022 07:00  --------------------------------------------------------  IN: 50 mL / OUT: 110 mL / NET: -60 mL        Assessment  71y Male  w/ PAST MEDICAL & SURGICAL HISTORY:  BPH (benign prostatic hypertrophy)    Nephrolithiasis    Hydronephrosis    Lung cancer  Dxd 2015 treated with neoadjuvant chemo/RT followed by pneumonectomy (June 2015), SBRT to RUL nodule in Jan 2017    Lung nodule    H/O lithotripsy    S/P tonsillectomy    H/O pneumonectomy  Left June 2015    S/P removal of lung  RUL lung wedge resection on 2/23/18  Patient is a 71y old  Male who presents with a chief complaint of Left chest wall abscess (17 Jan 2022 09:38) sp Ir drain 1/12/2022  .  On 1/14/22, patient underwent Bronchoscopy, flexible, by CT surgery      PLAN  Neuro: Pain management  Pulm: Encourage coughing, deep breathing and use of incentive spirometry. Daily CXR.   Cardio: Monitor telemetry/alarms  GI: Tolerating diet. Continue stool softeners.  Renal: monitor urine output, supplement electrolytes as needed  Vasc: Heparin SC/SCDs for DVT prophylaxis  Heme: Stable H/H. f/u recs for eval of Waldenstrom's Macroglobinemia/pancytopenia and BM bx plan   ID: c/w ceftriaxone. pt to get PICC once ready for d/c for long term IV abx, Rochepin for 4 weeks as per ID  Therapy: OOB/ambulate  Tubes:monitor IR tube output   Disposition: Aim to D/C to home this week   Discussed with Cardiothoracic Team at AM rounds.      Subjective: Patient seen at bedside this AM. Denies acute onset chest pain, NVD, fevers, chills, lightheadedness, SOB, CP     Vital Signs:  Vital Signs Last 24 Hrs  T(C): 36.7 (01-18-22 @ 10:21), Max: 36.8 (01-17-22 @ 13:36)  T(F): 98 (01-18-22 @ 10:21), Max: 98.2 (01-17-22 @ 13:36)  HR: 73 (01-18-22 @ 10:21) (68 - 78)  BP: 128/54 (01-18-22 @ 10:21) (114/53 - 141/63)  RR: 18 (01-18-22 @ 10:21) (16 - 20)  SpO2: 98% (01-18-22 @ 10:21) (95% - 100%) on (O2)    Telemetry/Alarms: NSR  General: WN/WD NAD  Neurology: Awake, nonfocal, KEY x 4  ENT:Gross hearing intact  Respiratory: no lung sound in left lung field 2/2 pneumonectomy, CTA on right lung field   CV: RRR on tele, S1, S2 no murmur appreciated  Skin: posterior thoracic left back collection significantly decreased with IR drain output sanguinopuruluent,placement c/d/i   Lymphatic: No Neck, axilla, groin LAD  Psych: Oriented x 3, normal affect  Incisions: IR placement site c/d/i  Tubes: L posterior thoracic back IR drain: sanguinopuruluent draining 110cc over 24H, insertion site dressing c/d/i  Relevant labs, radiology and Medications reviewed                        9.2    2.59  )-----------( 132      ( 18 Jan 2022 06:47 )             31.9     01-18    138  |  103  |  8   ----------------------------<  110<H>  4.0   |  28  |  0.94    Ca    8.4      18 Jan 2022 06:47  Phos  2.3     01-17  Mg     2.30     01-17    TPro  6.9  /  Alb  x   /  TBili  x   /  DBili  x   /  AST  x   /  ALT  x   /  AlkPhos  x   01-18    PT/INR - ( 17 Jan 2022 04:41 )   PT: 13.3 sec;   INR: 1.16 ratio         PTT - ( 17 Jan 2022 04:41 )  PTT:23.5 sec  MEDICATIONS  (STANDING):  cefTRIAXone   IVPB 2 milliGRAM(s) IV Intermittent every 24 hours  heparin   Injectable 5000 Unit(s) SubCutaneous every 8 hours  heparin   Injectable 5000 Unit(s) IV Push once  lidocaine 2% Injectable 20 milliLiter(s) Local Injection once  pantoprazole    Tablet 40 milliGRAM(s) Oral before breakfast  polyethylene glycol 3350 17 Gram(s) Oral daily  senna 2 Tablet(s) Oral at bedtime  tamsulosin 0.4 milliGRAM(s) Oral at bedtime    MEDICATIONS  (PRN):  acetaminophen     Tablet .. 650 milliGRAM(s) Oral every 6 hours PRN Mild Pain (1 - 3)  hydrOXYzine hydrochloride 50 milliGRAM(s) Oral two times a day PRN Anxiety  oxyCODONE    IR 5 milliGRAM(s) Oral every 4 hours PRN Moderate Pain (4 - 6)  oxyCODONE    IR 10 milliGRAM(s) Oral every 4 hours PRN Severe Pain (7 - 10)    Pertinent Physical Exam  I&O's Summary    17 Jan 2022 07:01  -  18 Jan 2022 07:00  --------------------------------------------------------  IN: 50 mL / OUT: 110 mL / NET: -60 mL        Assessment  71y Male  w/ PAST MEDICAL & SURGICAL HISTORY:  BPH (benign prostatic hypertrophy)    Nephrolithiasis    Hydronephrosis    Lung cancer  Dxd 2015 treated with neoadjuvant chemo/RT followed by pneumonectomy (June 2015), SBRT to RUL nodule in Jan 2017    Lung nodule    H/O lithotripsy    S/P tonsillectomy    H/O pneumonectomy  Left June 2015    S/P removal of lung  RUL lung wedge resection on 2/23/18  Patient is a 71y old  Male who presents with a chief complaint of Left chest wall abscess (17 Jan 2022 09:38) sp Ir drain 1/12/2022  .  On 1/14/22, patient underwent Bronchoscopy, flexible, by CT surgery      PLAN  Neuro: Pain management  Pulm: Encourage coughing, deep breathing and use of incentive spirometry. Daily CXR.   Cardio: Monitor telemetry/alarms  GI: Tolerating diet. Continue stool softeners.  Renal: monitor urine output, supplement electrolytes as needed  Vasc: Heparin SC/SCDs for DVT prophylaxis  Heme: Stable H/H. f/u recs for eval of Waldenstrom's Macroglobinemia/pancytopenia and BM bx plan   ID: c/w ceftriaxone. pt to get PICC once ready for d/c for long term IV abx, Rochepin for 4 weeks as per ID  Therapy: OOB/ambulate  Tubes:monitor IR tube output   Disposition: Aim to D/C to home this week   Discussed with Cardiothoracic Team at AM rounds.

## 2022-01-18 NOTE — DISCHARGE NOTE PROVIDER - NSDCMRMEDTOKEN_GEN_ALL_CORE_FT
acetaminophen 325 mg oral tablet: 2 tab(s) orally every 6 hours, As needed, Mild Pain (1 - 3)  cefTRIAXone 2 g injection: 2 gram(s) intravenously once a day   LAST DATE of Therapy 2/9/2022  CoQ 10 100 mg orally  daily  last dose 2/16: May resume  docusate sodium 100 mg oral capsule: 1 cap(s) orally 3 times a day  oxyCODONE-acetaminophen 5 mg-325 mg oral tablet: 1 tab(s) orally every 4 hours, As needed, Moderate Pain (4 - 6) or severe  pain (7-10) MDD:6  tamsulosin 0.4 mg oral capsule: 1 cap(s) orally 2x day  weekly cbc with diff, cmp, esr, crp: Fax results to Dr Ирина Espinoza  fax    acetaminophen 325 mg oral tablet: 2 tab(s) orally every 6 hours, As needed, Mild Pain (1 - 3)  cefTRIAXone 2 g injection: 2 gram(s) intravenously once a day   LAST DATE of Therapy 2/9/2022  CoQ 10 100 mg orally  daily  last dose 2/16: May resume  CXR PA and lateral: Dx s/p L thoractomy, pneumonextomy   fax Dr Sims   docusate sodium 100 mg oral capsule: 1 cap(s) orally 3 times a day  oxyCODONE 5 mg oral tablet: 1 tab(s) orally every 6 hours, As Needed -Moderate Pain (4 - 6) MDD:4 tablets  tamsulosin 0.4 mg oral capsule: 1 cap(s) orally 2x day  weekly cbc with diff, cmp, esr, crp: Fax results to Dr Ирина Espinoza  fax

## 2022-01-18 NOTE — PROGRESS NOTE ADULT - ASSESSMENT
Edema   resolved   normal cardiac function   no evidence of DVT   likely has underlying venous insuffiencey   diuresis as needed     DVT proph  on heparin     anemia  transfusion as needed  monitor H/H , stable

## 2022-01-18 NOTE — PROGRESS NOTE ADULT - ASSESSMENT
Pt admitted from thoracic surgery office visit today with complaints of pain at L thoracotomy site.  Pt is a 71y male with history of squamous cell lung Ca stage III (T3 N2) of LETY with neoadjuvant chemo/RT, followed by Left VATS, left thoracotomy, pneumonectomy, resection of first, second, third ribs on 06/23/2015, pathology revealed [id number]. In January of 2017 he completed SBRT to a RUL nodule. He is s/p right VATS, RUL lung wedge resection on 2/23/18, pathology revealed scar with reactive changes consistent with radiation atypia. Pt followed with Dr Sims as an outpatient and at follow up visit today, he c/o lethargy, weight loss and pain at L thoracotomy site and was found to have fluctuant collection.   (10 Dejon 2022 12:33)    HOSP COURSE:    CT chest shows new 3.0 x 12.4 x 10.1 cm lesion centered along the left subscapularis muscle. The lesion extends along the chest wall posterior to and communicates with the pneumonectomy bed, just inferior to the second rib resection and along the posterior left third, fourth, and fifth rib interspaces.    Pt seen by IR, s/p placement of drain, 500cc purulent drainage, sent for cultures.      L posterior chest wall collection:    - Pt with soft tissue abscess in L posterior chest wall, with possible extension into L pneumonectomy bed.    - f/u all culture data.  Thus far growing Strep mitis.  No evidence for MRSA, agree with d/c vanco    - f/u WBC and temp curve.  Mild leukopenia.  Abx narrowed to rocephin 2gm IV qdaily.     - Check blood cultures:  NGTD.  Anticipate eventual PICC line and long term abx.  This was d/w pt at bedside.    -  Cont to monitor drain output.   s/p bronch on 1/14 - no bronchopulmonary fistula.  No further plan for surgery at this time    -  ESR, CRP markers elevated.     Pancytopenia:    - Heme/Onc f/u appreciated.  Pt with h/o Waldenstrom's Macroglobinemia   - Cont to monitor cbc with diff.     * Plan for picc line and 4 week abx course.  Check weekly cbc with diff, cmp, esr, crp.  ID f/u in 1-2 weeks  d/c planning        Will follow,    Ирина Espinoza  [phone]

## 2022-01-18 NOTE — DISCHARGE NOTE PROVIDER - NSDCCPCAREPLAN_GEN_ALL_CORE_FT
PRINCIPAL DISCHARGE DIAGNOSIS  Diagnosis: Swelling of surgical site, sequela  Assessment and Plan of Treatment:       SECONDARY DISCHARGE DIAGNOSES  Diagnosis: Pleural effusion  Assessment and Plan of Treatment:

## 2022-01-18 NOTE — PROGRESS NOTE ADULT - SUBJECTIVE AND OBJECTIVE BOX
Infectious Diseases progress note:    Subjective:   Afebrile.  No acute events.     ROS:  CONSTITUTIONAL:  No fever, chills, rigors  CARDIOVASCULAR:  No chest pain or palpitations  RESPIRATORY:   No SOB, cough, dyspnea on exertion.  No wheezing  GASTROINTESTINAL:  No abd pain, N/V, diarrhea/constipation  EXTREMITIES:  No swelling or joint pain  GENITOURINARY:  No burning on urination, increased frequency or urgency.  No flank pain  NEUROLOGIC:  No HA, visual disturbances  SKIN: No rashes    Allergies    No Known Allergies    Intolerances        ANTIBIOTICS/RELEVANT:  antimicrobials  cefTRIAXone   IVPB 2 milliGRAM(s) IV Intermittent every 24 hours    immunologic:    OTHER:  acetaminophen     Tablet .. 650 milliGRAM(s) Oral every 6 hours PRN  heparin   Injectable 5000 Unit(s) SubCutaneous every 8 hours  hydrOXYzine hydrochloride 50 milliGRAM(s) Oral two times a day PRN  oxyCODONE    IR 5 milliGRAM(s) Oral every 4 hours PRN  oxyCODONE    IR 10 milliGRAM(s) Oral every 4 hours PRN  pantoprazole    Tablet 40 milliGRAM(s) Oral before breakfast  polyethylene glycol 3350 17 Gram(s) Oral daily  senna 2 Tablet(s) Oral at bedtime  tamsulosin 0.4 milliGRAM(s) Oral at bedtime      Objective:  Vital Signs Last 24 Hrs  T(C): 36.7 (18 Jan 2022 21:47), Max: 36.7 (18 Jan 2022 10:21)  T(F): 98 (18 Jan 2022 21:47), Max: 98 (18 Jan 2022 10:21)  HR: 72 (18 Jan 2022 21:47) (72 - 76)  BP: 134/54 (18 Jan 2022 21:47) (126/72 - 140/60)  BP(mean): --  RR: 18 (18 Jan 2022 21:47) (16 - 18)  SpO2: 97% (18 Jan 2022 21:47) (95% - 100%)    PHYSICAL EXAM:  Constitutional:NAD  Eyes:CHUCKY, EOMI  Ear/Nose/Throat: no thrush, mucositis.  Moist mucous membranes	  Neck:no JVD, no lymphadenopathy, supple  Respiratory: L post chest wall drain  Cardiovascular: S1S2 RRR, no murmurs  Gastrointestinal:soft, nontender,  nondistended (+) BS  Extremities:no e/e/c  Skin:  no rashes, open wounds or ulcerations        LABS:                        9.2    2.59  )-----------( 132      ( 18 Jan 2022 06:47 )             31.9     01-18    138  |  103  |  8   ----------------------------<  110<H>  4.0   |  28  |  0.94    Ca    8.4      18 Jan 2022 06:47  Phos  2.3     01-17  Mg     2.30     01-17    TPro  6.9  /  Alb  x   /  TBili  x   /  DBili  x   /  AST  x   /  ALT  x   /  AlkPhos  x   01-18    PT/INR - ( 17 Jan 2022 04:41 )   PT: 13.3 sec;   INR: 1.16 ratio         PTT - ( 17 Jan 2022 04:41 )  PTT:23.5 sec          MICROBIOLOGY:    Culture - Blood (01.13.22 @ 09:32)   Specimen Source: .Blood Blood-Venous   Culture Results:   No Growth Final       Culture - Blood (01.13.22 @ 08:52)   Specimen Source: .Blood Blood-Venous   Culture Results:   No Growth Final     RADIOLOGY & ADDITIONAL STUDIES:    < from: Xray Chest 1 View- PORTABLE-Routine (Xray Chest 1 View- PORTABLE-Routine in AM.) (01.18.22 @ 08:08) >    FINDINGS:  Left-sided pigtail catheter with tip inprior left chest wall collection.   No left chest wall gas or collection noted.  Heart/Vascular: Heart size is normal.  Pulmonary: Right lung clear. S/P left pneumonectomy.  Bones: Degenerative changes of the thoracic spine    Impression:  No significant interval change since previous studies.    < end of copied text >

## 2022-01-18 NOTE — DISCHARGE NOTE PROVIDER - NSDCFUADDINST_GEN_ALL_CORE_FT
Please walk 4-5 x per day; increase as tolerated. You may climb stairs. Continue to use the incentive spirometer.   You may keep wounds uncovered. Please shower daily with soap and water. The suture will be removed in the office at the follow up appointment.   Please call the office at [phone] if you have fevers, chills, worsening shortness of breath, chest pain, warmth, redness or purulent discharge from the wound.

## 2022-01-18 NOTE — DISCHARGE NOTE PROVIDER - CARE PROVIDER_API CALL
Jan Sims)  Surgery; Thoracic Surgery  [address] [place] [address], [place] [address]  Phone: [phone]  Fax: [phone]  Follow Up Time:

## 2022-01-18 NOTE — CHART NOTE - NSCHARTNOTEFT_GEN_A_CORE
PRE-INTERVENTIONAL RADIOLOGY PROCEDURE NOTE      Patient Age:     Patient Gender: male     Procedure: drainage left chest wall collection    Diagnosis/indication: left chest wall collection      Interventional Radiology Attending Physician: Dr Rey    Ordering Attending Physician: Levi    Pertinent Medical History:  Pt s/p Left thoracotomy and pneumonectomy 2015 for lung cancer and SBRT to RUL nodule in 2017, presents w/ 2 week h/o painful left posterior chest wall swelling,     < from: CT Angio Chest PE Protocol w/ IV Cont (01.10.22 @ 17:46) >      1.  No pulmonary embolus.  2.  New 3.0 x 12.4 x 10.1 cm lesion centered along the left subscapularis   muscle. The lesion extends along the chest wall posterior to and   communicates with the pneumonectomy bed, just inferior to the second rib   resection and along the posterior left third, fourth, and fifth rib   interspaces.    < end of copied text >            Pertinent labs:                      7.6    5.92  )-----------( 132      ( 11 Jan 2022 11:07 )             26.0       01-11    140  |  102  |  18  ----------------------------<  127<H>  4.6   |  31  |  0.88    Ca    8.6      11 Jan 2022 11:07  Phos  2.9     01-11  Mg     2.20     01-11    TPro  7.3  /  Alb  3.0<L>  /  TBili  0.6  /  DBili  x   /  AST  12  /  ALT  12  /  AlkPhos  63  01-10      PT/INR - ( 11 Jan 2022 11:07 )   PT: 14.8 sec;   INR: 1.30 ratio         PTT - ( 11 Jan 2022 11:07 )  PTT:26.9 sec        Patient and Family Aware ? Yes
PRE-INTERVENTIONAL RADIOLOGY PROCEDURE NOTE      Patient Age:  71    Patient Gender: M    Procedure: PICC line    Diagnosis/Indication: posterior L chest wall collection growing strept mitis    Interventional Radiology Attending Physician: PICC line PA/ team    Ordering Attending Physician: Dr Jan Sims    Pertinent Medical History: pt hx L thoracotomy 2015    Pertinent labs:                      9.2    2.59  )-----------( 132      ( 18 Jan 2022 06:47 )             31.9       01-18    138  |  103  |  8   ----------------------------<  110<H>  4.0   |  28  |  0.94    Ca    8.4      18 Jan 2022 06:47  Phos  2.3     01-17  Mg     2.30     01-17    TPro  6.9  /  Alb  x   /  TBili  x   /  DBili  x   /  AST  x   /  ALT  x   /  AlkPhos  x   01-18      PT/INR - ( 17 Jan 2022 04:41 )   PT: 13.3 sec;   INR: 1.16 ratio         PTT - ( 17 Jan 2022 04:41 )  PTT:23.5 sec        Patient and Family Aware ? Yes    approved by Chucky Whitaker

## 2022-01-18 NOTE — DISCHARGE NOTE PROVIDER - NSDCFUADDAPPT_GEN_ALL_CORE_FT
Follow up with Dr. Sims in 7-10 days  Chest x-ray 1-2 days prior to appointment with Dr. Sims.   Follow up with primary care provider in one week

## 2022-01-18 NOTE — PROCEDURE NOTE - ADDITIONAL PROCEDURE DETAILS
The bone marrow biopsy & aspirate was performed on 1/18/2022 11:00am    Time out was performed to confirm patient identification with two identifiers. The area to be biopsied was located using anatomic landmarks. The right posterior superior iliac crest was sterilely prepped and draped in the usual manner with povidone iodine swabs. Using sterile technique, 2% lidocaine was generously administered to the tissue and periosteum, with confirmation by the patient that we had achieved adequate analgesia. Aspirate needle was inserted, and bone marrow aspirate was obtained with confirmation of adequate spicules. The aspirate needle was withdrawn. Bone marrow biopsy was performed with adequate marrow specimen obtained. Biopsy needle was removed, and there was no significant post-procedural bleeding or immediate complications. The area was covered with gauze and an adhesive bandage. The patient was given post-procedural instructions, including not to shower until tomorrow and, if possible, to lie down with pressure on the biopsy location. The patient tolerated the procedure well. Specimens were sent for pathology examination, flow cytometry, and cytogenetics.

## 2022-01-18 NOTE — DISCHARGE NOTE PROVIDER - HOSPITAL COURSE
Pt admitted from thoracic surgery office visit today with complaints of pain at L thoracotomy site.  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 In January of 2017 he completed SBRT to a RUL nodule. He is s/p right VATS, RUL lung wedge resection on 2/23/18, pathology revealed scar with reactive changes consistent with radiation atypia. Pt followed with Dr Sims as an outpatient and at follow up visit today, he c/o lethargy, weight loss and pain at L thoracotomy site and was found to have fluctuant collection. CT chest shows new 3.0 x 12.4 x 10.1 cm lesion centered along the left subscapularis muscle. The lesion extends along the chest wall posterior to and communicates with the pneumonectomy bed, just inferior to the second rib resection and along the posterior left third, fourth, and fifth rib interspaces. Pt s/p placement of drain by IR, 500cc purulent drainage.  Fluid positive for strep mitis, ID consulted and currently on Rocephin.  Patient will need 4 weeks of Rocephin. Patient s/p flexible bronchoscopy on 1/14/22.   Patient also seen by Heme/onc for Waldenstrom's macroglobinemia, pancytopenia.  Bone marrow biopsy done on 1/18/22. Pt admitted from thoracic surgery office visit today with complaints of pain at L thoracotomy site.  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 In January of 2017 he completed SBRT to a RUL nodule. He is s/p right VATS, RUL lung wedge resection on 2/23/18, pathology revealed scar with reactive changes consistent with radiation atypia. Pt followed with Dr Sims as an outpatient and at follow up visit today, he c/o lethargy, weight loss and pain at L thoracotomy site and was found to have fluctuant collection. CT chest shows new 3.0 x 12.4 x 10.1 cm lesion centered along the left subscapularis muscle. The lesion extends along the chest wall posterior to and communicates with the pneumonectomy bed, just inferior to the second rib resection and along the posterior left third, fourth, and fifth rib interspaces. Pt s/p placement of drain by IR, 500cc purulent drainage.  Fluid positive for strep mitis, ID consulted and currently on Rocephin.  Patient will need 4 weeks of Rocephin. Patient s/p flexible bronchoscopy on 1/14/22.   Patient also seen by Heme/onc for Waldenstrom's macroglobinemia, pancytopenia.  Bone marrow biopsy done on 1/18/22. PICC was placed in AM on 1/19/2022 and patient was deemed stable discharge in PM with VNS for long term IV abx. Patient plan discussed with and approved by attending, Dr. Jan Sims

## 2022-01-18 NOTE — PROGRESS NOTE ADULT - ATTENDING COMMENTS
Patient seen and examined agree with above note as modified, where appropriate, by me. pt s/p pneumonectomy for lung ca approx. 6 and a half years ago now with chest wall abscess. Pt for bronchoscopy to r/o BPF. The risks, benefits and alternatives of the procedure were explained to the patient including but not limited to bleeding. All of the patients questions were answered, he demonstrated understanding and freely consented to the procedure.
patient was admitted to [facility] due to thoracic wall abscess at prior thoracotomy site  Hematology has been consulted given pancytopenia and hx of WM.   Pancytopenia likely multifactorial from infection, zosyn use and WM   BM biopsy today, results can be followed up outpt, no chemo planned during this hospitalization.   rec check CBC WITH DIFF daily  f/u with his hematologist Dr. Griffith after discharge

## 2022-01-18 NOTE — DISCHARGE NOTE PROVIDER - ATTENDING DISCHARGE PHYSICAL EXAMINATION:
T(C): 36.4 (01-19-22 @ 06:05), Max: 36.9 (01-19-22 @ 01:49)  HR: 76 (01-19-22 @ 06:05) (71 - 76)  BP: 109/55 (01-19-22 @ 06:05) (109/55 - 140/60)  RR: 18 (01-19-22 @ 06:05) (16 - 18)  SpO2: 97% (01-19-22 @ 06:05) (97% - 100%)    CONSTITUTIONAL: Well groomed, no apparent distress  EYES: PERRLA and symmetric, EOMI, No conjunctival or scleral injection, non-icteric  RESPIRATORY: No respiratory distress, no use of accessory muscles; No breath sounds on Left lung field 2/2 pneumonectomy, r lung field CTA  CARDIOVASCULAR: RRRR, +S1S2, no murmurs  GASTROINTESTINAL: Soft, non tender, non distended  MUSCULOSKELETAL: Normal gait and station  SKIN: IR drain in lower thoracic left back insertion site dressing c/d/i   PSYCHIATRIC: Appropriate insight/judgment; A+O x 3, mood and affect appropriate, recent/remote memory intact

## 2022-01-18 NOTE — PROGRESS NOTE ADULT - SUBJECTIVE AND OBJECTIVE BOX
Infectious Diseases progress note:    Subjective: NAD, anxious to go home.  Afebrile, occasional cough.      ROS:  CONSTITUTIONAL:  No fever, chills, rigors  CARDIOVASCULAR:  No chest pain or palpitations  RESPIRATORY:   No SOB, cough, dyspnea on exertion.  No wheezing  GASTROINTESTINAL:  No abd pain, N/V, diarrhea/constipation  EXTREMITIES:  No swelling or joint pain  GENITOURINARY:  No burning on urination, increased frequency or urgency.  No flank pain  NEUROLOGIC:  No HA, visual disturbances  SKIN: No rashes    Allergies    No Known Allergies    Intolerances        ANTIBIOTICS/RELEVANT:  antimicrobials  cefTRIAXone   IVPB 2 milliGRAM(s) IV Intermittent every 24 hours    immunologic:    OTHER:  acetaminophen     Tablet .. 650 milliGRAM(s) Oral every 6 hours PRN  heparin   Injectable 5000 Unit(s) SubCutaneous every 8 hours  hydrOXYzine hydrochloride 50 milliGRAM(s) Oral two times a day PRN  oxyCODONE    IR 5 milliGRAM(s) Oral every 4 hours PRN  oxyCODONE    IR 10 milliGRAM(s) Oral every 4 hours PRN  pantoprazole    Tablet 40 milliGRAM(s) Oral before breakfast  polyethylene glycol 3350 17 Gram(s) Oral daily  senna 2 Tablet(s) Oral at bedtime  tamsulosin 0.4 milliGRAM(s) Oral at bedtime      Objective:  Vital Signs Last 24 Hrs  T(C): 36.4 (17 Jan 2022 18:11), Max: 36.8 (17 Jan 2022 04:00)  T(F): 97.6 (17 Jan 2022 18:11), Max: 98.2 (17 Jan 2022 04:00)  HR: 75 (17 Jan 2022 18:11) (61 - 78)  BP: 141/63 (17 Jan 2022 18:11) (109/49 - 141/63)  BP(mean): 68 (17 Jan 2022 11:00) (64 - 82)  RR: 18 (17 Jan 2022 18:11) (18 - 29)  SpO2: 99% (17 Jan 2022 18:11) (98% - 100%)    PHYSICAL EXAM:  Constitutional:NAD  Eyes:CHUCKY, EOMI  Ear/Nose/Throat: no thrush, mucositis.  Moist mucous membranes	  Neck:no JVD, no lymphadenopathy, supple  Respiratory: CTA juileta, L chest wall RIKKI drain with purulent output  Cardiovascular: S1S2 RRR, no murmurs  Gastrointestinal:soft, nontender,  nondistended (+) BS  Extremities:no e/e/c  Skin:  no rashes, open wounds or ulcerations        LABS:                        8.5    2.86  )-----------( 128      ( 17 Jan 2022 04:41 )             29.1     01-17    139  |  103  |  9   ----------------------------<  105<H>  3.7   |  27  |  0.99    Ca    8.2<L>      17 Jan 2022 04:41  Phos  2.3     01-17  Mg     2.30     01-17      PT/INR - ( 17 Jan 2022 04:41 )   PT: 13.3 sec;   INR: 1.16 ratio         PTT - ( 17 Jan 2022 04:41 )  PTT:23.5 sec            Vancomycin Level, Trough: 12.4 ug/mL (01-13 @ 19:43)              MICROBIOLOGY:    Culture - Blood (01.13.22 @ 12:36)   Specimen Source: .Blood Blood-Venous   Culture Results:   No growth to date.     Culture - Blood (01.13.22 @ 12:35)   Specimen Source: .Blood Blood-Venous   Culture Results:   No growth to date.       Culture - Fungal, Other (01.12.22 @ 15:10)   Specimen Source: .Other LEFT CHEST WALL   Culture Results:   Testing in progress     Culture - Abscess with Gram Stain (01.12.22 @ 10:00)   Specimen Source: .Abscess LEFT CHEST WALL   Culture Results:   Moderate Streptococcus mitis/oralis group "Susceptibilities not performed"     RADIOLOGY & ADDITIONAL STUDIES:      < from: Xray Chest 1 View- PORTABLE-Routine (01.17.22 @ 06:29) >    IMPRESSION: There is left-sided chest tube with complete opacification of   the left hemithorax with volume loss. The right lung is clear. Heart size   cannot be assessed. There is no change compared to the prior study.    < end of copied text >

## 2022-01-19 ENCOUNTER — TRANSCRIPTION ENCOUNTER (OUTPATIENT)
Age: 72
End: 2022-01-19

## 2022-01-19 VITALS
HEART RATE: 79 BPM | SYSTOLIC BLOOD PRESSURE: 136 MMHG | DIASTOLIC BLOOD PRESSURE: 50 MMHG | OXYGEN SATURATION: 100 % | RESPIRATION RATE: 18 BRPM | TEMPERATURE: 98 F

## 2022-01-19 LAB
ANION GAP SERPL CALC-SCNC: 7 MMOL/L — SIGNIFICANT CHANGE UP (ref 7–14)
BASOPHILS # BLD AUTO: 0.02 K/UL — SIGNIFICANT CHANGE UP (ref 0–0.2)
BASOPHILS NFR BLD AUTO: 0.8 % — SIGNIFICANT CHANGE UP (ref 0–2)
BUN SERPL-MCNC: 10 MG/DL — SIGNIFICANT CHANGE UP (ref 7–23)
CALCIUM SERPL-MCNC: 8.5 MG/DL — SIGNIFICANT CHANGE UP (ref 8.4–10.5)
CHLORIDE SERPL-SCNC: 103 MMOL/L — SIGNIFICANT CHANGE UP (ref 98–107)
CO2 SERPL-SCNC: 29 MMOL/L — SIGNIFICANT CHANGE UP (ref 22–31)
CREAT SERPL-MCNC: 1.06 MG/DL — SIGNIFICANT CHANGE UP (ref 0.5–1.3)
EOSINOPHIL # BLD AUTO: 0.03 K/UL — SIGNIFICANT CHANGE UP (ref 0–0.5)
EOSINOPHIL NFR BLD AUTO: 1.2 % — SIGNIFICANT CHANGE UP (ref 0–6)
GLUCOSE SERPL-MCNC: 104 MG/DL — HIGH (ref 70–99)
HCT VFR BLD CALC: 30.8 % — LOW (ref 39–50)
HGB BLD-MCNC: 8.9 G/DL — LOW (ref 13–17)
IANC: 1.25 K/UL — LOW (ref 1.5–8.5)
IMM GRANULOCYTES NFR BLD AUTO: 0.4 % — SIGNIFICANT CHANGE UP (ref 0–1.5)
INTERPRETATION SERPL IFE-IMP: SIGNIFICANT CHANGE UP
LYMPHOCYTES # BLD AUTO: 0.63 K/UL — LOW (ref 1–3.3)
LYMPHOCYTES # BLD AUTO: 26.1 % — SIGNIFICANT CHANGE UP (ref 13–44)
MAGNESIUM SERPL-MCNC: 2.4 MG/DL — SIGNIFICANT CHANGE UP (ref 1.6–2.6)
MCHC RBC-ENTMCNC: 27.4 PG — SIGNIFICANT CHANGE UP (ref 27–34)
MCHC RBC-ENTMCNC: 28.9 GM/DL — LOW (ref 32–36)
MCV RBC AUTO: 94.8 FL — SIGNIFICANT CHANGE UP (ref 80–100)
MONOCYTES # BLD AUTO: 0.47 K/UL — SIGNIFICANT CHANGE UP (ref 0–0.9)
MONOCYTES NFR BLD AUTO: 19.5 % — HIGH (ref 2–14)
NEUTROPHILS # BLD AUTO: 1.25 K/UL — LOW (ref 1.8–7.4)
NEUTROPHILS NFR BLD AUTO: 52 % — SIGNIFICANT CHANGE UP (ref 43–77)
NRBC # BLD: 0 /100 WBCS — SIGNIFICANT CHANGE UP
NRBC # FLD: 0 K/UL — SIGNIFICANT CHANGE UP
PHOSPHATE SERPL-MCNC: 2.7 MG/DL — SIGNIFICANT CHANGE UP (ref 2.5–4.5)
PLATELET # BLD AUTO: 129 K/UL — LOW (ref 150–400)
POTASSIUM SERPL-MCNC: 4 MMOL/L — SIGNIFICANT CHANGE UP (ref 3.5–5.3)
POTASSIUM SERPL-SCNC: 4 MMOL/L — SIGNIFICANT CHANGE UP (ref 3.5–5.3)
RBC # BLD: 3.25 M/UL — LOW (ref 4.2–5.8)
RBC # FLD: 18.2 % — HIGH (ref 10.3–14.5)
SODIUM SERPL-SCNC: 139 MMOL/L — SIGNIFICANT CHANGE UP (ref 135–145)
WBC # BLD: 2.41 K/UL — LOW (ref 3.8–10.5)
WBC # FLD AUTO: 2.41 K/UL — LOW (ref 3.8–10.5)

## 2022-01-19 PROCEDURE — 36573 INSJ PICC RS&I 5 YR+: CPT

## 2022-01-19 PROCEDURE — 71045 X-RAY EXAM CHEST 1 VIEW: CPT | Mod: 26

## 2022-01-19 RX ORDER — CEFTRIAXONE 500 MG/1
2000 INJECTION, POWDER, FOR SOLUTION INTRAMUSCULAR; INTRAVENOUS EVERY 24 HOURS
Refills: 0 | Status: DISCONTINUED | OUTPATIENT
Start: 2022-01-19 | End: 2022-01-19

## 2022-01-19 RX ORDER — CHLORHEXIDINE GLUCONATE 213 G/1000ML
1 SOLUTION TOPICAL
Refills: 0 | Status: DISCONTINUED | OUTPATIENT
Start: 2022-01-19 | End: 2022-01-19

## 2022-01-19 RX ORDER — SODIUM CHLORIDE 9 MG/ML
10 INJECTION INTRAMUSCULAR; INTRAVENOUS; SUBCUTANEOUS
Refills: 0 | Status: DISCONTINUED | OUTPATIENT
Start: 2022-01-19 | End: 2022-01-19

## 2022-01-19 RX ORDER — CEFTRIAXONE 500 MG/1
2 INJECTION, POWDER, FOR SOLUTION INTRAMUSCULAR; INTRAVENOUS EVERY 24 HOURS
Refills: 0 | Status: DISCONTINUED | OUTPATIENT
Start: 2022-01-19 | End: 2022-01-19

## 2022-01-19 RX ORDER — OXYCODONE HYDROCHLORIDE 5 MG/1
1 TABLET ORAL
Qty: 28 | Refills: 0
Start: 2022-01-19 | End: 2022-01-25

## 2022-01-19 RX ORDER — CEFTRIAXONE 500 MG/1
2000 INJECTION, POWDER, FOR SOLUTION INTRAMUSCULAR; INTRAVENOUS EVERY 24 HOURS
Refills: 0 | Status: DISCONTINUED | OUTPATIENT
Start: 2022-01-20 | End: 2022-01-19

## 2022-01-19 RX ADMIN — PANTOPRAZOLE SODIUM 40 MILLIGRAM(S): 20 TABLET, DELAYED RELEASE ORAL at 06:06

## 2022-01-19 RX ADMIN — HEPARIN SODIUM 5000 UNIT(S): 5000 INJECTION INTRAVENOUS; SUBCUTANEOUS at 06:05

## 2022-01-19 RX ADMIN — CEFTRIAXONE 100 MILLIGRAM(S): 500 INJECTION, POWDER, FOR SOLUTION INTRAMUSCULAR; INTRAVENOUS at 09:53

## 2022-01-19 NOTE — DISCHARGE NOTE NURSING/CASE MANAGEMENT/SOCIAL WORK - PATIENT PORTAL LINK FT
You can access the FollowMyHealth Patient Portal offered by [facility] by registering at the following website: [email or web address]. By joining [facility]’s FollowMyHealth portal, you will also be able to view your health information using other applications (apps) compatible with our system.

## 2022-01-19 NOTE — PROGRESS NOTE ADULT - PROVIDER SPECIALTY LIST ADULT
Cardiology
Critical Care
Critical Care
Intervent Radiology
Thoracic Surgery
Cardiology
Critical Care
Critical Care
Internal Medicine
Thoracic Surgery
Cardiology
Cardiology
Infectious Disease
Infectious Disease
Heme/Onc

## 2022-01-19 NOTE — DISCHARGE NOTE NURSING/CASE MANAGEMENT/SOCIAL WORK - NSDPDISTO_GEN_ALL_CORE
Pt with PICC line inserted today, dressing Clean dry and intact. IR drain to pouch in place to back site.  VS stable Afebrile. pt with positive bowel sounds lenin po diet. Voiding. seen by MD and cleared for DC to home with Home Care as per safe Dc plan./Home with home care

## 2022-01-19 NOTE — DISCHARGE NOTE NURSING/CASE MANAGEMENT/SOCIAL WORK - NSDCPNINST_GEN_ALL_CORE
PICC line inserted 1/19/2022 for Home IV Antibiotics. Maintain PICC line and sterile dressing clean dry and intact. To be changed by visiting nurse at visit.   Maintain dry sterile dressing in place to IR drain site,  Maintain incision clean and dry, call MD with any signs of infection such as fever, redness or drainage from site.  Continue to drink plenty of fluids and avoid heavy lifting strenuous activity as well as constipation which may be caused by taking narcotic pain. Follow up with Surgeon for follow-up post-op check as instructed, as well as PMD for continuity of care.

## 2022-01-19 NOTE — PROGRESS NOTE ADULT - ASSESSMENT
Subjective:  Patient is seen at bedside this AM. Denies acute onset chest pain, NVD, fevers, chills, SOB, CP, lightheadedess.     Vital Signs:  Vital Signs Last 24 Hrs  T(C): 36.4 (01-19-22 @ 06:05), Max: 36.9 (01-19-22 @ 01:49)  T(F): 97.5 (01-19-22 @ 06:05), Max: 98.4 (01-19-22 @ 01:49)  HR: 76 (01-19-22 @ 06:05) (71 - 76)  BP: 109/55 (01-19-22 @ 06:05) (109/55 - 140/60)  RR: 18 (01-19-22 @ 06:05) (16 - 18)  SpO2: 97% (01-19-22 @ 06:05) (97% - 100%) on (O2)    Telemetry/Alarms: NSR  General: WN/WD NAD  Neurology: Awake, nonfocal, KEY x 4  ENT:Gross hearing intact  Respiratory: no lung sound in left lung field 2/2 pneumonectomy, CTA on right lung field   CV: RRR on tele, S1, S2 no murmur appreciated  Skin: posterior thoracic left back collection significantly decreased with IR drain output sanguinopuruluent,placement c/d/i   Lymphatic: No Neck, axilla, groin LAD  Psych: Oriented x 3, normal affect  Incisions: IR placement site c/d/i  Tubes: L posterior thoracic back IR drain: sanguinopuruluent draining 100cc ON, insertion site dressing c/d/i  Relevant labs, radiology and Medications reviewed                        8.9    2.41  )-----------( 129      ( 19 Jan 2022 06:40 )             30.8     01-19    139  |  103  |  10  ----------------------------<  104<H>  4.0   |  29  |  1.06    Ca    8.5      19 Jan 2022 06:40  Phos  2.7     01-19  Mg     2.40     01-19    TPro  6.9  /  Alb  x   /  TBili  x   /  DBili  x   /  AST  x   /  ALT  x   /  AlkPhos  x   01-18      MEDICATIONS  (STANDING):  cefTRIAXone   IVPB 2 milliGRAM(s) IV Intermittent every 24 hours  heparin   Injectable 5000 Unit(s) SubCutaneous every 8 hours  pantoprazole    Tablet 40 milliGRAM(s) Oral before breakfast  polyethylene glycol 3350 17 Gram(s) Oral daily  senna 2 Tablet(s) Oral at bedtime  tamsulosin 0.4 milliGRAM(s) Oral at bedtime    MEDICATIONS  (PRN):  acetaminophen     Tablet .. 650 milliGRAM(s) Oral every 6 hours PRN Mild Pain (1 - 3)  hydrOXYzine hydrochloride 50 milliGRAM(s) Oral two times a day PRN Anxiety  oxyCODONE    IR 5 milliGRAM(s) Oral every 4 hours PRN Moderate Pain (4 - 6)  oxyCODONE    IR 10 milliGRAM(s) Oral every 4 hours PRN Severe Pain (7 - 10)    Pertinent Physical Exam  I&O's Summary    18 Jan 2022 07:01  -  19 Jan 2022 07:00  --------------------------------------------------------  IN: 240 mL / OUT: 980 mL / NET: -740 mL      Assessment  71y Male  w/ PAST MEDICAL & SURGICAL HISTORY:  BPH (benign prostatic hypertrophy)    Nephrolithiasis    Hydronephrosis    Lung cancer  Dxd 2015 treated with neoadjuvant chemo/RT followed by pneumonectomy (June 2015), SBRT to RUL nodule in Jan 2017    Lung nodule    H/O lithotripsy    S/P tonsillectomy    H/O pneumonectomy  Left June 2015    S/P removal of lung  RUL lung wedge resection on 2/23/18  Patient is a 71y old  Male who presents with a chief complaint of Left chest wall abscess (17 Jan 2022 09:38) sp Ir drain 1/12/2022    On 1/14/22, patient underwent Bronchoscopy, flexible, by CT surgery      PLAN  Neuro: Pain management  Pulm: Encourage coughing, deep breathing and use of incentive spirometry. Daily CXR.   Cardio: Monitor telemetry/alarms  GI: Tolerating diet. Continue stool softeners.  Renal: monitor urine output, supplement electrolytes as needed  Vasc: Heparin SC/SCDs for DVT prophylaxis  Heme: Stable H/H. f/u with heme as outpatient for eval of Waldenstrom's Macroglobinemia/pancytopenia and f/u BM Bx results    ID: c/w ceftriaxone. pt to get PICC today for long term IV abx, Rochepin for 4 weeks as per ID  Therapy: OOB/ambulate  Tubes:monitor IR tube output   Disposition: Aim to D/C to home today/tomorrow   Discussed with Cardiothoracic Team at AM rounds.

## 2022-01-19 NOTE — PROGRESS NOTE ADULT - SUBJECTIVE AND OBJECTIVE BOX
Subjective:  Patient is seen at bedside this AM. Denies acute onset chest pain, NVD, fevers, chills, SOB, CP, lightheadedness.     Vital Signs:  Vital Signs Last 24 Hrs  T(C): 36.4 (01-19-22 @ 06:05), Max: 36.9 (01-19-22 @ 01:49)  T(F): 97.5 (01-19-22 @ 06:05), Max: 98.4 (01-19-22 @ 01:49)  HR: 76 (01-19-22 @ 06:05) (71 - 76)  BP: 109/55 (01-19-22 @ 06:05) (109/55 - 140/60)  RR: 18 (01-19-22 @ 06:05) (16 - 18)  SpO2: 97% (01-19-22 @ 06:05) (97% - 100%) on (O2)    Telemetry/Alarms: NSR  General: WN/WD NAD  Respiratory: no lung sound in left lung field 2/2 pneumonectomy, CTA on right lung field   CV: RRR on tele, S1, S2 no murmur appreciated  Skin: posterior thoracic left back collection significantly decreased with IR drain output sanguinopuruluent,placement c/d/i   Lymphatic: No Neck, axilla, groin LAD  Psych: Oriented x 3, normal affect  Incisions: IR placement site c/d/i  Tubes: L posterior thoracic back IR drain: sanguinopuruluent draining 100cc ON, insertion site dressing c/d/i  Relevant labs, radiology and Medications reviewed                        8.9    2.41  )-----------( 129      ( 19 Jan 2022 06:40 )             30.8     01-19    139  |  103  |  10  ----------------------------<  104<H>  4.0   |  29  |  1.06    Ca    8.5      19 Jan 2022 06:40  Phos  2.7     01-19  Mg     2.40     01-19    TPro  6.9  /  Alb  x   /  TBili  x   /  DBili  x   /  AST  x   /  ALT  x   /  AlkPhos  x   01-18      MEDICATIONS  (STANDING):  cefTRIAXone   IVPB 2 milliGRAM(s) IV Intermittent every 24 hours  heparin   Injectable 5000 Unit(s) SubCutaneous every 8 hours  pantoprazole    Tablet 40 milliGRAM(s) Oral before breakfast  polyethylene glycol 3350 17 Gram(s) Oral daily  senna 2 Tablet(s) Oral at bedtime  tamsulosin 0.4 milliGRAM(s) Oral at bedtime    MEDICATIONS  (PRN):  acetaminophen     Tablet .. 650 milliGRAM(s) Oral every 6 hours PRN Mild Pain (1 - 3)  hydrOXYzine hydrochloride 50 milliGRAM(s) Oral two times a day PRN Anxiety  oxyCODONE    IR 5 milliGRAM(s) Oral every 4 hours PRN Moderate Pain (4 - 6)  oxyCODONE    IR 10 milliGRAM(s) Oral every 4 hours PRN Severe Pain (7 - 10)    Pertinent Physical Exam  I&O's Summary    18 Jan 2022 07:01  -  19 Jan 2022 07:00  --------------------------------------------------------  IN: 240 mL / OUT: 980 mL / NET: -740 mL      Assessment  71y Male  w/ PAST MEDICAL & SURGICAL HISTORY:  BPH (benign prostatic hypertrophy)    Nephrolithiasis    Hydronephrosis    Lung cancer  Dxd 2015 treated with neoadjuvant chemo/RT followed by pneumonectomy (June 2015), SBRT to RUL nodule in Jan 2017    Lung nodule    H/O lithotripsy    S/P tonsillectomy    H/O pneumonectomy  Left June 2015    S/P removal of lung  RUL lung wedge resection on 2/23/18  Patient is a 71y old  Male who presents with a chief complaint of Left chest wall abscess (17 Jan 2022 09:38) sp Ir drain 1/12/2022    On 1/14/22, patient underwent Bronchoscopy, flexible, by CT surgery      PLAN  Neuro: Pain management  Pulm: Encourage coughing, deep breathing and use of incentive spirometry. Daily CXR.   Cardio: Monitor telemetry/alarms  GI: Tolerating diet. Continue stool softeners.  Renal: monitor urine output, supplement electrolytes as needed  Vasc: Heparin SC/SCDs for DVT prophylaxis  Heme: Stable H/H. f/u with heme as outpatient for eval of Waldenstrom's Macroglobinemia/pancytopenia and f/u BM Bx results    ID: c/w ceftriaxone. pt to get PICC today for long term IV abx, Rochepin for 4 weeks as per ID  Therapy: OOB/ambulate  Tubes:monitor IR tube output   Disposition: Aim to D/C to home today/tomorrow   Discussed with Cardiothoracic Team at AM rounds.

## 2022-01-19 NOTE — DISCHARGE NOTE NURSING/CASE MANAGEMENT/SOCIAL WORK - NSDCPEFALRISK_GEN_ALL_CORE
For information on Fall & Injury Prevention, visit: [email or web address] OR  [email or web address] OR  https://www.cdc.gov/steadi/patient.html

## 2022-01-19 NOTE — PROGRESS NOTE ADULT - SUBJECTIVE AND OBJECTIVE BOX
DATE OF SERVICE: 01-19-22 @ 07:10    Subjective: Patient seen and examined. No new events except as noted.     SUBJECTIVE/ROS:  feels ok   No chest pain, dyspnea, palpitation, or dizziness.       MEDICATIONS:  MEDICATIONS  (STANDING):  cefTRIAXone   IVPB 2 milliGRAM(s) IV Intermittent every 24 hours  heparin   Injectable 5000 Unit(s) SubCutaneous every 8 hours  pantoprazole    Tablet 40 milliGRAM(s) Oral before breakfast  polyethylene glycol 3350 17 Gram(s) Oral daily  senna 2 Tablet(s) Oral at bedtime  tamsulosin 0.4 milliGRAM(s) Oral at bedtime      PHYSICAL EXAM:  T(C): 36.4 (01-19-22 @ 06:05), Max: 36.9 (01-19-22 @ 01:49)  HR: 76 (01-19-22 @ 06:05) (71 - 76)  BP: 109/55 (01-19-22 @ 06:05) (109/55 - 140/60)  RR: 18 (01-19-22 @ 06:05) (16 - 18)  SpO2: 97% (01-19-22 @ 06:05) (97% - 100%)  Wt(kg): --  I&O's Summary    18 Jan 2022 07:01  -  19 Jan 2022 07:00  --------------------------------------------------------  IN: 240 mL / OUT: 750 mL / NET: -510 mL            JVP: Normal  Neck: supple  Lung: clear   CV: S1 S2 , Murmur:  Abd: soft  Ext: No edema  neuro: Awake / alert  Psych: flat affect  Skin: normal``    LABS/DATA:    CARDIAC MARKERS:                                8.9    2.41  )-----------( 129      ( 19 Jan 2022 06:40 )             30.8     01-18    138  |  103  |  8   ----------------------------<  110<H>  4.0   |  28  |  0.94    Ca    8.4      18 Jan 2022 06:47    TPro  6.9  /  Alb  x   /  TBili  x   /  DBili  x   /  AST  x   /  ALT  x   /  AlkPhos  x   01-18    proBNP:   Lipid Profile:   HgA1c:   TSH:     TELE:  EKG:

## 2022-01-20 ENCOUNTER — NON-APPOINTMENT (OUTPATIENT)
Age: 72
End: 2022-01-20

## 2022-01-20 DIAGNOSIS — T81.40XA INFECTION FOLLOWING A PROCEDURE, UNSPECIFIED, INITIAL ENCOUNTER: ICD-10-CM

## 2022-01-20 LAB
% ALBUMIN: 35.6 % — SIGNIFICANT CHANGE UP
% ALPHA 1: 7.4 % — SIGNIFICANT CHANGE UP
% ALPHA 2: 16.8 % — SIGNIFICANT CHANGE UP
% BETA: 10.4 % — SIGNIFICANT CHANGE UP
% GAMMA: 29.8 % — SIGNIFICANT CHANGE UP
% M SPIKE: 17.6 % — SIGNIFICANT CHANGE UP
ALBUMIN SERPL ELPH-MCNC: 2.46 G/DL — LOW (ref 3.3–4.4)
ALBUMIN/GLOB SERPL ELPH: 0.6 RATIO — SIGNIFICANT CHANGE UP
ALPHA1 GLOB SERPL ELPH-MCNC: 0.51 G/DL — HIGH (ref 0.1–0.3)
ALPHA2 GLOB SERPL ELPH-MCNC: 1.2 G/DL — HIGH (ref 0.6–1)
B-GLOBULIN SERPL ELPH-MCNC: 0.72 G/DL — SIGNIFICANT CHANGE UP (ref 0.6–1.1)
GAMMA GLOBULIN: 2.06 G/DL — HIGH (ref 0.7–1.7)
HEMATOPATHOLOGY REPORT: SIGNIFICANT CHANGE UP
M-SPIKE: 1.2 G/DL — SIGNIFICANT CHANGE UP
PROT PATTERN SERPL ELPH-IMP: SIGNIFICANT CHANGE UP
PROT SERPL-MCNC: 6.9 G/DL — SIGNIFICANT CHANGE UP
TM INTERPRETATION: SIGNIFICANT CHANGE UP

## 2022-01-21 ENCOUNTER — NON-APPOINTMENT (OUTPATIENT)
Age: 72
End: 2022-01-21

## 2022-01-21 LAB
CHROM ANALY INTERPHASE BLD FISH-IMP: SIGNIFICANT CHANGE UP
CHROM ANALY INTERPHASE BLD FISH-IMP: SIGNIFICANT CHANGE UP
VISC SER: 1.7 — SIGNIFICANT CHANGE UP (ref 1.4–2.1)

## 2022-01-24 ENCOUNTER — APPOINTMENT (OUTPATIENT)
Dept: THORACIC SURGERY | Facility: CLINIC | Age: 72
End: 2022-01-24
Payer: MEDICARE

## 2022-01-24 VITALS
WEIGHT: 175 LBS | DIASTOLIC BLOOD PRESSURE: 71 MMHG | OXYGEN SATURATION: 99 % | SYSTOLIC BLOOD PRESSURE: 115 MMHG | BODY MASS INDEX: 24.5 KG/M2 | RESPIRATION RATE: 18 BRPM | HEIGHT: 71 IN | HEART RATE: 63 BPM

## 2022-01-24 PROCEDURE — 99213 OFFICE O/P EST LOW 20 MIN: CPT

## 2022-01-26 ENCOUNTER — TRANSCRIPTION ENCOUNTER (OUTPATIENT)
Age: 72
End: 2022-01-26

## 2022-01-26 NOTE — CONSULT LETTER
[Dear  ___] : Dear  [unfilled], [Consult Letter:] : I had the pleasure of evaluating your patient, [unfilled]. [( Thank you for referring [unfilled] for consultation for _____ )] : Thank you for referring [unfilled] for consultation for [unfilled] [Please see my note below.] : Please see my note below. [Consult Closing:] : Thank you very much for allowing me to participate in the care of this patient.  If you have any questions, please do not hesitate to contact me. [Sincerely,] : Sincerely, [DrChuck  ___] : Dr. GROVES [DrChuck ___] : Dr. GROVES [FreeTextEntry2] : Dr. Clara Estrella (Onc)\par Dr. Mauro Mckeon (RadOnc)\par Dr. Kendrick Deleon (PCP)  [FreeTextEntry3] : Jan Sims MD \par Attending Surgeon \par Division of Thoracic Surgery \par , Cardiovascular and Thoracic Surgery \par [facility]\par \par

## 2022-01-26 NOTE — PHYSICAL EXAM
[] : no respiratory distress [Auscultation Breath Sounds / Voice Sounds] : lungs were clear to auscultation bilaterally [Heart Rate And Rhythm] : heart rate was normal and rhythm regular [Heart Sounds] : normal S1 and S2 [Heart Sounds Gallop] : no gallops [Murmurs] : no murmurs [Heart Sounds Pericardial Friction Rub] : no pericardial rub [Examination Of The Chest] : the chest was normal in appearance [Chest Visual Inspection Thoracic Asymmetry] : no chest asymmetry [Diminished Respiratory Excursion] : normal chest expansion [FreeTextEntry1] : s/p drain

## 2022-01-26 NOTE — ASSESSMENT
[FreeTextEntry1] : 71 year old male. 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 In January of 2017 he completed SBRT to a RUL nodule.  He is s/p right VATS, RUL lung wedge resection on 2/23/18, pathology revealed scar with reactive changes consistent with radiation atypia.\par \par Patient was seen on 09/20/2021 with stable scan, I recommended patient to RTC in 1 yr with CT chest w/o contrast and F/u with urologist for renal US. \par \par Patient was sent to ED on 01/10/2022 due to tender fluctuant area at previous thoracotomy site, s/p placement of drain by IR, with 500 ml purulent drainage, fluid is positive for strep mitis and started on Rocephin, s/p PICC placement, plan for 4 weeks Rocephin. Patient is also s/p FB on 01/14/2022. \par \par CT chest on 01/16/2022: \par - Status post left pneumonectomy and right upper lobe wedge resection. Trachea and right-sided airways are patent. Emphysematous changes are present in the right lung.\par - Pigtail drain in place within the area of the previously visualized left posterior chest wall collection with no significant chest wall fluid collection visualized.\par - Redemonstrated intrathoracic fluid in the pneumonectomy bed which appears similar in size as compared with 1/10/2022. A bronchoscope was placed.  The trachea and right  mainstem bronchus and bronchial tree were grossly normal.  The left pneumonectomy stump was noted to be intact.  The stump was flush with the va.  It was irrigated.  No bubbles were noted.  The irrigation was evacuated from the airways and the bronchoscope  was removed.\par \par F/u with Hem/Onc for Waldenstrom macroglobulinemia, pancytopenia, s/p bone marrow bx on 01/18/2022. Path revealed hypercellular bone marrow with marked B cell lymphocytosis, moderated plasmacytosis, increased mast cells and present iron stores. These findings along with the most current lab findings, support a differential diagnosis which includes lymphoplasmacytic lymphoma/Waldenstrom macroglobulinemia vs marginal zone lymphoma. \par \par I have reviewed the patient's medical records and diagnostic images at time of this office consultation and have made the following recommendation:\par 1. Patient is doing well, left posterior drain is intact, no sign of infection, dressing was changed. Patient has an appointment with IR on 01/27/2022 for left posterior subcutaneous fluid collection drainage, will also add a diagnostic left thoracentesis for intrathoracic fluid collection. \par 2. RTC after.  \par \par I personally performed the services described in the documentation, reviewed the documentation recorded by the scribe in my presence and it accurately and completely records my words and actions.\par \par I, Milka Su NP, am scribing for and the presence of LAUREN Leach, the following sections HISTORY OF PRESENT ILLNESS, PAST MEDICAL/FAMILY/SOCIAL HISTORY; REVIEW OF SYSTEMS; VITAL SIGNS; PHYSICAL EXAM; DISPOSITION.\par

## 2022-01-26 NOTE — HISTORY OF PRESENT ILLNESS
[FreeTextEntry1] : 71 year old male. 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 In January of 2017 he completed SBRT to a RUL nodule.  He is s/p right VATS, RUL lung wedge resection on 2/23/18, pathology revealed scar with reactive changes consistent with radiation atypia.\par \par Patient was seen on 09/20/2021 with stable scan, I recommended patient to RTC in 1 yr with CT chest w/o contrast and F/u with urologist for renal US. \par \par Patient was sent to ED on 01/10/2022 due to tender fluctuant area at previous thoracotomy site, s/p placement of drain by IR, with 500 ml purulent drainage, fluid is positive for strep mitis and started on Rocephin, s/p PICC placement, plan for 4 weeks Rocephin. Patient is also s/p FB on 01/14/2022. \par \par CT chest on 01/16/2022: \par - Status post left pneumonectomy and right upper lobe wedge resection. Trachea and right-sided airways are patent. Emphysematous changes are present in the right lung.\par - Pigtail drain in place within the area of the previously visualized left posterior chest wall collection with no significant chest wall fluid collection visualized.\par - Redemonstrated intrathoracic fluid in the pneumonectomy bed which appears similar in size as compared with 1/10/2022. A bronchoscope was placed.  The trachea and right  mainstem bronchus and bronchial tree were grossly normal.  The left pneumonectomy stump was noted to be intact.  The stump was flush with the va.  It was irrigated.  No bubbles were noted.  The irrigation was evacuated from the airways and the bronchoscope  was removed.\par \par F/u with Hem/Onc for Waldenstrom macroglobulinemia, pancytopenia, s/p bone marrow bx on 01/18/2022. Path revealed hypercellular bone marrow with marked B cell lymphocytosis, moderated plasmacytosis, increased mast cells and present iron stores. These findings along with the most current lab findings, support a differential diagnosis which includes lymphoplasmacytic lymphoma/Waldenstrom macroglobulinemia vs marginal zone lymphoma. \par \par Patient is here today for a follow up. Patient stated pain at drainage site when moving, he is drain ~ 50 ml 2x/day. Patient denies shortness of breath, cough, chest pain, fever, chills.

## 2022-01-27 ENCOUNTER — RESULT REVIEW (OUTPATIENT)
Age: 72
End: 2022-01-27

## 2022-01-27 ENCOUNTER — OUTPATIENT (OUTPATIENT)
Dept: OUTPATIENT SERVICES | Facility: HOSPITAL | Age: 72
LOS: 1 days | End: 2022-01-27
Payer: MEDICARE

## 2022-01-27 ENCOUNTER — APPOINTMENT (OUTPATIENT)
Dept: CT IMAGING | Facility: HOSPITAL | Age: 72
End: 2022-01-27
Payer: MEDICARE

## 2022-01-27 ENCOUNTER — OUTPATIENT (OUTPATIENT)
Dept: OUTPATIENT SERVICES | Facility: HOSPITAL | Age: 72
LOS: 1 days | End: 2022-01-27

## 2022-01-27 DIAGNOSIS — C34.90 MALIGNANT NEOPLASM OF UNSPECIFIED PART OF UNSPECIFIED BRONCHUS OR LUNG: ICD-10-CM

## 2022-01-27 DIAGNOSIS — J90 PLEURAL EFFUSION, NOT ELSEWHERE CLASSIFIED: ICD-10-CM

## 2022-01-27 DIAGNOSIS — Z90.2 ACQUIRED ABSENCE OF LUNG [PART OF]: Chronic | ICD-10-CM

## 2022-01-27 DIAGNOSIS — Z98.89 OTHER SPECIFIED POSTPROCEDURAL STATES: Chronic | ICD-10-CM

## 2022-01-27 DIAGNOSIS — Z98.890 OTHER SPECIFIED POSTPROCEDURAL STATES: Chronic | ICD-10-CM

## 2022-01-27 LAB
ALBUMIN FLD-MCNC: 1.6 G/DL — SIGNIFICANT CHANGE UP
B PERT IGG+IGM PNL SER: ABNORMAL
CHROM ANALY OVERALL INTERP SPEC-IMP: SIGNIFICANT CHANGE UP
COLOR FLD: YELLOW
FLUID INTAKE SUBSTANCE CLASS: SIGNIFICANT CHANGE UP
FLUID SEGMENTED GRANULOCYTES: 90 % — SIGNIFICANT CHANGE UP
GLUCOSE FLD-MCNC: <5 MG/DL — SIGNIFICANT CHANGE UP
GRAM STN FLD: SIGNIFICANT CHANGE UP
LDH SERPL L TO P-CCNC: SIGNIFICANT CHANGE UP U/L
MONOS+MACROS # FLD: 10 % — SIGNIFICANT CHANGE UP
PROT FLD-MCNC: 3.8 G/DL — SIGNIFICANT CHANGE UP
RCV VOL RI: HIGH CELLS/UL (ref 0–5)
SPECIMEN SOURCE FLD: SIGNIFICANT CHANGE UP
SPECIMEN SOURCE: SIGNIFICANT CHANGE UP
TOTAL NUCLEATED CELL COUNT, BODY FLUID: HIGH CELLS/UL (ref 0–5)
TRIGL FLD-MCNC: 32 MG/DL — SIGNIFICANT CHANGE UP
TUBE TYPE: SIGNIFICANT CHANGE UP

## 2022-01-27 PROCEDURE — 88305 TISSUE EXAM BY PATHOLOGIST: CPT | Mod: 26

## 2022-01-27 PROCEDURE — 88112 CYTOPATH CELL ENHANCE TECH: CPT | Mod: 26

## 2022-01-27 PROCEDURE — 71250 CT THORAX DX C-: CPT | Mod: 26,MH

## 2022-01-27 PROCEDURE — 71045 X-RAY EXAM CHEST 1 VIEW: CPT | Mod: 26

## 2022-01-27 PROCEDURE — 32555 ASPIRATE PLEURA W/ IMAGING: CPT | Mod: LT

## 2022-01-27 PROCEDURE — 32999 UNLISTED PX LUNGS & PLEURA: CPT

## 2022-01-28 LAB
NIGHT BLUE STAIN TISS: SIGNIFICANT CHANGE UP
PH FLD: 6.5 — SIGNIFICANT CHANGE UP
SPECIMEN SOURCE: SIGNIFICANT CHANGE UP

## 2022-01-31 DIAGNOSIS — Z46.82 ENCOUNTER FOR FITTING AND ADJUSTMENT OF NON-VASCULAR CATHETER: ICD-10-CM

## 2022-01-31 DIAGNOSIS — C34.90 MALIGNANT NEOPLASM OF UNSPECIFIED PART OF UNSPECIFIED BRONCHUS OR LUNG: ICD-10-CM

## 2022-01-31 DIAGNOSIS — J90 PLEURAL EFFUSION, NOT ELSEWHERE CLASSIFIED: ICD-10-CM

## 2022-02-01 LAB
CULTURE RESULTS: SIGNIFICANT CHANGE UP
NON-GYNECOLOGICAL CYTOLOGY STUDY: SIGNIFICANT CHANGE UP
SPECIMEN SOURCE: SIGNIFICANT CHANGE UP

## 2022-02-07 ENCOUNTER — APPOINTMENT (OUTPATIENT)
Dept: THORACIC SURGERY | Facility: CLINIC | Age: 72
End: 2022-02-07
Payer: MEDICARE

## 2022-02-07 VITALS
RESPIRATION RATE: 18 BRPM | SYSTOLIC BLOOD PRESSURE: 116 MMHG | OXYGEN SATURATION: 98 % | WEIGHT: 180 LBS | HEIGHT: 71 IN | DIASTOLIC BLOOD PRESSURE: 73 MMHG | HEART RATE: 94 BPM | BODY MASS INDEX: 25.2 KG/M2

## 2022-02-07 PROCEDURE — 99214 OFFICE O/P EST MOD 30 MIN: CPT

## 2022-02-08 NOTE — REVIEW OF SYSTEMS
[As Noted in HPI] : as noted in HPI [Negative] : Heme/Lymph [FreeTextEntry6] : Increased drainage from pigail insertion site.

## 2022-02-08 NOTE — CONSULT LETTER
[FreeTextEntry2] : Dr. Clara Estrella (Onc)\par Dr. Mauro Mckeon (RadOnc)\par Dr. Kendrick Deleon (PCP)  [FreeTextEntry3] : Jan Sims MD \par Attending Surgeon \par Division of Thoracic Surgery \par , Cardiovascular and Thoracic Surgery \par [facility] [employer] [facility] at [facility]\par \par

## 2022-02-08 NOTE — PHYSICAL EXAM
[] : no respiratory distress [Respiration, Rhythm And Depth] : normal respiratory rhythm and effort [Exaggerated Use Of Accessory Muscles For Inspiration] : no accessory muscle use [Auscultation Breath Sounds / Voice Sounds] : lungs were clear to auscultation bilaterally [Examination Of The Chest] : the chest was normal in appearance [Chest Visual Inspection Thoracic Asymmetry] : no chest asymmetry [Diminished Respiratory Excursion] : normal chest expansion [Breast Appearance] : normal in appearance [Breast Palpation Mass] : no palpable masses [No CVA Tenderness] : no ~M costovertebral angle tenderness [No Spinal Tenderness] : no spinal tenderness [Abnormal Walk] : normal gait [Oriented To Time, Place, And Person] : oriented to person, place, and time [FreeTextEntry1] : Skin sorrounding pigtail intact. No maceration.

## 2022-02-08 NOTE — HISTORY OF PRESENT ILLNESS
[FreeTextEntry1] : 71 year old male. He has a history of squamous cell lung Ca stage III (T3 N2) of LETY with neoadjuvant chemo/RT, followed by Left VATS, left thoracotomy, pneumonectomy, resection of first, second, third ribs on 06/23/2015. Pathology revealed [id number]. In January of 2017, he completed SBRT to a RUL nodule, and then subseequently underwent  right VATS, RUL lung wedge resection on 2/23/18. Pathology revealed scar with reactive changes consistent with radiation atypia. At outpatient follow up visit on 1/10/2022, he c/o lethargy, weight loss and pain at L thoracotomy site, and was found to have fluctuant collection. CT chest showing new 3.0 x 12.4 x 10.1 cm lesion centered along the left subscapularis muscle. The lesion extends along the chest wall posterior to and communicates with the pneumonectomy bed, just inferior to the second rib resection and along the posterior left third, fourth, and fifth rib interspaces. s/p placement of drain by IR w/ 500cc purulent drainage.  Fluid positive for strep mitis, ID consulted; PICC placed and plan for 4 week course of Rocephin.  s/p bronch 01/14/2022; The left pneumonectomy stump was noted to be intact. The stump was flush with the va. It was irrigated. No bubbles were noted. \par \par Patient is s/p US guided left thoracentesis on 01/27/2022, 60 cc of purulent fluid was then aspirated, and a sample of fluid was sent for microbiological and chemical analysis. Culture and AFB no growth at 1 week. \par \par Of note: Being followed by Heme/Onc for Waldenstrom's macroglobinemia, pancytopenia. \par Bone marrow biopsy done on 1/18/22\par \par Patient presents to office for follow up. No worsening SOB; Pain to surgical site better; Endorses pigtail dressing frequently saturated and requiring multiple changes daily. Pigtail output approx 40ml daily. No fever, chills or night sweats. Wife is helping with dressing changes and administering IV antibiotics.

## 2022-02-08 NOTE — ASSESSMENT
[FreeTextEntry1] : 71 year old male. He has a history of squamous cell lung Ca stage III (T3 N2) of LETY with neoadjuvant chemo/RT, followed by Left VATS, left thoracotomy, pneumonectomy, resection of first, second, third ribs on 06/23/2015. Pathology revealed [id number]. In January of 2017, he completed SBRT to a RUL nodule, and then subseequently underwent  right VATS, RUL lung wedge resection on 2/23/18. Pathology revealed scar with reactive changes consistent with radiation atypia. At outpatient follow up visit on 1/10/2022, he c/o lethargy, weight loss and pain at L thoracotomy site, and was found to have fluctuant collection. CT chest showing new 3.0 x 12.4 x 10.1 cm lesion centered along the left subscapularis muscle. The lesion extends along the chest wall posterior to and communicates with the pneumonectomy bed, just inferior to the second rib resection and along the posterior left third, fourth, and fifth rib interspaces. s/p placement of drain by IR w/ 500cc purulent drainage.  Fluid positive for strep mitis, ID consulted; PICC placed and plan for 4 week course of Rocephin.  s/p bronch 01/14/2022; The left pneumonectomy stump was noted to be intact. The stump was flush with the va. It was irrigated. No bubbles were noted. \par \par I have independently reviewed the medical records and imaging at the time of this office consultation. Patient feeling better. Respiratory status stable. 1/27/22 Cultures with no growth to date.Case reviewed with Dr. Espinoza. Will lengthen course of antibiotic treatment for another week. Recommendation to return to clinic next week with repeat CXR for clinical re-assessment and to evaluate necessity of pigtail catheter. No erythema noted to pigtail insertion site. Surrounding skin intact and not macerated. Dressing change technique reviewed with wife. Supplies given. \par \par Recommendations reviewed with patient during this office visit, and all questions answered; Patient instructed on the importance of follow up and verbalizes understanding.\par \par I personally performed the services described in the documentation, reviewed the documentation recorded by the scribe in my presence and it accurately and completely records my words and actions.\par \par I, Parisa Awad ANP-C, am scribing for and the presence of LAUREN Leach, the following sections HISTORY OF PRESENT ILLNESS, PAST MEDICAL/FAMILY/SOCIAL HISTORY; REVIEW OF SYSTEMS; VITAL SIGNS; PHYSICAL EXAM; DISPOSITION.\par \par

## 2022-02-10 ENCOUNTER — LABORATORY RESULT (OUTPATIENT)
Age: 72
End: 2022-02-10

## 2022-02-11 LAB
BASOPHILS # BLD AUTO: 0.02 K/UL
BASOPHILS NFR BLD AUTO: 0.5 %
CALCIUM SERPL-MCNC: 8.8 MG/DL
CREAT SERPL-MCNC: 0.93 MG/DL
DEPRECATED KAPPA LC FREE/LAMBDA SER: 4.75 RATIO
DEPRECATED KAPPA LC FREE/LAMBDA SER: 4.75 RATIO
EOSINOPHIL # BLD AUTO: 0.04 K/UL
EOSINOPHIL NFR BLD AUTO: 1 %
HCT VFR BLD CALC: 31.9 %
HGB BLD-MCNC: 9 G/DL
IGA SER QL IEP: 146 MG/DL
IGG SER QL IEP: 912 MG/DL
IGM SER QL IEP: 1945 MG/DL
IMM GRANULOCYTES NFR BLD AUTO: 0.2 %
KAPPA LC CSF-MCNC: 3.21 MG/DL
KAPPA LC CSF-MCNC: 3.21 MG/DL
KAPPA LC SERPL-MCNC: 15.24 MG/DL
KAPPA LC SERPL-MCNC: 15.24 MG/DL
LDH SERPL-CCNC: 108 U/L
LYMPHOCYTES # BLD AUTO: 0.92 K/UL
LYMPHOCYTES NFR BLD AUTO: 21.9 %
MAN DIFF?: NORMAL
MCHC RBC-ENTMCNC: 27.8 PG
MCHC RBC-ENTMCNC: 28.2 GM/DL
MCV RBC AUTO: 98.5 FL
MONOCYTES # BLD AUTO: 0.63 K/UL
MONOCYTES NFR BLD AUTO: 15 %
NEUTROPHILS # BLD AUTO: 2.59 K/UL
NEUTROPHILS NFR BLD AUTO: 61.4 %
PLATELET # BLD AUTO: 155 K/UL
RBC # BLD: 3.24 M/UL
RBC # FLD: 17.6 %
WBC # FLD AUTO: 4.21 K/UL

## 2022-02-12 LAB
CULTURE RESULTS: SIGNIFICANT CHANGE UP
SPECIMEN SOURCE: SIGNIFICANT CHANGE UP

## 2022-02-13 ENCOUNTER — OUTPATIENT (OUTPATIENT)
Dept: OUTPATIENT SERVICES | Facility: HOSPITAL | Age: 72
LOS: 1 days | Discharge: HOME | End: 2022-02-13

## 2022-02-13 DIAGNOSIS — Z90.2 ACQUIRED ABSENCE OF LUNG [PART OF]: Chronic | ICD-10-CM

## 2022-02-13 DIAGNOSIS — Z98.890 OTHER SPECIFIED POSTPROCEDURAL STATES: Chronic | ICD-10-CM

## 2022-02-13 DIAGNOSIS — Z98.89 OTHER SPECIFIED POSTPROCEDURAL STATES: Chronic | ICD-10-CM

## 2022-02-13 DIAGNOSIS — C34.90 MALIGNANT NEOPLASM OF UNSPECIFIED PART OF UNSPECIFIED BRONCHUS OR LUNG: ICD-10-CM

## 2022-02-14 ENCOUNTER — APPOINTMENT (OUTPATIENT)
Dept: THORACIC SURGERY | Facility: CLINIC | Age: 72
End: 2022-02-14
Payer: MEDICARE

## 2022-02-14 ENCOUNTER — OUTPATIENT (OUTPATIENT)
Dept: OUTPATIENT SERVICES | Facility: HOSPITAL | Age: 72
LOS: 1 days | End: 2022-02-14
Payer: MEDICARE

## 2022-02-14 ENCOUNTER — APPOINTMENT (OUTPATIENT)
Dept: RADIOLOGY | Facility: HOSPITAL | Age: 72
End: 2022-02-14

## 2022-02-14 ENCOUNTER — RESULT REVIEW (OUTPATIENT)
Age: 72
End: 2022-02-14

## 2022-02-14 VITALS
HEART RATE: 105 BPM | BODY MASS INDEX: 25.9 KG/M2 | RESPIRATION RATE: 18 BRPM | HEIGHT: 71 IN | DIASTOLIC BLOOD PRESSURE: 59 MMHG | WEIGHT: 185 LBS | OXYGEN SATURATION: 97 % | SYSTOLIC BLOOD PRESSURE: 92 MMHG

## 2022-02-14 DIAGNOSIS — Z90.2 ACQUIRED ABSENCE OF LUNG [PART OF]: Chronic | ICD-10-CM

## 2022-02-14 DIAGNOSIS — Z98.89 OTHER SPECIFIED POSTPROCEDURAL STATES: Chronic | ICD-10-CM

## 2022-02-14 DIAGNOSIS — Z98.890 OTHER SPECIFIED POSTPROCEDURAL STATES: Chronic | ICD-10-CM

## 2022-02-14 DIAGNOSIS — R22.2 LOCALIZED SWELLING, MASS AND LUMP, TRUNK: ICD-10-CM

## 2022-02-14 PROCEDURE — 99213 OFFICE O/P EST LOW 20 MIN: CPT

## 2022-02-14 PROCEDURE — 71046 X-RAY EXAM CHEST 2 VIEWS: CPT | Mod: 26

## 2022-02-15 LAB
ALBUMIN MFR SERPL ELPH: 37.2 %
ALBUMIN SERPL-MCNC: 2.6 G/DL
ALBUMIN/GLOB SERPL: 0.6 RATIO
ALPHA1 GLOB MFR SERPL ELPH: 8.3 %
ALPHA1 GLOB SERPL ELPH-MCNC: 0.6 G/DL
ALPHA2 GLOB MFR SERPL ELPH: 15 %
ALPHA2 GLOB SERPL ELPH-MCNC: 1.1 G/DL
B-GLOBULIN MFR SERPL ELPH: 9.7 %
B-GLOBULIN SERPL ELPH-MCNC: 0.7 G/DL
GAMMA GLOB FLD ELPH-MCNC: 2.1 G/DL
GAMMA GLOB MFR SERPL ELPH: 29.8 %
INTERPRETATION SERPL IEP-IMP: NORMAL
M PROTEIN MFR SERPL ELPH: 18.7 %
MONOCLON BAND OBS SERPL: 1.3 G/DL
PROT SERPL-MCNC: 7.1 G/DL
PROT SERPL-MCNC: 7.1 G/DL
VISCOSITY, SERUM: 2

## 2022-02-16 ENCOUNTER — APPOINTMENT (OUTPATIENT)
Dept: HEMATOLOGY ONCOLOGY | Facility: CLINIC | Age: 72
End: 2022-02-16
Payer: MEDICARE

## 2022-02-16 VITALS
HEART RATE: 96 BPM | BODY MASS INDEX: 24.14 KG/M2 | RESPIRATION RATE: 18 BRPM | DIASTOLIC BLOOD PRESSURE: 74 MMHG | SYSTOLIC BLOOD PRESSURE: 122 MMHG | OXYGEN SATURATION: 100 % | WEIGHT: 173.06 LBS | TEMPERATURE: 97.2 F

## 2022-02-16 DIAGNOSIS — D47.2 MONOCLONAL GAMMOPATHY: ICD-10-CM

## 2022-02-16 PROCEDURE — 99214 OFFICE O/P EST MOD 30 MIN: CPT

## 2022-02-16 NOTE — PHYSICAL EXAM
[Ambulatory and capable of all self care but unable to carry out any work activities] : Status 2- Ambulatory and capable of all self care but unable to carry out any work activities. Up and about more than 50% of waking hours [Thin] : thin [Normal] : affect appropriate [de-identified] : no left breath sounds.  CTA on right [de-identified] : bandage over left CW drain-draining cloudy fluid

## 2022-02-16 NOTE — HISTORY OF PRESENT ILLNESS
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 He received neoadjuvant radiation and Taxol/Carboplatin chemotherapy followed by left pneumonectomy-No residual tumor at time of surgery. \par 11/2016-PET/CT scan showed minimally hypermetabolic right apical lung nodule, increasing in size on serial diagnostic CT scans. Poor visualization/nonvisualization of ground glass right upper lung opacities.  Several hypermetabolic left internal mammary nodes, increased in size and number with new FDG activity compared to prior PET/CT scan 1/2017- FNA of left internal thoracic lymph node was negative for malignant cells. Cytology suggestive, but not diagnostic of reactive process.  Patient underwent SRS of right upper lung nodule.\par 2/2017-PET/CT scan-marked increased size and FDG avidity of right apical nodule, concerning for malignancy. The nodule has increased in solidity since CT chest 7/2017, and not significantly changed since 11/2017. Decreased FDG avidity of left internal mammary lymph node. No evidence of distant FDG avid metastatic disease.\par 2/2018- S/P right VATS lung wedge resection for enlarging right apical lung nodule on imaging with pathology revealing scar with reactive changes c/w radiation atypia.\par \par 10/2016-Serum immunofixation showed 3 IgM kappa bands, with urine immunofixation showing a weak IgM kappa band identified, and Bence-Steen protein kappa type.  Bone marrow biopsy, and bone marrow aspirate showed a patchy, normocellular bone marrow with trilineage hematopoiesis and maturation, iron stores present. 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  Cytogenetics showed a normal male karyotype. \par \par 1/18/2022-Bone marrow biopsy, bone marrow clot, and bone marrow aspirate:-hypercellular bone marrow with marked B-cell lymphocytosis (50%of total cellularity), moderate plasmacytosis (5-9% of cellularity), increased mast cells and present iron stores. Flow cytometry shows a very small monotypic population of B-cells (CD5 negative, CD10 negative) and too few plasma cells for definitive evalutation of clonality.\par \par  [de-identified] : squamous cell cancer [de-identified] : 1/2022-S/P hospitalization placement of drain by IR w/ 500 ml purulent drainage-abscess was at prior pneumonectomy site. Fluid positive for strep mitis, ID consulted; PICC placed and planned for 4 week course of Rocephin. s/p bronchoscopy on 01/14/2022; The left pneumonectomy stump was noted to be intact. The stump was flush with the va. It was irrigated. No bubbles were noted. \par BMB done in hospital.\par Pain at chest drain site with movement, though no pain at rest. Has another week of antibiotics. Seeing ID MD.\par Appetite  now "great" and sleeping much better.\par No complaints of cough. No hemoptysis. No fevers. No LN complaints. No visual complaints. No abnormal bruising. \par  \par Had flu shot.\par Had COVID vaccines (Moderna).\par \par \par \par

## 2022-02-16 NOTE — ASSESSMENT
[FreeTextEntry1] : Lab work, BMB pathology reports, Dr. Sims's 2/14/22 thoracic surgery note reviewed.\par #1) lung cancer-clinically stable, though 1/2022 course complicated by abscess at pneumonectomy site, still being treated. Continue expectant surveillance for his cancer. Patient will have next CAT scan of the chest per thoracic surgeon.\par \par #2) Waldenström's macroglobulinemia-1/2022 BMB findings support a differential diagnosis which includes lymphoplasmacytic lymphoma/Waldenstroms macroglobulinemia (LPL/WM) versus marginal zone lymphoma.\par Additional testing, including MYD88 and CXCR4 analysis will be performed and reported in a comprehensive addendum, which is pending. \par Suspect recent constitutional symptoms related to pulmonary abscess-they appear to be improving as infection is being treated. Holding om lymphoma treatment for now, especially while infection is being treated. Continue to follow closely clinically.\par \par #3) thrombocytopenia–platelet count currently WNL. Continue to monitor CBC with differential.  \par \par Patient was given the opportunity to ask questions. His questions have been answered to his apparent satisfaction at this time. He is agreeable to recommended f/u.\par

## 2022-02-18 NOTE — CONSULT LETTER
[Dear  ___] : Dear  [unfilled], [Consult Letter:] : I had the pleasure of evaluating your patient, [unfilled]. [( Thank you for referring [unfilled] for consultation for _____ )] : Thank you for referring [unfilled] for consultation for [unfilled] [Please see my note below.] : Please see my note below. [Consult Closing:] : Thank you very much for allowing me to participate in the care of this patient.  If you have any questions, please do not hesitate to contact me. [Sincerely,] : Sincerely, [DrChuck  ___] : Dr. GROVES [DrChuck ___] : Dr. GROVES [FreeTextEntry3] : Jan Sims MD \par Attending Surgeon \par Division of Thoracic Surgery \par , Cardiovascular and Thoracic Surgery \par [facility] at hospitals/[facility]\par \par  [FreeTextEntry2] : Dr. Clara Estrella (Onc)\par Dr. Mauro Mckeon (RadOnc)\par Dr. Kendrick Deleon (PCP)

## 2022-02-18 NOTE — ASSESSMENT
[FreeTextEntry1] : 71 year old male. 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 In January of 2017 he completed SBRT to a RUL nodule. He is s/p right VATS, RUL lung wedge resection on 2/23/18, pathology revealed scar with reactive changes consistent with radiation atypia.\par \par I have reviewed the patient's medical records and diagnostic images at time of this office consultation and have made the following recommendation:\par 1. Patient is still SOB on exertion, with 50-75mL drainage daily, I recommended patient to RTC in 1 week with CXR to re-evaluate, possible removal of Lt chest tube.\par \par \par I personally performed the services described in the documentation, reviewed the documentation recorded by the scribe in my presence and it accurately and completely records my words and actions.\par \par I, Jonathon Mallory NP, am scribing for and the presence of LAUREN Leach, the following sections HISTORY OF PRESENT ILLNESS, PAST MEDICAL/FAMILY/SOCIAL HISTORY; REVIEW OF SYSTEMS; VITAL SIGNS; PHYSICAL EXAM; DISPOSITION.\par \par

## 2022-02-18 NOTE — HISTORY OF PRESENT ILLNESS
[FreeTextEntry1] : 71 year old male. He has a history of squamous cell lung Ca stage III (T3 N2) of LETY with neoadjuvant chemo/RT, followed by Left VATS, left thoracotomy, pneumonectomy, resection of first, second, third ribs on 06/23/2015, pathology revealed [id number]. In January of 2017 he completed SBRT to a RUL nodule. He is s/p right VATS, RUL lung wedge resection on 2/23/18, pathology revealed scar with reactive changes consistent with radiation atypia.\par \par At outpatient follow up visit on 1/10/2022, he c/o lethargy, weight loss and pain at L thoracotomy site, and was found to have fluctuant collection. CT chest showing new 3.0 x 12.4 x 10.1 cm lesion centered along the left subscapularis muscle. The lesion extends along the chest wall posterior to and communicates with the pneumonectomy bed, just inferior to the second rib resection and along the posterior left third, fourth, and fifth rib interspaces. s/p placement of drain by IR w/ 500 ml purulent drainage.  Fluid positive for strep mitis, ID consulted; PICC placed and plan for 4 week course of Rocephin.  s/p bronchoscopy on 01/14/2022; The left pneumonectomy stump was noted to be intact. The stump was flush with the va. It was irrigated. No bubbles were noted. \par \par F/u with Hem/Onc for Waldenstrom macroglobulinemia, pancytopenia, s/p bone marrow bx on 01/18/2022. Path revealed hypercellular bone marrow with marked B cell lymphocytosis, moderated plasmacytosis, increased mast cells and present iron stores. These findings along with the most current lab findings, support a differential diagnosis which includes lymphoplasmacytic lymphoma/Waldenstrom macroglobulinemia vs marginal zone lymphoma. \par \par Patient is s/p US guided left thoracentesis on 01/27/2022, 60 cc of purulent fluid was then aspirated, and a sample of fluid was sent for microbiological and chemical analysis. Culture and AFB no growth at 1 week. \par \par Patient presents to office for follow up. Pigtail output approximately 50-75ml daily. Admits to SOB on moderate exertion. Denies CP or cough.

## 2022-02-18 NOTE — PHYSICAL EXAM
[Heart Rate And Rhythm] : heart rate was normal and rhythm regular [Heart Sounds] : normal S1 and S2 [Heart Sounds Gallop] : no gallops [Murmurs] : no murmurs [Heart Sounds Pericardial Friction Rub] : no pericardial rub [Examination Of The Chest] : the chest was normal in appearance [Diminished Respiratory Excursion] : normal chest expansion [Chest Visual Inspection Thoracic Asymmetry] : no chest asymmetry [FreeTextEntry1] : diminished BS to Lt lower lung

## 2022-02-18 NOTE — REASON FOR VISIT
[Follow-Up: _____] : a [unfilled] follow-up visit [Spouse] : spouse [Other: _____] : [unfilled] [FreeTextEntry1] : Squamous carcinoma of lung

## 2022-02-23 ENCOUNTER — APPOINTMENT (OUTPATIENT)
Dept: THORACIC SURGERY | Facility: CLINIC | Age: 72
End: 2022-02-23
Payer: MEDICARE

## 2022-02-23 ENCOUNTER — APPOINTMENT (OUTPATIENT)
Dept: RADIOLOGY | Facility: HOSPITAL | Age: 72
End: 2022-02-23

## 2022-02-23 ENCOUNTER — OUTPATIENT (OUTPATIENT)
Dept: OUTPATIENT SERVICES | Facility: HOSPITAL | Age: 72
LOS: 1 days | End: 2022-02-23
Payer: MEDICARE

## 2022-02-23 VITALS
OXYGEN SATURATION: 97 % | BODY MASS INDEX: 25.9 KG/M2 | HEART RATE: 130 BPM | HEIGHT: 71 IN | TEMPERATURE: 97.1 F | WEIGHT: 185 LBS | DIASTOLIC BLOOD PRESSURE: 70 MMHG | SYSTOLIC BLOOD PRESSURE: 116 MMHG

## 2022-02-23 DIAGNOSIS — C34.90 MALIGNANT NEOPLASM OF UNSPECIFIED PART OF UNSPECIFIED BRONCHUS OR LUNG: ICD-10-CM

## 2022-02-23 DIAGNOSIS — Z90.2 ACQUIRED ABSENCE OF LUNG [PART OF]: Chronic | ICD-10-CM

## 2022-02-23 DIAGNOSIS — Z98.890 OTHER SPECIFIED POSTPROCEDURAL STATES: Chronic | ICD-10-CM

## 2022-02-23 DIAGNOSIS — Z98.89 OTHER SPECIFIED POSTPROCEDURAL STATES: Chronic | ICD-10-CM

## 2022-02-23 DIAGNOSIS — J90 PLEURAL EFFUSION, NOT ELSEWHERE CLASSIFIED: ICD-10-CM

## 2022-02-23 PROCEDURE — 71046 X-RAY EXAM CHEST 2 VIEWS: CPT | Mod: 26

## 2022-02-23 PROCEDURE — 99212 OFFICE O/P EST SF 10 MIN: CPT

## 2022-02-25 NOTE — HISTORY OF PRESENT ILLNESS
[FreeTextEntry1] : 71 year old male. He has a history of squamous cell lung Ca stage III (T3 N2) of LETY with neoadjuvant chemo/RT, followed by Left VATS, left thoracotomy, pneumonectomy, resection of first, second, third ribs on 06/23/2015, pathology revealed [id number]. In January of 2017 he completed SBRT to a RUL nodule. He is s/p right VATS, RUL lung wedge resection on 2/23/18, pathology revealed scar with reactive changes consistent with radiation atypia.\par \par At outpatient follow up visit on 1/10/2022, he c/o lethargy, weight loss and pain at L thoracotomy site, and was found to have fluctuant collection. CT chest showing new 3.0 x 12.4 x 10.1 cm lesion centered along the left subscapularis muscle. The lesion extends along the chest wall posterior to and communicates with the pneumonectomy bed, just inferior to the second rib resection and along the posterior left third, fourth, and fifth rib interspaces. s/p placement of drain by IR w/ 500 ml purulent drainage.  Fluid positive for strep mitis, ID consulted; PICC placed and plan for 4 week course of Rocephin.  s/p bronchoscopy on 01/14/2022; The left pneumonectomy stump was noted to be intact. The stump was flush with the va. It was irrigated. No bubbles were noted. \par \par F/u with Hem/Onc for Waldenstrom macroglobulinemia, pancytopenia, s/p bone marrow bx on 01/18/2022. Path revealed hypercellular bone marrow with marked B cell lymphocytosis, moderated plasmacytosis, increased mast cells and present iron stores. These findings along with the most current lab findings, support a differential diagnosis which includes lymphoplasmacytic lymphoma/Waldenstrom macroglobulinemia vs marginal zone lymphoma. \par \par Patient is s/p US guided left thoracentesis on 01/27/2022, 60 cc of purulent fluid was then aspirated, and a sample of fluid was sent for microbiological and chemical analysis. Culture and AFB no growth at 1 week. \par \par CXR today stable\par \par Last visit 2/14/22- Pigtail output approximately 50-75ml daily. Admited to SOB on moderate exertion. \par \par Patient is here today for a follow up Pigtail with 50-75ml output. No worsening dyspnea, fever, chills, nightsweats or hemoptysis. \par \par

## 2022-02-25 NOTE — CONSULT LETTER
[Dear  ___] : Dear  [unfilled], [Courtesy Letter:] : I had the pleasure of seeing your patient, [unfilled], in my office today. [Please see my note below.] : Please see my note below. [Sincerely,] : Sincerely, [DrChuck  ___] : Dr. GROVES [DrChuck ___] : Dr. GROVES [FreeTextEntry2] : Dr. Clara Estrella (Onc)\par Dr. Mauro Mckeon (RadOnc)\par Dr. Kendrick Deleon (PCP)  [FreeTextEntry3] : Jan Sims MD \par Attending Surgeon \par Division of Thoracic Surgery \par , Cardiovascular and Thoracic Surgery \par [facility] at [facility]\par \par

## 2022-02-25 NOTE — PHYSICAL EXAM
[] : no respiratory distress [Respiration, Rhythm And Depth] : normal respiratory rhythm and effort [Exaggerated Use Of Accessory Muscles For Inspiration] : no accessory muscle use [Auscultation Breath Sounds / Voice Sounds] : lungs were clear to auscultation bilaterally [Examination Of The Chest] : the chest was normal in appearance [Chest Visual Inspection Thoracic Asymmetry] : no chest asymmetry [Diminished Respiratory Excursion] : normal chest expansion [Cervical Lymph Nodes Enlarged Posterior Bilaterally] : posterior cervical [Cervical Lymph Nodes Enlarged Anterior Bilaterally] : anterior cervical [Supraclavicular Lymph Nodes Enlarged Bilaterally] : supraclavicular [Skin Color & Pigmentation] : normal skin color and pigmentation [Oriented To Time, Place, And Person] : oriented to person, place, and time [FreeTextEntry1] : Tube insertion site to posterior chest wall intact. Scant to small amount of drainage noted around the area. No maceration

## 2022-02-25 NOTE — ASSESSMENT
[FreeTextEntry1] : 71 year old male. He has a history of squamous cell lung Ca stage III (T3 N2) of LETY with neoadjuvant chemo/RT, followed by Left VATS, left thoracotomy, pneumonectomy, resection of first, second, third ribs on 06/23/2015, pathology revealed [id number]. In January of 2017 he completed SBRT to a RUL nodule. He is s/p right VATS, RUL lung wedge resection on 2/23/18, pathology revealed scar with reactive changes consistent with radiation atypia.\par \par January, 2022; found to have left chest wall abscess; s/p IR guided drain. Fluid + for strep mitis. Completes 4 week course of Ceftriaxone today. Follows with Dr. Ирина Espinoza (ID). \par \par I have independently reviewed the medical records and imaging at the time of this office consultation. Daily outputs around 50 -75ml with purulent quality. Follows with Dr. Espinoza; Plan to start PO antibiotics tomorrow. CXR stable; good functional status with no worsening dyspnea.  Recommendation to return to clinic in 2 weeks with CXR to re-evaluate respiratory status and drain outputs. \par \par Recommendations reviewed with patient during this office visit, and all questions answered; Patient instructed on the importance of follow up and verbalizes understanding.\par \par I personally performed the services described in the documentation, reviewed the documentation recorded by the scribe in my presence and it accurately and completely records my words and actions.\par \par SARABJIT, KAEL Bazzi, am scribing for and the presence of JATINDER Sorensen, the following sections HISTORY OF PRESENT ILLNESS, PAST MEDICAL/FAMILY/SOCIAL HISTORY; REVIEW OF SYSTEMS; VITAL SIGNS; PHYSICAL EXAM; DISPOSITION.\par \par

## 2022-02-26 LAB
CULTURE RESULTS: SIGNIFICANT CHANGE UP
SPECIMEN SOURCE: SIGNIFICANT CHANGE UP

## 2022-02-28 ENCOUNTER — APPOINTMENT (OUTPATIENT)
Dept: FAMILY MEDICINE | Facility: CLINIC | Age: 72
End: 2022-02-28
Payer: MEDICARE

## 2022-02-28 VITALS
BODY MASS INDEX: 24.22 KG/M2 | SYSTOLIC BLOOD PRESSURE: 122 MMHG | WEIGHT: 173 LBS | DIASTOLIC BLOOD PRESSURE: 68 MMHG | HEIGHT: 71 IN | HEART RATE: 68 BPM | RESPIRATION RATE: 20 BRPM

## 2022-02-28 DIAGNOSIS — R63.4 ABNORMAL WEIGHT LOSS: ICD-10-CM

## 2022-02-28 PROCEDURE — 99214 OFFICE O/P EST MOD 30 MIN: CPT

## 2022-02-28 NOTE — HISTORY OF PRESENT ILLNESS
[de-identified] : Presents for general follow-up - states feeling progressively better; trying to eat more - note wt felt to have stabilized - wt of 185 at prior visit inaccurate.  Just completed IV antibiotics and is now taking oral Augmentin.  Denies breathing issues.

## 2022-02-28 NOTE — PHYSICAL EXAM
[No Acute Distress] : no acute distress [No Respiratory Distress] : no respiratory distress  [No Accessory Muscle Use] : no accessory muscle use [Clear to Auscultation] : lungs were clear to auscultation bilaterally [Normal] : normal rate, regular rhythm, normal S1 and S2 and no murmur heard [Soft] : abdomen soft [No Edema] : there was no peripheral edema [Non Tender] : non-tender [No CVA Tenderness] : no CVA  tenderness [No Spinal Tenderness] : no spinal tenderness [Coordination Grossly Intact] : coordination grossly intact [No Focal Deficits] : no focal deficits [Normal Gait] : normal gait [Alert and Oriented x3] : oriented to person, place, and time [de-identified] : decreased BS L lung field consistent with history [de-identified] : no mass noted; drain intact

## 2022-03-01 RX ADMIN — OXYCODONE HYDROCHLORIDE 5 MILLIGRAM(S): 5 TABLET ORAL at 22:31

## 2022-03-07 NOTE — DISCHARGE NOTE NURSING/CASE MANAGEMENT/SOCIAL WORK - NSDCPECAREGIVERED_GEN_ALL_CORE
Medline and carenotes for surgical procedure How to care for IR drain, PICC Line care, Ceftriaxone,  as well as DC Medications and side effects literature for patient reference./Yes 2

## 2022-03-11 PROBLEM — J90 PLEURAL EFFUSION ON LEFT: Status: ACTIVE | Noted: 2022-01-24

## 2022-03-14 ENCOUNTER — RESULT REVIEW (OUTPATIENT)
Age: 72
End: 2022-03-14

## 2022-03-14 ENCOUNTER — APPOINTMENT (OUTPATIENT)
Dept: THORACIC SURGERY | Facility: CLINIC | Age: 72
End: 2022-03-14
Payer: MEDICARE

## 2022-03-14 ENCOUNTER — OUTPATIENT (OUTPATIENT)
Dept: OUTPATIENT SERVICES | Facility: HOSPITAL | Age: 72
LOS: 1 days | End: 2022-03-14
Payer: MEDICARE

## 2022-03-14 ENCOUNTER — APPOINTMENT (OUTPATIENT)
Dept: RADIOLOGY | Facility: HOSPITAL | Age: 72
End: 2022-03-14

## 2022-03-14 VITALS
OXYGEN SATURATION: 100 % | BODY MASS INDEX: 24.22 KG/M2 | HEART RATE: 93 BPM | WEIGHT: 173 LBS | HEIGHT: 71 IN | SYSTOLIC BLOOD PRESSURE: 110 MMHG | DIASTOLIC BLOOD PRESSURE: 70 MMHG

## 2022-03-14 DIAGNOSIS — R22.2 LOCALIZED SWELLING, MASS AND LUMP, TRUNK: ICD-10-CM

## 2022-03-14 DIAGNOSIS — Z98.89 OTHER SPECIFIED POSTPROCEDURAL STATES: Chronic | ICD-10-CM

## 2022-03-14 DIAGNOSIS — J90 PLEURAL EFFUSION, NOT ELSEWHERE CLASSIFIED: ICD-10-CM

## 2022-03-14 DIAGNOSIS — Z98.890 OTHER SPECIFIED POSTPROCEDURAL STATES: Chronic | ICD-10-CM

## 2022-03-14 DIAGNOSIS — Z90.2 ACQUIRED ABSENCE OF LUNG [PART OF]: Chronic | ICD-10-CM

## 2022-03-14 PROCEDURE — 71046 X-RAY EXAM CHEST 2 VIEWS: CPT | Mod: 26

## 2022-03-14 PROCEDURE — 99215 OFFICE O/P EST HI 40 MIN: CPT

## 2022-03-14 RX ORDER — HYDROXYZINE HYDROCHLORIDE 50 MG/1
50 TABLET ORAL
Qty: 30 | Refills: 1 | Status: DISCONTINUED | COMMUNITY
Start: 2021-11-30 | End: 2022-03-14

## 2022-03-14 RX ORDER — AMOXICILLIN AND CLAVULANATE POTASSIUM 875; 125 MG/1; MG/1
875-125 TABLET, COATED ORAL
Qty: 28 | Refills: 0 | Status: DISCONTINUED | COMMUNITY
Start: 2022-02-23 | End: 2022-03-14

## 2022-03-14 RX ORDER — OXYCODONE 5 MG/1
5 TABLET ORAL
Qty: 120 | Refills: 0 | Status: DISCONTINUED | COMMUNITY
Start: 2022-01-20 | End: 2022-03-14

## 2022-03-17 ENCOUNTER — APPOINTMENT (OUTPATIENT)
Dept: CT IMAGING | Facility: HOSPITAL | Age: 72
End: 2022-03-17
Payer: MEDICARE

## 2022-03-17 ENCOUNTER — OUTPATIENT (OUTPATIENT)
Dept: OUTPATIENT SERVICES | Facility: HOSPITAL | Age: 72
LOS: 1 days | End: 2022-03-17
Payer: MEDICARE

## 2022-03-17 DIAGNOSIS — C34.90 MALIGNANT NEOPLASM OF UNSPECIFIED PART OF UNSPECIFIED BRONCHUS OR LUNG: ICD-10-CM

## 2022-03-17 DIAGNOSIS — Z98.89 OTHER SPECIFIED POSTPROCEDURAL STATES: Chronic | ICD-10-CM

## 2022-03-17 DIAGNOSIS — Z98.890 OTHER SPECIFIED POSTPROCEDURAL STATES: Chronic | ICD-10-CM

## 2022-03-17 DIAGNOSIS — Z90.2 ACQUIRED ABSENCE OF LUNG [PART OF]: Chronic | ICD-10-CM

## 2022-03-17 PROCEDURE — 71250 CT THORAX DX C-: CPT | Mod: 26,MG

## 2022-03-17 PROCEDURE — 71250 CT THORAX DX C-: CPT | Mod: MG

## 2022-03-17 PROCEDURE — G1004: CPT

## 2022-03-17 NOTE — CONSULT LETTER
[Dear  ___] : Dear  [unfilled], [Courtesy Letter:] : I had the pleasure of seeing your patient, [unfilled], in my office today. [Please see my note below.] : Please see my note below. [Sincerely,] : Sincerely, [DrChuck  ___] : Dr. GROVES [DrChuck ___] : Dr. GROVES [FreeTextEntry2] : Dr. Clara Estrella (Onc)\par Dr. Mauro Mckeon (RadOnc)\par Dr. Kendrick Deleon (PCP)  [FreeTextEntry3] : Jan Sims MD \par Attending Surgeon \par Division of Thoracic Surgery \par , Cardiovascular and Thoracic Surgery \par [facility] at [facility]\par \par

## 2022-03-17 NOTE — ASSESSMENT
[FreeTextEntry1] : 71 year old male. He has a history of squamous cell lung Ca stage III (T3 N2) of LETY with neoadjuvant chemo/RT, followed by Left VATS, left thoracotomy, pneumonectomy, resection of first, second, third ribs on 06/23/2015, pathology revealed [id number]. In January of 2017 he completed SBRT to a RUL nodule. He is s/p right VATS, RUL lung wedge resection on 2/23/18, pathology revealed scar with reactive changes consistent with radiation atypia.\par \par Patient is s/p US guided left thoracentesis on 01/27/2022, 60cc of purulent fluid was then aspirated, and a sample of fluid was sent for microbiological and chemical analysis. Culture and AFB no growth at 1 week. \par \par I have reviewed the patient's medical records and diagnostic images at time of this office consultation and have made the following recommendation:\par 1. I recommended a repeat CT Chest w/o contrast to re-evaluate Lt pleural effusion. \par 2. RTC via Tele after CT scan to discuss next step.\par \par \par I personally performed the services described in the documentation, reviewed the documentation recorded by the scribe in my presence and it accurately and completely records my words and actions.\par \par I, Jonathon Mallory NP, am scribing for and the presence of LAUREN Leach, the following sections HISTORY OF PRESENT ILLNESS, PAST MEDICAL/FAMILY/SOCIAL HISTORY; REVIEW OF SYSTEMS; VITAL SIGNS; PHYSICAL EXAM; DISPOSITION.\par \par

## 2022-03-17 NOTE — HISTORY OF PRESENT ILLNESS
[FreeTextEntry1] : 71 year old male. He has a history of squamous cell lung Ca stage III (T3 N2) of LETY with neoadjuvant chemo/RT, followed by Left VATS, left thoracotomy, pneumonectomy, resection of first, second, third ribs on 06/23/2015, pathology revealed [id number]. In January of 2017 he completed SBRT to a RUL nodule. He is s/p right VATS, RUL lung wedge resection on 2/23/18, pathology revealed scar with reactive changes consistent with radiation atypia.\par \par At outpatient follow up visit on 1/10/2022, he c/o lethargy, weight loss and pain at L thoracotomy site, and was found to have fluctuant collection. CT chest showing new 3.0 x 12.4 x 10.1 cm lesion centered along the left subscapularis muscle. The lesion extends along the chest wall posterior to and communicates with the pneumonectomy bed, just inferior to the second rib resection and along the posterior left third, fourth, and fifth rib interspaces. s/p placement of drain by IR w/ 500 ml purulent drainage.  Fluid positive for strep mitis, ID consulted; PICC placed and plan for 4 week course of Rocephin.  s/p bronchoscopy on 01/14/2022; The left pneumonectomy stump was noted to be intact. The stump was flush with the va. It was irrigated. No bubbles were noted. \par \par F/u with Hem/Onc for Waldenstrom macroglobulinemia, pancytopenia, s/p bone marrow bx on 01/18/2022. Path revealed hypercellular bone marrow with marked B cell lymphocytosis, moderated plasmacytosis, increased mast cells and present iron stores. These findings along with the most current lab findings, support a differential diagnosis which includes lymphoplasmacytic lymphoma/Waldenstrom macroglobulinemia vs marginal zone lymphoma. \par \par Patient is s/p US guided left thoracentesis on 01/27/2022, 60cc of purulent fluid was then aspirated, and a sample of fluid was sent for microbiological and chemical analysis. Culture and AFB no growth at 1 week. \par \par F/u with Dr. Espinoza, started on PO abx on 02/24/2022 and completed on 3/10/22.\par \par Patient has a drain to his Lt upper back, it is currently draining ~50ml output daily. \par \par Patient is here today for a follow up. Will be seeing Dr. Dalton next week.\par \par

## 2022-03-19 LAB
CULTURE RESULTS: SIGNIFICANT CHANGE UP
SPECIMEN SOURCE: SIGNIFICANT CHANGE UP

## 2022-03-21 ENCOUNTER — APPOINTMENT (OUTPATIENT)
Dept: THORACIC SURGERY | Facility: CLINIC | Age: 72
End: 2022-03-21
Payer: MEDICARE

## 2022-03-21 DIAGNOSIS — L02.213 CUTANEOUS ABSCESS OF CHEST WALL: ICD-10-CM

## 2022-03-21 PROCEDURE — 99443: CPT | Mod: 95

## 2022-03-22 ENCOUNTER — NON-APPOINTMENT (OUTPATIENT)
Age: 72
End: 2022-03-22

## 2022-03-22 LAB
BASOPHILS # BLD AUTO: 0.03 K/UL
BASOPHILS NFR BLD AUTO: 0.6 %
EOSINOPHIL # BLD AUTO: 0.06 K/UL
EOSINOPHIL NFR BLD AUTO: 1.2 %
FERRITIN SERPL-MCNC: 253 NG/ML
FOLATE SERPL-MCNC: 7.3 NG/ML
HAPTOGLOB SERPL-MCNC: 383 MG/DL
HCT VFR BLD CALC: 34.5 %
HGB BLD-MCNC: 10 G/DL
IMM GRANULOCYTES NFR BLD AUTO: 0.2 %
IRON SATN MFR SERPL: 9 %
IRON SERPL-MCNC: 19 UG/DL
LDH SERPL-CCNC: 116 U/L
LYMPHOCYTES # BLD AUTO: 1.27 K/UL
LYMPHOCYTES NFR BLD AUTO: 25.1 %
MAN DIFF?: NORMAL
MCHC RBC-ENTMCNC: 27.4 PG
MCHC RBC-ENTMCNC: 29 GM/DL
MCV RBC AUTO: 94.5 FL
MONOCYTES # BLD AUTO: 0.72 K/UL
MONOCYTES NFR BLD AUTO: 14.3 %
NEUTROPHILS # BLD AUTO: 2.96 K/UL
NEUTROPHILS NFR BLD AUTO: 58.6 %
PLATELET # BLD AUTO: 135 K/UL
RBC # BLD: 3.65 M/UL
RBC # BLD: 3.65 M/UL
RBC # FLD: 15.7 %
RETICS # AUTO: 1.5 %
RETICS AGGREG/RBC NFR: 53.3 K/UL
TIBC SERPL-MCNC: 220 UG/DL
UIBC SERPL-MCNC: 201 UG/DL
VIT B12 SERPL-MCNC: 499 PG/ML
WBC # FLD AUTO: 5.05 K/UL

## 2022-03-24 NOTE — HISTORY OF PRESENT ILLNESS
[Home] : at home, [unfilled] , at the time of the visit. [Medical Office: ([facility])___] : at the medical office located in  [Verbal consent obtained from patient] : the patient, [unfilled] [FreeTextEntry1] : \par 71 year old male. He has a history of squamous cell lung Ca stage III (T3 N2) of LETY with neoadjuvant chemo/RT, followed by Left VATS, left thoracotomy, pneumonectomy, resection of first, second, third ribs on 06/23/2015, pathology revealed [id number]. In January of 2017 he completed SBRT to a RUL nodule. He is s/p right VATS, RUL lung wedge resection on 2/23/18, pathology revealed scar with reactive changes consistent with radiation atypia.\par \par At outpatient follow up visit on 1/10/2022, he c/o lethargy, weight loss and pain at L thoracotomy site, and was found to have fluctuant collection. CT chest showing new 3.0 x 12.4 x 10.1 cm lesion centered along the left subscapularis muscle. The lesion extends along the chest wall posterior to and communicates with the pneumonectomy bed, just inferior to the second rib resection and along the posterior left third, fourth, and fifth rib interspaces. s/p placement of drain by IR w/ 500 ml purulent drainage.  Fluid positive for strep mitis, ID consulted; PICC placed and plan for 4 week course of Rocephin.  s/p bronchoscopy on 01/14/2022; The left pneumonectomy stump was noted to be intact. The stump was flush with the va. It was irrigated. No bubbles were noted. \par \par F/u with Hem/Onc for Waldenstrom macroglobulinemia, pancytopenia, s/p bone marrow bx on 01/18/2022. Path revealed hypercellular bone marrow with marked B cell lymphocytosis, moderated plasmacytosis, increased mast cells and present iron stores. These findings along with the most current lab findings, support a differential diagnosis which includes lymphoplasmacytic lymphoma/Waldenstrom macroglobulinemia vs marginal zone lymphoma. \par \par Patient is s/p US guided left thoracentesis on 01/27/2022, 60cc of purulent fluid was then aspirated, and a sample of fluid was sent for microbiological and chemical analysis. Culture and AFB no growth at 1 week. \par \par F/u with Dr. Espinoza, started on PO abx on 02/24/2022 and completed on 3/10/22.\par \par Patient has a drain to his Lt upper back, it is currently draining ~50ml output daily. \par \par CT chest on 03/17/2022:\par - Left hemithorax is filled with fluid. Air within the left pneumonectomy space has increased when compared to previous exam. A pigtail catheter is noted in the left posterior lateral chest wall between the scapula and the left ribs. No fluid collection is noted surrounding the pigtail catheter\par \par Patient is followed today via Telephonic visit. \par \par

## 2022-03-24 NOTE — ASSESSMENT
[FreeTextEntry1] : 71 year old male. He has a history of squamous cell lung Ca stage III (T3 N2) of LETY with neoadjuvant chemo/RT, followed by Left VATS, left thoracotomy, pneumonectomy, resection of first, second, third ribs on 06/23/2015, pathology revealed [id number]. In January of 2017 he completed SBRT to a RUL nodule. He is s/p right VATS, RUL lung wedge resection on 2/23/18, pathology revealed scar with reactive changes consistent with radiation atypia.\par \par At outpatient follow up visit on 1/10/2022, he c/o lethargy, weight loss and pain at L thoracotomy site, and was found to have fluctuant collection. CT chest showing new 3.0 x 12.4 x 10.1 cm lesion centered along the left subscapularis muscle. The lesion extends along the chest wall posterior to and communicates with the pneumonectomy bed, just inferior to the second rib resection and along the posterior left third, fourth, and fifth rib interspaces. s/p placement of drain by IR w/ 500 ml purulent drainage.  Fluid positive for strep mitis, ID consulted; PICC placed and plan for 4 week course of Rocephin.  s/p bronchoscopy on 01/14/2022; The left pneumonectomy stump was noted to be intact. The stump was flush with the va. It was irrigated. No bubbles were noted. \par \par F/u with Hem/Onc for Waldenstrom macroglobulinemia, pancytopenia, s/p bone marrow bx on 01/18/2022. Path revealed hypercellular bone marrow with marked B cell lymphocytosis, moderated plasmacytosis, increased mast cells and present iron stores. These findings along with the most current lab findings, support a differential diagnosis which includes lymphoplasmacytic lymphoma/Waldenstrom macroglobulinemia vs marginal zone lymphoma. \par \par Patient is s/p US guided left thoracentesis on 01/27/2022, 60cc of purulent fluid was then aspirated, and a sample of fluid was sent for microbiological and chemical analysis. Culture and AFB no growth at 1 week. \par \par F/u with Dr. Espinoza, started on PO abx on 02/24/2022 and completed on 3/10/22.\par \par Patient has a drain to his Lt upper back, it is currently draining ~50ml output daily. \par \par I have reviewed the patient's medical records and diagnostic images at time of this office consultation and have made the following recommendation:\par 1. CT chest reviewed and explained to patient, Increased amount of air is present within the left pneumonectomy space, likely will require surgical intervention, will discuss the case with radiologist and call patient back for plan of care. \par \par I personally performed the services described in the documentation, reviewed the documentation recorded by the scribe in my presence and it accurately and completely records my words and actions.\par \par I, Milka Su, NP, am scribing for and the presence of LAUREN Leach, the following sections HISTORY OF PRESENT ILLNESS, PAST MEDICAL/FAMILY/SOCIAL HISTORY; REVIEW OF SYSTEMS; VITAL SIGNS; PHYSICAL EXAM; DISPOSITION.

## 2022-03-24 NOTE — CONSULT LETTER
[Dear  ___] : Dear  [unfilled], [Courtesy Letter:] : I had the pleasure of seeing your patient, [unfilled], in my office today. [Please see my note below.] : Please see my note below. [Sincerely,] : Sincerely, [DrChuck  ___] : Dr. GROVES [DrChuck ___] : Dr. GROVES [FreeTextEntry2] : Dr. Clara Estrella (Onc)\par Dr. Mauro Mckeon (RadOnc)\par Dr. Kendrick Deleon (PCP)  [FreeTextEntry3] : Jan Sims MD \par Attending Surgeon \par Division of Thoracic Surgery \par , Cardiovascular and Thoracic Surgery \par [facility] at \[facility]\""/[facility]\par \par

## 2022-03-25 ENCOUNTER — NON-APPOINTMENT (OUTPATIENT)
Age: 72
End: 2022-03-25

## 2022-03-26 LAB — SARS-COV-2 N GENE NPH QL NAA+PROBE: NOT DETECTED

## 2022-03-28 ENCOUNTER — RESULT REVIEW (OUTPATIENT)
Age: 72
End: 2022-03-28

## 2022-03-28 ENCOUNTER — INPATIENT (INPATIENT)
Facility: HOSPITAL | Age: 72
LOS: 9 days | Discharge: HOME | End: 2022-04-07
Attending: THORACIC SURGERY (CARDIOTHORACIC VASCULAR SURGERY) | Admitting: THORACIC SURGERY (CARDIOTHORACIC VASCULAR SURGERY)
Payer: MEDICARE

## 2022-03-28 VITALS — HEIGHT: 71 IN | WEIGHT: 170.86 LBS

## 2022-03-28 DIAGNOSIS — Z98.89 OTHER SPECIFIED POSTPROCEDURAL STATES: Chronic | ICD-10-CM

## 2022-03-28 DIAGNOSIS — Z90.2 ACQUIRED ABSENCE OF LUNG [PART OF]: Chronic | ICD-10-CM

## 2022-03-28 DIAGNOSIS — Z98.890 OTHER SPECIFIED POSTPROCEDURAL STATES: Chronic | ICD-10-CM

## 2022-03-28 DIAGNOSIS — L02.213 CUTANEOUS ABSCESS OF CHEST WALL: ICD-10-CM

## 2022-03-28 LAB
ALBUMIN FLD-MCNC: 1.9 G/DL — SIGNIFICANT CHANGE UP
ANION GAP SERPL CALC-SCNC: 11 MMOL/L — SIGNIFICANT CHANGE UP (ref 7–14)
APTT BLD: 32.2 SEC — SIGNIFICANT CHANGE UP (ref 27–36.3)
B PERT IGG+IGM PNL SER: ABNORMAL
BLD GP AB SCN SERPL QL: NEGATIVE — SIGNIFICANT CHANGE UP
BUN SERPL-MCNC: 17 MG/DL — SIGNIFICANT CHANGE UP (ref 7–23)
CALCIUM SERPL-MCNC: 9.3 MG/DL — SIGNIFICANT CHANGE UP (ref 8.4–10.5)
CHLORIDE SERPL-SCNC: 105 MMOL/L — SIGNIFICANT CHANGE UP (ref 98–107)
CO2 SERPL-SCNC: 26 MMOL/L — SIGNIFICANT CHANGE UP (ref 22–31)
COLOR FLD: ABNORMAL
CREAT SERPL-MCNC: 1.02 MG/DL — SIGNIFICANT CHANGE UP (ref 0.5–1.3)
EGFR: 79 ML/MIN/1.73M2 — SIGNIFICANT CHANGE UP
EOSINOPHIL # FLD: 0 % — SIGNIFICANT CHANGE UP
FLUID INTAKE SUBSTANCE CLASS: SIGNIFICANT CHANGE UP
FLUID SEGMENTED GRANULOCYTES: 92 % — SIGNIFICANT CHANGE UP
FOLATE+VIT B12 SERBLD-IMP: 0 % — SIGNIFICANT CHANGE UP
GLUCOSE FLD-MCNC: <5 MG/DL — SIGNIFICANT CHANGE UP
GLUCOSE SERPL-MCNC: 106 MG/DL — HIGH (ref 70–99)
HCT VFR BLD CALC: 34.3 % — LOW (ref 39–50)
HGB BLD-MCNC: 9.9 G/DL — LOW (ref 13–17)
INR BLD: 1.14 RATIO — SIGNIFICANT CHANGE UP (ref 0.88–1.16)
LDH SERPL L TO P-CCNC: SIGNIFICANT CHANGE UP U/L
LYMPHOCYTES # FLD: 3 % — SIGNIFICANT CHANGE UP
MCHC RBC-ENTMCNC: 27.3 PG — SIGNIFICANT CHANGE UP (ref 27–34)
MCHC RBC-ENTMCNC: 28.9 GM/DL — LOW (ref 32–36)
MCV RBC AUTO: 94.8 FL — SIGNIFICANT CHANGE UP (ref 80–100)
MESOTHL CELL # FLD: 0 % — SIGNIFICANT CHANGE UP
MONOS+MACROS # FLD: 5 % — SIGNIFICANT CHANGE UP
NRBC # BLD: 0 /100 WBCS — SIGNIFICANT CHANGE UP
NRBC # FLD: 0 K/UL — SIGNIFICANT CHANGE UP
OTHER CELLS FLD MANUAL: 0 % — SIGNIFICANT CHANGE UP
PLATELET # BLD AUTO: 136 K/UL — LOW (ref 150–400)
POTASSIUM SERPL-MCNC: 4.5 MMOL/L — SIGNIFICANT CHANGE UP (ref 3.5–5.3)
POTASSIUM SERPL-SCNC: 4.5 MMOL/L — SIGNIFICANT CHANGE UP (ref 3.5–5.3)
PROT FLD-MCNC: 4.9 G/DL — SIGNIFICANT CHANGE UP
PROTHROM AB SERPL-ACNC: 13.3 SEC — SIGNIFICANT CHANGE UP (ref 10.5–13.4)
RBC # BLD: 3.62 M/UL — LOW (ref 4.2–5.8)
RBC # FLD: 15.5 % — HIGH (ref 10.3–14.5)
RCV VOL RI: HIGH CELLS/UL (ref 0–5)
RH IG SCN BLD-IMP: POSITIVE — SIGNIFICANT CHANGE UP
SODIUM SERPL-SCNC: 142 MMOL/L — SIGNIFICANT CHANGE UP (ref 135–145)
SPECIMEN SOURCE FLD: SIGNIFICANT CHANGE UP
TOTAL CELLS COUNTED, BODY FLUID: 100 CELLS — SIGNIFICANT CHANGE UP
TOTAL NUCLEATED CELL COUNT, BODY FLUID: HIGH CELLS/UL (ref 0–5)
TUBE TYPE: SIGNIFICANT CHANGE UP
WBC # BLD: 5.22 K/UL — SIGNIFICANT CHANGE UP (ref 3.8–10.5)
WBC # FLD AUTO: 5.22 K/UL — SIGNIFICANT CHANGE UP (ref 3.8–10.5)

## 2022-03-28 PROCEDURE — 32557 INSERT CATH PLEURA W/ IMAGE: CPT | Mod: LT

## 2022-03-28 PROCEDURE — 88112 CYTOPATH CELL ENHANCE TECH: CPT | Mod: 26

## 2022-03-28 PROCEDURE — 88305 TISSUE EXAM BY PATHOLOGIST: CPT | Mod: 26

## 2022-03-28 RX ORDER — ACETAMINOPHEN 500 MG
650 TABLET ORAL EVERY 6 HOURS
Refills: 0 | Status: DISCONTINUED | OUTPATIENT
Start: 2022-03-28 | End: 2022-04-07

## 2022-03-28 RX ORDER — TAMSULOSIN HYDROCHLORIDE 0.4 MG/1
0.8 CAPSULE ORAL AT BEDTIME
Refills: 0 | Status: DISCONTINUED | OUTPATIENT
Start: 2022-03-28 | End: 2022-04-07

## 2022-03-28 RX ORDER — OXYCODONE HYDROCHLORIDE 5 MG/1
5 TABLET ORAL EVERY 6 HOURS
Refills: 0 | Status: DISCONTINUED | OUTPATIENT
Start: 2022-03-28 | End: 2022-03-28

## 2022-03-28 RX ORDER — PANTOPRAZOLE SODIUM 20 MG/1
40 TABLET, DELAYED RELEASE ORAL
Refills: 0 | Status: DISCONTINUED | OUTPATIENT
Start: 2022-03-28 | End: 2022-04-07

## 2022-03-28 RX ORDER — OXYCODONE AND ACETAMINOPHEN 5; 325 MG/1; MG/1
1 TABLET ORAL ONCE
Refills: 0 | Status: DISCONTINUED | OUTPATIENT
Start: 2022-03-28 | End: 2022-03-28

## 2022-03-28 RX ORDER — SENNA PLUS 8.6 MG/1
2 TABLET ORAL AT BEDTIME
Refills: 0 | Status: DISCONTINUED | OUTPATIENT
Start: 2022-03-28 | End: 2022-04-07

## 2022-03-28 RX ORDER — OXYCODONE HYDROCHLORIDE 5 MG/1
10 TABLET ORAL EVERY 4 HOURS
Refills: 0 | Status: DISCONTINUED | OUTPATIENT
Start: 2022-03-28 | End: 2022-03-30

## 2022-03-28 RX ORDER — HEPARIN SODIUM 5000 [USP'U]/ML
5000 INJECTION INTRAVENOUS; SUBCUTANEOUS EVERY 8 HOURS
Refills: 0 | Status: DISCONTINUED | OUTPATIENT
Start: 2022-03-28 | End: 2022-03-28

## 2022-03-28 RX ORDER — POLYETHYLENE GLYCOL 3350 17 G/17G
17 POWDER, FOR SOLUTION ORAL DAILY
Refills: 0 | Status: DISCONTINUED | OUTPATIENT
Start: 2022-03-28 | End: 2022-04-07

## 2022-03-28 RX ORDER — OXYCODONE HYDROCHLORIDE 5 MG/1
5 TABLET ORAL EVERY 4 HOURS
Refills: 0 | Status: DISCONTINUED | OUTPATIENT
Start: 2022-03-28 | End: 2022-04-04

## 2022-03-28 RX ORDER — HYDROMORPHONE HYDROCHLORIDE 2 MG/ML
1 INJECTION INTRAMUSCULAR; INTRAVENOUS; SUBCUTANEOUS EVERY 4 HOURS
Refills: 0 | Status: DISCONTINUED | OUTPATIENT
Start: 2022-03-28 | End: 2022-03-28

## 2022-03-28 RX ADMIN — OXYCODONE HYDROCHLORIDE 5 MILLIGRAM(S): 5 TABLET ORAL at 20:00

## 2022-03-28 RX ADMIN — TAMSULOSIN HYDROCHLORIDE 0.8 MILLIGRAM(S): 0.4 CAPSULE ORAL at 22:04

## 2022-03-28 RX ADMIN — OXYCODONE HYDROCHLORIDE 5 MILLIGRAM(S): 5 TABLET ORAL at 19:07

## 2022-03-28 RX ADMIN — HYDROMORPHONE HYDROCHLORIDE 1 MILLIGRAM(S): 2 INJECTION INTRAMUSCULAR; INTRAVENOUS; SUBCUTANEOUS at 22:04

## 2022-03-28 RX ADMIN — HYDROMORPHONE HYDROCHLORIDE 1 MILLIGRAM(S): 2 INJECTION INTRAMUSCULAR; INTRAVENOUS; SUBCUTANEOUS at 22:19

## 2022-03-28 NOTE — H&P ADULT - ASSESSMENT
Pt admitted from thoracic surgery office visit today with complaints of pain at L thoracotomy site.  Pt is a 71y male with history of squamous cell lung Ca stage III (T3 N2) of LETY with neoadjuvant chemo/RT, followed by Left VATS, left thoracotomy, pneumonectomy, resection of first, second, third ribs on 06/23/2015, pathology revealed [id number]. In January of 2017 he completed SBRT to a RUL nodule. He is s/p right VATS, RUL lung wedge resection on 2/23/18, pathology revealed scar with reactive changes consistent with radiation atypia. Pt followed with Dr Sims as an outpatient and at follow up visit today, he c/o lethargy, weight loss and pain at L thoracotomy site and was found to have fluctuant collection.    Pt admitted from thoracic surgery office visit today with complicated wound healing.  Pt is a 71y male with history of squamous cell lung Ca s/p L thoracotomy, pneumonectomy in 2015 and RVATS RUL wedge resection 2019. Pt was admitted in January 2022 with a L chest wall abscess for which a L PTC was placed by IR. Pt was discharge home with IV antibiotics and followed as an outpatient.  Recent CT chest 3/17 reveals increased air collection in pneumonectomy space.  He was directly admitted to the hospital today with planto placed an additional IR drain for irrigation and possible surgical intervention, left thoracotomy and muscle flap.

## 2022-03-28 NOTE — PATIENT PROFILE ADULT - FALL HARM RISK - FALL HARM RISK
Writer spoke with patient.  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  He stated that he would be comfortable returning to work with a 30lb weight limitation if he is able to.   Other

## 2022-03-28 NOTE — PRE PROCEDURE NOTE - PRE PROCEDURE EVALUATION
Interventional Radiology    HPI: 71y male with history of squamous cell lung Ca s/p L thoracotomy, pneumonectomy in 2015 and RVATS RUL wedge resection 2019. Pt was admitted in January 2022 with a L chest wall abscess for which a L PTC was placed by IR. Pt was discharge home with IV antibiotics and followed as an outpatient.  Recent CT chest 3/17 reveals increased air collection in pneumonectomy space.  He was directly admitted to the hospital today with planto placed an additional IR drain for irrigation and possible surgical intervention, left thoracotomy and muscle flap. IR c/s for chest tube placement     Allergies:   Medications (Abx/Cardiac/Anticoagulation/Blood Products)      Data:  180.3  77.5  T(C): 36.9  HR: 87  BP: 141/63  RR: 18  SpO2: 100%    Exam  General: No acute distress  Chest: Non labored breathing  Abdomen: Non-distended  Extremities: No swelling, warm    -WBC 5.22 / HgB 9.9 / Hct 34.3 / Plt 136  -Na 142 / Cl 105 / BUN 17 / Glucose 106  -K 4.5 / CO2 26 / Cr 1.02  -ALT -- / Alk Phos -- / T.Bili --  -INR1.14    Imaging:     Plan:   71y male with history of squamous cell lung Ca s/p L thoracotomy, pneumonectomy in 2015 and RVATS RUL wedge resection 2019. Pt was admitted in January 2022 with a L chest wall abscess for which a L PTC was placed by IR. Pt was discharge home with IV antibiotics and followed as an outpatient.  Recent CT chest 3/17 reveals increased air collection in pneumonectomy space.  He was directly admitted to the hospital today with planto placed an additional IR drain for irrigation and possible surgical intervention, left thoracotomy and muscle flap. IR c/s for chest tube placement    -- Relevant imaging and labs were reviewed.   -- No additional antibiotics are indicated for this procedure.   -- Risks, benefits, and alternatives were explained to the patient and informed consent was obtained.

## 2022-03-28 NOTE — H&P ADULT - NSHPLABSRESULTS_GEN_ALL_CORE
< from: CT Chest No Cont (03.17.22 @ 11:07) >      ACC: [id number] EXAM:  CT CHEST                          PROCEDURE DATE:  03/17/2022          INTERPRETATION:  Clinical information: Lung cancer. Exam is compared to   previous study of 1/27/2022.    CT scan of the chest was obtained without administration of intravenous   contrast.    No hilar and or mediastinal adenopathy is noted.    Heart is normal in size. Calcification of the coronary arteries noted. No   pericardial effusion is noted.    No endobronchial lesions are noted. Patient is status post left   pneumonectomy. Patient is also status post wedge resection in the right   upper lobe. No nodules are noted in the right lung. No right pleural   effusion is noted. Left hemithorax is filled with fluid. Air within the   left pneumonectomyspace has increased when compared to previous exam. A   pigtail catheter is noted in the left posterior lateral chest wall   between the scapula and the left ribs. No fluid collection is noted   surrounding the pigtail catheter    Below the diaphragm, visualized portions of the abdomen are unremarkable.    Below the diaphragm, visualized portions of the abdomen demonstrate   cholelithiasis. Isodense lesion in the left kidney is unchanged when   compared to study of 1/16/2022.    Degenerative changes of the spine are noted.    IMPRESSION: Status post left pneumonectomy as well as wedge resection in   the right upper lobe.    Increased amount of air is present within the left pneumonectomy space.   Exact etiology is unclear.    Left-sided pigtail catheter in place as described above. No fluid   collection is noted surrounding the pigtail catheter.    --- End of Report ---            JENNYFER TORO MD; Attending Radiologist  This document has been electronically signed. Mar 18 2022  1:20PM    < end of copied text >

## 2022-03-28 NOTE — CONSULT NOTE ADULT - ASSESSMENT
Interventional Radiology    Evaluate for Procedure:     HPI: 71y male with history of squamous cell lung Ca s/p L thoracotomy, pneumonectomy in 2015 and RVATS RUL wedge resection 2019. Pt was admitted in January 2022 with a L chest wall abscess for which a L PTC was placed by IR. Pt was discharge home with IV antibiotics and followed as an outpatient.  Recent CT chest 3/17 reveals increased air collection in pneumonectomy space.  He was directly admitted to the hospital today with planto placed an additional IR drain for irrigation and possible surgical intervention, left thoracotomy and muscle flap. IR c/s for chest tube placement    Allergies:   Medications (Abx/Cardiac/Anticoagulation/Blood Products)      Data:  180.3  77.5  T(C): 36.9  HR: 87  BP: 141/63  RR: 18  SpO2: 100%    -WBC 5.22 / HgB 9.9 / Hct 34.3 / Plt 136  -Na 142 / Cl 105 / BUN 17 / Glucose 106  -K 4.5 / CO2 26 / Cr 1.02  -ALT -- / Alk Phos -- / T.Bili --  -INR 1.14 / PTT 32.2      Radiology:     Assessment/Plan:   71y male with history of squamous cell lung Ca s/p L thoracotomy, pneumonectomy in 2015 and RVATS RUL wedge resection 2019. Pt was admitted in January 2022 with a L chest wall abscess for which a L PTC was placed by IR. Pt was discharge home with IV antibiotics and followed as an outpatient.  Recent CT chest 3/17 reveals increased air collection in pneumonectomy space.  He was directly admitted to the hospital today with planto placed an additional IR drain for irrigation and possible surgical intervention, left thoracotomy and muscle flap. IR c/s for chest tube placement    -- IR will plan to perform 3/28  -- please place IR procedure request order under Dr. Rey  -- Pre-procedure note written.

## 2022-03-28 NOTE — H&P ADULT - NSHPPHYSICALEXAM_GEN_ALL_CORE
Constitutional:  General appearance: chronically ill.   Neck:  no jugular venous distention, supple, no lymphadenopathy and the thyroid was normal and there were no nodules present.   Pulmonary: decreased BS L lung field consistent with history  no respiratory distress, no accessory muscle use, lungs were clear to auscultation bilaterally.   Cardiac:  normal rate, regular rhythm, normal S1 and S2 and no murmur heard.   Vascular:  there was no peripheral edema.   Abdomen:  abdomen soft and non-tender.   Lymphatic:  no posterior cervical lymphadenopathy, no anterior cervical lymphadenopathy.   Back: fluctuant mass in area of surgical scar L upper back - tender to palpation .   Skin:  no rash and no skin lesions.   Neurology:  no focal deficits.   Psychiatric:  oriented to person, place, and time. Constitutional:  General appearance: pt in no acute distress  Neck:  no jugular venous distention, supple, no lymphadenopathy and the thyroid was normal and there were no nodules present.   Pulmonary: no respiratory distress, absent breath soundson L consistent w h/o pneumonectomy ; L PTC draining to bag  Cardiac:  normal rate, regular rhythm, normal S1 and S2 and no murmur heard.   Vascular:  there was no peripheral edema.   Abdomen:  abdomen soft and non-tender.   Lymphatic:  no posterior cervical lymphadenopathy, no anterior cervical lymphadenopathy.   Back: fluctuant mass in area of surgical scar L upper back - tender to palpation .   Skin:  no rash and no skin lesions.   Neurology:  no focal deficits.   Psychiatric:  oriented to person, place, and time.

## 2022-03-28 NOTE — H&P ADULT - HISTORY OF PRESENT ILLNESS
Pt admitted from thoracic surgery office visit today.  He has a L posterior chest drain placed by IR    with complaints of pain at L thoracotomy site.  Pt is a 71y male with history of squamous cell lung Ca stage III (T3 N2) of LETY with neoadjuvant chemo/RT, followed by Left VATS, left thoracotomy, pneumonectomy, resection of first, second, third ribs on 06/23/2015, pathology revealed [id number]. In January of 2017 he completed SBRT to a RUL nodule. He is s/p right VATS, RUL lung wedge resection on 2/23/18, pathology revealed scar with reactive changes consistent with radiation atypia. Pt followed with Dr Sims as an outpatient and at follow up visit today, he c/o lethargy, weight loss and pain at L thoracotomy site and was found to have fluctuant collection.    Pt admitted from thoracic surgery office visit today with complicated wound healing.  Pt is a 71y male with history of squamous cell lung Ca s/p L thoracotomy, pneumonectomy in 2015 and RVATS RUL wedge resection 2019. Pt was admitted in January 2022 with a L chest wall abscess for which a L PTC was placed by IR. Pt was discharge home with IV antibiotics and followed as an outpatient.  Recent CT chest 3/17 reveals increased air collection in pneumonectomy space.  He was directly admitted to the hospital today with planto placed an additional IR drain for irrigation and possible surgical intervention, left thoracotomy and muscle flap.

## 2022-03-28 NOTE — H&P ADULT - NSHPREVIEWOFSYSTEMS_GEN_ALL_CORE
Review of Systems:   · Negative General Symptoms	no fever; no chills; no sweating; no weight loss;  +Fatigue, lethargy and weight loss  · Skin/Breast	negative  · Negative Ophthalmologic Symptoms	no diplopia; no blurred vision L; no blurred vision R  · Ophthalmologic Comments	reading glasses  · ENMT	negative  · Negative Respiratory and Thorax Symptoms	+ pain at L thoracotomy site, see HPI  no wheezing; no dyspnea; no cough; no hemoptysis; no pleuritic chest pain  · Negative Cardiovascular Symptoms	no chest pain; no palpitations; no dyspnea on exertion; no orthopnea; no paroxysmal nocturnal dyspnea; no peripheral edema; no claudication  · Gastrointestinal	negative  · Genitourinary	negative  · Musculoskeletal	negative  · Neurological	negative  · Psychiatric	negative  · Hematology/Lymphatics	negative  · Endocrine	negative  · Allergic/Immunologic	negative Review of Systems:   · Negative General Symptoms	no fever; no chills; no sweating; no weight loss;  +Fatigue,   · Skin/Breast	negative  · Negative Ophthalmologic Symptoms	no diplopia; no blurred vision L; no blurred vision R  · Ophthalmologic Comments	reading glasses  · ENMT	negative  · Negative Respiratory and Thorax Symptoms	+ pain at L PTC site  no wheezing; no dyspnea; no cough; no hemoptysis; no pleuritic chest pain  · Negative Cardiovascular Symptoms	no chest pain; no palpitations; no dyspnea on exertion; no orthopnea; no paroxysmal nocturnal dyspnea; no peripheral edema; no claudication  · Gastrointestinal	negative  · Genitourinary	negative  · Musculoskeletal	negative  · Neurological	negative  · Psychiatric	negative  · Hematology/Lymphatics	negative  · Endocrine	negative  · Allergic/Immunologic	negative

## 2022-03-28 NOTE — H&P ADULT - PROBLEM SELECTOR PLAN 1
admit to Dr Sims's service on 9T  CT chest, evaluate for drainage  continue current medications    Melania ABBOTT [id number] admit to Dr Sims's service on 8T  will contact IR, Dr Rey to place additional IR drain  preop L thoracotomy, muscle flap with date to be determined  continue current medications    Melania ABBOTT [id number]

## 2022-03-29 LAB
GRAM STN FLD: SIGNIFICANT CHANGE UP
HCV AB S/CO SERPL IA: 0.13 S/CO — SIGNIFICANT CHANGE UP (ref 0–0.99)
HCV AB SERPL-IMP: SIGNIFICANT CHANGE UP
NIGHT BLUE STAIN TISS: SIGNIFICANT CHANGE UP
SPECIMEN SOURCE: SIGNIFICANT CHANGE UP
SPECIMEN SOURCE: SIGNIFICANT CHANGE UP

## 2022-03-29 PROCEDURE — 99232 SBSQ HOSP IP/OBS MODERATE 35: CPT

## 2022-03-29 PROCEDURE — 71045 X-RAY EXAM CHEST 1 VIEW: CPT | Mod: 26

## 2022-03-29 RX ORDER — PIPERACILLIN AND TAZOBACTAM 4; .5 G/20ML; G/20ML
3.38 INJECTION, POWDER, LYOPHILIZED, FOR SOLUTION INTRAVENOUS ONCE
Refills: 0 | Status: COMPLETED | OUTPATIENT
Start: 2022-03-29 | End: 2022-03-29

## 2022-03-29 RX ORDER — VANCOMYCIN HCL 1 G
1250 VIAL (EA) INTRAVENOUS EVERY 12 HOURS
Refills: 0 | Status: DISCONTINUED | OUTPATIENT
Start: 2022-03-29 | End: 2022-03-30

## 2022-03-29 RX ORDER — VANCOMYCIN HCL 1 G
1500 VIAL (EA) INTRAVENOUS EVERY 12 HOURS
Refills: 0 | Status: DISCONTINUED | OUTPATIENT
Start: 2022-03-29 | End: 2022-03-29

## 2022-03-29 RX ORDER — PIPERACILLIN AND TAZOBACTAM 4; .5 G/20ML; G/20ML
3.38 INJECTION, POWDER, LYOPHILIZED, FOR SOLUTION INTRAVENOUS EVERY 8 HOURS
Refills: 0 | Status: DISCONTINUED | OUTPATIENT
Start: 2022-03-29 | End: 2022-04-07

## 2022-03-29 RX ADMIN — OXYCODONE HYDROCHLORIDE 5 MILLIGRAM(S): 5 TABLET ORAL at 15:15

## 2022-03-29 RX ADMIN — TAMSULOSIN HYDROCHLORIDE 0.8 MILLIGRAM(S): 0.4 CAPSULE ORAL at 22:28

## 2022-03-29 RX ADMIN — OXYCODONE HYDROCHLORIDE 5 MILLIGRAM(S): 5 TABLET ORAL at 14:32

## 2022-03-29 RX ADMIN — Medication 166.67 MILLIGRAM(S): at 18:33

## 2022-03-29 RX ADMIN — PIPERACILLIN AND TAZOBACTAM 25 GRAM(S): 4; .5 INJECTION, POWDER, LYOPHILIZED, FOR SOLUTION INTRAVENOUS at 22:28

## 2022-03-29 RX ADMIN — OXYCODONE HYDROCHLORIDE 5 MILLIGRAM(S): 5 TABLET ORAL at 10:10

## 2022-03-29 RX ADMIN — OXYCODONE HYDROCHLORIDE 10 MILLIGRAM(S): 5 TABLET ORAL at 02:52

## 2022-03-29 RX ADMIN — PIPERACILLIN AND TAZOBACTAM 200 GRAM(S): 4; .5 INJECTION, POWDER, LYOPHILIZED, FOR SOLUTION INTRAVENOUS at 16:02

## 2022-03-29 RX ADMIN — PANTOPRAZOLE SODIUM 40 MILLIGRAM(S): 20 TABLET, DELAYED RELEASE ORAL at 05:10

## 2022-03-29 RX ADMIN — OXYCODONE HYDROCHLORIDE 10 MILLIGRAM(S): 5 TABLET ORAL at 03:22

## 2022-03-29 RX ADMIN — OXYCODONE HYDROCHLORIDE 5 MILLIGRAM(S): 5 TABLET ORAL at 09:32

## 2022-03-29 RX ADMIN — OXYCODONE HYDROCHLORIDE 10 MILLIGRAM(S): 5 TABLET ORAL at 22:28

## 2022-03-29 RX ADMIN — OXYCODONE HYDROCHLORIDE 10 MILLIGRAM(S): 5 TABLET ORAL at 23:00

## 2022-03-29 NOTE — PROGRESS NOTE ADULT - SUBJECTIVE AND OBJECTIVE BOX
-------------------------------------------------------------  Interventional Radiology Post-operative Check  -------------------------------------------------------------    Procedure: Empyema    No acute events overnight. Patient resting comfortably in bed. Soreness from tube placement, however, pain well controlled. No fevers, chills, NV. No CP outside of chest tube sites. No SOB. Overall doing well.    Vital Signs:    T(C): 36.5 (03-29-22 @ 16:24), Max: 36.9 (03-28-22 @ 19:36)  HR: 83 (03-29-22 @ 16:24) (79 - 98)  BP: 125/57 (03-29-22 @ 16:24) (114/52 - 137/66)  RR: 18 (03-29-22 @ 16:24) (18 - 20)  SpO2: 96% (03-29-22 @ 16:24) (96% - 100%)    Limited Physical Exam:    General: No acute distress  Chest: Non labored breathing. L sided anterior and posterior chest tubes to suction. Small air leak in posterior chest tube. Seropurulent drainage. 150 cc posterior, 450 cc anterior.  Abdomen: Non-distended  Extremities: No swelling, warm    Recommendations:  -- IR to follow  -- Continue CT to low continuous wall suctions. Monitor output     Tej Grant MD  PGY-2/R1  IR Pager [phone]

## 2022-03-29 NOTE — PROGRESS NOTE ADULT - SUBJECTIVE AND OBJECTIVE BOX
Subjective: "I have a lot of pain in my back where all these tubes are" Pt states only taking Oxy 5ng. Encouraged pt to take 10mg more frequently. Pt OOB to edge w assist. No CP or SOB. States good appetite. No fevers.     Vital Signs:  Vital Signs Last 24 Hrs  T(C): 36.6 (03-29-22 @ 12:40), Max: 36.9 (03-28-22 @ 19:36)  T(F): 97.8 (03-29-22 @ 12:40), Max: 98.5 (03-28-22 @ 19:36)  HR: 85 (03-29-22 @ 12:40) (79 - 98)  BP: 114/52 (03-29-22 @ 12:40) (114/52 - 137/66)  RR: 20 (03-29-22 @ 12:40) (18 - 20)  SpO2: 100% (03-29-22 @ 12:40) (97% - 100%) on (O2)    Telemetry/Alarms:  General: WN/WD NAD  Neurology: Awake, nonfocal, KEY x 4  Eyes: Scleras clear, PERRLA/ EOMI, Gross vision intact  ENT:Gross hearing intact, grossly patent pharynx, no stridor  Neck: Neck supple, trachea midline, No JVD,   Respiratory: No BS to left. CTA Rt  CV: RRR, S1S2, no murmurs, rubs or gallops  Abdominal: Soft, NT, ND +BS, + BM yesterday  Extremities: No edema, + peripheral pulses  Skin: No Rashes, Hematoma, Ecchymosis  Lymphatic: No Neck, axilla, groin LAD  Psych: Oriented x 3, normal affect  Incisions: thoracotomy site healed  Tubes: Old left post IR tube to bag- 60cc/24hrs. New Anterior IR PTC- 350cc/24rs to sxn, no AL. SErosang fluid. new Post IR PTC- 120cc/24hrs on sxn, no AL  Old left post IR drain removed at bedside this am per Dr. Sims.   Relevant labs, radiology and Medications reviewed                        9.9    5.22  )-----------( 136      ( 28 Mar 2022 13:52 )             34.3     03-28    142  |  105  |  17  ----------------------------<  106<H>  4.5   |  26  |  1.02    Ca    9.3      28 Mar 2022 13:52      PT/INR - ( 28 Mar 2022 15:30 )   PT: 13.3 sec;   INR: 1.14 ratio         PTT - ( 28 Mar 2022 15:30 )  PTT:32.2 sec  MEDICATIONS  (STANDING):  pantoprazole    Tablet 40 milliGRAM(s) Oral before breakfast  senna 2 Tablet(s) Oral at bedtime  tamsulosin 0.8 milliGRAM(s) Oral at bedtime    MEDICATIONS  (PRN):  acetaminophen     Tablet .. 650 milliGRAM(s) Oral every 6 hours PRN Mild Pain (1 - 3)  HYDROmorphone  Injectable 1 milliGRAM(s) IV Push every 4 hours PRN break through pain  oxyCODONE    IR 5 milliGRAM(s) Oral every 4 hours PRN Moderate Pain (4 - 6)  oxyCODONE    IR 10 milliGRAM(s) Oral every 4 hours PRN Severe Pain (7 - 10)  polyethylene glycol 3350 17 Gram(s) Oral daily PRN Constipation    Pertinent Physical Exam  I&O's Summary    28 Mar 2022 07:01  -  29 Mar 2022 07:00  --------------------------------------------------------  IN: 0 mL / OUT: 930 mL / NET: -930 mL    29 Mar 2022 07:01  -  29 Mar 2022 13:50  --------------------------------------------------------  IN: 0 mL / OUT: 316 mL / NET: -316 mL    FAMILY HISTORY:  Father  Still living? No  Family history of lung cancer, Age at diagnosis: Age Unknown.     Social History:  Social History (marital status, living situation, occupation, tobacco use, alcohol and drug use, and sexual history): · Marital Status	  · Occupation	works for 's [employer]; retired   · Lives With	spouse    Substance Use History:  · Substance Use	caffeine  · Caffeine Type	coffee  · Caffeine Amount/Frequency	1-2 cups/cans per day  · Caffeine Withdrawal Pattern	denies    Alcohol Use History:  · Have you ever consumed alcohol	yes...  · Alcohol Type	beer  · Alcohol Frequency	daily  · Alcohol Amount	1-2 drinks  · Problems Related to Alcohol Use	no  · 1. Have you felt you ought to CUT down on your drinking?	no  · 2. Have people ANNOYED you by criticizing your drinking?	no  · 3. Have you ever felt bad or GUILTY about your drinking?	no  · 4. Have you ever needed an "EYE OPENER", a drink first thing in the morning to steady your nerves or get rid of a hangover?	no    Former smoker, 40 pack years, · Last Tobacco Use (dd-mmm-yy): 12-Jun-2015        Assessment  71y Male  w/ PAST MEDICAL & SURGICAL HISTORY:  BPH (benign prostatic hypertrophy)    Nephrolithiasis    Hydronephrosis    Lung cancer  Dx&#x27;d 2015 treated with neoadjuvant chemo/RT followed by pneumonectomy (June 2015), SBRT to RUL nodule in Jan 2017    Lung nodule    H/O lithotripsy    S/P tonsillectomy    H/O pneumonectomy  Left June 2015    S/P removal of lung  RUL lung wedge resection on 2/23/18    Pt admitted from thoracic surgery office visit today with complicated wound healing.  Pt is a 71y male with history of squamous cell lung Ca s/p L thoracotomy, pneumonectomy in 2015 and RVATS RUL wedge resection 2019. Pt was admitted in January 2022 with a L chest wall abscess for which a L PTC was placed by IR. Pt was discharge home with IV antibiotics and followed as an outpatient.  Recent CT chest 3/17 reveals increased air collection in pneumonectomy space.  He was directly admitted to the hospital 3/28 with plan to place an additional IR drain for irrigation and possible surgical intervention, left thoracotomy and muscle flap. On 3/28- pt went to IR and had Ant and Post PTC placed. Both draining serosang fluid.     PLAN  Neuro: Pain management. Cont oxy, neurontin, tylenol   Pulm: Encourage coughing, deep breathing and use of incentive spirometry. Daily CXR.   Cardio: Monitor telemetry/alarms  GI: Tolerating diet. Continue stool softeners.  Renal: monitor urine output, supplement electrolytes as needed  Vasc: Heparin SC/SCDs for DVT prophylaxis  Heme: Stable H/H. .   ID: Off antibiotics. Stable.  Therapy: OOB/ambulate  Tubes: Monitor Chest tube outputs from both IR drains.   Disposition: Awaiting final OR plans with Dr. Sims.   Discussed with Cardiothoracic Team at AM rounds.

## 2022-03-30 ENCOUNTER — APPOINTMENT (OUTPATIENT)
Dept: HEMATOLOGY ONCOLOGY | Facility: CLINIC | Age: 72
End: 2022-03-30

## 2022-03-30 LAB
HCT VFR BLD CALC: 29.3 % — LOW (ref 39–50)
HGB BLD-MCNC: 8.7 G/DL — LOW (ref 13–17)
MCHC RBC-ENTMCNC: 27.6 PG — SIGNIFICANT CHANGE UP (ref 27–34)
MCHC RBC-ENTMCNC: 29.7 GM/DL — LOW (ref 32–36)
MCV RBC AUTO: 93 FL — SIGNIFICANT CHANGE UP (ref 80–100)
NRBC # BLD: 0 /100 WBCS — SIGNIFICANT CHANGE UP
NRBC # FLD: 0 K/UL — SIGNIFICANT CHANGE UP
PH FLD: 6.9 — SIGNIFICANT CHANGE UP
PLATELET # BLD AUTO: 118 K/UL — LOW (ref 150–400)
PROT SERPL-MCNC: 7.2 G/DL — SIGNIFICANT CHANGE UP (ref 6–8.3)
RBC # BLD: 3.15 M/UL — LOW (ref 4.2–5.8)
RBC # FLD: 15.4 % — HIGH (ref 10.3–14.5)
WBC # BLD: 4.17 K/UL — SIGNIFICANT CHANGE UP (ref 3.8–10.5)
WBC # FLD AUTO: 4.17 K/UL — SIGNIFICANT CHANGE UP (ref 3.8–10.5)

## 2022-03-30 PROCEDURE — 99223 1ST HOSP IP/OBS HIGH 75: CPT

## 2022-03-30 PROCEDURE — 99232 SBSQ HOSP IP/OBS MODERATE 35: CPT

## 2022-03-30 PROCEDURE — 84165 PROTEIN E-PHORESIS SERUM: CPT | Mod: 26

## 2022-03-30 PROCEDURE — 99231 SBSQ HOSP IP/OBS SF/LOW 25: CPT

## 2022-03-30 PROCEDURE — 71045 X-RAY EXAM CHEST 1 VIEW: CPT | Mod: 26

## 2022-03-30 PROCEDURE — 99223 1ST HOSP IP/OBS HIGH 75: CPT | Mod: GC

## 2022-03-30 RX ORDER — HEPARIN SODIUM 5000 [USP'U]/ML
5000 INJECTION INTRAVENOUS; SUBCUTANEOUS EVERY 8 HOURS
Refills: 0 | Status: DISCONTINUED | OUTPATIENT
Start: 2022-03-30 | End: 2022-04-07

## 2022-03-30 RX ADMIN — OXYCODONE HYDROCHLORIDE 10 MILLIGRAM(S): 5 TABLET ORAL at 23:08

## 2022-03-30 RX ADMIN — OXYCODONE HYDROCHLORIDE 10 MILLIGRAM(S): 5 TABLET ORAL at 22:08

## 2022-03-30 RX ADMIN — OXYCODONE HYDROCHLORIDE 5 MILLIGRAM(S): 5 TABLET ORAL at 05:06

## 2022-03-30 RX ADMIN — OXYCODONE HYDROCHLORIDE 10 MILLIGRAM(S): 5 TABLET ORAL at 14:50

## 2022-03-30 RX ADMIN — HEPARIN SODIUM 5000 UNIT(S): 5000 INJECTION INTRAVENOUS; SUBCUTANEOUS at 22:08

## 2022-03-30 RX ADMIN — SENNA PLUS 2 TABLET(S): 8.6 TABLET ORAL at 22:05

## 2022-03-30 RX ADMIN — PIPERACILLIN AND TAZOBACTAM 25 GRAM(S): 4; .5 INJECTION, POWDER, LYOPHILIZED, FOR SOLUTION INTRAVENOUS at 06:50

## 2022-03-30 RX ADMIN — Medication 166.67 MILLIGRAM(S): at 05:06

## 2022-03-30 RX ADMIN — TAMSULOSIN HYDROCHLORIDE 0.8 MILLIGRAM(S): 0.4 CAPSULE ORAL at 22:08

## 2022-03-30 RX ADMIN — HEPARIN SODIUM 5000 UNIT(S): 5000 INJECTION INTRAVENOUS; SUBCUTANEOUS at 14:11

## 2022-03-30 RX ADMIN — PANTOPRAZOLE SODIUM 40 MILLIGRAM(S): 20 TABLET, DELAYED RELEASE ORAL at 05:06

## 2022-03-30 RX ADMIN — PIPERACILLIN AND TAZOBACTAM 25 GRAM(S): 4; .5 INJECTION, POWDER, LYOPHILIZED, FOR SOLUTION INTRAVENOUS at 14:11

## 2022-03-30 RX ADMIN — OXYCODONE HYDROCHLORIDE 5 MILLIGRAM(S): 5 TABLET ORAL at 06:00

## 2022-03-30 RX ADMIN — OXYCODONE HYDROCHLORIDE 10 MILLIGRAM(S): 5 TABLET ORAL at 14:18

## 2022-03-30 RX ADMIN — PIPERACILLIN AND TAZOBACTAM 25 GRAM(S): 4; .5 INJECTION, POWDER, LYOPHILIZED, FOR SOLUTION INTRAVENOUS at 22:08

## 2022-03-30 NOTE — PROGRESS NOTE ADULT - SUBJECTIVE AND OBJECTIVE BOX
HPI:  Pt admitted from thoracic surgery office visit today with complicated wound healing.  Pt is a 71y male with history of squamous cell lung Ca s/p L thoracotomy, pneumonectomy in 2015 and RVATS RUL wedge resection 2019. Pt was admitted in January 2022 with a L chest wall abscess for which a L PTC was placed by IR. Pt was discharge home with IV antibiotics and followed as an outpatient.  Recent CT chest 3/17 reveals increased air collection in pneumonectomy space.  He was directly admitted to the hospital today with planto placed an additional IR drain for irrigation and possible surgical intervention, left thoracotomy and muscle flap.  (28 Mar 2022 13:13)      No acute events overnight. Patient resting comfortably in bed. Soreness from tube placement, however, pain well controlled. No fevers, chills, NV. No CP outside of chest tube sites. No SOB. Overall doing well.      Vital Signs Last 24 Hrs  T(C): 36.6 (30 Mar 2022 09:11), Max: 36.9 (30 Mar 2022 05:02)  T(F): 97.8 (30 Mar 2022 09:11), Max: 98.5 (30 Mar 2022 05:02)  HR: 85 (30 Mar 2022 09:11) (77 - 87)  BP: 140/55 (30 Mar 2022 09:11) (110/80 - 140/55)  BP(mean): --  RR: 18 (30 Mar 2022 09:11) (18 - 20)  SpO2: 100% (30 Mar 2022 09:11) (95% - 100%)    Focused Exam findings:          LABS:                        8.7    4.17  )-----------( 118      ( 30 Mar 2022 06:24 )             29.3     03-28    142  |  105  |  17  ----------------------------<  106<H>  4.5   |  26  |  1.02    Ca    9.3      28 Mar 2022 13:52      I&O's Detail    29 Mar 2022 07:01  -  30 Mar 2022 07:00  --------------------------------------------------------  IN:    IV PiggyBack: 350 mL  Total IN: 350 mL    OUT:    Chest Tube (mL): 26 mL    Chest Tube (mL): 280 mL    Voided (mL): 750 mL  Total OUT: 1056 mL    Total NET: -706 mL             No acute events overnight. Patient resting comfortably in bed. Soreness from tube placement, however, pain well controlled. No fevers, chills, NV. No CP outside of chest tube sites. No SOB.       Vital Signs Last 24 Hrs  T(C): 36.6 (30 Mar 2022 09:11), Max: 36.9 (30 Mar 2022 05:02)  T(F): 97.8 (30 Mar 2022 09:11), Max: 98.5 (30 Mar 2022 05:02)  HR: 85 (30 Mar 2022 09:11) (77 - 87)  BP: 140/55 (30 Mar 2022 09:11) (110/80 - 140/55)  BP(mean): --  RR: 18 (30 Mar 2022 09:11) (18 - 20)  SpO2: 100% (30 Mar 2022 09:11) (95% - 100%)          Focused Exam findings:    General: No acute distress  Chest: Non labored breathing. L sided anterior and posterior chest tubes to suction. Sites c/d/i.  Seropurulent drainage.         LABS:                        8.7    4.17  )-----------( 118      ( 30 Mar 2022 06:24 )             29.3     03-28    142  |  105  |  17  ----------------------------<  106<H>  4.5   |  26  |  1.02    Ca    9.3      28 Mar 2022 13:52      I&O's Detail    29 Mar 2022 07:01  -  30 Mar 2022 07:00  --------------------------------------------------------  IN:    IV PiggyBack: 350 mL  Total IN: 350 mL    OUT:    Chest Tube (mL): 26 mL    Chest Tube (mL): 280 mL    Voided (mL): 750 mL  Total OUT: 1056 mL    Total NET: -706 mL

## 2022-03-30 NOTE — CONSULT NOTE ADULT - ASSESSMENT
71y male with history of squamous cell lung Ca s/p L thoracotomy, pneumonectomy in 2015 and RVATS RUL wedge resection 2019. He has history of Waldenstroms macorglobulinemia  He appears to have empyema in  left pleural space- likely with anaerobes.    Suggest  Stop Vanco  continue Zosyn  Hematology evaluation    message left for NP

## 2022-03-30 NOTE — CONSULT NOTE ADULT - ATTENDING COMMENTS
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 Recent CT chest 03/17 with increased air collection in pneumonectomy space. S/p anterior and posterior chest tube placement 03/28 by IR. Continue antibiotics per ID.  Waldenström's macroglobulinemia was dx 01/2022 with BMBx with + [id number]. Labs 02/15/22: M spike 1.3, IgM 1945, viscosity 2. Would repeat SPEP, immunoglobulin panel, free light chain ratio, serum viscosity. As per Dr. Estrella, will hold on treatment for now given infection and she will see him in follow up as an outpatient after discharge to discuss further management.

## 2022-03-30 NOTE — CONSULT NOTE ADULT - SUBJECTIVE AND OBJECTIVE BOX
Patient is a 71y old  Male who presents with a chief complaint of chest air collection (29 Mar 2022 13:49)    HPI:  Pt admitted from thoracic surgery office visit today 3/28/22 with complicated wound healing.  Pt is a 71y male with history of squamous cell lung Ca s/p L thoracotomy, pneumonectomy in 2015 and RVATS RUL wedge resection 2019. Pt was admitted in January 2022 with a L chest wall abscess for which a L PTC was placed by IR. Pt was discharge home with IV antibiotics and followed as an outpatient.  Recent CT chest 3/17 reveals increased air collection in pneumonectomy space.  He was directly admitted to the hospital today with planto placed an additional IR drain for irrigation and possible surgical intervention, left thoracotomy and muscle flap.  (28 Mar 2022 13:13)    Previously hospitalized 1/10 --> 1/19/22   admitted for  pain at L thoracotomy site.  Pt is a 71y male with history of squamous cell lung Ca stage III (T3 N2) of LETY with neoadjuvant chemo/RT, followed by Left VATS, left thoracotomy, pneumonectomy, resection of first, second, third ribs on 06/23/2015, pathology revealed [id number]. In January of 2017 he completed SBRT to a RUL nodule. He is s/p right VATS, RUL lung wedge resection on 2/23/18, pathology revealed scar with reactive changes consistent with radiation atypia. .   CT chest shows new 3.0 x 12.4 x 10.1 cm which was drained by IR, 500cc purulent drainage.  Fluid positive for strep mitis, ID consulted and currently on Rocephin.  PICC was placed and given 4 week course of Ceftriaxone 2 gm prescribed through 2/9/22    Patient previuosly evaluated by Heme-Onc:  In 2016 Serum immunofixation showed 3 IgM kappa bands, with urine immunofixation showing a weak IgM kappa band, and Bence-Steen protein kappa type. Bone marrow biopsy, and bone marrow aspirate at the time showed a patchy, normocellular bone marrow with trilineage hematopoiesis. He did have Monoclonal B cells less than 10% and plasma cells 5-10%, without forming aggregates. 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 Cytogenetics showed a normal male karyotype. Diagnosis of Waldenstrom's Macroglobinemia was made (unable to obtain MYD88 status from chart review). Patient has been on surveillance since and IgM has been trending down.  1/18/22 Bone marrow done  MYD88 mutation consistent with Waldenstroms was detected    On 3/28/22 chest tube placed: 150 cc purulent drainage:   145k rbc, 33,9595 white cells 92N glu <8; LDH >10k, cultures NGTD    He notes 15 # weight loss over previous several months. no fever, sweats  He has no cough, sob  He feels fine except for pain at chest tube site      PAST MEDICAL & SURGICAL HISTORY:   Waldenstrom's macroglobinemia,  BPH (benign prostatic hypertrophy)    Nephrolithiasis    Hydronephrosis    Lung cancer  Dx&#x27;d 2015 treated with neoadjuvant chemo/RT followed by pneumonectomy (June 2015), SBRT to RUL nodule in Jan 2017    Lung nodule    H/O lithotripsy    S/P tonsillectomy    H/O pneumonectomy  Left June 2015    S/P removal of lung  RUL lung wedge resection on 2/23/18        Social history: retired , former smoker, quit 7 years ago, no Etoh since thanksgiving    FAMILY HISTORY:  Family history of lung cancer (Father)  94 year old mother just had surgery for breast cancer        REVIEW OF SYSTEMS:  CONSTITUTIONAL: No weakness, fevers or chills  EYES/ENT: No visual changes;  No vertigo or throat pain   NECK: No pain or stiffness  RESPIRATORY: No cough, wheezing, hemoptysis; No shortness of breath  CARDIOVASCULAR: No chest pain or palpitations  GASTROINTESTINAL: No abdominal or epigastric pain. No nausea, vomiting, or hematemesis; No diarrhea or constipation. No melena or hematochezia.  GENITOURINARY: No dysuria, frequency or hematuria  NEUROLOGICAL: No numbness or weakness  SKIN: No itching, burning, rashes, or lesions   All other review of systems is negative unless indicated above    Allergies  No Known Allergies    Antimicrobials:  piperacillin/tazobactam IVPB.. 3.375 Gram(s) IV Intermittent every 8 hours  vancomycin  IVPB 1250 milliGRAM(s) IV Intermittent every 12 hours      Vital Signs Last 24 Hrs  T(C): 37.1 (30 Mar 2022 12:39), Max: 37.1 (30 Mar 2022 12:39)  T(F): 98.8 (30 Mar 2022 12:39), Max: 98.8 (30 Mar 2022 12:39)  HR: 84 (30 Mar 2022 12:39) (77 - 87)  BP: 113/62 (30 Mar 2022 12:39) (110/80 - 140/55)  BP(mean): --  RR: 18 (30 Mar 2022 12:39) (18 - 18)  SpO2: 100% (30 Mar 2022 09:11) (95% - 100%)    PHYSICAL EXAM:  General: WN/WD NAD, Non-toxic  Neurology: A&Ox3, nonfocal  Respiratory: Clear to auscultation bilaterally, chest tube drainage  CV: RRR, S1S2, no murmurs, rubs or gallops  Abdominal: Soft, Non-tender, non-distended, normal bowel sounds  Extremities: No edema,   Line Sites: Clear  Skin: No rash                        8.7    4.17  )-----------( 118      ( 30 Mar 2022 06:24 )             29.3   WBC Count: 4.17 (03-30 @ 06:24)  WBC Count: 5.22 (03-28 @ 13:52)    03-28    142  |  105  |  17  ----------------------------<  106<H>  4.5   |  26  |  1.02    Ca    9.3      28 Mar 2022 13:52      MICROBIOLOGY:  .Body Fluid LEFT PLEURAL EFFUSION  03-29-22 --  --  --      Pleural Fl LEFT PLEURAL EFFUSION  03-29-22   No growth to date.  --    Numerous polymorphonuclear leukocytes per low power field  No organisms seen per oil power field    Radiology:  < from: Xray Chest 1 View- PORTABLE-Urgent (Xray Chest 1 View- PORTABLE-Urgent .) (03.29.22 @ 07:28) >  Interval placement of additional left chest tubes with decreased left   pleural fluid.  Persistent air in left pneumonectomy space of unclear etiology.    < end of copied text >  < from: CT Chest No Cont (03.17.22 @ 11:07) >  IMPRESSION: Status post left pneumonectomy as well as wedge resection in   the right upper lobe.    Increased amount of air is present within the left pneumonectomy space.   Exact etiology is unclear.    Left-sided pigtail catheter in place as described above. No fluid   collection is noted surrounding the pigtail catheter.    < end of copied text >      Chetan Coffey MD; Division of Infectious Disease; Pager: [phone]; nights and weekends: [phone]

## 2022-03-30 NOTE — CONSULT NOTE ADULT - ASSESSMENT
72yo M w/ hx squamous cell lung cancer s/p L thoracotomy, pneumonectomy in 2015 and RVATS RUL wedge resection 2019 c/b chonic infection at pneumonectomy site, newly diagnosed Waldenstrom's macroglobulinemia (not on treament given infection), admission 01/2022 for L chest wall abscess s/p L PTC placed and IV ABX who presents with complicated wound healing at chest wall site. Recent CT chest 03/17 with increased air collection in pneumonectomy space. S/p anterior and posterior chest tube placement 03/28 by IR.       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 Estrella, will hold on treatment for now given infection   - repeat SPEP, immunoglobulin panel, free light chain ratio, serum viscosity    70yo M w/ hx squamous cell lung cancer s/p L thoracotomy, pneumonectomy in 2015 and RVATS RUL wedge resection 2019 c/b chonic infection at pneumonectomy site, newly diagnosed Waldenstrom's macroglobulinemia (not on treament given infection), admission 01/2022 for L chest wall abscess s/p L PTC placed and IV ABX who presents with complicated wound healing at chest wall site. Recent CT chest 03/17 with increased air collection in pneumonectomy space. S/p anterior and posterior chest tube placement 03/28 by IR.     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 Recio    Waldenström's macroglobulinemia  - dx 01/2022 BMBx with + MYD88  - Labs 02/15/22: M spike 1.3, IgM 1945, viscosity 2   - per Dr. Estrella, will hold on treatment for now given infection   - repeat SPEP, immunoglobulin panel, free light chain ratio, serum viscosity   - no plan for inpatient treatment, outpatient f/u with Dr. Recio  - trend CBC daily   72 yo M w/ hx squamous cell lung cancer s/p L thoracotomy, pneumonectomy in 2015 and RVATS RUL wedge resection 2019 c/b chonic infection at pneumonectomy site, newly diagnosed Waldenstrom's macroglobulinemia (not on treament given infection), admission 01/2022 for L chest wall abscess s/p L PTC placed and IV ABX who presents with complicated wound healing at chest wall site. Recent CT chest 03/17 with increased air collection in pneumonectomy space. S/p anterior and posterior chest tube placement 03/28 by IR.     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 Reico    Waldenström's macroglobulinemia  - dx 01/2022 BMBx with + MYD88  - Labs 02/15/22: M spike 1.3, IgM 1945, viscosity 2   - per Dr. Estrella, will hold on treatment for now given infection   - repeat SPEP, immunoglobulin panel, free light chain ratio, serum viscosity   - no plan for inpatient treatment, outpatient f/u with Dr. Recio  - trend CBC

## 2022-03-30 NOTE — CONSULT NOTE ADULT - CONSULT REASON
Chest tube placement
muscle coverage for L chest
waldenstrom's macroglobulinemia
pleural fluid infection

## 2022-03-30 NOTE — CONSULT NOTE ADULT - SUBJECTIVE AND OBJECTIVE BOX
Plastic Surgery Consult Note  (pg [facility]: [id number], [facility]: [phone])    HPI:  Pt admitted from thoracic surgery office with complicated wound healing. Pt is a 71y male with history of squamous cell lung Ca s/p L thoracotomy, pneumonectomy in 2015 and RVATS RUL wedge resection 2019. Pt was admitted in January 2022 with a L chest wall abscess for which a L PTC was placed by IR. Pt was discharge home with IV antibiotics and followed as an outpatient.  Recent CT chest 3/17 reveals increased air collection in pneumonectomy space. He was directly admitted to the hospital with plan to placed an additional IR drain for irrigation and possible surgical intervention. PRS consulted for muscle coverage of L chest.    PAST MEDICAL & SURGICAL HISTORY:  BPH (benign prostatic hypertrophy)    Nephrolithiasis    Hydronephrosis    Lung cancer  Dx&#x27;d 2015 treated with neoadjuvant chemo/RT followed by pneumonectomy (June 2015), SBRT to RUL nodule in Jan 2017    Lung nodule    H/O lithotripsy    S/P tonsillectomy    H/O pneumonectomy  Left June 2015    S/P removal of lung  RUL lung wedge resection on 2/23/18      Allergies    No Known Allergies    Intolerances      Home Medications:  acetaminophen 325 mg oral tablet: 2 tab(s) orally every 6 hours, As needed, Mild Pain (1 - 3) (19 Jan 2022 16:25)  CoQ 10 100 mg orally  daily  last dose 2/16: May resume (19 Jan 2022 16:25)  docusate sodium 100 mg oral capsule: 1 cap(s) orally 3 times a day (19 Jan 2022 16:25)  tamsulosin 0.4 mg oral capsule: 1 cap(s) orally 2x day (19 Jan 2022 16:25)    MEDICATIONS  (STANDING):  pantoprazole    Tablet 40 milliGRAM(s) Oral before breakfast  piperacillin/tazobactam IVPB.. 3.375 Gram(s) IV Intermittent every 8 hours  senna 2 Tablet(s) Oral at bedtime  tamsulosin 0.8 milliGRAM(s) Oral at bedtime  vancomycin  IVPB 1250 milliGRAM(s) IV Intermittent every 12 hours      SOCIAL HISTORY:  FAMILY HISTORY:  Family history of lung cancer (Father)        ___________________________________________  OBJECTIVE:  Vital Signs Last 24 Hrs  T(C): 36.6 (30 Mar 2022 09:11), Max: 36.9 (30 Mar 2022 05:02)  T(F): 97.8 (30 Mar 2022 09:11), Max: 98.5 (30 Mar 2022 05:02)  HR: 85 (30 Mar 2022 09:11) (77 - 87)  BP: 140/55 (30 Mar 2022 09:11) (110/80 - 140/55)  BP(mean): --  RR: 18 (30 Mar 2022 09:11) (18 - 20)  SpO2: 100% (30 Mar 2022 09:11) (95% - 100%)CAPILLARY BLOOD GLUCOSE        I&O's Detail    29 Mar 2022 07:01  -  30 Mar 2022 07:00  --------------------------------------------------------  IN:    IV PiggyBack: 350 mL  Total IN: 350 mL    OUT:    Chest Tube (mL): 26 mL    Chest Tube (mL): 280 mL    Voided (mL): 750 mL  Total OUT: 1056 mL    Total NET: -706 mL        General: Well developed, well nourished, NAD  Neuro: Alert and oriented, moves all extremities spontaneously  Respiratory: Airway patent, respirations unlabored, chest tube in place x2- 1 anterior and 1 posterior  Extremities: Sensation and movement grossly intact  Skin: Warm, dry, appropriate color  ____________________________________________  LABS:  CBC Full  -  ( 30 Mar 2022 06:24 )  WBC Count : 4.17 K/uL  RBC Count : 3.15 M/uL  Hemoglobin : 8.7 g/dL  Hematocrit : 29.3 %  Platelet Count - Automated : 118 K/uL  Mean Cell Volume : 93.0 fL  Mean Cell Hemoglobin : 27.6 pg  Mean Cell Hemoglobin Concentration : 29.7 gm/dL  Auto Neutrophil # : x  Auto Lymphocyte # : x  Auto Monocyte # : x  Auto Eosinophil # : x  Auto Basophil # : x  Auto Neutrophil % : x  Auto Lymphocyte % : x  Auto Monocyte % : x  Auto Eosinophil % : x  Auto Basophil % : x    03-28    142  |  105  |  17  ----------------------------<  106<H>  4.5   |  26  |  1.02    Ca    9.3      28 Mar 2022 13:52        PT/INR - ( 28 Mar 2022 15:30 )   PT: 13.3 sec;   INR: 1.14 ratio         PTT - ( 28 Mar 2022 15:30 )  PTT:32.2 sec          ____________________________________________  MICRO:  RECENT CULTURES:  Specimen Source: .Body Fluid LEFT PLEURAL EFFUSION  Date/Time: 03-29 @ 00:40  Culture Results: --  Gram Stain: --  Organism: --  Specimen Source: Pleural Fl LEFT PLEURAL EFFUSION  Date/Time: 03-29 @ 00:39  Culture Results:   No growth to date.  Gram Stain:   Numerous polymorphonuclear leukocytes per low power field  No organisms seen per oil power field  Organism: --    ____________________________________________  RADIOLOGY:

## 2022-03-30 NOTE — PROGRESS NOTE ADULT - SUBJECTIVE AND OBJECTIVE BOX
Subjective: pt seen and examined   pt admits discomfort at chest tube insertion sites; anterior and posterior drains place by IR  he is ambulatory, without complaints of fever, shortness of breath or chest pain  he is eager to figure out a treatment plan  Plastic surgery Dr Irizarry consulted      Vital Signs:  Vital Signs Last 24 Hrs  T(C): 36.6 (03-30-22 @ 09:11), Max: 36.9 (03-30-22 @ 05:02)  T(F): 97.8 (03-30-22 @ 09:11), Max: 98.5 (03-30-22 @ 05:02)  HR: 85 (03-30-22 @ 09:11) (77 - 87)  BP: 140/55 (03-30-22 @ 09:11) (110/80 - 140/55)  RR: 18 (03-30-22 @ 09:11) (18 - 20)  SpO2: 100% (03-30-22 @ 09:11) (95% - 100%) on (O2)    Telemetry/Alarms:  General: WN/WD NAD  Neurology: Awake, nonfocal, KEY x 4  Eyes: Scleras clear, PERRLA/ EOMI, Gross vision intact  ENT:Gross hearing intact, grossly patent pharynx, no stridor  Neck: Neck supple, trachea midline, No JVD,   Respiratory: CTA B/L, No wheezing, rales, rhonchi  CV: RRR, S1S2, no murmurs, rubs or gallops  Abdominal: Soft, NT, ND +BS,   Extremities: No edema, + peripheral pulses  Skin: No Rashes, Hematoma, Ecchymosis  Lymphatic: No Neck, axilla, groin LAD  Psych: Oriented x 3, normal affect  Incisions: c,d,i  Tubes: anterior IR drain 280cc/24h ws no air leak  posterior IR drain 25cc/24h ws no AL  Relevant labs, radiology and Medications reviewed                        8.7    4.17  )-----------( 118      ( 30 Mar 2022 06:24 )             29.3     03-28    142  |  105  |  17  ----------------------------<  106<H>  4.5   |  26  |  1.02    Ca    9.3      28 Mar 2022 13:52      PT/INR - ( 28 Mar 2022 15:30 )   PT: 13.3 sec;   INR: 1.14 ratio         PTT - ( 28 Mar 2022 15:30 )  PTT:32.2 sec  MEDICATIONS  (STANDING):  pantoprazole    Tablet 40 milliGRAM(s) Oral before breakfast  piperacillin/tazobactam IVPB.. 3.375 Gram(s) IV Intermittent every 8 hours  senna 2 Tablet(s) Oral at bedtime  tamsulosin 0.8 milliGRAM(s) Oral at bedtime  vancomycin  IVPB 1250 milliGRAM(s) IV Intermittent every 12 hours    MEDICATIONS  (PRN):  acetaminophen     Tablet .. 650 milliGRAM(s) Oral every 6 hours PRN Mild Pain (1 - 3)  HYDROmorphone  Injectable 1 milliGRAM(s) IV Push every 4 hours PRN break through pain  oxyCODONE    IR 5 milliGRAM(s) Oral every 4 hours PRN Moderate Pain (4 - 6)  oxyCODONE    IR 10 milliGRAM(s) Oral every 4 hours PRN Severe Pain (7 - 10)  polyethylene glycol 3350 17 Gram(s) Oral daily PRN Constipation    Pertinent Physical Exam  I&O's Summary    29 Mar 2022 07:01  -  30 Mar 2022 07:00  --------------------------------------------------------  IN: 350 mL / OUT: 1056 mL / NET: -706 mL              pleural fluid cultures no growth to date        Social History (marital status, living situation, occupation, tobacco use, alcohol and drug use, and sexual history): · Marital Status	  · Occupation	works for 's [employer]; retired   · Lives With	spouse    Substance Use History:  · Substance Use	caffeine  · Caffeine Type	coffee  · Caffeine Amount/Frequency	1-2 cups/cans per day  · Caffeine Withdrawal Pattern	denies    Alcohol Use History:  · Have you ever consumed alcohol	yes...  · Alcohol Type	beer  · Alcohol Frequency	daily  · Alcohol Amount	1-2 drinks  · Problems Related to Alcohol Use	no  · 1. Have you felt you ought to CUT down on your drinking?	no  · 2. Have people ANNOYED you by criticizing your drinking?	no  · 3. Have you ever felt bad or GUILTY about your drinking?	no  · 4. Have you ever needed an "EYE OPENER", a drink first thing in the morning to steady your nerves or get rid of a hangover?	no    Former smoker, 40 pack years, · Last Tobacco Use (dd-mmm-yy): 12-Jun-2015                  Assessment  71y Male  w/ PAST MEDICAL & SURGICAL HISTORY:  BPH (benign prostatic hypertrophy)    Nephrolithiasis    Hydronephrosis    Lung cancer  Dx&#x27;d 2015 treated with neoadjuvant chemo/RT followed by pneumonectomy (June 2015), SBRT to RUL nodule in Jan 2017    Lung nodule    H/O lithotripsy    S/P tonsillectomy    H/O pneumonectomy  Left June 2015    S/P removal of lung  RUL lung wedge resection on 2/23/18    Pt admitted from thoracic surgery office visit today with complicated wound healing.   Pt is a 71y male with history of squamous cell lung Ca s/p L thoracotomy, pneumonectomy in 2015 and RVATS RUL wedge resection 2019.  Pt was admitted in January 2022 with a L chest wall abscess for which a L PTC was placed by IR. Pt was discharge home with IV antibiotics and followed as an outpatient.  Recent CT chest 3/17 reveals increased air collection in pneumonectomy space.  He was directly admitted to the hospital 3/28 with plan to place an additional IR drain for irrigation and possible surgical intervention, left thoracotomy and muscle flap.  On 3/28- pt went to IR and had Ant and Post PTC placed. Both draining serosang fluid. Cultures sent no growth to date.  3/30 Plastics Dr Irizarry consulted. Continue both PTC for drainage.     PLAN  Neuro: Pain management. Cont oxy, neurontin, tylenol   Pulm: Encourage coughing, deep breathing and use of incentive spirometry. Daily CXR.   Cardio: Monitor telemetry/alarms  GI: Tolerating diet. Continue stool softeners.  Renal: monitor urine output, supplement electrolytes as needed  Vasc: Heparin SC/SCDs for DVT prophylaxis  Heme: Stable H/H. .   ID: VANCO ZOSYN, f/u pleural fluid cs; f/u ID consult  Therapy: OOB/ambulate  Tubes: Monitor Chest tube outputs from both IR drains.   f/u Plastic surgery to d/w Dr Sims  Disposition: Awaiting final OR plans from Dr. Sims.   Discussed with Cardiothoracic Team at AM rounds.

## 2022-03-30 NOTE — CONSULT NOTE ADULT - ASSESSMENT
71M w/ squamous cell lung Ca s/p L thoracotomy, pneumonectomy in 2015 and RVATS RUL wedge resection 2019 c/b L chest wall abscess s/p IR drain in Jan 2022 presenting for possible additional IR drain +/- surgical intervention requiring muscle flap coverage.    Plan:  -possible lat dorsi vs rectus flap  -Dr. Irizarry to give final surgical plan pending d/w Dr. Sims  -pain control  -care per thoracic surgery

## 2022-03-30 NOTE — CONSULT NOTE ADULT - SUBJECTIVE AND OBJECTIVE BOX
Hematology Oncology Consult Note    HPI:  Pt admitted from thoracic surgery office visit today with complicated wound healing.  Pt is a 71y male with history of squamous cell lung Ca s/p L thoracotomy, pneumonectomy in 2015 and RVATS RUL wedge resection 2019. Pt was admitted in January 2022 with a L chest wall abscess for which a L PTC was placed by IR. Pt was discharge home with IV antibiotics and followed as an outpatient.  Recent CT chest 3/17 reveals increased air collection in pneumonectomy space.  He was directly admitted to the hospital today with planto placed an additional IR drain for irrigation and possible surgical intervention, left thoracotomy and muscle flap.  (28 Mar 2022 13:13)      PAST MEDICAL & SURGICAL HISTORY:  BPH (benign prostatic hypertrophy)    Nephrolithiasis    Hydronephrosis    Lung cancer  Dx&#x27;d 2015 treated with neoadjuvant chemo/RT followed by pneumonectomy (June 2015), SBRT to RUL nodule in Jan 2017    Lung nodule    H/O lithotripsy    S/P tonsillectomy    H/O pneumonectomy  Left June 2015    S/P removal of lung  RUL lung wedge resection on 2/23/18        FAMILY HISTORY:  Family history of lung cancer (Father)        MEDICATIONS  (STANDING):  heparin   Injectable 5000 Unit(s) SubCutaneous every 8 hours  pantoprazole    Tablet 40 milliGRAM(s) Oral before breakfast  piperacillin/tazobactam IVPB.. 3.375 Gram(s) IV Intermittent every 8 hours  senna 2 Tablet(s) Oral at bedtime  tamsulosin 0.8 milliGRAM(s) Oral at bedtime    MEDICATIONS  (PRN):  acetaminophen     Tablet .. 650 milliGRAM(s) Oral every 6 hours PRN Mild Pain (1 - 3)  HYDROmorphone  Injectable 1 milliGRAM(s) IV Push every 4 hours PRN break through pain  oxyCODONE    IR 5 milliGRAM(s) Oral every 4 hours PRN Moderate Pain (4 - 6)  oxyCODONE    IR 10 milliGRAM(s) Oral every 4 hours PRN Severe Pain (7 - 10)  polyethylene glycol 3350 17 Gram(s) Oral daily PRN Constipation      Allergies    No Known Allergies    Intolerances        SOCIAL HISTORY: No EtOH, no tobacco    Review of Systems:  General: denies fevers/chills  Respiratory: denies cough, shortness of breath  Cardiovascular: denies chest pain, palpitations  Gastrointestinal: denies nausea, vomiting, abdominal pain, constipation, diarrhea, melena, hematochezia  MSK: denies joint pain or muscle pain  Neuro: denies headache, weakness, or parasthesias  Skin: denies rash, petichiae, echymoses  Psych: denies anxiety or sleep disturbances        T(F): 98.8 (03-30-22 @ 12:39), Max: 98.8 (03-30-22 @ 12:39)  HR: 84 (03-30-22 @ 12:39)  BP: 113/62 (03-30-22 @ 12:39)  RR: 18 (03-30-22 @ 12:39)  SpO2: 100% (03-30-22 @ 09:11)  Wt(kg): --    PHYSICAL EXAM:    Constitutional: NAD  Respiratory: CTA b/l, symmetric chest rise, with normal respiratory effort  Cardiovascular: RRR  Gastrointestinal: soft, NTND  Extremities:  no edema  MSK: no obvious abnormalities  Neurological: Grossly intact  Skin: no rash, no echymoses, no petichiae  Psych: normal affect                          8.7    4.17  )-----------( 118      ( 30 Mar 2022 06:24 )             29.3                    Hematology Oncology Consult Note    HPI:  Pt admitted from thoracic surgery office visit today with complicated wound healing.  Pt is a 71y male with history of squamous cell lung Ca s/p L thoracotomy, pneumonectomy in 2015 and RVATS RUL wedge resection 2019. Pt was admitted in January 2022 with a L chest wall abscess for which a L PTC was placed by IR. Pt was discharge home with IV antibiotics and followed as an outpatient.  Recent CT chest 3/17 reveals increased air collection in pneumonectomy space.  He was directly admitted to the hospital today with planto placed an additional IR drain for irrigation and possible surgical intervention, left thoracotomy and muscle flap.  (28 Mar 2022 13:13)      PAST MEDICAL & SURGICAL HISTORY:  BPH (benign prostatic hypertrophy)    Nephrolithiasis    Hydronephrosis    Lung cancer  Dx&#x27;d 2015 treated with neoadjuvant chemo/RT followed by pneumonectomy (June 2015), SBRT to RUL nodule in Jan 2017    Lung nodule    H/O lithotripsy    S/P tonsillectomy    H/O pneumonectomy  Left June 2015    S/P removal of lung  RUL lung wedge resection on 2/23/18        FAMILY HISTORY:  Family history of lung cancer (Father)        MEDICATIONS  (STANDING):  heparin   Injectable 5000 Unit(s) SubCutaneous every 8 hours  pantoprazole    Tablet 40 milliGRAM(s) Oral before breakfast  piperacillin/tazobactam IVPB.. 3.375 Gram(s) IV Intermittent every 8 hours  senna 2 Tablet(s) Oral at bedtime  tamsulosin 0.8 milliGRAM(s) Oral at bedtime    MEDICATIONS  (PRN):  acetaminophen     Tablet .. 650 milliGRAM(s) Oral every 6 hours PRN Mild Pain (1 - 3)  HYDROmorphone  Injectable 1 milliGRAM(s) IV Push every 4 hours PRN break through pain  oxyCODONE    IR 5 milliGRAM(s) Oral every 4 hours PRN Moderate Pain (4 - 6)  oxyCODONE    IR 10 milliGRAM(s) Oral every 4 hours PRN Severe Pain (7 - 10)  polyethylene glycol 3350 17 Gram(s) Oral daily PRN Constipation      Allergies    No Known Allergies    Intolerances        SOCIAL HISTORY: No EtOH, no tobacco    Review of Systems:  General: denies fevers/chills  Respiratory: denies cough, shortness of breath  Cardiovascular: denies chest pain, palpitations  Gastrointestinal: denies nausea, vomiting, abdominal pain, constipation, diarrhea, melena, hematochezia  MSK: denies joint pain or muscle pain  Neuro: denies headache, weakness, or parasthesias  Skin: denies rash, petichiae, echymoses  Psych: denies anxiety or sleep disturbances        T(F): 98.8 (03-30-22 @ 12:39), Max: 98.8 (03-30-22 @ 12:39)  HR: 84 (03-30-22 @ 12:39)  BP: 113/62 (03-30-22 @ 12:39)  RR: 18 (03-30-22 @ 12:39)  SpO2: 100% (03-30-22 @ 09:11)  Wt(kg): --    PHYSICAL EXAM:    Constitutional: NAD  Respiratory: symmetric chest rise, with normal respiratory effort, + chest tube   Cardiovascular: RRR  Gastrointestinal: soft, NTND  Extremities:  no edema  MSK: no obvious abnormalities  Neurological: Grossly intact  Skin: no rash, no echymoses, no petichiae  Psych: normal affect                          8.7    4.17  )-----------( 118      ( 30 Mar 2022 06:24 )             29.3

## 2022-03-31 LAB
ANION GAP SERPL CALC-SCNC: 11 MMOL/L — SIGNIFICANT CHANGE UP (ref 7–14)
BUN SERPL-MCNC: 14 MG/DL — SIGNIFICANT CHANGE UP (ref 7–23)
CALCIUM SERPL-MCNC: 8.8 MG/DL — SIGNIFICANT CHANGE UP (ref 8.4–10.5)
CHLORIDE SERPL-SCNC: 104 MMOL/L — SIGNIFICANT CHANGE UP (ref 98–107)
CO2 SERPL-SCNC: 27 MMOL/L — SIGNIFICANT CHANGE UP (ref 22–31)
CREAT SERPL-MCNC: 1.27 MG/DL — SIGNIFICANT CHANGE UP (ref 0.5–1.3)
EGFR: 61 ML/MIN/1.73M2 — SIGNIFICANT CHANGE UP
GLUCOSE SERPL-MCNC: 102 MG/DL — HIGH (ref 70–99)
HCT VFR BLD CALC: 32.2 % — LOW (ref 39–50)
HGB BLD-MCNC: 9.5 G/DL — LOW (ref 13–17)
IGA FLD-MCNC: 85 MG/DL — SIGNIFICANT CHANGE UP (ref 84–499)
IGG FLD-MCNC: 876 MG/DL — SIGNIFICANT CHANGE UP (ref 610–1660)
IGM SERPL-MCNC: 1819 MG/DL — HIGH (ref 35–242)
KAPPA LC SER QL IFE: 11.72 MG/DL — HIGH (ref 0.33–1.94)
KAPPA LC SER QL IFE: 12.15 MG/DL — HIGH (ref 0.33–1.94)
KAPPA/LAMBDA FREE LIGHT CHAIN RATIO, SERUM: 5.89 RATIO — HIGH (ref 0.26–1.65)
KAPPA/LAMBDA FREE LIGHT CHAIN RATIO, SERUM: 6.3 RATIO — HIGH (ref 0.26–1.65)
LAMBDA LC SER QL IFE: 1.93 MG/DL — SIGNIFICANT CHANGE UP (ref 0.57–2.63)
LAMBDA LC SER QL IFE: 1.99 MG/DL — SIGNIFICANT CHANGE UP (ref 0.57–2.63)
MCHC RBC-ENTMCNC: 27.9 PG — SIGNIFICANT CHANGE UP (ref 27–34)
MCHC RBC-ENTMCNC: 29.5 GM/DL — LOW (ref 32–36)
MCV RBC AUTO: 94.7 FL — SIGNIFICANT CHANGE UP (ref 80–100)
NRBC # BLD: 0 /100 WBCS — SIGNIFICANT CHANGE UP
NRBC # FLD: 0 K/UL — SIGNIFICANT CHANGE UP
PLATELET # BLD AUTO: 114 K/UL — LOW (ref 150–400)
POTASSIUM SERPL-MCNC: 3.9 MMOL/L — SIGNIFICANT CHANGE UP (ref 3.5–5.3)
POTASSIUM SERPL-SCNC: 3.9 MMOL/L — SIGNIFICANT CHANGE UP (ref 3.5–5.3)
RBC # BLD: 3.4 M/UL — LOW (ref 4.2–5.8)
RBC # FLD: 15.3 % — HIGH (ref 10.3–14.5)
SODIUM SERPL-SCNC: 142 MMOL/L — SIGNIFICANT CHANGE UP (ref 135–145)
VANCOMYCIN FLD-MCNC: 10.1 UG/ML — SIGNIFICANT CHANGE UP
WBC # BLD: 3.32 K/UL — LOW (ref 3.8–10.5)
WBC # FLD AUTO: 3.32 K/UL — LOW (ref 3.8–10.5)

## 2022-03-31 PROCEDURE — 99232 SBSQ HOSP IP/OBS MODERATE 35: CPT

## 2022-03-31 PROCEDURE — 71045 X-RAY EXAM CHEST 1 VIEW: CPT | Mod: 26

## 2022-03-31 RX ADMIN — TAMSULOSIN HYDROCHLORIDE 0.8 MILLIGRAM(S): 0.4 CAPSULE ORAL at 22:14

## 2022-03-31 RX ADMIN — HEPARIN SODIUM 5000 UNIT(S): 5000 INJECTION INTRAVENOUS; SUBCUTANEOUS at 14:07

## 2022-03-31 RX ADMIN — PIPERACILLIN AND TAZOBACTAM 25 GRAM(S): 4; .5 INJECTION, POWDER, LYOPHILIZED, FOR SOLUTION INTRAVENOUS at 22:14

## 2022-03-31 RX ADMIN — OXYCODONE HYDROCHLORIDE 5 MILLIGRAM(S): 5 TABLET ORAL at 12:47

## 2022-03-31 RX ADMIN — HEPARIN SODIUM 5000 UNIT(S): 5000 INJECTION INTRAVENOUS; SUBCUTANEOUS at 22:14

## 2022-03-31 RX ADMIN — PANTOPRAZOLE SODIUM 40 MILLIGRAM(S): 20 TABLET, DELAYED RELEASE ORAL at 05:16

## 2022-03-31 RX ADMIN — PIPERACILLIN AND TAZOBACTAM 25 GRAM(S): 4; .5 INJECTION, POWDER, LYOPHILIZED, FOR SOLUTION INTRAVENOUS at 05:17

## 2022-03-31 RX ADMIN — OXYCODONE HYDROCHLORIDE 5 MILLIGRAM(S): 5 TABLET ORAL at 12:14

## 2022-03-31 RX ADMIN — PIPERACILLIN AND TAZOBACTAM 25 GRAM(S): 4; .5 INJECTION, POWDER, LYOPHILIZED, FOR SOLUTION INTRAVENOUS at 14:06

## 2022-03-31 RX ADMIN — HEPARIN SODIUM 5000 UNIT(S): 5000 INJECTION INTRAVENOUS; SUBCUTANEOUS at 05:17

## 2022-03-31 RX ADMIN — OXYCODONE HYDROCHLORIDE 5 MILLIGRAM(S): 5 TABLET ORAL at 22:14

## 2022-03-31 NOTE — PROGRESS NOTE ADULT - SUBJECTIVE AND OBJECTIVE BOX
Follow Up:  left pleural space infection    Interval History/ROS:  feels fine except discomfort at chest tube sites    Allergies  No Known Allergies    ANTIMICROBIALS:  piperacillin/tazobactam IVPB.. 3.375 every 8 hours      OTHER MEDS:  MEDICATIONS  (STANDING):  acetaminophen     Tablet .. 650 every 6 hours PRN  heparin   Injectable 5000 every 8 hours  HYDROmorphone  Injectable 1 every 4 hours PRN  oxyCODONE    IR 5 every 4 hours PRN  oxyCODONE    IR 10 every 4 hours PRN  pantoprazole    Tablet 40 before breakfast  polyethylene glycol 3350 17 daily PRN  senna 2 at bedtime  tamsulosin 0.8 at bedtime      Vital Signs Last 24 Hrs  T(C): 36.4 (31 Mar 2022 17:00), Max: 36.8 (31 Mar 2022 05:14)  T(F): 97.6 (31 Mar 2022 17:00), Max: 98.2 (31 Mar 2022 05:14)  HR: 78 (31 Mar 2022 17:00) (67 - 87)  BP: 136/56 (31 Mar 2022 17:00) (113/61 - 145/64)  BP(mean): 74 (31 Mar 2022 05:14) (74 - 84)  RR: 16 (31 Mar 2022 17:00) (16 - 16)  SpO2: 98% (31 Mar 2022 17:00) (92% - 100%)    PHYSICAL EXAM:  General: WN/WD NAD, Non-toxic  Neurology: A&Ox3, nonfocal  Respiratory: bronchial BS on right chest,  Chest tube x 2  left chest  CV: RRR, S1S2, no murmurs, rubs or gallops  Abdominal: Soft, Non-tender, non-distended, normal bowel sounds  Extremities: No edema,   Line Sites: Clear  Skin: No rash                        9.5    3.32  )-----------( 114      ( 31 Mar 2022 06:33 )             32.2       03-31    142  |  104  |  14  ----------------------------<  102<H>  3.9   |  27  |  1.27    Ca    8.8      31 Mar 2022 06:33    TPro  7.2  /  Alb  x   /  TBili  x   /  DBili  x   /  AST  x   /  ALT  x   /  AlkPhos  x   03-30      MICROBIOLOGY:  .Body Fluid LEFT PLEURAL EFFUSION  03-29-22   Culture is being performed.  --  --      Pleural Fl LEFT PLEURAL EFFUSION  03-29-22   No growth to date.  --    Numerous polymorphonuclear leukocytes per low power field  No organisms seen per oil power field    Vancomycin Level, Random: 10.1 ug/mL (03-31-22 @ 06:33)    RADIOLOGY:    Chetan Coffey MD; Division of Infectious Disease; Pager: [phone]; nights and weekends: [phone]

## 2022-03-31 NOTE — PROGRESS NOTE ADULT - SUBJECTIVE AND OBJECTIVE BOX
Subjective: Pt seen and assessed at bedside by surgical team. Patient endorsed mild discomfort around CT insertion site. Pt did not endorse any chest pain, SOB, fevers/chills, N/V/D/C.    Vital Signs:  Vital Signs Last 24 Hrs  T(C): 36.4 (03-31-22 @ 12:07), Max: 36.8 (03-31-22 @ 05:14)  T(F): 97.6 (03-31-22 @ 12:07), Max: 98.2 (03-31-22 @ 05:14)  HR: 71 (03-31-22 @ 12:07) (67 - 87)  BP: 139/51 (03-31-22 @ 12:07) (113/61 - 145/64)  RR: 16 (03-31-22 @ 12:07) (16 - 16)  SpO2: 100% (03-31-22 @ 12:07) (92% - 100%) on (O2)    Telemetry/Alarms:  General: WN/WD NAD  Neurology: Awake, nonfocal, KEY x 4  Eyes: Scleras clear, PERRLA/ EOMI, Gross vision intact  ENT:Gross hearing intact, grossly patent pharynx, no stridor  Neck: Neck supple, trachea midline, No JVD,   Respiratory: CTA B/L, No wheezing, rales, rhonchi      - Two L sided IR PTC, BOTH to WS  CV: RRR, S1S2, no murmurs, rubs or gallops  Abdominal: Soft, NT, ND +BS,   Extremities: No edema, + peripheral pulses  Skin: No Rashes, Hematoma, Ecchymosis  Lymphatic: No Neck, axilla, groin LAD  Psych: Oriented x 3, normal affect  Relevant labs, radiology and Medications reviewed                        9.5    3.32  )-----------( 114      ( 31 Mar 2022 06:33 )             32.2     03-31    142  |  104  |  14  ----------------------------<  102<H>  3.9   |  27  |  1.27    Ca    8.8      31 Mar 2022 06:33    TPro  7.2  /  Alb  x   /  TBili  x   /  DBili  x   /  AST  x   /  ALT  x   /  AlkPhos  x   03-30      MEDICATIONS  (STANDING):  heparin   Injectable 5000 Unit(s) SubCutaneous every 8 hours  pantoprazole    Tablet 40 milliGRAM(s) Oral before breakfast  piperacillin/tazobactam IVPB.. 3.375 Gram(s) IV Intermittent every 8 hours  senna 2 Tablet(s) Oral at bedtime  tamsulosin 0.8 milliGRAM(s) Oral at bedtime    MEDICATIONS  (PRN):  acetaminophen     Tablet .. 650 milliGRAM(s) Oral every 6 hours PRN Mild Pain (1 - 3)  HYDROmorphone  Injectable 1 milliGRAM(s) IV Push every 4 hours PRN break through pain  oxyCODONE    IR 5 milliGRAM(s) Oral every 4 hours PRN Moderate Pain (4 - 6)  oxyCODONE    IR 10 milliGRAM(s) Oral every 4 hours PRN Severe Pain (7 - 10)  polyethylene glycol 3350 17 Gram(s) Oral daily PRN Constipation    Pertinent Physical Exam  I&O's Summary    30 Mar 2022 07:01  -  31 Mar 2022 07:00  --------------------------------------------------------  IN: 240 mL / OUT: 1050 mL / NET: -810 mL    31 Mar 2022 07:01  -  31 Mar 2022 16:55  --------------------------------------------------------  IN: 0 mL / OUT: 608 mL / NET: -608 mL      Marital Status:  (   )    (   ) Single    (   )    (  )   Lives with: (  ) alone  (  ) children   (  ) spouse   (  ) parents  (  ) other  Recent Travel: No recent travel  Occupation:    Substance Use (street drugs): ( x ) never used  (  ) other:  Tobacco Usage:  ( x  ) never smoked   (   ) former smoker   (   ) current smoker  (     ) pack year  Alcohol Usage: None     Assessment  71y Male  w/ PAST MEDICAL & SURGICAL HISTORY:  BPH (benign prostatic hypertrophy)    Nephrolithiasis    Hydronephrosis    Lung cancer  Dx&#x27;d 2015 treated with neoadjuvant chemo/RT followed by pneumonectomy (June 2015), SBRT to RUL nodule in Jan 2017    Lung nodule    H/O lithotripsy    S/P tonsillectomy    H/O pneumonectomy  Left June 2015    S/P removal of lung  RUL lung wedge resection on 2/23/18    admitted with complaints of Patient is a 71y old  Male who presents with a chief complaint of bubbles in chest x-ray (30 Mar 2022 13:07)  .  On (Date), patient underwent . Postoperative course/issues:    PLAN  Neuro: Pain management  Pulm: Encourage coughing, deep breathing and use of incentive spirometry. Wean off supplemental oxygen as able. Daily CXR.   Cardio: Monitor telemetry/alarms  GI: Tolerating diet. Continue stool softeners.  Renal: monitor urine output, supplement electrolytes as needed  Vasc: Heparin SC/SCDs for DVT prophylaxis  Heme: Stable H/H. .   ID: Off antibiotics. Stable.  Therapy: OOB/ambulate  Tubes: Monitor Chest tube output  Disposition: Aim to D/C to home on  Discussed with Cardiothoracic Team at AM rounds.

## 2022-04-01 LAB
% ALBUMIN: 41.9 % — SIGNIFICANT CHANGE UP
% ALPHA 1: 4.6 % — SIGNIFICANT CHANGE UP
% ALPHA 2: 14.3 % — SIGNIFICANT CHANGE UP
% BETA: 9.5 % — SIGNIFICANT CHANGE UP
% GAMMA: 29.7 % — SIGNIFICANT CHANGE UP
% M SPIKE: 21.3 % — SIGNIFICANT CHANGE UP
ALBUMIN SERPL ELPH-MCNC: 3.02 G/DL — LOW (ref 3.3–4.4)
ALBUMIN/GLOB SERPL ELPH: 0.7 RATIO — SIGNIFICANT CHANGE UP
ALPHA1 GLOB SERPL ELPH-MCNC: 0.33 G/DL — HIGH (ref 0.1–0.3)
ALPHA2 GLOB SERPL ELPH-MCNC: 1 G/DL — SIGNIFICANT CHANGE UP (ref 0.6–1)
ANION GAP SERPL CALC-SCNC: 11 MMOL/L — SIGNIFICANT CHANGE UP (ref 7–14)
B-GLOBULIN SERPL ELPH-MCNC: 0.68 G/DL — SIGNIFICANT CHANGE UP (ref 0.6–1.1)
BUN SERPL-MCNC: 11 MG/DL — SIGNIFICANT CHANGE UP (ref 7–23)
CALCIUM SERPL-MCNC: 8.9 MG/DL — SIGNIFICANT CHANGE UP (ref 8.4–10.5)
CHLORIDE SERPL-SCNC: 104 MMOL/L — SIGNIFICANT CHANGE UP (ref 98–107)
CO2 SERPL-SCNC: 25 MMOL/L — SIGNIFICANT CHANGE UP (ref 22–31)
CREAT SERPL-MCNC: 1.08 MG/DL — SIGNIFICANT CHANGE UP (ref 0.5–1.3)
EGFR: 74 ML/MIN/1.73M2 — SIGNIFICANT CHANGE UP
GAMMA GLOBULIN: 2.14 G/DL — HIGH (ref 0.7–1.7)
GLUCOSE SERPL-MCNC: 98 MG/DL — SIGNIFICANT CHANGE UP (ref 70–99)
HCT VFR BLD CALC: 30.5 % — LOW (ref 39–50)
HGB BLD-MCNC: 9.1 G/DL — LOW (ref 13–17)
M-SPIKE: 1.5 G/DL — SIGNIFICANT CHANGE UP
MAGNESIUM SERPL-MCNC: 2.2 MG/DL — SIGNIFICANT CHANGE UP (ref 1.6–2.6)
MCHC RBC-ENTMCNC: 27.7 PG — SIGNIFICANT CHANGE UP (ref 27–34)
MCHC RBC-ENTMCNC: 29.8 GM/DL — LOW (ref 32–36)
MCV RBC AUTO: 93 FL — SIGNIFICANT CHANGE UP (ref 80–100)
NON-GYNECOLOGICAL CYTOLOGY STUDY: SIGNIFICANT CHANGE UP
NRBC # BLD: 0 /100 WBCS — SIGNIFICANT CHANGE UP
NRBC # FLD: 0 K/UL — SIGNIFICANT CHANGE UP
PHOSPHATE SERPL-MCNC: 2.9 MG/DL — SIGNIFICANT CHANGE UP (ref 2.5–4.5)
PLATELET # BLD AUTO: 133 K/UL — LOW (ref 150–400)
POTASSIUM SERPL-MCNC: 3.7 MMOL/L — SIGNIFICANT CHANGE UP (ref 3.5–5.3)
POTASSIUM SERPL-SCNC: 3.7 MMOL/L — SIGNIFICANT CHANGE UP (ref 3.5–5.3)
PROT PATTERN SERPL ELPH-IMP: SIGNIFICANT CHANGE UP
PROT SERPL-MCNC: 7.2 G/DL — SIGNIFICANT CHANGE UP
RBC # BLD: 3.28 M/UL — LOW (ref 4.2–5.8)
RBC # FLD: 15.4 % — HIGH (ref 10.3–14.5)
SODIUM SERPL-SCNC: 140 MMOL/L — SIGNIFICANT CHANGE UP (ref 135–145)
VANCOMYCIN FLD-MCNC: 5.6 UG/ML — SIGNIFICANT CHANGE UP
VISC SER: 1.9 — SIGNIFICANT CHANGE UP (ref 1.4–2.1)
WBC # BLD: 3.21 K/UL — LOW (ref 3.8–10.5)
WBC # FLD AUTO: 3.21 K/UL — LOW (ref 3.8–10.5)

## 2022-04-01 PROCEDURE — 71045 X-RAY EXAM CHEST 1 VIEW: CPT | Mod: 26

## 2022-04-01 RX ADMIN — PANTOPRAZOLE SODIUM 40 MILLIGRAM(S): 20 TABLET, DELAYED RELEASE ORAL at 05:27

## 2022-04-01 RX ADMIN — OXYCODONE HYDROCHLORIDE 5 MILLIGRAM(S): 5 TABLET ORAL at 13:55

## 2022-04-01 RX ADMIN — PIPERACILLIN AND TAZOBACTAM 25 GRAM(S): 4; .5 INJECTION, POWDER, LYOPHILIZED, FOR SOLUTION INTRAVENOUS at 05:27

## 2022-04-01 RX ADMIN — PIPERACILLIN AND TAZOBACTAM 25 GRAM(S): 4; .5 INJECTION, POWDER, LYOPHILIZED, FOR SOLUTION INTRAVENOUS at 13:40

## 2022-04-01 RX ADMIN — OXYCODONE HYDROCHLORIDE 5 MILLIGRAM(S): 5 TABLET ORAL at 21:54

## 2022-04-01 RX ADMIN — TAMSULOSIN HYDROCHLORIDE 0.8 MILLIGRAM(S): 0.4 CAPSULE ORAL at 21:54

## 2022-04-01 RX ADMIN — OXYCODONE HYDROCHLORIDE 5 MILLIGRAM(S): 5 TABLET ORAL at 22:26

## 2022-04-01 RX ADMIN — PIPERACILLIN AND TAZOBACTAM 25 GRAM(S): 4; .5 INJECTION, POWDER, LYOPHILIZED, FOR SOLUTION INTRAVENOUS at 21:54

## 2022-04-01 RX ADMIN — HEPARIN SODIUM 5000 UNIT(S): 5000 INJECTION INTRAVENOUS; SUBCUTANEOUS at 05:27

## 2022-04-01 RX ADMIN — HEPARIN SODIUM 5000 UNIT(S): 5000 INJECTION INTRAVENOUS; SUBCUTANEOUS at 13:45

## 2022-04-01 RX ADMIN — HEPARIN SODIUM 5000 UNIT(S): 5000 INJECTION INTRAVENOUS; SUBCUTANEOUS at 21:54

## 2022-04-01 RX ADMIN — OXYCODONE HYDROCHLORIDE 5 MILLIGRAM(S): 5 TABLET ORAL at 14:55

## 2022-04-01 NOTE — PROGRESS NOTE ADULT - SUBJECTIVE AND OBJECTIVE BOX
Subjective: Pt seen and assessed at bedside by surgical team. Patient continue to endorse mild discomfort around CT insertion site. Pt did not endorse any chest pain, SOB, fevers/chills, N/V/D/C.    Vital Signs:  T(C): 36.6 (04-01-22 @ 17:03), Max: 36.9 (03-31-22 @ 20:16)  HR: 68 (04-01-22 @ 17:03) (68 - 83)  BP: 134/62 (04-01-22 @ 17:03) (119/53 - 157/57)  BP(mean): --  ABP: --  ABP(mean): --  RR: 18 (04-01-22 @ 17:03) (16 - 18)  SpO2: 99% (04-01-22 @ 17:03) (92% - 100%)  Wt(kg): --  CVP(mm Hg): --  CI: --  CAPILLARY BLOOD GLUCOSE       N/A      03-31 @ 07:01  -  04-01 @ 07:00  --------------------------------------------------------  IN:  Total IN: 0 mL    OUT:    Chest Tube (mL): 0 mL    Chest Tube (mL): 1178 mL    Voided (mL): 1350 mL  Total OUT: 2528 mL    Total NET: -2528 mL      04-01 @ 07:01  -  04-01 @ 17:52  --------------------------------------------------------  IN:  Total IN: 0 mL    OUT:    Chest Tube (mL): 0 mL    Chest Tube (mL): 420 mL  Total OUT: 420 mL    Total NET: -420 mL    Telemetry/Alarms:  General: WN/WD NAD  Neurology: Awake, nonfocal, KEY x 4  Eyes: Scleras clear, PERRLA/ EOMI, Gross vision intact  ENT:Gross hearing intact, grossly patent pharynx, no stridor  Neck: Neck supple, trachea midline, No JVD,   Respiratory: CTA B/L, No wheezing, rales, rhonchi      - Two L sided IR PTC, BOTH to WS  CV: RRR, S1S2, no murmurs, rubs or gallops  Abdominal: Soft, NT, ND +BS,   Extremities: No edema, + peripheral pulses  Skin: No Rashes, Hematoma, Ecchymosis  Lymphatic: No Neck, axilla, groin LAD  Psych: Oriented x 3, normal affect  Relevant labs, radiology and Medications reviewed                                                       9.1                   Neurophils% (auto):   x      (04-01 @ 07:12):    3.21 )-----------(133          Lymphocytes% (auto):  x                                             30.5                   Eosinphils% (auto):   x        Manual%: Neutrophils x    ; Lymphocytes x    ; Eosinophils x    ; Bands%: x    ; Blasts x          04-01    140  |  104  |  11  ----------------------------<  98  3.7   |  25  |  1.08    Ca    8.9      01 Apr 2022 07:12  Phos  2.9     04-01  Mg     2.20     04-01        RECENT CULTURES:  03-29 @ 00:40 .Body Fluid LEFT PLEURAL EFFUSION     Culture is being performed.      03-29 @ 00:39 Pleural Fl LEFT PLEURAL EFFUSION     No growth to date.    Numerous polymorphonuclear leukocytes per low power field  No organisms seen per oil power field      MEDICATIONS  (STANDING):  Neurologic Medications  acetaminophen     Tablet .. 650 milliGRAM(s) Oral every 6 hours PRN Mild Pain (1 - 3)  HYDROmorphone  Injectable 1 milliGRAM(s) IV Push every 4 hours PRN break through pain  oxyCODONE    IR 5 milliGRAM(s) Oral every 4 hours PRN Moderate Pain (4 - 6)  oxyCODONE    IR 10 milliGRAM(s) Oral every 4 hours PRN Severe Pain (7 - 10)    Respiratory Medications    Cardiovascular Medications  tamsulosin 0.8 milliGRAM(s) Oral at bedtime    Gastrointestinal Medications  pantoprazole    Tablet 40 milliGRAM(s) Oral before breakfast  polyethylene glycol 3350 17 Gram(s) Oral daily PRN Constipation  senna 2 Tablet(s) Oral at bedtime    Genitourinary Medications    Hematologic/Oncologic Medications  heparin   Injectable 5000 Unit(s) SubCutaneous every 8 hours    Antimicrobial/Immunologic Medications  piperacillin/tazobactam IVPB.. 3.375 Gram(s) IV Intermittent every 8 hours    Endocrine/Metabolic Medications    Topical/Other Medications  Vancomycin 1000mg/1000ml NS irrigation 1 Application(s) 1 Application(s) IntraPleural every 24 hours

## 2022-04-02 LAB
ANION GAP SERPL CALC-SCNC: 12 MMOL/L — SIGNIFICANT CHANGE UP (ref 7–14)
BUN SERPL-MCNC: 12 MG/DL — SIGNIFICANT CHANGE UP (ref 7–23)
CALCIUM SERPL-MCNC: 8.9 MG/DL — SIGNIFICANT CHANGE UP (ref 8.4–10.5)
CHLORIDE SERPL-SCNC: 106 MMOL/L — SIGNIFICANT CHANGE UP (ref 98–107)
CO2 SERPL-SCNC: 24 MMOL/L — SIGNIFICANT CHANGE UP (ref 22–31)
CREAT SERPL-MCNC: 1.06 MG/DL — SIGNIFICANT CHANGE UP (ref 0.5–1.3)
CULTURE RESULTS: NO GROWTH — SIGNIFICANT CHANGE UP
EGFR: 76 ML/MIN/1.73M2 — SIGNIFICANT CHANGE UP
GLUCOSE SERPL-MCNC: 94 MG/DL — SIGNIFICANT CHANGE UP (ref 70–99)
HCT VFR BLD CALC: 30.7 % — LOW (ref 39–50)
HGB BLD-MCNC: 9 G/DL — LOW (ref 13–17)
MAGNESIUM SERPL-MCNC: 2.2 MG/DL — SIGNIFICANT CHANGE UP (ref 1.6–2.6)
MCHC RBC-ENTMCNC: 27.5 PG — SIGNIFICANT CHANGE UP (ref 27–34)
MCHC RBC-ENTMCNC: 29.3 GM/DL — LOW (ref 32–36)
MCV RBC AUTO: 93.9 FL — SIGNIFICANT CHANGE UP (ref 80–100)
NRBC # BLD: 0 /100 WBCS — SIGNIFICANT CHANGE UP
NRBC # FLD: 0 K/UL — SIGNIFICANT CHANGE UP
PHOSPHATE SERPL-MCNC: 2.8 MG/DL — SIGNIFICANT CHANGE UP (ref 2.5–4.5)
PLATELET # BLD AUTO: 138 K/UL — LOW (ref 150–400)
POTASSIUM SERPL-MCNC: 3.7 MMOL/L — SIGNIFICANT CHANGE UP (ref 3.5–5.3)
POTASSIUM SERPL-SCNC: 3.7 MMOL/L — SIGNIFICANT CHANGE UP (ref 3.5–5.3)
RBC # BLD: 3.27 M/UL — LOW (ref 4.2–5.8)
RBC # FLD: 15.4 % — HIGH (ref 10.3–14.5)
SODIUM SERPL-SCNC: 142 MMOL/L — SIGNIFICANT CHANGE UP (ref 135–145)
SPECIMEN SOURCE: SIGNIFICANT CHANGE UP
WBC # BLD: 3.46 K/UL — LOW (ref 3.8–10.5)
WBC # FLD AUTO: 3.46 K/UL — LOW (ref 3.8–10.5)

## 2022-04-02 PROCEDURE — 71045 X-RAY EXAM CHEST 1 VIEW: CPT | Mod: 26

## 2022-04-02 RX ORDER — POTASSIUM CHLORIDE 20 MEQ
40 PACKET (EA) ORAL ONCE
Refills: 0 | Status: COMPLETED | OUTPATIENT
Start: 2022-04-02 | End: 2022-04-02

## 2022-04-02 RX ADMIN — OXYCODONE HYDROCHLORIDE 5 MILLIGRAM(S): 5 TABLET ORAL at 21:52

## 2022-04-02 RX ADMIN — PANTOPRAZOLE SODIUM 40 MILLIGRAM(S): 20 TABLET, DELAYED RELEASE ORAL at 05:01

## 2022-04-02 RX ADMIN — TAMSULOSIN HYDROCHLORIDE 0.8 MILLIGRAM(S): 0.4 CAPSULE ORAL at 21:52

## 2022-04-02 RX ADMIN — HEPARIN SODIUM 5000 UNIT(S): 5000 INJECTION INTRAVENOUS; SUBCUTANEOUS at 05:01

## 2022-04-02 RX ADMIN — PIPERACILLIN AND TAZOBACTAM 25 GRAM(S): 4; .5 INJECTION, POWDER, LYOPHILIZED, FOR SOLUTION INTRAVENOUS at 05:01

## 2022-04-02 RX ADMIN — PIPERACILLIN AND TAZOBACTAM 25 GRAM(S): 4; .5 INJECTION, POWDER, LYOPHILIZED, FOR SOLUTION INTRAVENOUS at 21:00

## 2022-04-02 RX ADMIN — PIPERACILLIN AND TAZOBACTAM 25 GRAM(S): 4; .5 INJECTION, POWDER, LYOPHILIZED, FOR SOLUTION INTRAVENOUS at 13:38

## 2022-04-02 RX ADMIN — OXYCODONE HYDROCHLORIDE 5 MILLIGRAM(S): 5 TABLET ORAL at 22:22

## 2022-04-02 RX ADMIN — HEPARIN SODIUM 5000 UNIT(S): 5000 INJECTION INTRAVENOUS; SUBCUTANEOUS at 21:52

## 2022-04-02 RX ADMIN — Medication 40 MILLIEQUIVALENT(S): at 21:55

## 2022-04-02 RX ADMIN — HEPARIN SODIUM 5000 UNIT(S): 5000 INJECTION INTRAVENOUS; SUBCUTANEOUS at 13:39

## 2022-04-02 NOTE — PROGRESS NOTE ADULT - SUBJECTIVE AND OBJECTIVE BOX
Subjective: Pt seen and assessed at bedside by surgical team. Patient stated improvement of CT insertion site pain. Pt did not endorse any chest pain, SOB, fevers/chills, N/V/D/C.    Vital Signs:  T(C): 36.4 (04-02-22 @ 05:08), Max: 36.7 (04-02-22 @ 00:59)  HR: 82 (04-02-22 @ 05:08) (68 - 82)  BP: 126/56 (04-02-22 @ 05:08) (126/56 - 147/59)  BP(mean): --  ABP: --  ABP(mean): --  RR: 17 (04-02-22 @ 05:08) (17 - 18)  SpO2: 98% (04-02-22 @ 05:08) (98% - 100%)  Wt(kg): --  CVP(mm Hg): --  CI: --  CAPILLARY BLOOD GLUCOSE       N/A      04-01 @ 07:01  -  04-02 @ 07:00  --------------------------------------------------------  IN:  Total IN: 0 mL    OUT:    Chest Tube (mL): 0 mL    Chest Tube (mL): 900 mL    Voided (mL): 600 mL  Total OUT: 1500 mL    Total NET: -1500 mL          Telemetry/Alarms:  General: WN/WD NAD  Neurology: Awake, nonfocal, KEY x 4  Eyes: Scleras clear, PERRLA/ EOMI, Gross vision intact  ENT:Gross hearing intact, grossly patent pharynx, no stridor  Neck: Neck supple, trachea midline, No JVD,   Respiratory: CTA B/L, No wheezing, rales, rhonchi      - Two L sided IR PTC, BOTH to WS  CV: RRR, S1S2, no murmurs, rubs or gallops  Abdominal: Soft, NT, ND +BS,   Extremities: No edema, + peripheral pulses  Skin: No Rashes, Hematoma, Ecchymosis  Lymphatic: No Neck, axilla, groin LAD  Psych: Oriented x 3, normal affect  Relevant labs, radiology and Medications reviewed                                            9.0                   Neurophils% (auto):   x      (04-02 @ 06:43):    3.46 )-----------(138          Lymphocytes% (auto):  x                                             30.7                   Eosinphils% (auto):   x        Manual%: Neutrophils x    ; Lymphocytes x    ; Eosinophils x    ; Bands%: x    ; Blasts x          04-02    142  |  106  |  12  ----------------------------<  94  3.7   |  24  |  1.06    Ca    8.9      02 Apr 2022 06:43  Phos  2.8     04-02  Mg     2.20     04-02            RECENT CULTURES:  03-29 @ 00:40 .Body Fluid LEFT PLEURAL EFFUSION     Culture is being performed.      03-29 @ 00:39 Pleural Fl LEFT PLEURAL EFFUSION     No growth to date.    Numerous polymorphonuclear leukocytes per low power field  No organisms seen per oil power field    MEDICATIONS  (STANDING):  Neurologic Medications  acetaminophen     Tablet .. 650 milliGRAM(s) Oral every 6 hours PRN Mild Pain (1 - 3)  HYDROmorphone  Injectable 1 milliGRAM(s) IV Push every 4 hours PRN break through pain  oxyCODONE    IR 5 milliGRAM(s) Oral every 4 hours PRN Moderate Pain (4 - 6)  oxyCODONE    IR 10 milliGRAM(s) Oral every 4 hours PRN Severe Pain (7 - 10)    Respiratory Medications    Cardiovascular Medications  tamsulosin 0.8 milliGRAM(s) Oral at bedtime    Gastrointestinal Medications  pantoprazole    Tablet 40 milliGRAM(s) Oral before breakfast  polyethylene glycol 3350 17 Gram(s) Oral daily PRN Constipation  potassium chloride    Tablet ER 40 milliEquivalent(s) Oral once  senna 2 Tablet(s) Oral at bedtime    Genitourinary Medications    Hematologic/Oncologic Medications  heparin   Injectable 5000 Unit(s) SubCutaneous every 8 hours    Antimicrobial/Immunologic Medications  piperacillin/tazobactam IVPB.. 3.375 Gram(s) IV Intermittent every 8 hours    Endocrine/Metabolic Medications    Topical/Other Medications  Vancomycin 1000mg/1000ml NS irrigation 1 Application(s) 1 Application(s) IntraPleural every 24 hours

## 2022-04-03 LAB
ANION GAP SERPL CALC-SCNC: 12 MMOL/L — SIGNIFICANT CHANGE UP (ref 7–14)
BUN SERPL-MCNC: 12 MG/DL — SIGNIFICANT CHANGE UP (ref 7–23)
CALCIUM SERPL-MCNC: 9 MG/DL — SIGNIFICANT CHANGE UP (ref 8.4–10.5)
CHLORIDE SERPL-SCNC: 105 MMOL/L — SIGNIFICANT CHANGE UP (ref 98–107)
CO2 SERPL-SCNC: 25 MMOL/L — SIGNIFICANT CHANGE UP (ref 22–31)
CREAT SERPL-MCNC: 1.13 MG/DL — SIGNIFICANT CHANGE UP (ref 0.5–1.3)
EGFR: 70 ML/MIN/1.73M2 — SIGNIFICANT CHANGE UP
GLUCOSE SERPL-MCNC: 112 MG/DL — HIGH (ref 70–99)
HCT VFR BLD CALC: 31.8 % — LOW (ref 39–50)
HGB BLD-MCNC: 9.7 G/DL — LOW (ref 13–17)
MAGNESIUM SERPL-MCNC: 2.3 MG/DL — SIGNIFICANT CHANGE UP (ref 1.6–2.6)
MCHC RBC-ENTMCNC: 28.4 PG — SIGNIFICANT CHANGE UP (ref 27–34)
MCHC RBC-ENTMCNC: 30.5 GM/DL — LOW (ref 32–36)
MCV RBC AUTO: 93.3 FL — SIGNIFICANT CHANGE UP (ref 80–100)
NRBC # BLD: 0 /100 WBCS — SIGNIFICANT CHANGE UP
NRBC # FLD: 0 K/UL — SIGNIFICANT CHANGE UP
PHOSPHATE SERPL-MCNC: 2.7 MG/DL — SIGNIFICANT CHANGE UP (ref 2.5–4.5)
PLATELET # BLD AUTO: 132 K/UL — LOW (ref 150–400)
POTASSIUM SERPL-MCNC: 4.1 MMOL/L — SIGNIFICANT CHANGE UP (ref 3.5–5.3)
POTASSIUM SERPL-SCNC: 4.1 MMOL/L — SIGNIFICANT CHANGE UP (ref 3.5–5.3)
RBC # BLD: 3.41 M/UL — LOW (ref 4.2–5.8)
RBC # FLD: 15.7 % — HIGH (ref 10.3–14.5)
SODIUM SERPL-SCNC: 142 MMOL/L — SIGNIFICANT CHANGE UP (ref 135–145)
WBC # BLD: 3.65 K/UL — LOW (ref 3.8–10.5)
WBC # FLD AUTO: 3.65 K/UL — LOW (ref 3.8–10.5)

## 2022-04-03 PROCEDURE — 99232 SBSQ HOSP IP/OBS MODERATE 35: CPT

## 2022-04-03 PROCEDURE — 71045 X-RAY EXAM CHEST 1 VIEW: CPT | Mod: 26

## 2022-04-03 RX ADMIN — OXYCODONE HYDROCHLORIDE 5 MILLIGRAM(S): 5 TABLET ORAL at 21:26

## 2022-04-03 RX ADMIN — OXYCODONE HYDROCHLORIDE 5 MILLIGRAM(S): 5 TABLET ORAL at 21:56

## 2022-04-03 RX ADMIN — HEPARIN SODIUM 5000 UNIT(S): 5000 INJECTION INTRAVENOUS; SUBCUTANEOUS at 15:06

## 2022-04-03 RX ADMIN — PIPERACILLIN AND TAZOBACTAM 25 GRAM(S): 4; .5 INJECTION, POWDER, LYOPHILIZED, FOR SOLUTION INTRAVENOUS at 21:26

## 2022-04-03 RX ADMIN — PANTOPRAZOLE SODIUM 40 MILLIGRAM(S): 20 TABLET, DELAYED RELEASE ORAL at 05:58

## 2022-04-03 RX ADMIN — HEPARIN SODIUM 5000 UNIT(S): 5000 INJECTION INTRAVENOUS; SUBCUTANEOUS at 05:58

## 2022-04-03 RX ADMIN — PIPERACILLIN AND TAZOBACTAM 25 GRAM(S): 4; .5 INJECTION, POWDER, LYOPHILIZED, FOR SOLUTION INTRAVENOUS at 05:00

## 2022-04-03 RX ADMIN — HEPARIN SODIUM 5000 UNIT(S): 5000 INJECTION INTRAVENOUS; SUBCUTANEOUS at 21:26

## 2022-04-03 RX ADMIN — PIPERACILLIN AND TAZOBACTAM 25 GRAM(S): 4; .5 INJECTION, POWDER, LYOPHILIZED, FOR SOLUTION INTRAVENOUS at 15:06

## 2022-04-03 RX ADMIN — TAMSULOSIN HYDROCHLORIDE 0.8 MILLIGRAM(S): 0.4 CAPSULE ORAL at 21:26

## 2022-04-03 NOTE — PROGRESS NOTE ADULT - SUBJECTIVE AND OBJECTIVE BOX
Subjective:  pt seen and examined at bedside with Dr Guy and Dr Navarro  pt is tolerating intrapleural vancomycin infusion  he denies chest pain, shortness of breath, nausea, vomiting or diarrhea  pt is ambulatory with minimal assistance    Vital Signs:  Vital Signs Last 24 Hrs  T(C): 36.6 (04-03-22 @ 05:30), Max: 37.1 (04-02-22 @ 16:35)  T(F): 97.8 (04-03-22 @ 05:30), Max: 98.7 (04-02-22 @ 16:35)  HR: 80 (04-03-22 @ 05:30) (74 - 83)  BP: 120/65 (04-03-22 @ 05:30) (120/65 - 153/76)  RR: 18 (04-03-22 @ 05:30) (18 - 18)  SpO2: 100% (04-03-22 @ 05:30) (95% - 100%) on (O2)    Telemetry/Alarms: SR  General: WN/WD NAD  Neurology: Awake, nonfocal, KEY x 4  Eyes: Scleras clear, PERRLA/ EOMI, Gross vision intact  ENT:Gross hearing intact, grossly patent pharynx, no stridor  Neck: Neck supple, trachea midline, No JVD,   Respiratory: CTA B/L, No wheezing, rales, rhonchi  CV: RRR, S1S2, no murmurs, rubs or gallops  Abdominal: Soft, NT, ND +BS,   Extremities: No edema, + peripheral pulses  Skin: No Rashes, Hematoma, Ecchymosis  Lymphatic: No Neck, axilla, groin LAD  Psych: Oriented x 3, normal affect  Incisions: c,d,i  Tubes: anterior and posterior pigtails in place with vanco infusion running; anterior PTC w expiratory air leak  Relevant labs, radiology and Medications reviewed                        9.7    3.65  )-----------( 132      ( 03 Apr 2022 07:09 )             31.8     04-03    142  |  105  |  12  ----------------------------<  112<H>  4.1   |  25  |  1.13    Ca    9.0      03 Apr 2022 07:09  Phos  2.7     04-03  Mg     2.30     04-03        MEDICATIONS  (STANDING):  heparin   Injectable 5000 Unit(s) SubCutaneous every 8 hours  pantoprazole    Tablet 40 milliGRAM(s) Oral before breakfast  piperacillin/tazobactam IVPB.. 3.375 Gram(s) IV Intermittent every 8 hours  senna 2 Tablet(s) Oral at bedtime  tamsulosin 0.8 milliGRAM(s) Oral at bedtime  Vancomycin 1000mg/1000ml NS irrigation 1 Application(s) 1 Application(s) IntraPleural every 24 hours    MEDICATIONS  (PRN):  acetaminophen     Tablet .. 650 milliGRAM(s) Oral every 6 hours PRN Mild Pain (1 - 3)  HYDROmorphone  Injectable 1 milliGRAM(s) IV Push every 4 hours PRN break through pain  oxyCODONE    IR 5 milliGRAM(s) Oral every 4 hours PRN Moderate Pain (4 - 6)  oxyCODONE    IR 10 milliGRAM(s) Oral every 4 hours PRN Severe Pain (7 - 10)  polyethylene glycol 3350 17 Gram(s) Oral daily PRN Constipation    Pertinent Physical Exam  I&O's Summary    02 Apr 2022 07:01  -  03 Apr 2022 07:00  --------------------------------------------------------  IN: 200 mL / OUT: 2582 mL / NET: -2382 mL        Assessment  71y Male  w/ PAST MEDICAL & SURGICAL HISTORY:  BPH (benign prostatic hypertrophy)    Nephrolithiasis    Hydronephrosis    Lung cancer  Dx&#x27;d 2015 treated with neoadjuvant chemo/RT followed by pneumonectomy (June 2015), SBRT to RUL nodule in Jan 2017    Lung nodule    H/O lithotripsy    S/P tonsillectomy    H/O pneumonectomy  Left June 2015    S/P removal of lung  RUL lung wedge resection on 2/23/18    Pt is a 71y male with history of squamous cell lung Ca s/p L thoracotomy, pneumonectomy in 2015 and RVATS RUL wedge resection 2019.  Pt was admitted in January 2022 with a L chest wall abscess for which a L PTC was placed by IR. Pt was discharge home with IV antibiotics and followed as an outpatient.  Recent CT chest 3/17 reveals increased air collection in pneumonectomy space.  He was directly admitted to the hospital 3/28 with plan to place an additional IR drain for irrigation and possible surgical intervention, left thoracotomy and muscle flap.  On 3/28- pt went to IR and had Ant and Post PTC placed. Both draining serosang fluid. Cultures sent no growth to date.  3/30 Plastics Dr Irizarry consulted. Continue both PTC for drainage.     PLAN  Neuro: Pain management. Cont oxy, neurontin, tylenol   Pulm: Encourage coughing, deep breathing and use of incentive spirometry. Daily CXR.   Cardio: Monitor telemetry/alarms  GI: Tolerating diet. Continue stool softeners.  Renal: monitor urine output, supplement electrolytes as needed  Vasc: Heparin SC/SCDs for DVT prophylaxis  Heme: Stable H/H. .   ID: ZOSYN, f/u pleural fluid cs; f/u ID consult; intrapleural vanco infusion -   as per ID, Dr Coffey - would continue Zosyn 7 - 10 days, then place on  Augmentin for additional 4 weeks unless new findings  Therapy: OOB/ambulate  Tubes: Monitor Chest tube outputs from both IR drains.   f/u Plastic surgery to d/w Dr Sims  Disposition: Awaiting final OR plans from Dr. Sims; Tentatively planned for OR wednesday  Discussed with Cardiothoracic Team at AM rounds.        Subjective:  pt seen and examined at bedside with Dr Guy and Dr Navarro  pt is tolerating intrapleural vancomycin infusion  he denies chest pain, shortness of breath, nausea, vomiting or diarrhea  pt is ambulatory with minimal assistance    Vital Signs:  Vital Signs Last 24 Hrs  T(C): 36.6 (04-03-22 @ 05:30), Max: 37.1 (04-02-22 @ 16:35)  T(F): 97.8 (04-03-22 @ 05:30), Max: 98.7 (04-02-22 @ 16:35)  HR: 80 (04-03-22 @ 05:30) (74 - 83)  BP: 120/65 (04-03-22 @ 05:30) (120/65 - 153/76)  RR: 18 (04-03-22 @ 05:30) (18 - 18)  SpO2: 100% (04-03-22 @ 05:30) (95% - 100%) on (O2)    Telemetry/Alarms: SR  General: WN/WD NAD  Neurology: Awake, nonfocal, KEY x 4  Eyes: Scleras clear, PERRLA/ EOMI, Gross vision intact  ENT:Gross hearing intact, grossly patent pharynx, no stridor  Neck: Neck supple, trachea midline, No JVD,   Respiratory: CTA B/L, No wheezing, rales, rhonchi  CV: RRR, S1S2, no murmurs, rubs or gallops  Abdominal: Soft, NT, ND +BS,   Extremities: No edema, + peripheral pulses  Skin: No Rashes, Hematoma, Ecchymosis  Lymphatic: No Neck, axilla, groin LAD  Psych: Oriented x 3, normal affect  Incisions: c,d,i  Tubes: anterior and posterior pigtails in place with vanco infusion running; anterior PTC w expiratory air leak  Relevant labs, radiology and Medications reviewed                        9.7    3.65  )-----------( 132      ( 03 Apr 2022 07:09 )             31.8     04-03    142  |  105  |  12  ----------------------------<  112<H>  4.1   |  25  |  1.13    Ca    9.0      03 Apr 2022 07:09  Phos  2.7     04-03  Mg     2.30     04-03        MEDICATIONS  (STANDING):  heparin   Injectable 5000 Unit(s) SubCutaneous every 8 hours  pantoprazole    Tablet 40 milliGRAM(s) Oral before breakfast  piperacillin/tazobactam IVPB.. 3.375 Gram(s) IV Intermittent every 8 hours  senna 2 Tablet(s) Oral at bedtime  tamsulosin 0.8 milliGRAM(s) Oral at bedtime  Vancomycin 1000mg/1000ml NS irrigation 1 Application(s) 1 Application(s) IntraPleural every 24 hours    MEDICATIONS  (PRN):  acetaminophen     Tablet .. 650 milliGRAM(s) Oral every 6 hours PRN Mild Pain (1 - 3)  HYDROmorphone  Injectable 1 milliGRAM(s) IV Push every 4 hours PRN break through pain  oxyCODONE    IR 5 milliGRAM(s) Oral every 4 hours PRN Moderate Pain (4 - 6)  oxyCODONE    IR 10 milliGRAM(s) Oral every 4 hours PRN Severe Pain (7 - 10)  polyethylene glycol 3350 17 Gram(s) Oral daily PRN Constipation    Pertinent Physical Exam  I&O's Summary    02 Apr 2022 07:01  -  03 Apr 2022 07:00  --------------------------------------------------------  IN: 200 mL / OUT: 2582 mL / NET: -2382 mL    Social History:  Social History (marital status, living situation, occupation, tobacco use, alcohol and drug use, and sexual history): · Marital Status	  · Occupation	works for [employer]; retired   · Lives With	spouse    Substance Use History:  · Substance Use	caffeine  · Caffeine Type	coffee  · Caffeine Amount/Frequency	1-2 cups/cans per day  · Caffeine Withdrawal Pattern	denies    Alcohol Use History:  · Have you ever consumed alcohol	yes...  · Alcohol Type	beer  · Alcohol Frequency	daily  · Alcohol Amount	1-2 drinks  · Problems Related to Alcohol Use	no  · 1. Have you felt you ought to CUT down on your drinking?	no  · 2. Have people ANNOYED you by criticizing your drinking?	no  · 3. Have you ever felt bad or GUILTY about your drinking?	no  · 4. Have you ever needed an "EYE OPENER", a drink first thing in the morning to steady your nerves or get rid of a hangover?	no    Former smoker, 40 pack years, · Last Tobacco Use (dd-mmm-yy): 12-Jun-2015      Assessment  71y Male  w/ PAST MEDICAL & SURGICAL HISTORY:  BPH (benign prostatic hypertrophy)    Nephrolithiasis    Hydronephrosis    Lung cancer  Dx&#x27;d 2015 treated with neoadjuvant chemo/RT followed by pneumonectomy (June 2015), SBRT to RUL nodule in Jan 2017    Lung nodule    H/O lithotripsy    S/P tonsillectomy    H/O pneumonectomy  Left June 2015    S/P removal of lung  RUL lung wedge resection on 2/23/18    Pt is a 71y male with history of squamous cell lung Ca s/p L thoracotomy, pneumonectomy in 2015 and RVATS RUL wedge resection 2019.  Pt was admitted in January 2022 with a L chest wall abscess for which a L PTC was placed by IR. Pt was discharge home with IV antibiotics and followed as an outpatient.  Recent CT chest 3/17 reveals increased air collection in pneumonectomy space.  He was directly admitted to the hospital 3/28 with plan to place an additional IR drain for irrigation and possible surgical intervention, left thoracotomy and muscle flap.  On 3/28- pt went to IR and had Ant and Post PTC placed. Both draining serosang fluid. Cultures sent no growth to date.  3/30 Plastics Dr Irizarry consulted. Continue both PTC for drainage.     PLAN  Neuro: Pain management. Cont oxy, neurontin, tylenol   Pulm: Encourage coughing, deep breathing and use of incentive spirometry. Daily CXR.   Cardio: Monitor telemetry/alarms  GI: Tolerating diet. Continue stool softeners.  Renal: monitor urine output, supplement electrolytes as needed  Vasc: Heparin SC/SCDs for DVT prophylaxis  Heme: Stable H/H. .   ID: ZOSYN, f/u pleural fluid cs; f/u ID consult; intrapleural vanco infusion -   as per ID, Dr Coffey - would continue Zosyn 7 - 10 days, then place on  Augmentin for additional 4 weeks unless new findings  Therapy: OOB/ambulate  Tubes: Monitor Chest tube outputs from both IR drains.   f/u Plastic surgery to d/w Dr Sims  Disposition: Awaiting final OR plans from Dr. Sims; Tentatively planned for OR wednesday  Discussed with Cardiothoracic Team at AM rounds.

## 2022-04-04 LAB
ANION GAP SERPL CALC-SCNC: 12 MMOL/L — SIGNIFICANT CHANGE UP (ref 7–14)
BUN SERPL-MCNC: 14 MG/DL — SIGNIFICANT CHANGE UP (ref 7–23)
CALCIUM SERPL-MCNC: 8.9 MG/DL — SIGNIFICANT CHANGE UP (ref 8.4–10.5)
CHLORIDE SERPL-SCNC: 107 MMOL/L — SIGNIFICANT CHANGE UP (ref 98–107)
CO2 SERPL-SCNC: 22 MMOL/L — SIGNIFICANT CHANGE UP (ref 22–31)
CREAT SERPL-MCNC: 1.14 MG/DL — SIGNIFICANT CHANGE UP (ref 0.5–1.3)
EGFR: 69 ML/MIN/1.73M2 — SIGNIFICANT CHANGE UP
GLUCOSE SERPL-MCNC: 105 MG/DL — HIGH (ref 70–99)
HCT VFR BLD CALC: 33 % — LOW (ref 39–50)
HGB BLD-MCNC: 9.9 G/DL — LOW (ref 13–17)
MCHC RBC-ENTMCNC: 28 PG — SIGNIFICANT CHANGE UP (ref 27–34)
MCHC RBC-ENTMCNC: 30 GM/DL — LOW (ref 32–36)
MCV RBC AUTO: 93.5 FL — SIGNIFICANT CHANGE UP (ref 80–100)
NRBC # BLD: 0 /100 WBCS — SIGNIFICANT CHANGE UP
NRBC # FLD: 0 K/UL — SIGNIFICANT CHANGE UP
PLATELET # BLD AUTO: 118 K/UL — LOW (ref 150–400)
POTASSIUM SERPL-MCNC: 4.1 MMOL/L — SIGNIFICANT CHANGE UP (ref 3.5–5.3)
POTASSIUM SERPL-SCNC: 4.1 MMOL/L — SIGNIFICANT CHANGE UP (ref 3.5–5.3)
RBC # BLD: 3.53 M/UL — LOW (ref 4.2–5.8)
RBC # FLD: 15.9 % — HIGH (ref 10.3–14.5)
SODIUM SERPL-SCNC: 141 MMOL/L — SIGNIFICANT CHANGE UP (ref 135–145)
WBC # BLD: 3.27 K/UL — LOW (ref 3.8–10.5)
WBC # FLD AUTO: 3.27 K/UL — LOW (ref 3.8–10.5)

## 2022-04-04 PROCEDURE — 71045 X-RAY EXAM CHEST 1 VIEW: CPT | Mod: 26

## 2022-04-04 PROCEDURE — 99232 SBSQ HOSP IP/OBS MODERATE 35: CPT

## 2022-04-04 RX ADMIN — HEPARIN SODIUM 5000 UNIT(S): 5000 INJECTION INTRAVENOUS; SUBCUTANEOUS at 06:00

## 2022-04-04 RX ADMIN — OXYCODONE HYDROCHLORIDE 5 MILLIGRAM(S): 5 TABLET ORAL at 22:01

## 2022-04-04 RX ADMIN — HEPARIN SODIUM 5000 UNIT(S): 5000 INJECTION INTRAVENOUS; SUBCUTANEOUS at 21:54

## 2022-04-04 RX ADMIN — PIPERACILLIN AND TAZOBACTAM 25 GRAM(S): 4; .5 INJECTION, POWDER, LYOPHILIZED, FOR SOLUTION INTRAVENOUS at 05:59

## 2022-04-04 RX ADMIN — PANTOPRAZOLE SODIUM 40 MILLIGRAM(S): 20 TABLET, DELAYED RELEASE ORAL at 05:59

## 2022-04-04 RX ADMIN — HEPARIN SODIUM 5000 UNIT(S): 5000 INJECTION INTRAVENOUS; SUBCUTANEOUS at 12:20

## 2022-04-04 RX ADMIN — PIPERACILLIN AND TAZOBACTAM 25 GRAM(S): 4; .5 INJECTION, POWDER, LYOPHILIZED, FOR SOLUTION INTRAVENOUS at 12:21

## 2022-04-04 RX ADMIN — TAMSULOSIN HYDROCHLORIDE 0.8 MILLIGRAM(S): 0.4 CAPSULE ORAL at 21:54

## 2022-04-04 RX ADMIN — PIPERACILLIN AND TAZOBACTAM 25 GRAM(S): 4; .5 INJECTION, POWDER, LYOPHILIZED, FOR SOLUTION INTRAVENOUS at 21:53

## 2022-04-04 RX ADMIN — OXYCODONE HYDROCHLORIDE 5 MILLIGRAM(S): 5 TABLET ORAL at 22:31

## 2022-04-04 RX ADMIN — SENNA PLUS 2 TABLET(S): 8.6 TABLET ORAL at 21:54

## 2022-04-04 NOTE — PROGRESS NOTE ADULT - SUBJECTIVE AND OBJECTIVE BOX
Subjective: "I'm feeling pretty good considering" pt states improved pain at tube sites. OOB w minimal assist. No CP or SOB. NO fevers.     Vital Signs:  Vital Signs Last 24 Hrs  T(C): 36.3 (04-04-22 @ 12:23), Max: 36.6 (04-04-22 @ 02:00)  T(F): 97.4 (04-04-22 @ 12:23), Max: 97.9 (04-04-22 @ 02:00)  HR: 75 (04-04-22 @ 12:23) (66 - 80)  BP: 118/52 (04-04-22 @ 12:23) (118/52 - 149/60)  RR: 17 (04-04-22 @ 12:23) (17 - 18)  SpO2: 100% (04-04-22 @ 12:23) (96% - 100%) on (O2)    Telemetry/Alarms:  General: WN/WD NAD  Neurology: Awake, nonfocal, KEY x 4  Eyes: Scleras clear, PERRLA/ EOMI, Gross vision intact  ENT:Gross hearing intact, grossly patent pharynx, no stridor  Neck: Neck supple, trachea midline, No JVD,   Respiratory:dec/absent to left. CTA rt.   CV: RRR, S1S2, no murmurs, rubs or gallops  Abdominal: Soft, NT, ND +BS, +BM  Extremities: No edema, + peripheral pulses  Skin: No Rashes, Hematoma, Ecchymosis  Lymphatic: No Neck, axilla, groin LAD  Psych: Oriented x 3, normal affect  Incisions: none  Tubes: Left POst PTC with Vanco infusion. Left ANT PTC to waterseal, 900cc/24hrs draining infusion.   Relevant labs, radiology and Medications reviewed                        9.9    3.27  )-----------( 118      ( 04 Apr 2022 06:56 )             33.0     04-04    141  |  107  |  14  ----------------------------<  105<H>  4.1   |  22  |  1.14    Ca    8.9      04 Apr 2022 06:56  Phos  2.7     04-03  Mg     2.30     04-03        MEDICATIONS  (STANDING):  heparin   Injectable 5000 Unit(s) SubCutaneous every 8 hours  pantoprazole    Tablet 40 milliGRAM(s) Oral before breakfast  piperacillin/tazobactam IVPB.. 3.375 Gram(s) IV Intermittent every 8 hours  senna 2 Tablet(s) Oral at bedtime  tamsulosin 0.8 milliGRAM(s) Oral at bedtime  Vancomycin 1000mg/1000ml NS irrigation 1 Application(s) 1 Application(s) IntraPleural every 24 hours    MEDICATIONS  (PRN):  acetaminophen     Tablet .. 650 milliGRAM(s) Oral every 6 hours PRN Mild Pain (1 - 3)  HYDROmorphone  Injectable 1 milliGRAM(s) IV Push every 4 hours PRN break through pain  oxyCODONE    IR 5 milliGRAM(s) Oral every 4 hours PRN Moderate Pain (4 - 6)  oxyCODONE    IR 10 milliGRAM(s) Oral every 4 hours PRN Severe Pain (7 - 10)  polyethylene glycol 3350 17 Gram(s) Oral daily PRN Constipation    Pertinent Physical Exam  I&O's Summary    03 Apr 2022 07:01  -  04 Apr 2022 07:00  --------------------------------------------------------  IN: 0 mL / OUT: 1340 mL / NET: -1340 mL    FAMILY HISTORY:  Father  Still living? No  Family history of lung cancer, Age at diagnosis: Age Unknown.     Social History:  Social History (marital status, living situation, occupation, tobacco use, alcohol and drug use, and sexual history): · Marital Status	  · Occupation	works for [employer]; retired   · Lives With	spouse    Substance Use History:  · Substance Use	caffeine  · Caffeine Type	coffee  · Caffeine Amount/Frequency	1-2 cups/cans per day  · Caffeine Withdrawal Pattern	denies    Alcohol Use History:  · Have you ever consumed alcohol	yes...  · Alcohol Type	beer  · Alcohol Frequency	daily  · Alcohol Amount	1-2 drinks  · Problems Related to Alcohol Use	no  · 1. Have you felt you ought to CUT down on your drinking?	no  · 2. Have people ANNOYED you by criticizing your drinking?	no  · 3. Have you ever felt bad or GUILTY about your drinking?	no  · 4. Have you ever needed an "EYE OPENER", a drink first thing in the morning to steady your nerves or get rid of a hangover?	no    Former smoker, 40 pack years, · Last Tobacco Use (dd-mmm-yy): 12-Jun-2015        Assessment  71y Male  w/ PAST MEDICAL & SURGICAL HISTORY:  BPH (benign prostatic hypertrophy)    Nephrolithiasis    Hydronephrosis    Lung cancer  Dx&#x27;d 2015 treated with neoadjuvant chemo/RT followed by pneumonectomy (June 2015), SBRT to RUL nodule in Jan 2017    Lung nodule    H/O lithotripsy    S/P tonsillectomy    H/O pneumonectomy  Left June 2015    S/P removal of lung  RUL lung wedge resection on 2/23/18    Pt admitted from thoracic surgery office visit today with complicated wound healing.  Pt is a 71y male with history of squamous cell lung Ca s/p L thoracotomy, pneumonectomy in 2015 and RVATS RUL wedge resection 2019. Pt was admitted in January 2022 with a L chest wall abscess for which a L PTC was placed by IR. Pt was discharge home with IV antibiotics and followed as an outpatient.  Recent CT chest 3/17 reveals increased air collection in pneumonectomy space.  He was directly admitted to the hospital 3/28 with plan to place an additional IR drain for irrigation and possible surgical intervention, left thoracotomy and muscle flap. On 3/28- pt went to IR and had Ant and Post PTC placed. Both draining serosang fluid. 3/29-Seen by ID. Placed on Zosyn only. 3/30-Intra pleural Vanco infusion started and running currently until 4/4/22. Pt awaiting left thoracotomy and muscle flap on 4/6/22    PLAN  Neuro: Pain management  Pulm: Encourage coughing, deep breathing and use of incentive spirometry  Cardio: Monitor telemetry/alarms  GI: Tolerating diet. Continue stool softeners.  Renal: monitor urine output, supplement electrolytes as needed  Vasc: Heparin SC/SCDs for DVT prophylaxis  Heme: Stable H/H. .   ID: Cont Zosyn per ID. COnt intra pleural Vanco  Therapy: OOB/ambulate  Tubes: Monitor Chest tube outputs, continue infusion. Vanco level in am.   Disposition: OR on Wednesday  Discussed with Cardiothoracic Team at AM rounds.

## 2022-04-04 NOTE — DIETITIAN INITIAL EVALUATION ADULT. - OTHER INFO
Pt has a history of Squamous Cell Lung Ca, Lung Nodule, Hydronephrosis, Nephrolithiasis and BPH. Pt is s/p left Thoracotomy Pneumonectomy in 2015, R VATS RUL wedge resection in 2019. a L PCT was placed in Jan 2022 for a left chest wall abscess. Pt was admitted for placement of drain for irrigation and possible surgical intervention.   Pt states his appetite is fair and his po intake is about 50 to 75 % of his usual intake since December 2021. He states his usual weight before December ws 190 lbs. His admission weight was 170 lbs. He had a 10.52 % weight loss in 3 months.   Pt refused nutrition supplements. Food preferences were discussed and will be provided as able.  RDN remains available.

## 2022-04-04 NOTE — PROGRESS NOTE ADULT - NUTRITIONAL ASSESSMENT
This patient has been assessed with a concern for Malnutrition and has been determined to have a diagnosis/diagnoses of Severe protein-calorie malnutrition.    This patient is being managed with:   Diet Regular-  Entered: Mar 28 2022  1:44PM

## 2022-04-04 NOTE — DIETITIAN INITIAL EVALUATION ADULT. - ORAL INTAKE PTA/DIET HISTORY
Pt states his appetite has been fair and his po intake has been less than his norm since December 2021.

## 2022-04-05 ENCOUNTER — TRANSCRIPTION ENCOUNTER (OUTPATIENT)
Age: 72
End: 2022-04-05

## 2022-04-05 LAB
APTT BLD: 29 SEC — SIGNIFICANT CHANGE UP (ref 27–36.3)
BLD GP AB SCN SERPL QL: NEGATIVE — SIGNIFICANT CHANGE UP
INR BLD: 1.12 RATIO — SIGNIFICANT CHANGE UP (ref 0.88–1.16)
PROTHROM AB SERPL-ACNC: 13 SEC — SIGNIFICANT CHANGE UP (ref 10.5–13.4)
RH IG SCN BLD-IMP: POSITIVE — SIGNIFICANT CHANGE UP
SARS-COV-2 RNA SPEC QL NAA+PROBE: SIGNIFICANT CHANGE UP
VANCOMYCIN FLD-MCNC: <4 UG/ML — SIGNIFICANT CHANGE UP

## 2022-04-05 PROCEDURE — 99232 SBSQ HOSP IP/OBS MODERATE 35: CPT

## 2022-04-05 PROCEDURE — 99232 SBSQ HOSP IP/OBS MODERATE 35: CPT | Mod: GC

## 2022-04-05 RX ADMIN — HEPARIN SODIUM 5000 UNIT(S): 5000 INJECTION INTRAVENOUS; SUBCUTANEOUS at 14:11

## 2022-04-05 RX ADMIN — HEPARIN SODIUM 5000 UNIT(S): 5000 INJECTION INTRAVENOUS; SUBCUTANEOUS at 06:46

## 2022-04-05 RX ADMIN — PANTOPRAZOLE SODIUM 40 MILLIGRAM(S): 20 TABLET, DELAYED RELEASE ORAL at 06:46

## 2022-04-05 RX ADMIN — PIPERACILLIN AND TAZOBACTAM 25 GRAM(S): 4; .5 INJECTION, POWDER, LYOPHILIZED, FOR SOLUTION INTRAVENOUS at 14:11

## 2022-04-05 RX ADMIN — Medication 650 MILLIGRAM(S): at 21:42

## 2022-04-05 RX ADMIN — TAMSULOSIN HYDROCHLORIDE 0.8 MILLIGRAM(S): 0.4 CAPSULE ORAL at 21:10

## 2022-04-05 RX ADMIN — HEPARIN SODIUM 5000 UNIT(S): 5000 INJECTION INTRAVENOUS; SUBCUTANEOUS at 21:10

## 2022-04-05 RX ADMIN — PIPERACILLIN AND TAZOBACTAM 25 GRAM(S): 4; .5 INJECTION, POWDER, LYOPHILIZED, FOR SOLUTION INTRAVENOUS at 06:46

## 2022-04-05 RX ADMIN — PIPERACILLIN AND TAZOBACTAM 25 GRAM(S): 4; .5 INJECTION, POWDER, LYOPHILIZED, FOR SOLUTION INTRAVENOUS at 21:09

## 2022-04-05 RX ADMIN — Medication 650 MILLIGRAM(S): at 21:12

## 2022-04-05 NOTE — PROGRESS NOTE ADULT - SUBJECTIVE AND OBJECTIVE BOX
INTERVAL HPI/OVERNIGHT EVENTS:  Patient S&E at bedside. No o/n events,     VITAL SIGNS:  T(F): 97.6 (04-05-22 @ 06:30)  HR: 74 (04-05-22 @ 06:30)  BP: 134/69 (04-05-22 @ 06:30)  RR: 18 (04-05-22 @ 06:30)  SpO2: 100% (04-05-22 @ 06:30)  Wt(kg): --    PHYSICAL EXAM:    Constitutional: NAD  Eyes: EOMI, sclera non-icteric  Neck: supple  Respiratory: CTAB, no wheezes or crackles   Cardiovascular: RRR  Gastrointestinal: soft, NTND, + BS  Extremities: no cyanosis, clubbing or edema   Neurological: awake and alert      MEDICATIONS  (STANDING):  heparin   Injectable 5000 Unit(s) SubCutaneous every 8 hours  pantoprazole    Tablet 40 milliGRAM(s) Oral before breakfast  piperacillin/tazobactam IVPB.. 3.375 Gram(s) IV Intermittent every 8 hours  senna 2 Tablet(s) Oral at bedtime  tamsulosin 0.8 milliGRAM(s) Oral at bedtime  Vancomycin 1000mg/1000ml NS irrigation 1 Application(s) 1 Application(s) IntraPleural every 24 hours    MEDICATIONS  (PRN):  acetaminophen     Tablet .. 650 milliGRAM(s) Oral every 6 hours PRN Mild Pain (1 - 3)  polyethylene glycol 3350 17 Gram(s) Oral daily PRN Constipation      Allergies    No Known Allergies    Intolerances        LABS:                        9.9    3.27  )-----------( 118      ( 04 Apr 2022 06:56 )             33.0     04-04    141  |  107  |  14  ----------------------------<  105<H>  4.1   |  22  |  1.14    Ca    8.9      04 Apr 2022 06:56      PT/INR - ( 05 Apr 2022 06:47 )   PT: 13.0 sec;   INR: 1.12 ratio         PTT - ( 05 Apr 2022 06:47 )  PTT:29.0 sec      RADIOLOGY & ADDITIONAL TESTS:  Studies reviewed.

## 2022-04-05 NOTE — DISCHARGE NOTE PROVIDER - NSDCFUADDAPPT_GEN_ALL_CORE_FT
Follow up with Dr. Sims in 7-10 days  Chest x-ray 1-2 days prior to appointment with Dr. Sims.    Follow up with primary care provider in one week  Follow up with Dr. Sims in 10 days. Call to make an apt.   Chest x-ray 1-2 days prior to appointment with Dr. Sims.    Follow up with primary care provider in one week

## 2022-04-05 NOTE — DISCHARGE NOTE PROVIDER - CARE PROVIDER_API CALL
Jan Sims)  Surgery; Thoracic Surgery  [phone] [place] [address], [place] [address]  Phone: [phone]  Fax: [phone]  Follow Up Time:

## 2022-04-05 NOTE — DISCHARGE NOTE PROVIDER - NSDCCPTREATMENT_GEN_ALL_CORE_FT
PRINCIPAL PROCEDURE  Procedure: Insertion of closed pleural drainage catheter system by interventional radiology  Findings and Treatment:

## 2022-04-05 NOTE — CHART NOTE - NSCHARTNOTESELECT_GEN_ALL_CORE
IR preprocedure note/Event Note
Plastic surgery
Thoracic Surgery- Vancomycin Intrapleural/Event Note

## 2022-04-05 NOTE — CHART NOTE - NSCHARTNOTEFT_GEN_A_CORE
Patient s/p Left thoracotomy, pneumonectomy 2015, presented with left chest wall abscess 1/2022 for which a left pigtail cath was placed by IR.  Culture +strep mitis and patient finished course of antibiotic on 3/10/22.  CT on 3/17 showing increased air collection in pneumonectomy space.  Patient admitted and had 2 pigtail cath placed by IR.  As per Dr. Sims, vancomycin 1g in 1L normal saline infusing @42cc/hr intrapleural via posterior pigtail cath.  Patient tolerating infusion, will continue to monitor.    Subjective:    Vital Signs:  Vital Signs Last 24 Hrs  T(C): 36.7 (03-30-22 @ 16:39), Max: 37.1 (03-30-22 @ 12:39)  T(F): 98.1 (03-30-22 @ 16:39), Max: 98.8 (03-30-22 @ 12:39)  HR: 74 (03-30-22 @ 16:39) (74 - 87)  BP: 142/69 (03-30-22 @ 16:39) (110/80 - 142/69)  RR: 18 (03-30-22 @ 16:39) (18 - 18)  SpO2: 100% (03-30-22 @ 16:39) (95% - 100%) on (O2)    I&O's Detail    29 Mar 2022 07:01  -  30 Mar 2022 07:00  --------------------------------------------------------  IN:    IV PiggyBack: 350 mL  Total IN: 350 mL    OUT:    Chest Tube (mL): 26 mL    Chest Tube (mL): 280 mL    Voided (mL): 750 mL  Total OUT: 1056 mL    Total NET: -706 mL                              8.7    4.17  )-----------( 118      ( 30 Mar 2022 06:24 )             29.3         TPro  7.2  /  Alb  x   /  TBili  x   /  DBili  x   /  AST  x   /  ALT  x   /  AlkPhos  x   03-30      MEDICATIONS  (STANDING):  heparin   Injectable 5000 Unit(s) SubCutaneous every 8 hours  pantoprazole    Tablet 40 milliGRAM(s) Oral before breakfast  piperacillin/tazobactam IVPB.. 3.375 Gram(s) IV Intermittent every 8 hours  senna 2 Tablet(s) Oral at bedtime  tamsulosin 0.8 milliGRAM(s) Oral at bedtime  Vancomycin 1000mg/1000ml NS irrigation 1 Application(s) 1 Dose(s) IntraPleural once    MEDICATIONS  (PRN):  acetaminophen     Tablet .. 650 milliGRAM(s) Oral every 6 hours PRN Mild Pain (1 - 3)  HYDROmorphone  Injectable 1 milliGRAM(s) IV Push every 4 hours PRN break through pain  oxyCODONE    IR 5 milliGRAM(s) Oral every 4 hours PRN Moderate Pain (4 - 6)  oxyCODONE    IR 10 milliGRAM(s) Oral every 4 hours PRN Severe Pain (7 - 10)  polyethylene glycol 3350 17 Gram(s) Oral daily PRN Constipation        Assessment  71y Male  w/ PAST MEDICAL & SURGICAL HISTORY:  BPH (benign prostatic hypertrophy)    Nephrolithiasis    Hydronephrosis    Lung cancer  Dx&#x27;d 2015 treated with neoadjuvant chemo/RT followed by pneumonectomy (June 2015), SBRT to RUL nodule in Jan 2017    Lung nodule    H/O lithotripsy    S/P tonsillectomy    H/O pneumonectomy  Left June 2015    S/P removal of lung  RUL lung wedge resection on 2/23/18    Admitted with increased air collection in pneumonectomy space     PLAN  Above discussed with Dr. Sims  Continue Vancomycin Infusion intrapleurally   Monitor output  Follow up labs and chest x-ray in am
Spoke to patient at length about possibly necessitating plastic surgery reconstruction for closure tomorrow.     Plan to consent for latissimus versus rectus abdominis flap for closure options.
PRE-INTERVENTIONAL RADIOLOGY PROCEDURE NOTE      Patient Age:  71    Patient Gender: Male    Procedure: L pigtail catheter    Diagnosis/Indication: L chest wall collection    Interventional Radiology Attending Physician: Dr Rey    Ordering Attending Physician: Dr Jan Sims    Pertinent Medical History:  Pt admitted from thoracic surgery office visit today with complicated wound healing.  Pt is a 71y male with history of squamous cell lung Ca s/p L thoracotomy, pneumonectomy in 2015 and RVATS RUL wedge resection 2019. Pt was admitted in January 2022 with a L chest wall abscess for which a L PTC was placed by IR. Pt was discharge home with IV antibiotics and followed as an outpatient.  Recent CT chest 3/17 reveals increased air collection in pneumonectomy space.  He was directly admitted to the hospital today with plan to placed an additional IR drain for irrigation and possible surgical intervention, left thoracotomy and muscle flap.     Pertinent labs:                      9.9    5.22  )-----------( 136      ( 28 Mar 2022 13:52 )             34.3                       Patient and Family Aware ? Yes

## 2022-04-05 NOTE — PROGRESS NOTE ADULT - SUBJECTIVE AND OBJECTIVE BOX
Subjective: Pt seen and assessed at bedside by surgical team. Pt did not have any complaints. Pt did not endorse any chest pain, SOB, fevers/chills, N/V/D/C.    Vital Signs:  T(C): 36.4 (04-05-22 @ 08:57), Max: 36.6 (04-04-22 @ 21:45)  HR: 88 (04-05-22 @ 08:57) (72 - 88)  BP: 125/55 (04-05-22 @ 08:57) (121/67 - 135/76)  BP(mean): --  ABP: --  ABP(mean): --  RR: 18 (04-05-22 @ 08:57) (18 - 18)  SpO2: 100% (04-05-22 @ 08:57) (100% - 100%)  Wt(kg): --  CVP(mm Hg): --  CI: --  CAPILLARY BLOOD GLUCOSE       N/A      04-04 @ 07:01  -  04-05 @ 07:00  --------------------------------------------------------  IN:    IV PiggyBack: 200 mL  Total IN: 200 mL    OUT:    Chest Tube (mL): 0 mL    Chest Tube (mL): 680 mL  Total OUT: 680 mL    Total NET: -480 mL      04-05 @ 07:01  -  04-05 @ 15:19  --------------------------------------------------------  IN:  Total IN: 0 mL    OUT:    Chest Tube (mL): 0 mL    Chest Tube (mL): 120 mL    Voided (mL): 120 mL  Total OUT: 240 mL    Total NET: -240 mL      Telemetry/Alarms:  General: WN/WD NAD  Neurology: Awake, nonfocal, KEY x 4  Eyes: Scleras clear, PERRLA/ EOMI, Gross vision intact  ENT:Gross hearing intact, grossly patent pharynx, no stridor  Neck: Neck supple, trachea midline, No JVD,   Respiratory: CTA B/L, No wheezing, rales, rhonchi      - Two L sided CT, BOTH to suction  CV: RRR, S1S2, no murmurs, rubs or gallops  Abdominal: Soft, NT, ND +BS,   Extremities: No edema, + peripheral pulses  Skin: No Rashes, Hematoma, Ecchymosis  Lymphatic: No Neck, axilla, groin LAD  Psych: Oriented x 3, normal affect  Relevant labs, radiology and Medications reviewed      04-04    141  |  107  |  14  ----------------------------<  105<H>  4.1   |  22  |  1.14    Ca    8.9      04 Apr 2022 06:56      ( 04-05 @ 06:47 )   PT: 13.0 sec;   INR: 1.12 ratio  aPTT: 29.0 sec        RECENT CULTURES:        PT/INR - ( 05 Apr 2022 06:47 )   PT: 13.0 sec;   INR: 1.12 ratio         PTT - ( 05 Apr 2022 06:47 )  PTT:29.0 sec    MEDICATIONS    Neurologic Medications  acetaminophen     Tablet .. 650 milliGRAM(s) Oral every 6 hours PRN Mild Pain (1 - 3)    Respiratory Medications    Cardiovascular Medications  tamsulosin 0.8 milliGRAM(s) Oral at bedtime    Gastrointestinal Medications  pantoprazole    Tablet 40 milliGRAM(s) Oral before breakfast  polyethylene glycol 3350 17 Gram(s) Oral daily PRN Constipation  senna 2 Tablet(s) Oral at bedtime    Genitourinary Medications    Hematologic/Oncologic Medications  heparin   Injectable 5000 Unit(s) SubCutaneous every 8 hours    Antimicrobial/Immunologic Medications  piperacillin/tazobactam IVPB.. 3.375 Gram(s) IV Intermittent every 8 hours    Endocrine/Metabolic Medications    Topical/Other Medications  Vancomycin 1000mg/1000ml NS irrigation 1 Application(s) 1 Application(s) IntraPleural every 24 hours

## 2022-04-05 NOTE — PROGRESS NOTE ADULT - ASSESSMENT
71y male with history of squamous cell lung Ca s/p L thoracotomy, pneumonectomy in 2015 and RVATS RUL wedge resection 2019. He has history of Waldenstroms macorglobulinemia  He appears to have empyema in  left pleural space- likely with anaerobes.    No cultures support underlying MRSA or Actinomyces - No evidence of local malignancy. Left pleural space process  appears to be smoldering anaerobic process  Hematology evaluation appreciated    Suggest  continue Zosyn 3/29-->    would continue Zosyn 7 - 10 days, then place on  Augmentin for additional 4 weeks unless new findings    discussed with thoracic attending    I will be rotating to different hospital:  ID service will continue to follow
Assessment: 71y male with history of Waldenstroms macorglobulinemia, squamous cell lung Ca s/p L thoracotomy, pneumonectomy in 2015 and RVATS RUL wedge resection 2019. Now with empyema in left pleural space- likely with anaerobes.    PLAN  - Cont Vanc pleura vac infusions  - Pain control  - Encourage coughing, deep breathing and use of incentive spirometry.  - Daily CXR   - Diet: Regular  - F/u ID recs  - F/u Chest tube outputs from both IR drains   - Tentatively planned for OR wednesday  - DVT PPx: Hep Subq    Thoracic Surgery  [id number]
Assessment: 71y male with history of Waldenstroms macorglobulinemia, squamous cell lung Ca s/p L thoracotomy, pneumonectomy in 2015 and RVATS RUL wedge resection 2019. Now with empyema in left pleural space- likely with anaerobes.    PLAN  - Pain control  - Encourage coughing, deep breathing and use of incentive spirometry.  - Daily CXR   - Diet: Regular  - F/u ID recs  - F/u Chest tube outputs from both IR drains   - F/u Dr. Sims for final OR plans  - DVT PPx: Hep Subq    Thoracic Surgery  [id number]
71M w/ squamous cell lung Ca s/p L thoracotomy, pneumonectomy in 2015 and RVATS RUL wedge resection 2019 c/b L chest wall abscess s/p chest tube placement x2      Plan:  Continue CT to low continuous wall suctions.   Monitor output 
72 yo M w/ hx squamous cell lung cancer s/p L thoracotomy, pneumonectomy in 2015 and RVATS RUL wedge resection 2019 c/b chonic infection at pneumonectomy site, newly diagnosed Waldenstrom's macroglobulinemia (not on treament given infection), admission 01/2022 for L chest wall abscess s/p L PTC placed and IV ABX who presents with complicated wound healing at chest wall site. Recent CT chest 03/17 with increased air collection in pneumonectomy space. S/p anterior and posterior chest tube placement 03/28 by IR. Intrapleural Vanc infusion started to run until 4/4- Left thoracotomy and muscle flap planned for 4/6.     Empyema  - rececnt CT chest 03/17 with increased air collection in pneumonectomy space  - s/p anterior and posterior chest tube placement 03/28 by IR  - f/u cultures  - ABX per ID  - Intra pleural Vanco infusion started and running currently until 4/4/22. 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 Recio    Waldenström's macroglobulinemia  - dx 01/2022 BMBx with + MYD88  - Labs 02/15/22: M spike 1.3, IgM 1945, viscosity 2   - per Dr. Estrella, will hold on treatment for now given infection   - WM labs similar to prior IgM 1819, K/L FLC ratio 5.89, M spike 1.5, viscosity 1.9  - no plan for inpatient treatment, outpatient f/u with Dr. Recio  - trend CBC     Epi Barakat MD  Hematology/Oncology Fellow   Pager [phone]  After 5pm and on weekends page on call fellow   
Assessment: 71y male with history of Waldenstroms macorglobulinemia, squamous cell lung Ca s/p L thoracotomy, pneumonectomy in 2015 and RVATS RUL wedge resection 2019. Now with empyema in left pleural space- likely with anaerobes.    PLAN  - Cont Vanc pleura vac infusions  - Pain control  - Encourage coughing, deep breathing and use of incentive spirometry.  - Daily CXR   - Diet: Regular  - F/u ID recs  - F/u Chest tube outputs from both IR drains   - Tentatively planned for OR Tomorrow, preop  - DVT PPx: Hep Subq    Thoracic Surgery  [id number]
Assessment: 71y male with history of Waldenstroms macorglobulinemia, squamous cell lung Ca s/p L thoracotomy, pneumonectomy in 2015 and RVATS RUL wedge resection 2019. Now with empyema in left pleural space- likely with anaerobes.    PLAN  - Cont Vanc pleura vac infusions  - Pain control  - Encourage coughing, deep breathing and use of incentive spirometry.  - Daily CXR   - Diet: Regular  - F/u ID recs  - F/u Chest tube outputs from both IR drains   - Tentatively planned for OR wednesday  - DVT PPx: Hep Subq    Thoracic Surgery  [id number]

## 2022-04-05 NOTE — DISCHARGE NOTE PROVIDER - NSDCMRMEDTOKEN_GEN_ALL_CORE_FT
acetaminophen 325 mg oral tablet: 2 tab(s) orally every 6 hours, As needed, Mild Pain (1 - 3)  cefTRIAXone 2 g injection: 2 gram(s) intravenously once a day   LAST DATE of Therapy 2/9/2022  CoQ 10 100 mg orally  daily  last dose 2/16: May resume  CXR PA and lateral: once   docusate sodium 100 mg oral capsule: 1 cap(s) orally 3 times a day  oxyCODONE 5 mg oral tablet: 1 tab(s) orally every 6 hours, As Needed -Moderate Pain (4 - 6) MDD:4 tablets  tamsulosin 0.4 mg oral capsule: 1 cap(s) orally 2x day  weekly cbc with diff, cmp, esr, crp: Fax results to Dr Ирина Espinoza  fax    acetaminophen 325 mg oral tablet: 2 tab(s) orally every 6 hours, As needed, Mild Pain (1 - 3)  amoxicillin-clavulanate 875 mg-125 mg oral tablet: 875 milligram(s) orally every 12 hours   CoQ 10 100 mg orally  daily  last dose 2/16: 1   once a day  docusate sodium 100 mg oral capsule: 1 cap(s) orally 3 times a day  oxyCODONE 5 mg oral tablet: 1 tab(s) orally every 4 hours, As Needed -Moderate Pain to severe pain(4 - 6) MDD:4 tablets   polyethylene glycol 3350 oral powder for reconstitution: 17 gram(s) orally once a day, As needed, Constipation  senna oral tablet: 2 tab(s) orally once a day (at bedtime)  tamsulosin 0.4 mg oral capsule: 1 cap(s) orally 2x day

## 2022-04-05 NOTE — DISCHARGE NOTE PROVIDER - NSDCFUADDINST_GEN_ALL_CORE_FT
Keep dressings in place, dry and intact for next 2 days. After that, you can remove both drain site dressings then begin to shower. Sites can stay uncovered.  Take antibiotics for next 4 weeks. Walk frequently. YOu may climb stairs. Call office with any new fevers, pain or increased shortness of breath.

## 2022-04-05 NOTE — PROGRESS NOTE ADULT - ATTENDING COMMENTS
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 Recent CT chest 03/17 with increased air collection in pneumonectomy space. S/p anterior and posterior chest tube placement 03/28 by IR. Continue antibiotics per ID.  Waldenström's macroglobulinemia was dx 01/2022 with BMBx with + MYD88. Labs 02/15/22: M spike 1.3, IgM 1945, viscosity 2. Repeat SPEP, immunoglobulin panel, free light chain ratio, serum viscosity show disease is fairly stable. As per Dr. Estrella, will hold on treatment for now given infection and she will see him in follow up as an outpatient after discharge to discuss further management.

## 2022-04-05 NOTE — DISCHARGE NOTE PROVIDER - DETAILS OF MALNUTRITION DIAGNOSIS/DIAGNOSES
This patient has been assessed with a concern for Malnutrition and was treated during this hospitalization for the following Nutrition diagnosis/diagnoses:     -  04/04/2022: Severe protein-calorie malnutrition

## 2022-04-05 NOTE — DISCHARGE NOTE PROVIDER - HOSPITAL COURSE
Pt is a 71y male with history of squamous cell lung Ca s/p L thoracotomy, pneumonectomy in 2015 and RVATS RUL wedge resection 2019. Pt was admitted in January 2022 with a L chest wall abscess for which a L PTC was placed by IR. Pt was discharge home with IV antibiotics and followed as an outpatient.  Recent CT chest 3/17 reveals increased air collection in pneumonectomy space.  He was directly admitted to the hospital with plan to place an additional IR drain for irrigation.  IR placed 2 pigtail cath through which he was receiving Vancomycin intrapleurally.  Hospital course, patient afebrile, cultures negative, tolerating vancomycin infusion intrapleurally.  Patient stable for discharge home.  Pt is a 71y male with history of squamous cell lung Ca s/p L thoracotomy, pneumonectomy in 2015 and RVATS RUL wedge resection 2019. Pt was admitted in January 2022 with a L chest wall abscess for which a L PTC was placed by IR. Pt was discharge home with IV antibiotics and followed as an outpatient.  Recent CT chest 3/17 reveals increased air collection in pneumonectomy space.  He was directly admitted to the hospital with plan to place an additional IR drain for irrigation.  IR placed 2 pigtail cath through which he was receiving Vancomycin intrapleurally.  Hospital course, patient afebrile, cultures negative, tolerating vancomycin infusion intrapleurally.  Patient stable for discharge home with Augmentin.  Pt is a 71y male with history of squamous cell lung Ca s/p L thoracotomy, pneumonectomy in 2015 and RVATS RUL wedge resection 2019. Pt was admitted in January 2022 with a L chest wall abscess for which a L PTC was placed by IR. Pt was discharge home with IV antibiotics and followed as an outpatient.  Recent CT chest 3/17 reveals increased air collection in pneumonectomy space.  He was directly admitted to the hospital with plan to place an additional IR drain for irrigation.  IR placed 2 pigtail cath through which he was receiving Vancomycin intrapleurally.  Hospital course, patient afebrile, cultures negative, tolerating vancomycin infusion intrapleurally.  Pt was kept on 10day course of Zosyn. Seen by ID>  Today, both PTCs removed. Occlusive dressings placed. Pt feels well. Anxious to go home. No plans for OR at this time per Dr. Sims. Patient stable for discharge home with Augmentinx 1 month.   Cleared for home by Dr. Sims  Vital Signs Last 24 Hrs  T(C): 36.4 (07 Apr 2022 08:24), Max: 36.7 (06 Apr 2022 21:31)  T(F): 97.6 (07 Apr 2022 08:24), Max: 98.1 (06 Apr 2022 21:31)  HR: 96 (07 Apr 2022 08:24) (77 - 96)  BP: 124/54 (07 Apr 2022 08:24) (124/54 - 149/64)  BP(mean): --  RR: 18 (07 Apr 2022 08:24) (17 - 18)  SpO2: 98% (07 Apr 2022 08:24) (98% - 100%)

## 2022-04-05 NOTE — PROGRESS NOTE ADULT - PROVIDER SPECIALTY LIST ADULT
CT Surgery
Heme/Onc
Intervent Radiology
Intervent Radiology
Thoracic Surgery
CT Surgery
Infectious Disease
Thoracic Surgery

## 2022-04-06 ENCOUNTER — APPOINTMENT (OUTPATIENT)
Dept: THORACIC SURGERY | Facility: HOSPITAL | Age: 72
End: 2022-04-06

## 2022-04-06 LAB
ANION GAP SERPL CALC-SCNC: 13 MMOL/L — SIGNIFICANT CHANGE UP (ref 7–14)
APTT BLD: 29.3 SEC — SIGNIFICANT CHANGE UP (ref 27–36.3)
BUN SERPL-MCNC: 14 MG/DL — SIGNIFICANT CHANGE UP (ref 7–23)
CALCIUM SERPL-MCNC: 8.9 MG/DL — SIGNIFICANT CHANGE UP (ref 8.4–10.5)
CHLORIDE SERPL-SCNC: 103 MMOL/L — SIGNIFICANT CHANGE UP (ref 98–107)
CO2 SERPL-SCNC: 24 MMOL/L — SIGNIFICANT CHANGE UP (ref 22–31)
CREAT SERPL-MCNC: 1.09 MG/DL — SIGNIFICANT CHANGE UP (ref 0.5–1.3)
EGFR: 73 ML/MIN/1.73M2 — SIGNIFICANT CHANGE UP
GLUCOSE SERPL-MCNC: 91 MG/DL — SIGNIFICANT CHANGE UP (ref 70–99)
HCT VFR BLD CALC: 33.6 % — LOW (ref 39–50)
HGB BLD-MCNC: 10.3 G/DL — LOW (ref 13–17)
INR BLD: 1.06 RATIO — SIGNIFICANT CHANGE UP (ref 0.88–1.16)
MAGNESIUM SERPL-MCNC: 2.4 MG/DL — SIGNIFICANT CHANGE UP (ref 1.6–2.6)
MCHC RBC-ENTMCNC: 28.5 PG — SIGNIFICANT CHANGE UP (ref 27–34)
MCHC RBC-ENTMCNC: 30.7 GM/DL — LOW (ref 32–36)
MCV RBC AUTO: 93.1 FL — SIGNIFICANT CHANGE UP (ref 80–100)
NRBC # BLD: 0 /100 WBCS — SIGNIFICANT CHANGE UP
NRBC # FLD: 0 K/UL — SIGNIFICANT CHANGE UP
PHOSPHATE SERPL-MCNC: 3.1 MG/DL — SIGNIFICANT CHANGE UP (ref 2.5–4.5)
PLATELET # BLD AUTO: 121 K/UL — LOW (ref 150–400)
POTASSIUM SERPL-MCNC: 4 MMOL/L — SIGNIFICANT CHANGE UP (ref 3.5–5.3)
POTASSIUM SERPL-SCNC: 4 MMOL/L — SIGNIFICANT CHANGE UP (ref 3.5–5.3)
PROTHROM AB SERPL-ACNC: 12.3 SEC — SIGNIFICANT CHANGE UP (ref 10.5–13.4)
RBC # BLD: 3.61 M/UL — LOW (ref 4.2–5.8)
RBC # FLD: 16.2 % — HIGH (ref 10.3–14.5)
SODIUM SERPL-SCNC: 140 MMOL/L — SIGNIFICANT CHANGE UP (ref 135–145)
WBC # BLD: 3.51 K/UL — LOW (ref 3.8–10.5)
WBC # FLD AUTO: 3.51 K/UL — LOW (ref 3.8–10.5)

## 2022-04-06 PROCEDURE — 71045 X-RAY EXAM CHEST 1 VIEW: CPT | Mod: 26

## 2022-04-06 PROCEDURE — 71045 X-RAY EXAM CHEST 1 VIEW: CPT | Mod: 26,77

## 2022-04-06 RX ADMIN — HEPARIN SODIUM 5000 UNIT(S): 5000 INJECTION INTRAVENOUS; SUBCUTANEOUS at 14:43

## 2022-04-06 RX ADMIN — Medication 650 MILLIGRAM(S): at 23:43

## 2022-04-06 RX ADMIN — PIPERACILLIN AND TAZOBACTAM 25 GRAM(S): 4; .5 INJECTION, POWDER, LYOPHILIZED, FOR SOLUTION INTRAVENOUS at 21:59

## 2022-04-06 RX ADMIN — PANTOPRAZOLE SODIUM 40 MILLIGRAM(S): 20 TABLET, DELAYED RELEASE ORAL at 05:34

## 2022-04-06 RX ADMIN — HEPARIN SODIUM 5000 UNIT(S): 5000 INJECTION INTRAVENOUS; SUBCUTANEOUS at 05:34

## 2022-04-06 RX ADMIN — TAMSULOSIN HYDROCHLORIDE 0.8 MILLIGRAM(S): 0.4 CAPSULE ORAL at 21:57

## 2022-04-06 RX ADMIN — PIPERACILLIN AND TAZOBACTAM 25 GRAM(S): 4; .5 INJECTION, POWDER, LYOPHILIZED, FOR SOLUTION INTRAVENOUS at 05:34

## 2022-04-06 RX ADMIN — Medication 650 MILLIGRAM(S): at 22:14

## 2022-04-06 RX ADMIN — PIPERACILLIN AND TAZOBACTAM 25 GRAM(S): 4; .5 INJECTION, POWDER, LYOPHILIZED, FOR SOLUTION INTRAVENOUS at 14:43

## 2022-04-06 RX ADMIN — HEPARIN SODIUM 5000 UNIT(S): 5000 INJECTION INTRAVENOUS; SUBCUTANEOUS at 21:57

## 2022-04-07 ENCOUNTER — TRANSCRIPTION ENCOUNTER (OUTPATIENT)
Age: 72
End: 2022-04-07

## 2022-04-07 ENCOUNTER — NON-APPOINTMENT (OUTPATIENT)
Age: 72
End: 2022-04-07

## 2022-04-07 VITALS
OXYGEN SATURATION: 98 % | DIASTOLIC BLOOD PRESSURE: 54 MMHG | RESPIRATION RATE: 18 BRPM | SYSTOLIC BLOOD PRESSURE: 124 MMHG | HEART RATE: 96 BPM | TEMPERATURE: 98 F

## 2022-04-07 LAB
ANION GAP SERPL CALC-SCNC: 14 MMOL/L — SIGNIFICANT CHANGE UP (ref 7–14)
BUN SERPL-MCNC: 14 MG/DL — SIGNIFICANT CHANGE UP (ref 7–23)
CALCIUM SERPL-MCNC: 8.9 MG/DL — SIGNIFICANT CHANGE UP (ref 8.4–10.5)
CHLORIDE SERPL-SCNC: 107 MMOL/L — SIGNIFICANT CHANGE UP (ref 98–107)
CO2 SERPL-SCNC: 21 MMOL/L — LOW (ref 22–31)
CREAT SERPL-MCNC: 1.21 MG/DL — SIGNIFICANT CHANGE UP (ref 0.5–1.3)
EGFR: 64 ML/MIN/1.73M2 — SIGNIFICANT CHANGE UP
GLUCOSE SERPL-MCNC: 94 MG/DL — SIGNIFICANT CHANGE UP (ref 70–99)
HCT VFR BLD CALC: 33.4 % — LOW (ref 39–50)
HGB BLD-MCNC: 9.8 G/DL — LOW (ref 13–17)
MAGNESIUM SERPL-MCNC: 2.2 MG/DL — SIGNIFICANT CHANGE UP (ref 1.6–2.6)
MCHC RBC-ENTMCNC: 28.2 PG — SIGNIFICANT CHANGE UP (ref 27–34)
MCHC RBC-ENTMCNC: 29.3 GM/DL — LOW (ref 32–36)
MCV RBC AUTO: 96 FL — SIGNIFICANT CHANGE UP (ref 80–100)
NRBC # BLD: 0 /100 WBCS — SIGNIFICANT CHANGE UP
NRBC # FLD: 0 K/UL — SIGNIFICANT CHANGE UP
PHOSPHATE SERPL-MCNC: 3.1 MG/DL — SIGNIFICANT CHANGE UP (ref 2.5–4.5)
PLATELET # BLD AUTO: 116 K/UL — LOW (ref 150–400)
POTASSIUM SERPL-MCNC: 4.1 MMOL/L — SIGNIFICANT CHANGE UP (ref 3.5–5.3)
POTASSIUM SERPL-SCNC: 4.1 MMOL/L — SIGNIFICANT CHANGE UP (ref 3.5–5.3)
RBC # BLD: 3.48 M/UL — LOW (ref 4.2–5.8)
RBC # FLD: 16.6 % — HIGH (ref 10.3–14.5)
SODIUM SERPL-SCNC: 142 MMOL/L — SIGNIFICANT CHANGE UP (ref 135–145)
WBC # BLD: 4.66 K/UL — SIGNIFICANT CHANGE UP (ref 3.8–10.5)
WBC # FLD AUTO: 4.66 K/UL — SIGNIFICANT CHANGE UP (ref 3.8–10.5)

## 2022-04-07 PROCEDURE — 71045 X-RAY EXAM CHEST 1 VIEW: CPT | Mod: 26

## 2022-04-07 PROCEDURE — 99238 HOSP IP/OBS DSCHRG MGMT 30/<: CPT

## 2022-04-07 RX ORDER — POLYETHYLENE GLYCOL 3350 17 G/17G
17 POWDER, FOR SOLUTION ORAL
Qty: 0 | Refills: 0 | DISCHARGE
Start: 2022-04-07

## 2022-04-07 RX ORDER — SENNA PLUS 8.6 MG/1
2 TABLET ORAL
Qty: 0 | Refills: 0 | DISCHARGE
Start: 2022-04-07

## 2022-04-07 RX ORDER — OXYCODONE HYDROCHLORIDE 5 MG/1
1 TABLET ORAL
Qty: 24 | Refills: 0
Start: 2022-04-07 | End: 2022-04-10

## 2022-04-07 RX ADMIN — PANTOPRAZOLE SODIUM 40 MILLIGRAM(S): 20 TABLET, DELAYED RELEASE ORAL at 06:15

## 2022-04-07 RX ADMIN — PIPERACILLIN AND TAZOBACTAM 25 GRAM(S): 4; .5 INJECTION, POWDER, LYOPHILIZED, FOR SOLUTION INTRAVENOUS at 06:14

## 2022-04-07 RX ADMIN — HEPARIN SODIUM 5000 UNIT(S): 5000 INJECTION INTRAVENOUS; SUBCUTANEOUS at 06:15

## 2022-04-07 NOTE — DISCHARGE NOTE NURSING/CASE MANAGEMENT/SOCIAL WORK - PATIENT PORTAL LINK FT
You can access the FollowMyHealth Patient Portal offered by [facility] by registering at the following website: [email or web address]. By joining [employer]’s FollowMyHealth portal, you will also be able to view your health information using other applications (apps) compatible with our system.

## 2022-04-07 NOTE — DISCHARGE NOTE NURSING/CASE MANAGEMENT/SOCIAL WORK - NSDCPNINST_GEN_ALL_CORE
Reviewed discharge instructions with patient. Instructed patient to take all medications as prescribed. Instructed patient to follow up with healthcare provider and to call 911 if patient experiences chest pain, shortness of breath, difficulty breathing or fever.

## 2022-04-07 NOTE — DISCHARGE NOTE NURSING/CASE MANAGEMENT/SOCIAL WORK - NSDCPEFALRISK_GEN_ALL_CORE
For information on Fall & Injury Prevention, visit: [email or web address] OR  [email or web address] OR  https://www.cdc.gov/steadi/patient.html

## 2022-04-08 ENCOUNTER — NON-APPOINTMENT (OUTPATIENT)
Age: 72
End: 2022-04-08

## 2022-04-22 ENCOUNTER — OUTPATIENT (OUTPATIENT)
Dept: OUTPATIENT SERVICES | Facility: HOSPITAL | Age: 72
LOS: 1 days | End: 2022-04-22
Payer: MEDICARE

## 2022-04-22 ENCOUNTER — APPOINTMENT (OUTPATIENT)
Dept: RADIOLOGY | Facility: HOSPITAL | Age: 72
End: 2022-04-22
Payer: MEDICARE

## 2022-04-22 ENCOUNTER — RESULT REVIEW (OUTPATIENT)
Age: 72
End: 2022-04-22

## 2022-04-22 DIAGNOSIS — Z98.89 OTHER SPECIFIED POSTPROCEDURAL STATES: Chronic | ICD-10-CM

## 2022-04-22 DIAGNOSIS — Z00.8 ENCOUNTER FOR OTHER GENERAL EXAMINATION: ICD-10-CM

## 2022-04-22 DIAGNOSIS — Z90.2 ACQUIRED ABSENCE OF LUNG [PART OF]: Chronic | ICD-10-CM

## 2022-04-22 DIAGNOSIS — Z98.890 OTHER SPECIFIED POSTPROCEDURAL STATES: Chronic | ICD-10-CM

## 2022-04-22 PROCEDURE — 71046 X-RAY EXAM CHEST 2 VIEWS: CPT

## 2022-04-22 PROCEDURE — 71046 X-RAY EXAM CHEST 2 VIEWS: CPT | Mod: 26

## 2022-04-24 PROBLEM — J86.9 EMPYEMA LUNG: Status: ACTIVE | Noted: 2022-04-24

## 2022-04-25 ENCOUNTER — APPOINTMENT (OUTPATIENT)
Dept: THORACIC SURGERY | Facility: CLINIC | Age: 72
End: 2022-04-25
Payer: MEDICARE

## 2022-04-25 VITALS
HEIGHT: 71 IN | DIASTOLIC BLOOD PRESSURE: 78 MMHG | OXYGEN SATURATION: 98 % | HEART RATE: 75 BPM | WEIGHT: 170 LBS | SYSTOLIC BLOOD PRESSURE: 158 MMHG | BODY MASS INDEX: 23.8 KG/M2

## 2022-04-25 DIAGNOSIS — J86.9 PYOTHORAX W/OUT FISTULA: ICD-10-CM

## 2022-04-25 PROCEDURE — 99214 OFFICE O/P EST MOD 30 MIN: CPT

## 2022-04-25 RX ORDER — CEFTRIAXONE 10 G/1
10 INJECTION, POWDER, FOR SOLUTION INTRAVENOUS
Qty: 1 | Refills: 0 | Status: DISCONTINUED | COMMUNITY
Start: 2022-02-16 | End: 2022-04-25

## 2022-04-25 RX ORDER — CEFTRIAXONE 2 G/1
2 INJECTION, POWDER, FOR SOLUTION INTRAMUSCULAR; INTRAVENOUS
Refills: 0 | Status: DISCONTINUED | COMMUNITY
Start: 2022-01-20 | End: 2022-04-25

## 2022-04-25 RX ORDER — DOCUSATE SODIUM 100 MG/1
100 CAPSULE ORAL
Qty: 270 | Refills: 1 | Status: DISCONTINUED | COMMUNITY
Start: 2022-01-20 | End: 2022-04-25

## 2022-04-27 LAB
CULTURE RESULTS: SIGNIFICANT CHANGE UP
SPECIMEN SOURCE: SIGNIFICANT CHANGE UP

## 2022-04-27 NOTE — CONSULT LETTER
[Dear  ___] : Dear  [unfilled], [Courtesy Letter:] : I had the pleasure of seeing your patient, [unfilled], in my office today. [Please see my note below.] : Please see my note below. [Sincerely,] : Sincerely, [DrChuck  ___] : Dr. GROVES [DrChuck ___] : Dr. GROVES [FreeTextEntry3] : Jan Sims MD \par Attending Surgeon \par Division of Thoracic Surgery \par , Cardiovascular and Thoracic Surgery \par [facility]\par \par  [FreeTextEntry2] : Dr. Clara Estrella (Onc)\par Dr. Mauro Mckeon (RadOnc)\par Dr. Kendrick Deleon (PCP)

## 2022-04-27 NOTE — ASSESSMENT
[FreeTextEntry1] : 71 year old male. He has a history of squamous cell lung Ca stage III (T3 N2) of LETY with neoadjuvant chemo/RT, followed by Left VATS, left thoracotomy, pneumonectomy, resection of first, second, third ribs on 06/23/2015, pathology revealed [id number]. In January of 2017 he completed SBRT to a RUL nodule. He is s/p right VATS, RUL lung wedge resection on 2/23/18, pathology revealed scar with reactive changes consistent with radiation atypia.\par \par I have reviewed the patient's medical records and diagnostic images at time of this office consultation and have made the following recommendation:\par 1. CXR reviewed and explained to patient, patient is doing well, I recommended patient to RTC in 05/2022 with CXR after completion of ABX. \par \par I personally performed the services described in the documentation, reviewed the documentation recorded by the scribe in my presence and it accurately and completely records my words and actions.\par \par SARABJIT, Milka Su NP, am scribing for and the presence of LAUREN Leach, the following sections HISTORY OF PRESENT ILLNESS, PAST MEDICAL/FAMILY/SOCIAL HISTORY; REVIEW OF SYSTEMS; VITAL SIGNS; PHYSICAL EXAM; DISPOSITION.

## 2022-04-27 NOTE — HISTORY OF PRESENT ILLNESS
[FreeTextEntry1] : 71 year old male. He has a history of squamous cell lung Ca stage III (T3 N2) of LETY with neoadjuvant chemo/RT, followed by Left VATS, left thoracotomy, pneumonectomy, resection of first, second, third ribs on 06/23/2015, pathology revealed [id number]. In January of 2017 he completed SBRT to a RUL nodule. He is s/p right VATS, RUL lung wedge resection on 2/23/18, pathology revealed scar with reactive changes consistent with radiation atypia.\par \par At outpatient follow up visit on 1/10/2022, he c/o lethargy, weight loss and pain at L thoracotomy site, and was found to have fluctuant collection. CT chest showing new 3.0 x 12.4 x 10.1 cm lesion centered along the left subscapularis muscle. The lesion extends along the chest wall posterior to and communicates with the pneumonectomy bed, just inferior to the second rib resection and along the posterior left third, fourth, and fifth rib interspaces. s/p placement of drain by IR w/ 500 ml purulent drainage.  Fluid positive for strep mitis, ID consulted; PICC placed and plan for 4 week course of Rocephin.  s/p bronchoscopy on 01/14/2022; The left pneumonectomy stump was noted to be intact. The stump was flush with the va. It was irrigated. No bubbles were noted. \par \par F/u with Hem/Onc for Waldenstrom macroglobulinemia, pancytopenia, s/p bone marrow bx on 01/18/2022. Path revealed hypercellular bone marrow with marked B cell lymphocytosis, moderated plasmacytosis, increased mast cells and present iron stores. These findings along with the most current lab findings, support a differential diagnosis which includes lymphoplasmacytic lymphoma/Waldenstrom macroglobulinemia vs marginal zone lymphoma. \par \par Patient is s/p US guided left thoracentesis on 01/27/2022, 60cc of purulent fluid was then aspirated, and a sample of fluid was sent for microbiological and chemical analysis. Culture and AFB no growth at 1 week. \par \par F/u with Dr. Espinoza, started on PO abx on 02/24/2022 and completed on 3/10/22.\par \par Patient has a drain to his Lt upper back, it is currently draining ~50ml output daily. \par \par CT chest on 03/17/2022:\par - Left hemithorax is filled with fluid. Air within the left pneumonectomy space has increased when compared to previous exam. A pigtail catheter is noted in the left posterior lateral chest wall between the scapula and the left ribs. No fluid collection is noted surrounding the pigtail catheter\par \par Patient was directly admitted on 03/28/2022 to  for chest tube placement by IR for drainage and irrigation and plan for thoracotomy with muscle flap. IR placed 2 pigtail cath through which he was receiving Vancomycin intrapleurally and 10 day course of Zosyn and discharged home on 04/07/2022 with Augmentin for 1 month. \par \par CXR on 04/22/2022: reviewed. \par \par Patient is here today for a follow up. Patient denies shortness of breath, cough, chest pain, fever, chills. \par

## 2022-05-06 ENCOUNTER — OUTPATIENT (OUTPATIENT)
Dept: OUTPATIENT SERVICES | Facility: HOSPITAL | Age: 72
LOS: 1 days | End: 2022-05-06
Payer: MEDICARE

## 2022-05-06 ENCOUNTER — APPOINTMENT (OUTPATIENT)
Dept: RADIOLOGY | Facility: HOSPITAL | Age: 72
End: 2022-05-06
Payer: MEDICARE

## 2022-05-06 DIAGNOSIS — Z98.890 OTHER SPECIFIED POSTPROCEDURAL STATES: Chronic | ICD-10-CM

## 2022-05-06 DIAGNOSIS — Z98.89 OTHER SPECIFIED POSTPROCEDURAL STATES: Chronic | ICD-10-CM

## 2022-05-06 DIAGNOSIS — J86.9 PYOTHORAX WITHOUT FISTULA: ICD-10-CM

## 2022-05-06 DIAGNOSIS — Z90.2 ACQUIRED ABSENCE OF LUNG [PART OF]: Chronic | ICD-10-CM

## 2022-05-06 PROCEDURE — 71046 X-RAY EXAM CHEST 2 VIEWS: CPT

## 2022-05-06 PROCEDURE — 71046 X-RAY EXAM CHEST 2 VIEWS: CPT | Mod: 26

## 2022-05-09 ENCOUNTER — APPOINTMENT (OUTPATIENT)
Dept: THORACIC SURGERY | Facility: CLINIC | Age: 72
End: 2022-05-09
Payer: MEDICARE

## 2022-05-09 VITALS
DIASTOLIC BLOOD PRESSURE: 78 MMHG | HEART RATE: 75 BPM | RESPIRATION RATE: 17 BRPM | SYSTOLIC BLOOD PRESSURE: 145 MMHG | BODY MASS INDEX: 24.5 KG/M2 | HEIGHT: 71 IN | WEIGHT: 175 LBS | OXYGEN SATURATION: 100 %

## 2022-05-09 PROCEDURE — 99214 OFFICE O/P EST MOD 30 MIN: CPT

## 2022-05-13 NOTE — PHYSICAL EXAM
[Respiration, Rhythm And Depth] : normal respiratory rhythm and effort [Exaggerated Use Of Accessory Muscles For Inspiration] : no accessory muscle use [Heart Rate And Rhythm] : heart rate was normal and rhythm regular [Heart Sounds] : normal S1 and S2 [Examination Of The Chest] : the chest was normal in appearance [Oriented To Time, Place, And Person] : oriented to person, place, and time [FreeTextEntry1] : (clear but slightly diminished breath sounds of left s/p left pneumonectomy)

## 2022-05-13 NOTE — ASSESSMENT
[FreeTextEntry1] : 71 year old male. He has a history of squamous cell lung Ca stage III (T3 N2) of LETY with neoadjuvant chemo/RT, followed by Left VATS, left thoracotomy, pneumonectomy, resection of first, second, third ribs on 06/23/2015, pathology revealed [id number]. In January of 2017 he completed SBRT to a RUL nodule. He is s/p right VATS, RUL lung wedge resection on 2/23/18, pathology revealed scar with reactive changes consistent with radiation atypia.\par \par He was directly admitted on 3/28/22 to [address] [place] [address] for chest tube placement by IR for drainage and irrigation and plan for thoracotomy with muscle flap.  IR placed 2 pigtail cath through which he was receiving Vancomycin intrapleurally and 10 day course of Zoayn and D/C home on 4/7/22 with Augmentin for 1 month. \par \par CXR 5/6/22:\par - s/p left pneumonectomy\par - right lung field remains clear of infiltrate or effusion \par \par I have reviewed the patient's medical records and diagnostic images at time of this office consultation and have made the following recommendation:\par 1.  I have recommended that he follow up in 4 months (as previously scheduled) with noncontrast Chest CT.  I have instructed him to call my office with any concerns in the interim.   \par \par Recommendations were reviewed with patient during this office visit, and all questions answered; Patient instructed on the importance of follow up and verbalizes understanding.\par \par I personally performed the services described in the documentation, reviewed the documentation recorded by the scribe in my presence and it accurately and completely records my words and actions.\par \par I, Elina Walker NP, am scribing for and the presence of Dr. Sims, the following sections HISTORY OF PRESENT ILLNESS, PAST MEDICAL/FAMILY/SOCIAL HISTORY; REVIEW OF SYSTEMS; VITAL SIGNS; PHYSICAL EXAM; DISPOSITION.\par  \par \par \par \par \par

## 2022-05-13 NOTE — CONSULT LETTER
[Dear  ___] : Dear  [unfilled], [Courtesy Letter:] : I had the pleasure of seeing your patient, [unfilled], in my office today. [Please see my note below.] : Please see my note below. [Sincerely,] : Sincerely, [FreeTextEntry2] : Dr. Clara Estrella (Onc)\par Dr. Mauro Mckeon (RadOnc)\par Dr. Kendrick Deleon (PCP)  [FreeTextEntry3] : Jan Sims MD \par Attending Surgeon \par Division of Thoracic Surgery \par , Cardiovascular and Thoracic Surgery \par [facility] at [facility]\par \par

## 2022-05-13 NOTE — HISTORY OF PRESENT ILLNESS
[FreeTextEntry1] : 71 year old male returns today for follow up.  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 In January of 2017 he completed SBRT to a RUL nodule. He is s/p right VATS, RUL lung wedge resection on 2/23/18, pathology revealed scar with reactive changes consistent with radiation atypia.\par \par At outpatient follow up visit on 1/10/2022, he c/o lethargy, weight loss and pain at L thoracotomy site, and was found to have fluctuant collection. CT chest showing new 3.0 x 12.4 x 10.1 cm lesion centered along the left subscapularis muscle. The lesion extends along the chest wall posterior to and communicates with the pneumonectomy bed, just inferior to the second rib resection and along the posterior left third, fourth, and fifth rib interspaces. s/p placement of drain by IR w/ 500 ml purulent drainage.  Fluid positive for strep mitis, ID consulted; PICC placed and plan for 4 week course of Rocephin.  s/p bronchoscopy on 01/14/2022; The left pneumonectomy stump was noted to be intact. The stump was flush with the va. It was irrigated. No bubbles were noted. \par \par F/u with Hem/Onc for Waldenstrom macroglobulinemia, pancytopenia, s/p bone marrow bx on 01/18/2022. Path revealed hypercellular bone marrow with marked B cell lymphocytosis, moderated plasmacytosis, increased mast cells and present iron stores. These findings along with the most current lab findings, support a differential diagnosis which includes lymphoplasmacytic lymphoma/Waldenstrom macroglobulinemia vs marginal zone lymphoma. \par \par Patient is s/p US guided left thoracentesis on 01/27/2022, 60cc of purulent fluid was then aspirated, and a sample of fluid was sent for microbiological and chemical analysis. Culture and AFB no growth at 1 week. \par \par F/u with Dr. Espinoza, started on PO abx on 02/24/2022 and completed on 3/10/22. Patient has a drain to his Lt upper back, it is currently draining ~50ml output daily. \par \par CT chest on 03/17/2022:\par - Left hemithorax is filled with fluid. Air within the left pneumonectomy space has increased when compared to previous exam. A pigtail catheter is noted in the left posterior lateral chest wall between the scapula and the left ribs. No fluid collection is noted surrounding the pigtail catheter\par \par Patient was directly admitted on 03/28/2022 to  for chest tube placement by IR for drainage and irrigation and plan for thoracotomy with muscle flap. IR placed 2 pigtail cath through which he was receiving Vancomycin intrapleurally and 10 day course of Zosyn and discharged home on 04/07/2022 with Augmentin for 1 month. \par \par CXR on 04/22/2022: Status post left pneumonectomy. Right lung field remains clear of infiltrate or effusion.\par \par He was last seen in the office 4/25/22 and asked to return with CXR after he completion of antibiotics.  He completed the antibiotics on 5/6/22.\par \par CXR 5/6/22:\par - Status post left pneumonectomy. \par -Right lung field remains clear of infiltrate or effusion\par \par Patient presents today for follow up. He reports that he feels much better.  He denies any fever, chills, cough, shortness of breath, chest pain, hemoptysis, or recent illness. \par

## 2022-05-18 LAB
CULTURE RESULTS: SIGNIFICANT CHANGE UP
SPECIMEN SOURCE: SIGNIFICANT CHANGE UP

## 2022-08-02 ENCOUNTER — NON-APPOINTMENT (OUTPATIENT)
Age: 72
End: 2022-08-02

## 2022-08-23 ENCOUNTER — LABORATORY RESULT (OUTPATIENT)
Age: 72
End: 2022-08-23

## 2022-08-23 LAB
HAPTOGLOB SERPL-MCNC: 294 MG/DL
RBC # BLD: 3.62 M/UL
RETICS # AUTO: 1.7 %
RETICS AGGREG/RBC NFR: 61.2 K/UL

## 2022-08-24 LAB
BASOPHILS # BLD AUTO: 0.02 K/UL
BASOPHILS NFR BLD AUTO: 0.5 %
CALCIUM SERPL-MCNC: 8.7 MG/DL
CREAT SERPL-MCNC: 1.02 MG/DL
DEPRECATED KAPPA LC FREE/LAMBDA SER: 7.5 RATIO
EGFR: 79 ML/MIN/1.73M2
EOSINOPHIL # BLD AUTO: 0.03 K/UL
EOSINOPHIL NFR BLD AUTO: 0.7 %
FOLATE SERPL-MCNC: 9.2 NG/ML
HCT VFR BLD CALC: 35.2 %
HGB BLD-MCNC: 10.6 G/DL
IGA SER QL IEP: 77 MG/DL
IGG SER QL IEP: 825 MG/DL
IGM SER QL IEP: 2252 MG/DL
IMM GRANULOCYTES NFR BLD AUTO: 0.5 %
KAPPA LC CSF-MCNC: 1.71 MG/DL
KAPPA LC SERPL-MCNC: 12.82 MG/DL
LDH SERPL-CCNC: 136 U/L
LYMPHOCYTES # BLD AUTO: 1.12 K/UL
LYMPHOCYTES NFR BLD AUTO: 26.4 %
MAN DIFF?: NORMAL
MCHC RBC-ENTMCNC: 29.3 PG
MCHC RBC-ENTMCNC: 30.1 GM/DL
MCV RBC AUTO: 97.2 FL
MONOCYTES # BLD AUTO: 0.58 K/UL
MONOCYTES NFR BLD AUTO: 13.7 %
NEUTROPHILS # BLD AUTO: 2.47 K/UL
NEUTROPHILS NFR BLD AUTO: 58.2 %
PLATELET # BLD AUTO: 94 K/UL
RBC # BLD: 3.62 M/UL
RBC # FLD: 16 %
VIT B12 SERPL-MCNC: 739 PG/ML
WBC # FLD AUTO: 4.24 K/UL

## 2022-08-26 LAB
ALBUMIN MFR SERPL ELPH: 43.9 %
ALBUMIN SERPL-MCNC: 3.2 G/DL
ALBUMIN/GLOB SERPL: 0.8 RATIO
ALPHA1 GLOB MFR SERPL ELPH: 5.4 %
ALPHA1 GLOB SERPL ELPH-MCNC: 0.4 G/DL
ALPHA2 GLOB MFR SERPL ELPH: 11.7 %
ALPHA2 GLOB SERPL ELPH-MCNC: 0.9 G/DL
B-GLOBULIN MFR SERPL ELPH: 8.6 %
B-GLOBULIN SERPL ELPH-MCNC: 0.6 G/DL
GAMMA GLOB FLD ELPH-MCNC: 2.2 G/DL
GAMMA GLOB MFR SERPL ELPH: 30.4 %
INTERPRETATION SERPL IEP-IMP: NORMAL
M PROTEIN MFR SERPL ELPH: 24.7 %
MONOCLON BAND OBS SERPL: 1.8 G/DL
PROT SERPL-MCNC: 7.4 G/DL
PROT SERPL-MCNC: 7.4 G/DL

## 2022-08-27 ENCOUNTER — OUTPATIENT (OUTPATIENT)
Dept: OUTPATIENT SERVICES | Facility: HOSPITAL | Age: 72
LOS: 1 days | Discharge: HOME | End: 2022-08-27

## 2022-08-27 DIAGNOSIS — Z98.89 OTHER SPECIFIED POSTPROCEDURAL STATES: Chronic | ICD-10-CM

## 2022-08-27 DIAGNOSIS — Z98.890 OTHER SPECIFIED POSTPROCEDURAL STATES: Chronic | ICD-10-CM

## 2022-08-27 DIAGNOSIS — Z90.2 ACQUIRED ABSENCE OF LUNG [PART OF]: Chronic | ICD-10-CM

## 2022-08-27 DIAGNOSIS — C34.90 MALIGNANT NEOPLASM OF UNSPECIFIED PART OF UNSPECIFIED BRONCHUS OR LUNG: ICD-10-CM

## 2022-08-31 ENCOUNTER — APPOINTMENT (OUTPATIENT)
Dept: HEMATOLOGY ONCOLOGY | Facility: CLINIC | Age: 72
End: 2022-08-31

## 2022-08-31 VITALS
OXYGEN SATURATION: 99 % | RESPIRATION RATE: 16 BRPM | DIASTOLIC BLOOD PRESSURE: 80 MMHG | BODY MASS INDEX: 24.23 KG/M2 | HEART RATE: 78 BPM | SYSTOLIC BLOOD PRESSURE: 138 MMHG | TEMPERATURE: 97.2 F | WEIGHT: 173.72 LBS

## 2022-08-31 PROCEDURE — 99214 OFFICE O/P EST MOD 30 MIN: CPT

## 2022-08-31 NOTE — ASSESSMENT
[FreeTextEntry1] : Lab work  Dr. Sims's 5/9/22 thoracic surgery note reviewed.\par #1) lung cancer-clinically stable, though course complicated by abscess at pneumonectomy site. Continue expectant surveillance for his cancer. Patient will have next CAT scan of the chest per thoracic surgeon.\par \par #2) Waldenström's macroglobulinemia-1/2022 BMB findings support a differential diagnosis which includes lymphoplasmacytic lymphoma/Waldenstroms macroglobulinemia (LPL/WM) versus marginal zone lymphoma.\par Additional testing showed +MYD88 mutation and -CXCR4 mutation analysis -favoring lymphoplasmacytic lymphoma/WM. Reviewed potential complications with patient, and indications for treatment, pending course.\par Holding on lymphoma treatment at this time. Continue to follow closely clinically.\par \par #3) thrombocytopenia–platelet count decreased from 3/2022. Short interval f/u CBC to be done-if progressive consistent decrease in platelet count, need to reconsider possible WM treatment..  \par \par Patient was given the opportunity to ask questions. His questions have been answered to his apparent satisfaction at this time. He is agreeable to recommended f/u.\par

## 2022-08-31 NOTE — HISTORY OF PRESENT ILLNESS
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 He received neoadjuvant radiation and Taxol/Carboplatin chemotherapy followed by left pneumonectomy-No residual tumor at time of surgery. \par 11/2016-PET/CT scan showed minimally hypermetabolic right apical lung nodule, increasing in size on serial diagnostic CT scans. Poor visualization/nonvisualization of ground glass right upper lung opacities.  Several hypermetabolic left internal mammary nodes, increased in size and number with new FDG activity compared to prior PET/CT scan 1/2017- FNA of left internal thoracic lymph node was negative for malignant cells. Cytology suggestive, but not diagnostic of reactive process.  Patient underwent SRS of right upper lung nodule.\par 2/2017-PET/CT scan-marked increased size and FDG avidity of right apical nodule, concerning for malignancy. The nodule has increased in solidity since CT chest 7/2017, and not significantly changed since 11/2017. Decreased FDG avidity of left internal mammary lymph node. No evidence of distant FDG avid metastatic disease.\par 2/2018- S/P right VATS lung wedge resection for enlarging right apical lung nodule on imaging with pathology revealing scar with reactive changes c/w radiation atypia.\par \par 10/2016-Serum immunofixation showed 3 IgM kappa bands, with urine immunofixation showing a weak IgM kappa band identified, and Bence-Steen protein kappa type.  Bone marrow biopsy, and bone marrow aspirate showed a patchy, normocellular bone marrow with trilineage hematopoiesis and maturation, iron stores present. 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  Cytogenetics showed a normal male karyotype. \par \par 1/18/2022-Bone marrow biopsy, bone marrow clot, and bone marrow aspirate:-hypercellular bone marrow with marked B-cell lymphocytosis (50%of total cellularity), moderate plasmacytosis (5-9% of cellularity), increased mast cells and present iron stores. Flow cytometry shows a very small monotypic population of B-cells (CD5 negative, CD10 negative) and too few plasma cells for definitive evaluation of clonality.\par \par  [de-identified] : squamous cell cancer [de-identified] : Appetite  good.\par No complaints of cough. No hemoptysis. No fevers. No LN complaints. No visual complaints. No abnormal bruising. \par Maintaining usual avtivity level, ADL's.\par \par Has had COVID vaccines (Moderna).\par \par \par \par

## 2022-09-07 NOTE — PRE-OP CHECKLIST - NOTHING BY MOUTH SINCE
Last seen:8/9/22    Follow up: needs to schedule injection and two week follow up    Dr. Lawton please advise patient had covid and continued to have symptoms, cancelled appointments d/t illness. OK to schedule or do they need to come back in as next week is over 30 days?   14-Jan-2022 00:32

## 2022-09-12 ENCOUNTER — OUTPATIENT (OUTPATIENT)
Dept: OUTPATIENT SERVICES | Facility: HOSPITAL | Age: 72
LOS: 1 days | End: 2022-09-12
Payer: MEDICARE

## 2022-09-12 ENCOUNTER — APPOINTMENT (OUTPATIENT)
Dept: CT IMAGING | Facility: HOSPITAL | Age: 72
End: 2022-09-12

## 2022-09-12 DIAGNOSIS — Z98.89 OTHER SPECIFIED POSTPROCEDURAL STATES: Chronic | ICD-10-CM

## 2022-09-12 DIAGNOSIS — C34.90 MALIGNANT NEOPLASM OF UNSPECIFIED PART OF UNSPECIFIED BRONCHUS OR LUNG: ICD-10-CM

## 2022-09-12 DIAGNOSIS — Z90.2 ACQUIRED ABSENCE OF LUNG [PART OF]: Chronic | ICD-10-CM

## 2022-09-12 DIAGNOSIS — Z98.890 OTHER SPECIFIED POSTPROCEDURAL STATES: Chronic | ICD-10-CM

## 2022-09-12 PROCEDURE — 71250 CT THORAX DX C-: CPT | Mod: 26,MH

## 2022-09-12 PROCEDURE — 71250 CT THORAX DX C-: CPT

## 2022-09-16 NOTE — PHYSICAL EXAM
DEDE HERE FOR MD VISIT     LABS CDP, CMP, IRON, TIBC, FERRITIN AND RETICULOCYTES TO BE DONE PRIOR TO RV- LABS SLIPS GIVEN TO WIFE, TO BE DONE ON DAY OF SCAN     CT CHEST, ABD, PELVIS 9-23-22 4PM     RV 10-7-22 [Thin] : thin [Normal] : affect appropriate [Restricted in physically strenuous activity but ambulatory and able to carry out work of a light or sedentary nature] : Status 1- Restricted in physically strenuous activity but ambulatory and able to carry out work of a light or sedentary nature, e.g., light house work, office work [de-identified] : no left breath sounds.  CTA on right

## 2022-09-19 ENCOUNTER — APPOINTMENT (OUTPATIENT)
Dept: THORACIC SURGERY | Facility: CLINIC | Age: 72
End: 2022-09-19

## 2022-09-19 VITALS
SYSTOLIC BLOOD PRESSURE: 166 MMHG | HEART RATE: 74 BPM | BODY MASS INDEX: 24.5 KG/M2 | HEIGHT: 71 IN | OXYGEN SATURATION: 97 % | DIASTOLIC BLOOD PRESSURE: 77 MMHG | WEIGHT: 175 LBS

## 2022-09-19 PROCEDURE — 99214 OFFICE O/P EST MOD 30 MIN: CPT

## 2022-09-19 NOTE — PHYSICAL EXAM
[Normal Rate] : the respiratory rate was normal [Normal Rhythm/Effort] : normal respiratory rhythm and effort [Heart Rate And Rhythm] : heart rate was normal and rhythm regular [Heart Sounds] : normal S1 and S2 [Heart Sounds Gallop] : no gallops [Murmurs] : no murmurs [Heart Sounds Pericardial Friction Rub] : no pericardial rub [Examination Of The Chest] : the chest was normal in appearance [Chest Visual Inspection Thoracic Asymmetry] : no chest asymmetry [Diminished Respiratory Excursion] : normal chest expansion

## 2022-09-22 NOTE — ASSESSMENT
[FreeTextEntry1] : 72 year old male. He has a history of squamous cell lung Ca stage III (T3 N2) of LETY with neoadjuvant chemo/RT, followed by Left VATS, left thoracotomy, pneumonectomy, resection of first, second, third ribs on 06/23/2015, pathology revealed [id number]. In January of 2017 he completed SBRT to a RUL nodule. He is s/p right VATS, RUL lung wedge resection on 2/23/18, pathology revealed scar with reactive changes consistent with radiation atypia.\par \par I have reviewed the patient's medical records and diagnostic images at time of this office consultation and have made the following recommendation:\par 1. CT chest reviewed and explained to patient, small amount of air within the left pneumonectomy space is resolved, I recommended patient to return to office in 12 months with CT Chest without contrast. \par \par I personally performed the services described in the documentation, reviewed the documentation recorded by the scribe in my presence and it accurately and completely records my words and actions.\par \par SARABJIT, Mikla Su NP, am scribing for and the presence of LAUREN Leach, the following sections HISTORY OF PRESENT ILLNESS, PAST MEDICAL/FAMILY/SOCIAL HISTORY; REVIEW OF SYSTEMS; VITAL SIGNS; PHYSICAL EXAM; DISPOSITION.

## 2022-09-22 NOTE — CONSULT LETTER
[Dear  ___] : Dear  [unfilled], [Courtesy Letter:] : I had the pleasure of seeing your patient, [unfilled], in my office today. [Please see my note below.] : Please see my note below. [Sincerely,] : Sincerely, [FreeTextEntry2] : Dr. Clara Estrella (Onc)\par Dr. Mauro Mckeon (RadOnc)\par Dr. Kendrick Deleon (PCP)  [FreeTextEntry3] : Jan Sims MD \par Attending Surgeon \par Division of Thoracic Surgery \par , Cardiovascular and Thoracic Surgery \par [facility] [employer] [facility]\par \par

## 2022-09-22 NOTE — HISTORY OF PRESENT ILLNESS
[FreeTextEntry1] : 72 year old male. He has a history of squamous cell lung Ca stage III (T3 N2) of LETY with neoadjuvant chemo/RT, followed by Left VATS, left thoracotomy, pneumonectomy, resection of first, second, third ribs on 06/23/2015, pathology revealed [id number]. In January of 2017 he completed SBRT to a RUL nodule. He is s/p right VATS, RUL lung wedge resection on 2/23/18, pathology revealed scar with reactive changes consistent with radiation atypia.\par \par At outpatient follow up visit on 1/10/2022, he c/o lethargy, weight loss and pain at L thoracotomy site, and was found to have fluctuant collection. CT chest showing new 3.0 x 12.4 x 10.1 cm lesion centered along the left subscapularis muscle. The lesion extends along the chest wall posterior to and communicates with the pneumonectomy bed, just inferior to the second rib resection and along the posterior left third, fourth, and fifth rib interspaces. s/p placement of drain by IR w/ 500 ml purulent drainage.  Fluid positive for strep mitis, ID consulted; PICC placed and plan for 4 week course of Rocephin.  s/p bronchoscopy on 01/14/2022; The left pneumonectomy stump was noted to be intact. The stump was flush with the va. It was irrigated. No bubbles were noted. \par \par F/u with Hem/Onc for Waldenstrom macroglobulinemia, pancytopenia, s/p bone marrow bx on 01/18/2022. Path revealed hypercellular bone marrow with marked B cell lymphocytosis, moderated plasmacytosis, increased mast cells and present iron stores. These findings along with the most current lab findings, support a differential diagnosis which includes lymphoplasmacytic lymphoma/Waldenstrom macroglobulinemia vs marginal zone lymphoma. \par \par Patient is s/p US guided left thoracentesis on 01/27/2022, 60cc of purulent fluid was then aspirated, and a sample of fluid was sent for microbiological and chemical analysis. Culture and AFB no growth at 1 week. \par \par F/u with Dr. Espinoza, started on PO abx on 02/24/2022 and completed on 3/10/22. Patient has a drain to his Lt upper back, it is currently draining ~50ml output daily. \par \par Patient was directly admitted on 03/28/2022 to 8T for chest tube placement by IR for drainage and irrigation and plan for thoracotomy with muscle flap. IR placed 2 pigtail cath through which he was receiving Vancomycin intrapleurally and 10 day course of Zosyn and discharged home on 04/07/2022 with Augmentin for 1 month. \par \par CT chest on 09/12/2022: \par In comparison with 3/17/2022, small amount of air within the left pneumonectomy space is resolved. The left upper posterior chest wall drainage catheter is removed. Otherwise, stable exam.\par \par Patient presents today for follow up. Patient c/o SOB on exertion, denies cough, chest pain, fever, chills. \par

## 2022-09-28 ENCOUNTER — NON-APPOINTMENT (OUTPATIENT)
Age: 72
End: 2022-09-28

## 2022-09-28 ENCOUNTER — LABORATORY RESULT (OUTPATIENT)
Age: 72
End: 2022-09-28

## 2022-09-28 LAB
BASOPHILS # BLD AUTO: 0.03 K/UL
BASOPHILS NFR BLD AUTO: 0.7 %
EOSINOPHIL # BLD AUTO: 0.05 K/UL
EOSINOPHIL NFR BLD AUTO: 1.2 %
HCT VFR BLD CALC: 35.5 %
HGB BLD-MCNC: 10.9 G/DL
IMM GRANULOCYTES NFR BLD AUTO: 0.2 %
LYMPHOCYTES # BLD AUTO: 1.32 K/UL
LYMPHOCYTES NFR BLD AUTO: 31.3 %
MAN DIFF?: NORMAL
MCHC RBC-ENTMCNC: 30.4 PG
MCHC RBC-ENTMCNC: 30.7 GM/DL
MCV RBC AUTO: 98.9 FL
MONOCYTES # BLD AUTO: 0.99 K/UL
MONOCYTES NFR BLD AUTO: 23.5 %
NEUTROPHILS # BLD AUTO: 1.82 K/UL
NEUTROPHILS NFR BLD AUTO: 43.1 %
PLATELET # BLD AUTO: 88 K/UL
RBC # BLD: 3.59 M/UL
RBC # FLD: 17 %
WBC # FLD AUTO: 4.22 K/UL

## 2022-09-30 ENCOUNTER — LABORATORY RESULT (OUTPATIENT)
Age: 72
End: 2022-09-30

## 2022-09-30 ENCOUNTER — APPOINTMENT (OUTPATIENT)
Dept: FAMILY MEDICINE | Facility: CLINIC | Age: 72
End: 2022-09-30

## 2022-09-30 VITALS
HEIGHT: 71 IN | DIASTOLIC BLOOD PRESSURE: 70 MMHG | RESPIRATION RATE: 20 BRPM | BODY MASS INDEX: 24.36 KG/M2 | HEART RATE: 76 BPM | WEIGHT: 174 LBS | SYSTOLIC BLOOD PRESSURE: 126 MMHG

## 2022-09-30 PROCEDURE — 36415 COLL VENOUS BLD VENIPUNCTURE: CPT

## 2022-09-30 PROCEDURE — G0008: CPT

## 2022-09-30 PROCEDURE — 90662 IIV NO PRSV INCREASED AG IM: CPT

## 2022-09-30 PROCEDURE — 99214 OFFICE O/P EST MOD 30 MIN: CPT | Mod: 25

## 2022-09-30 NOTE — ASSESSMENT
[FreeTextEntry1] : Hemodynamically stable with acceptable BP\par Lab profiles drawn in office and sent\par High dose flu vaccine given L deltoid

## 2022-09-30 NOTE — HISTORY OF PRESENT ILLNESS
[de-identified] : Presents for BP check, labs, and general follow-up; also requests the current flu vaccine.  States feeling generally well; following with Heme-Onc.

## 2022-10-01 LAB
ALBUMIN SERPL ELPH-MCNC: 4.5 G/DL
ALP BLD-CCNC: 82 U/L
ALT SERPL-CCNC: 5 U/L
ANION GAP SERPL CALC-SCNC: 16 MMOL/L
AST SERPL-CCNC: 11 U/L
BASOPHILS # BLD AUTO: 0.02 K/UL
BASOPHILS NFR BLD AUTO: 0.6 %
BILIRUB SERPL-MCNC: 0.5 MG/DL
BUN SERPL-MCNC: 25 MG/DL
CALCIUM SERPL-MCNC: 9.4 MG/DL
CHLORIDE SERPL-SCNC: 104 MMOL/L
CHOLEST SERPL-MCNC: 116 MG/DL
CO2 SERPL-SCNC: 23 MMOL/L
CREAT SERPL-MCNC: 1.02 MG/DL
EGFR: 79 ML/MIN/1.73M2
EOSINOPHIL # BLD AUTO: 0.02 K/UL
EOSINOPHIL NFR BLD AUTO: 0.6 %
ESTIMATED AVERAGE GLUCOSE: 114 MG/DL
GLUCOSE SERPL-MCNC: 95 MG/DL
HBA1C MFR BLD HPLC: 5.6 %
HCT VFR BLD CALC: 39.1 %
HDLC SERPL-MCNC: 38 MG/DL
HGB BLD-MCNC: 11.7 G/DL
IMM GRANULOCYTES NFR BLD AUTO: 0.3 %
LDLC SERPL CALC-MCNC: 65 MG/DL
LYMPHOCYTES # BLD AUTO: 1.08 K/UL
LYMPHOCYTES NFR BLD AUTO: 31 %
MAN DIFF?: NORMAL
MCHC RBC-ENTMCNC: 29.9 GM/DL
MCHC RBC-ENTMCNC: 30 PG
MCV RBC AUTO: 100.3 FL
MONOCYTES # BLD AUTO: 0.76 K/UL
MONOCYTES NFR BLD AUTO: 21.8 %
NEUTROPHILS # BLD AUTO: 1.59 K/UL
NEUTROPHILS NFR BLD AUTO: 45.7 %
NONHDLC SERPL-MCNC: 78 MG/DL
PLATELET # BLD AUTO: 87 K/UL
POTASSIUM SERPL-SCNC: 4.5 MMOL/L
PROT SERPL-MCNC: 8.6 G/DL
PSA SERPL-MCNC: 0.62 NG/ML
RBC # BLD: 3.9 M/UL
RBC # FLD: 16.8 %
SODIUM SERPL-SCNC: 143 MMOL/L
TRIGL SERPL-MCNC: 68 MG/DL
WBC # FLD AUTO: 3.48 K/UL

## 2022-10-10 NOTE — DISCHARGE NOTE ADULT - NS AS ACTIVITY OBS
Stairs allowed/Sex allowed/No Heavy lifting/straining/Walking-Indoors allowed/Showering allowed/Do not drive or operate machinery/Walking-Outdoors allowed sudden onset Sex allowed/Walking-Outdoors allowed/Do not make important decisions/Do not drive or operate machinery/Walking-Indoors allowed/Showering allowed/Stairs allowed/No Heavy lifting/straining

## 2022-10-20 ENCOUNTER — LABORATORY RESULT (OUTPATIENT)
Age: 72
End: 2022-10-20

## 2022-10-20 LAB
ALBUMIN SERPL ELPH-MCNC: 4 G/DL
ALP BLD-CCNC: 74 U/L
ALT SERPL-CCNC: <5 U/L
ANION GAP SERPL CALC-SCNC: 12 MMOL/L
AST SERPL-CCNC: 10 U/L
B2 MICROGLOB SERPL-MCNC: 4.3 MG/L
BILIRUB SERPL-MCNC: 0.4 MG/DL
BUN SERPL-MCNC: 22 MG/DL
CALCIUM SERPL-MCNC: 8.6 MG/DL
CHLORIDE SERPL-SCNC: 104 MMOL/L
CO2 SERPL-SCNC: 23 MMOL/L
CREAT SERPL-MCNC: 0.99 MG/DL
EGFR: 81 ML/MIN/1.73M2
GLUCOSE SERPL-MCNC: 101 MG/DL
LDH SERPL-CCNC: 167 U/L
POTASSIUM SERPL-SCNC: 4.7 MMOL/L
PROT SERPL-MCNC: 7.4 G/DL
SODIUM SERPL-SCNC: 139 MMOL/L

## 2022-10-21 LAB
BASOPHILS # BLD AUTO: 0.02 K/UL
BASOPHILS NFR BLD AUTO: 0.5 %
DEPRECATED KAPPA LC FREE/LAMBDA SER: 7.31 RATIO
DEPRECATED KAPPA LC FREE/LAMBDA SER: 7.31 RATIO
EOSINOPHIL # BLD AUTO: 0.03 K/UL
EOSINOPHIL NFR BLD AUTO: 0.8 %
HCT VFR BLD CALC: 35.3 %
HGB BLD-MCNC: 10.6 G/DL
IGA SER QL IEP: 87 MG/DL
IGG SER QL IEP: 854 MG/DL
IGM SER QL IEP: 3067 MG/DL
IMM GRANULOCYTES NFR BLD AUTO: 0.3 %
KAPPA LC CSF-MCNC: 1.83 MG/DL
KAPPA LC CSF-MCNC: 1.83 MG/DL
KAPPA LC SERPL-MCNC: 13.38 MG/DL
KAPPA LC SERPL-MCNC: 13.38 MG/DL
LYMPHOCYTES # BLD AUTO: 1.37 K/UL
LYMPHOCYTES NFR BLD AUTO: 35.7 %
MAN DIFF?: NORMAL
MCHC RBC-ENTMCNC: 29.5 PG
MCHC RBC-ENTMCNC: 30 GM/DL
MCV RBC AUTO: 98.3 FL
MONOCYTES # BLD AUTO: 0.96 K/UL
MONOCYTES NFR BLD AUTO: 25 %
NEUTROPHILS # BLD AUTO: 1.45 K/UL
NEUTROPHILS NFR BLD AUTO: 37.7 %
PLATELET # BLD AUTO: 68 K/UL
RBC # BLD: 3.59 M/UL
RBC # FLD: 16.9 %
WBC # FLD AUTO: 3.84 K/UL

## 2022-10-26 ENCOUNTER — APPOINTMENT (OUTPATIENT)
Dept: HEMATOLOGY ONCOLOGY | Facility: CLINIC | Age: 72
End: 2022-10-26

## 2022-10-26 VITALS
WEIGHT: 178.55 LBS | HEART RATE: 78 BPM | TEMPERATURE: 97.4 F | RESPIRATION RATE: 18 BRPM | DIASTOLIC BLOOD PRESSURE: 68 MMHG | BODY MASS INDEX: 24.91 KG/M2 | SYSTOLIC BLOOD PRESSURE: 170 MMHG | OXYGEN SATURATION: 100 %

## 2022-10-26 PROCEDURE — 99214 OFFICE O/P EST MOD 30 MIN: CPT

## 2022-10-26 NOTE — PHYSICAL EXAM
[Restricted in physically strenuous activity but ambulatory and able to carry out work of a light or sedentary nature] : Status 1- Restricted in physically strenuous activity but ambulatory and able to carry out work of a light or sedentary nature, e.g., light house work, office work [Thin] : thin [Normal] : affect appropriate [de-identified] : no left breath sounds.  CTA on right

## 2022-10-26 NOTE — ASSESSMENT
[FreeTextEntry1] : Lab work  Dr. Sims's 9/2022 thoracic surgery note, CT chest report reviewed.\par #1) lung cancer-clinically stable, though course was complicated by abscess at pneumonectomy site. Continue expectant surveillance for his cancer. 9/14/2022 CT chest-in comparison with 3/17/2022, small amount of air within the left pneumonectomy space is resolved. The left upper posterior chest wall drainage catheter is removed. Otherwise, stable exam.\par Patient will have next CAT scan of the chest per thoracic surgeon.\par \par #2) Waldenström's macroglobulinemia-1/2022 BMB findings support a differential diagnosis which includes lymphoplasmacytic lymphoma/Waldenstroms macroglobulinemia (LPL/WM) versus marginal zone lymphoma.\par Additional testing showed +MYD88 mutation and -CXCR4 mutation analysis -favoring lymphoplasmacytic lymphoma/WM. Reviewed potential complications with patient, and indications for treatment.  I am concerned regarding his progressive thrombocytopenia (and increase in IgM level noted).  Recommend treatment at this point.  Patient will have a follow-up BMB with tentative plans to consider zanubrutinib pending this.  Treatment alternatives reviewed with respective pros and cons.  Patient stated he doesn't want to take medicine as he feels well. Explained may be harder to treat once complication(s) develop/worsen. He consents to f/u BMB. He may then reconsider "pill" therapy.\par Check viscosity level as well, with next lab work\par \par Patient was given the opportunity to ask questions. His questions have been answered to his apparent satisfaction at this time. \par

## 2022-10-26 NOTE — HISTORY OF PRESENT ILLNESS
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 He received neoadjuvant radiation and Taxol/Carboplatin chemotherapy followed by left pneumonectomy-No residual tumor at time of surgery. \par 11/2016-PET/CT scan showed minimally hypermetabolic right apical lung nodule, increasing in size on serial diagnostic CT scans. Poor visualization/nonvisualization of ground glass right upper lung opacities.  Several hypermetabolic left internal mammary nodes, increased in size and number with new FDG activity compared to prior PET/CT scan 1/2017- FNA of left internal thoracic lymph node was negative for malignant cells. Cytology suggestive, but not diagnostic of reactive process.  Patient underwent SRS of right upper lung nodule.\par 2/2017-PET/CT scan-marked increased size and FDG avidity of right apical nodule, concerning for malignancy. The nodule has increased in solidity since CT chest 7/2017, and not significantly changed since 11/2017. Decreased FDG avidity of left internal mammary lymph node. No evidence of distant FDG avid metastatic disease.\par 2/2018- S/P right VATS lung wedge resection for enlarging right apical lung nodule on imaging with pathology revealing scar with reactive changes c/w radiation atypia.\par \par 10/2016-Serum immunofixation showed 3 IgM kappa bands, with urine immunofixation showing a weak IgM kappa band identified, and Bence-Steen protein kappa type.  Bone marrow biopsy, and bone marrow aspirate showed a patchy, normocellular bone marrow with trilineage hematopoiesis and maturation, iron stores present. 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  Cytogenetics showed a normal male karyotype. \par \par 1/18/2022-Bone marrow biopsy, bone marrow clot, and bone marrow aspirate:-hypercellular bone marrow with marked B-cell lymphocytosis (50%of total cellularity), moderate plasmacytosis (5-9% of cellularity), increased mast cells and present iron stores. Flow cytometry shows a very small monotypic population of B-cells (CD5 negative, CD10 negative) and too few plasma cells for definitive evaluation of clonality.\par \par  [de-identified] : squamous cell cancer [de-identified] : States is feeling very well.\par No complaints of cough. No fevers. No LN complaints. No visual complaints. No abnormal bruising or bleeding reported.\par Maintaining usual activity level, ADL's.\par \par Has had COVID vaccines (Moderna).\par \par \par \par

## 2022-10-27 LAB — VISCOSITY, SERUM: 1.9

## 2022-11-08 ENCOUNTER — OUTPATIENT (OUTPATIENT)
Dept: OUTPATIENT SERVICES | Facility: HOSPITAL | Age: 72
LOS: 1 days | Discharge: HOME | End: 2022-11-08

## 2022-11-08 DIAGNOSIS — Z90.2 ACQUIRED ABSENCE OF LUNG [PART OF]: Chronic | ICD-10-CM

## 2022-11-08 DIAGNOSIS — Z98.89 OTHER SPECIFIED POSTPROCEDURAL STATES: Chronic | ICD-10-CM

## 2022-11-08 DIAGNOSIS — Z98.890 OTHER SPECIFIED POSTPROCEDURAL STATES: Chronic | ICD-10-CM

## 2022-11-08 DIAGNOSIS — C34.90 MALIGNANT NEOPLASM OF UNSPECIFIED PART OF UNSPECIFIED BRONCHUS OR LUNG: ICD-10-CM

## 2022-11-14 ENCOUNTER — LABORATORY RESULT (OUTPATIENT)
Age: 72
End: 2022-11-14

## 2022-11-14 LAB
ALBUMIN SERPL ELPH-MCNC: 4.2 G/DL
ALP BLD-CCNC: 82 U/L
ALT SERPL-CCNC: 5 U/L
ANION GAP SERPL CALC-SCNC: 10 MMOL/L
AST SERPL-CCNC: 10 U/L
BILIRUB SERPL-MCNC: 0.5 MG/DL
BUN SERPL-MCNC: 20 MG/DL
CALCIUM SERPL-MCNC: 9.4 MG/DL
CHLORIDE SERPL-SCNC: 104 MMOL/L
CO2 SERPL-SCNC: 28 MMOL/L
CREAT SERPL-MCNC: 0.98 MG/DL
DEPRECATED KAPPA LC FREE/LAMBDA SER: 6.45 RATIO
EGFR: 82 ML/MIN/1.73M2
GLUCOSE SERPL-MCNC: 107 MG/DL
IGA SER QL IEP: 95 MG/DL
IGG SER QL IEP: 939 MG/DL
IGM SER QL IEP: 2606 MG/DL
KAPPA LC CSF-MCNC: 1.93 MG/DL
KAPPA LC SERPL-MCNC: 12.44 MG/DL
POTASSIUM SERPL-SCNC: 5 MMOL/L
PROT SERPL-MCNC: 8.2 G/DL
SODIUM SERPL-SCNC: 142 MMOL/L

## 2022-11-15 LAB
BASOPHILS # BLD AUTO: 0.02 K/UL
BASOPHILS NFR BLD AUTO: 0.6 %
EOSINOPHIL # BLD AUTO: 0.03 K/UL
EOSINOPHIL NFR BLD AUTO: 0.8 %
HCT VFR BLD CALC: 35.9 %
HGB BLD-MCNC: 10.7 G/DL
IMM GRANULOCYTES NFR BLD AUTO: 0.8 %
LYMPHOCYTES # BLD AUTO: 1.32 K/UL
LYMPHOCYTES NFR BLD AUTO: 36.4 %
MAN DIFF?: NORMAL
MCHC RBC-ENTMCNC: 29.8 GM/DL
MCHC RBC-ENTMCNC: 29.9 PG
MCV RBC AUTO: 100.3 FL
MONOCYTES # BLD AUTO: 0.66 K/UL
MONOCYTES NFR BLD AUTO: 18.2 %
NEUTROPHILS # BLD AUTO: 1.57 K/UL
NEUTROPHILS NFR BLD AUTO: 43.2 %
PLATELET # BLD AUTO: 84 K/UL
RBC # BLD: 3.58 M/UL
RBC # FLD: 16.7 %
WBC # FLD AUTO: 3.63 K/UL

## 2022-11-16 ENCOUNTER — APPOINTMENT (OUTPATIENT)
Dept: HEMATOLOGY ONCOLOGY | Facility: CLINIC | Age: 72
End: 2022-11-16

## 2022-11-16 ENCOUNTER — RESULT REVIEW (OUTPATIENT)
Age: 72
End: 2022-11-16

## 2022-11-16 ENCOUNTER — LABORATORY RESULT (OUTPATIENT)
Age: 72
End: 2022-11-16

## 2022-11-16 VITALS
HEIGHT: 70.98 IN | SYSTOLIC BLOOD PRESSURE: 177 MMHG | TEMPERATURE: 97.3 F | HEART RATE: 69 BPM | OXYGEN SATURATION: 99 % | DIASTOLIC BLOOD PRESSURE: 62 MMHG | BODY MASS INDEX: 24.91 KG/M2 | RESPIRATION RATE: 16 BRPM | WEIGHT: 177.91 LBS

## 2022-11-16 LAB
BASOPHILS # BLD AUTO: 0 K/UL — SIGNIFICANT CHANGE UP (ref 0–0.2)
BASOPHILS NFR BLD AUTO: 0 % — SIGNIFICANT CHANGE UP (ref 0–2)
EOSINOPHIL # BLD AUTO: 0.04 K/UL — SIGNIFICANT CHANGE UP (ref 0–0.5)
EOSINOPHIL NFR BLD AUTO: 1 % — SIGNIFICANT CHANGE UP (ref 0–6)
HCT VFR BLD CALC: 31.9 % — LOW (ref 39–50)
HGB BLD-MCNC: 10 G/DL — LOW (ref 13–17)
LYMPHOCYTES # BLD AUTO: 1.42 K/UL — SIGNIFICANT CHANGE UP (ref 1–3.3)
LYMPHOCYTES # BLD AUTO: 35 % — SIGNIFICANT CHANGE UP (ref 13–44)
MCHC RBC-ENTMCNC: 30.1 PG — SIGNIFICANT CHANGE UP (ref 27–34)
MCHC RBC-ENTMCNC: 31.3 G/DL — LOW (ref 32–36)
MCV RBC AUTO: 96.1 FL — SIGNIFICANT CHANGE UP (ref 80–100)
MONOCYTES # BLD AUTO: 0.81 K/UL — SIGNIFICANT CHANGE UP (ref 0–0.9)
MONOCYTES NFR BLD AUTO: 20 % — HIGH (ref 2–14)
NEUTROPHILS # BLD AUTO: 1.79 K/UL — LOW (ref 1.8–7.4)
NEUTROPHILS NFR BLD AUTO: 44 % — SIGNIFICANT CHANGE UP (ref 43–77)
NRBC # BLD: 0 /100 — SIGNIFICANT CHANGE UP (ref 0–0)
NRBC # BLD: SIGNIFICANT CHANGE UP /100 WBCS (ref 0–0)
PLAT MORPH BLD: NORMAL — SIGNIFICANT CHANGE UP
PLATELET # BLD AUTO: 66 K/UL — LOW (ref 150–400)
RBC # BLD: 3.32 M/UL — LOW (ref 4.2–5.8)
RBC # FLD: 16.9 % — HIGH (ref 10.3–14.5)
RBC BLD AUTO: SIGNIFICANT CHANGE UP
WBC # BLD: 4.07 K/UL — SIGNIFICANT CHANGE UP (ref 3.8–10.5)
WBC # FLD AUTO: 4.07 K/UL — SIGNIFICANT CHANGE UP (ref 3.8–10.5)

## 2022-11-16 PROCEDURE — 38222 DX BONE MARROW BX & ASPIR: CPT | Mod: LT

## 2022-11-16 NOTE — REASON FOR VISIT
[Bone Marrow Biopsy] : bone marrow biopsy [Bone Marrow Aspiration] : bone marrow aspiration [FreeTextEntry2] : Waldenström's macroglobulinemia-1/2022 BMB findings support a differential diagnosis which includes lymphoplasmacytic lymphoma/Waldenstroms macroglobulinemia (LPL/WM) versus marginal zone lymphoma.\par Waldenström's macroglobulinemia-1/2022 BMB findings support a differential diagnosis which includes lymphoplasmacytic lymphoma/Waldenstroms macroglobulinemia (LPL/WM) versus marginal zone lymphoma.

## 2022-11-16 NOTE — PROCEDURE
[Bone Marrow Biopsy] : bone marrow biopsy [Bone Marrow Aspiration] : bone marrow aspiration  [Patient] : the patient [Verbal Consent Obtained] : verbal consent was obtained prior to the procedure [Patient identification verified] : patient identification verified [Procedure verified and consent obtained] : procedure verified and consent obtained [Laterality verified and correct site marked] : laterality verified and correct site marked [Left] : site: left [Correct positioning] : correct positioning [Correct implant and/ or special equipment obtained] : correct impact and/ or special equipment obtained [Prone] : prone [Superior iliac spine was identified] : the superior iliac spine was identified. [The left posterior iliac crest was prepped with betadine and draped, using sterile technique.] : The left posterior iliac crest was prepped with betadine and draped, using sterile technique. [Lidocaine was injected and into the periosteum overlying the site.] : Lidocaine was injected and into the periosteum overlying the site. [Aspirate] : aspirate [Cytogenetics] : cytogenetics [FISH] : FISH [Biopsy] : biopsy [Flow Cytometry] : flow cytometry [] : The patient was instructed to remove the bandage the following AM. The patient may bathe. Acetaminophen may be taken for discomfort, as per package directions.If there are any other problems, the patient was instructed to call the office. The patient verbalized understanding, and is aware of the office contact numbers. [FreeTextEntry1] : Waldenström's macroglobulinemia-1/2022 BMB findings support a differential diagnosis which includes lymphoplasmacytic lymphoma/Waldenstroms macroglobulinemia (LPL/WM) versus marginal zone lymphoma. [FreeTextEntry2] : 8 cc of lidocaine was used for the procedure. \par \par WBC:  4.07 K/uL\par Hgb:   10.0 g/dL\par Hct:    31.9 %\par Plts:    66 K/uL\par \par Bone marrow aspiration and biopsy were done. Lymphoma panel and NGS [facility] requested.\par

## 2022-11-17 LAB — VISCOSITY, SERUM: 2.1

## 2022-11-18 LAB — M PROTEIN SPEC IFE-MCNC: NORMAL

## 2022-11-29 NOTE — DISCHARGE NOTE ADULT - FUNCTIONAL SCREEN CURRENT LEVEL: BATHING, MLM
[address] [place]. [place], [address]  Tel.  [phone]  Fax. [address]  [place], [address]  Tel.  [phone]  Fax. [phone] [address]  Tel.  [phone]  Fax. [address] is a 48y.o. year old female seen for evaluation of a sleep disorder. ASSESSMENT/PLAN:      ICD-10-CM ICD-9-CM    1. GIOVANY (obstructive sleep apnea)  G47.33 327.23 POLYSOMNOGRAPHY 1 NIGHT      2. Hypoventilation associated with obesity syndrome (HCC)  E66.2 278.03 POLYSOMNOGRAPHY 1 NIGHT      3. Paroxysmal atrial fibrillation (HCC)  I48.0 427.31       4. Anxiety and depression  F41.9 300.00     F32. A 311       5. Primary hypertension  I10 401.9       6. BMI 50.0-59.9, adult [facility])  [id number] V85.43           Patient has a history and examination consistent with the diagnosis of sleep apnea. Follow-up and Dispositions    Return for telephone follow-up after testing is completed. * The patient currently has a High Risk for having sleep apnea. STOP-BANG score 6.    * Sleep testing was ordered for initial evaluation. Orders Placed This Encounter    POLYSOMNOGRAPHY 1 NIGHT     Standing Status:   Future     Standing Expiration Date:   2/28/2023     Scheduling Instructions:      Perform ETCO2 monitoring during Polysomnography             Assess REM supine sleep     Order Specific Question:   Reason for Exam     Answer:   GIOVANY / OHS       * She was provided information on sleep apnea including corresponding risk factors and the importance of proper treatment. * Treatment options were reviewed in detail. she would like to proceed with PAP therapy. Patient will be seen in follow-up in 6-8 weeks after PAP setup to gauge treatment response and adherence to therapy.      * The patient was counseled regarding proper sleep hygiene, with emphasis on ensuring sufficient total sleep time; safe driving and the benefits of exercise and weight loss. * All of her questions were addressed. 2.  Hypoventilation associated with obesity syndrome (HCC) - Elevated bicarbonate levels on metabolic screen in the absence of any significant cardio-pulmonary problem is suggestive of hypoventilation associated with obesity. EtCO2 levels will be monitored during attended polysomnography, this cannot be done on home sleep apnea testing. 3. Paroxysmal atrial fibrillation (HCC) - She is currently taking apixaban (Eliquis) 5 mg tablett. She will continue on current regimen, monitor her pulse and follow up with her care provider for reevaluation/adjustment of medications if warranted. I have reviewed the relationship between atrial fibrillation as it relates to sleep-disordered breathing. 4. Anxiety and Depression -  continue on current regimen - ARIPiprazole (ABILIFY) 2 mg tablet, she will continue to monitor her mood and follow up with her care provider for reevaluation/adjustment of medications if warranted. I have reviewed the relationship between mood as it relates to sleep-disordered breathing. 5. Hypertension -  continue on current regimen - amLODIPine (NORVASC) 10 mg tablet, she will continue to monitor her BP and follow up with her primary care provider for reevaluation/adjustment of medications if warranted. I have reviewed the relationship between hypertension as it relates to sleep-disordered breathing. 6. Recommended a dedicated weight loss program through appropriate diet and exercise regimen as significant weight reduction has been shown to reduce severity of obstructive sleep apnea. SUBJECTIVE/OBJECTIVE:    Maame Burks is an 48 y.o. female referred for evaluation for a sleep disorder. She complains of snoring associated with awakening in the middle of the night because of no specific reason. Patient reports of poor sleep quality which is not restorative, she feels tired and fatigued during the day. Symptoms began several years ago, unchanged since that time. She usually can fall asleep in 20 -30 minutes with benadryl and prescribed medication. Family or house members note snoring. She denies of symptoms indicative of cataplexy, sleep paralysis or sleep related hallucinations. She denies of a history of unusual movements occurring during sleep. Mary Chanel (P) does wake up frequently at night. She (P) is bothered by waking up too early and left unable to get back to sleep. She actually sleeps about (P) 5 hours at night and wakes up about (P) 4 times during the night. She (P) does work shifts: Pinky Andreea Mary indicates she (P) does get too little sleep at night. Her bedtime is (P) 0028. She awakens at (P) 3824. She (P) does not take naps. She has the following observed behaviors: (P) Light snoring, Not applicable;  . Other remarks: The patient has not undergone diagnostic testing for the current problems. Review of Systems:  Constitutional:  No significant weight loss or weight gain  Eyes:  No blurred vision  CVS:  No significant chest pain  Pulm:  No significant shortness of breath  GI:  No significant nausea or vomiting  :  No significant nocturia  Musculoskeletal:  No significant joint pain at night  Skin:  No significant rashes  Neuro:  No significant dizziness   Psych:  No active mood issues    Sleep Review of Systems: notable for Positive difficulty falling asleep; Positive awakenings at night; Positive perceived regular dreaming; Negative nightmares; Negative  early morning headaches; Positive  memory problems; Positive  concentration issues; Negative caffeine;  Negative alcohol;   Negative history of any automobile or occupational accidents due to daytime drowsiness.       [place] Sleepiness Score: (P) 7   and Modified F.O.S.Q. Score Total / 2: (P) 16.5    Visit Vitals  /86   Pulse 78   Temp 97.3 °F (36.3 °C)   Ht (P) 5' 3\" (1.6 m)   Wt 326 lb 1.6 oz (147.9 kg)   SpO2 96% BMI (P) 57.77 kg/m²    Neck circ. in \"inches\": 14      General:   Alert, oriented, not in acute distress   Eyes:  Anicteric Sclerae; intact EOM's   Nose:  No obvious nasal septum deviation    Oropharynx:   Mallampati score 4, thick tongue base, uvula not seen due to low-lying soft palate, narrow tonsilo-pharyngeal pilars, tongue scalloped   Neck:   midline trachea,  no JVD   Chest/Lungs:  symmetrical lung expansion ,clear lung fields on auscultation    CVS:  Normal rate, regular rhythm    Extremities:  No obvious rashes, absent edema    Neuro:  No focal deficits; No obvious tremor    Psych:  Normal eye contact; normal  affect, normal countenance          Fransisca Williamson MD, FAASM  Diplomate American Board of Sleep Medicine  Diplomate in Sleep Medicine - ABP    Electronically signed.  11/29/22 (0) independent

## 2022-12-05 ENCOUNTER — APPOINTMENT (OUTPATIENT)
Dept: HEMATOLOGY ONCOLOGY | Facility: CLINIC | Age: 72
End: 2022-12-05

## 2022-12-05 VITALS
SYSTOLIC BLOOD PRESSURE: 159 MMHG | DIASTOLIC BLOOD PRESSURE: 78 MMHG | BODY MASS INDEX: 25.06 KG/M2 | HEART RATE: 70 BPM | TEMPERATURE: 96.8 F | OXYGEN SATURATION: 99 % | RESPIRATION RATE: 16 BRPM | WEIGHT: 179.02 LBS | HEIGHT: 70.98 IN

## 2022-12-05 PROCEDURE — 99214 OFFICE O/P EST MOD 30 MIN: CPT

## 2022-12-05 NOTE — ASSESSMENT
[FreeTextEntry1] : Lab work  BM pathology/cytogenetics/NGS reports reviewed.\par #1) lung cancer-clinically stable, though course was complicated by abscess at pneumonectomy site. Continue expectant surveillance for his cancer. 9/14/2022 CT chest-in comparison with 3/17/2022, small amount of air within the left pneumonectomy space is resolved. The left upper posterior chest wall drainage catheter is removed. Otherwise, stable exam.\par Patient will have next CAT scan of the chest per thoracic surgeon.\par \par #2) Waldenström's macroglobulinemia-1/2022 BMB findings support a differential diagnosis which includes lymphoplasmacytic lymphoma/Waldenstroms macroglobulinemia (LPL/WM) versus marginal zone lymphoma.\par Additional testing showed +MYD88 mutation and -CXCR4 mutation analysis -favoring lymphoplasmacytic lymphoma/WM. 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  I am concerned regarding his progressive thrombocytopenia (and increase in IgM level noted).  Recommended consideration of treatment at this point, such as zanubrutinib.  Treatment alternatives reviewed with respective pros and cons.  \par Patient is currently [id number]-Leoa-Kcrswf Risk calculator used revealing a score of 4.0815, placing patient in the high risk group with median TTP of 1.8 years. \par While he is at high risk for POD over the next 1.8 years, as he is currently without symptoms, per NCCN guidelines, can consider continued close surveillance for now-patient wishes for this approach, expressing his understanding of risks.\par Advised ophthalmologic exam now and at least yearly (and as clinically indicated).\par \par Patient was given the opportunity to ask questions. His questions have been answered to his apparent satisfaction at this time. \par

## 2022-12-05 NOTE — PHYSICAL EXAM
[Restricted in physically strenuous activity but ambulatory and able to carry out work of a light or sedentary nature] : Status 1- Restricted in physically strenuous activity but ambulatory and able to carry out work of a light or sedentary nature, e.g., light house work, office work [Thin] : thin [Normal] : affect appropriate [de-identified] : breathing appeared unlabored [de-identified] : BMB site healed

## 2022-12-05 NOTE — HISTORY OF PRESENT ILLNESS
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 He received neoadjuvant radiation and Taxol/Carboplatin chemotherapy followed by left pneumonectomy-No residual tumor at time of surgery. \par 11/2016-PET/CT scan showed minimally hypermetabolic right apical lung nodule, increasing in size on serial diagnostic CT scans. Poor visualization/nonvisualization of ground glass right upper lung opacities.  Several hypermetabolic left internal mammary nodes, increased in size and number with new FDG activity compared to prior PET/CT scan 1/2017- FNA of left internal thoracic lymph node was negative for malignant cells. Cytology suggestive, but not diagnostic of reactive process.  Patient underwent SRS of right upper lung nodule.\par 2/2017-PET/CT scan-marked increased size and FDG avidity of right apical nodule, concerning for malignancy. The nodule has increased in solidity since CT chest 7/2017, and not significantly changed since 11/2017. Decreased FDG avidity of left internal mammary lymph node. No evidence of distant FDG avid metastatic disease.\par 2/2018- S/P right VATS lung wedge resection for enlarging right apical lung nodule on imaging with pathology revealing scar with reactive changes c/w radiation atypia.\par \par 10/2016-Serum immunofixation showed 3 IgM kappa bands, with urine immunofixation showing a weak IgM kappa band identified, and Bence-Steen protein kappa type.  Bone marrow biopsy, and bone marrow aspirate showed a patchy, normocellular bone marrow with trilineage hematopoiesis and maturation, iron stores present. 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  Cytogenetics showed a normal male karyotype. \par \par 1/18/2022-Bone marrow biopsy, bone marrow clot, and bone marrow aspirate:-hypercellular bone marrow with marked B-cell lymphocytosis (50%of total cellularity), moderate plasmacytosis (5-9% of cellularity), increased mast cells and present iron stores. 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 S/P BMB.\par No complaints of cough. No fevers. No LN complaints. No visual complaints. No abnormal bruising or bleeding reported. No fevers/sweats. No anorexia.\par Maintaining usual activity level, ADL's.\par \par Has had COVID vaccines (Moderna).\par \par \par \par

## 2022-12-12 ENCOUNTER — LABORATORY RESULT (OUTPATIENT)
Age: 72
End: 2022-12-12

## 2022-12-13 LAB
ALBUMIN MFR SERPL ELPH: 45 %
ALBUMIN SERPL-MCNC: 3.6 G/DL
ALBUMIN/GLOB SERPL: 0.8 RATIO
ALPHA1 GLOB MFR SERPL ELPH: 5.5 %
ALPHA1 GLOB SERPL ELPH-MCNC: 0.4 G/DL
ALPHA2 GLOB MFR SERPL ELPH: 11.8 %
ALPHA2 GLOB SERPL ELPH-MCNC: 0.9 G/DL
B-GLOBULIN MFR SERPL ELPH: 9.3 %
B-GLOBULIN SERPL ELPH-MCNC: 0.7 G/DL
BASOPHILS # BLD AUTO: 0.02 K/UL
BASOPHILS NFR BLD AUTO: 0.5 %
CALCIUM SERPL-MCNC: 9.4 MG/DL
CREAT SERPL-MCNC: 0.99 MG/DL
DEPRECATED KAPPA LC FREE/LAMBDA SER: 7.42 RATIO
DEPRECATED KAPPA LC FREE/LAMBDA SER: 7.42 RATIO
EGFR: 81 ML/MIN/1.73M2
EOSINOPHIL # BLD AUTO: 0.03 K/UL
EOSINOPHIL NFR BLD AUTO: 0.8 %
FERRITIN SERPL-MCNC: 188 NG/ML
GAMMA GLOB FLD ELPH-MCNC: 2.3 G/DL
GAMMA GLOB MFR SERPL ELPH: 28.4 %
HAPTOGLOB SERPL-MCNC: 326 MG/DL
HCT VFR BLD CALC: 31 %
HGB BLD-MCNC: 9.4 G/DL
IGA SER QL IEP: 96 MG/DL
IGG SER QL IEP: 870 MG/DL
IGM SER QL IEP: 2219 MG/DL
IMM GRANULOCYTES NFR BLD AUTO: 0.8 %
INTERPRETATION SERPL IEP-IMP: NORMAL
IRON SATN MFR SERPL: 16 %
IRON SERPL-MCNC: 51 UG/DL
KAPPA LC CSF-MCNC: 1.7 MG/DL
KAPPA LC CSF-MCNC: 1.7 MG/DL
KAPPA LC SERPL-MCNC: 12.61 MG/DL
KAPPA LC SERPL-MCNC: 12.61 MG/DL
LDH SERPL-CCNC: 213 U/L
LYMPHOCYTES # BLD AUTO: 1.29 K/UL
LYMPHOCYTES NFR BLD AUTO: 34.7 %
M PROTEIN MFR SERPL ELPH: 19.7 %
MAN DIFF?: NORMAL
MCHC RBC-ENTMCNC: 29.7 PG
MCHC RBC-ENTMCNC: 30.3 GM/DL
MCV RBC AUTO: 97.8 FL
MONOCLON BAND OBS SERPL: 1.6 G/DL
MONOCYTES # BLD AUTO: 0.8 K/UL
MONOCYTES NFR BLD AUTO: 21.5 %
NEUTROPHILS # BLD AUTO: 1.55 K/UL
NEUTROPHILS NFR BLD AUTO: 41.7 %
PLATELET # BLD AUTO: 71 K/UL
PROT SERPL-MCNC: 8 G/DL
PROT SERPL-MCNC: 8 G/DL
RBC # BLD: 3.17 M/UL
RBC # BLD: 3.17 M/UL
RBC # FLD: 17.3 %
RETICS # AUTO: 2.6 %
RETICS AGGREG/RBC NFR: 81.8 K/UL
TIBC SERPL-MCNC: 311 UG/DL
UIBC SERPL-MCNC: 260 UG/DL
WBC # FLD AUTO: 3.72 K/UL

## 2023-01-05 ENCOUNTER — APPOINTMENT (OUTPATIENT)
Dept: HEMATOLOGY ONCOLOGY | Facility: CLINIC | Age: 73
End: 2023-01-05
Payer: MEDICARE

## 2023-01-05 VITALS
SYSTOLIC BLOOD PRESSURE: 175 MMHG | HEART RATE: 52 BPM | WEIGHT: 182.32 LBS | RESPIRATION RATE: 16 BRPM | DIASTOLIC BLOOD PRESSURE: 71 MMHG | OXYGEN SATURATION: 99 % | BODY MASS INDEX: 25.44 KG/M2 | TEMPERATURE: 97.1 F

## 2023-01-05 PROCEDURE — 99214 OFFICE O/P EST MOD 30 MIN: CPT

## 2023-01-05 NOTE — ASSESSMENT
[FreeTextEntry1] : Lab work reviewed.\par #1) lung cancer-clinically stable, though course was complicated by abscess at pneumonectomy site. Continue expectant surveillance for his cancer. 9/14/2022 CT chest-in comparison with 3/17/2022, small amount of air within the left pneumonectomy space is resolved. The left upper posterior chest wall drainage catheter is removed. Otherwise, stable exam.\par Clinically stable at present.\par Patient will have next CAT scan of the chest per thoracic surgeon.\par \par #2) Waldenström's macroglobulinemia-1/2022 BMB findings support a differential diagnosis which includes lymphoplasmacytic lymphoma/Waldenstroms macroglobulinemia (LPL/WM) versus marginal zone lymphoma.\par Additional testing showed +MYD88 mutation and -CXCR4 mutation analysis -favoring lymphoplasmacytic lymphoma/WM. 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 \par Reviewed potential complications with patient, and indications for treatment.  I am concerned regarding his cytopenias.  Recommended consideration of treatment, such as zanubrutinib.  Treatment alternatives were reviewed with respective pros and cons.  \par Patient currently refuses any treatment for his WM. \par While he is at high risk for POD over the next 1.8 years, as he is currently without symptoms, per NCCN guidelines, can consider continued close surveillance. Explained that it may be more difficult to treat if treatment is needed on an emergency basis-he expressed his understanding and reiterated that he does not want any treatment at this time.\par Have advised ophthalmologic exam now and at least yearly (and as clinically indicated).\par \par Patient was given the opportunity to ask questions. His questions have been answered to his apparent satisfaction. \par

## 2023-01-05 NOTE — PHYSICAL EXAM
[Restricted in physically strenuous activity but ambulatory and able to carry out work of a light or sedentary nature] : Status 1- Restricted in physically strenuous activity but ambulatory and able to carry out work of a light or sedentary nature, e.g., light house work, office work [Thin] : thin [Normal] : normal spine exam without palpable tenderness, no kyphosis or scoliosis [de-identified] : breathing appeared unlabored

## 2023-01-05 NOTE — HISTORY OF PRESENT ILLNESS
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 He received neoadjuvant radiation and Taxol/Carboplatin chemotherapy followed by left pneumonectomy-No residual tumor at time of surgery. \par 11/2016-PET/CT scan showed minimally hypermetabolic right apical lung nodule, increasing in size on serial diagnostic CT scans. Poor visualization/nonvisualization of ground glass right upper lung opacities.  Several hypermetabolic left internal mammary nodes, increased in size and number with new FDG activity compared to prior PET/CT scan 1/2017- FNA of left internal thoracic lymph node was negative for malignant cells. Cytology suggestive, but not diagnostic of reactive process.  Patient underwent SRS of right upper lung nodule.\par 2/2017-PET/CT scan-marked increased size and FDG avidity of right apical nodule, concerning for malignancy. The nodule has increased in solidity since CT chest 7/2017, and not significantly changed since 11/2017. Decreased FDG avidity of left internal mammary lymph node. No evidence of distant FDG avid metastatic disease.\par 2/2018- S/P right VATS lung wedge resection for enlarging right apical lung nodule on imaging with pathology revealing scar with reactive changes c/w radiation atypia.\par \par 10/2016-Serum immunofixation showed 3 IgM kappa bands, with urine immunofixation showing a weak IgM kappa band identified, and Bence-Steen protein kappa type.  Bone marrow biopsy, and bone marrow aspirate showed a patchy, normocellular bone marrow with trilineage hematopoiesis and maturation, iron stores present. 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  Cytogenetics showed a normal male karyotype. \par \par 1/18/2022-Bone marrow biopsy, bone marrow clot, and bone marrow aspirate:-hypercellular bone marrow with marked B-cell lymphocytosis (50%of total cellularity), moderate plasmacytosis (5-9% of cellularity), increased mast cells and present iron stores. 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 No fevers. No LN complaints. No visual complaints. No abnormal bruising or bleeding reported. No fevers/sweats. No anorexia.\par Maintaining usual activity level, ADL's.\par \par Has had COVID vaccines (Moderna).\par \par \par \par

## 2023-02-01 ENCOUNTER — NON-APPOINTMENT (OUTPATIENT)
Age: 73
End: 2023-02-01

## 2023-02-01 ENCOUNTER — LABORATORY RESULT (OUTPATIENT)
Age: 73
End: 2023-02-01

## 2023-02-02 LAB
BASOPHILS # BLD AUTO: 0.01 K/UL
BASOPHILS NFR BLD AUTO: 0.3 %
EOSINOPHIL # BLD AUTO: 0.01 K/UL
EOSINOPHIL NFR BLD AUTO: 0.3 %
HCT VFR BLD CALC: 27.6 %
HGB BLD-MCNC: 8.1 G/DL
IMM GRANULOCYTES NFR BLD AUTO: 1.2 %
LYMPHOCYTES # BLD AUTO: 1.38 K/UL
LYMPHOCYTES NFR BLD AUTO: 42.5 %
MAN DIFF?: NORMAL
MCHC RBC-ENTMCNC: 29.3 GM/DL
MCHC RBC-ENTMCNC: 29.3 PG
MCV RBC AUTO: 100 FL
MONOCYTES # BLD AUTO: 0.65 K/UL
MONOCYTES NFR BLD AUTO: 20 %
NEUTROPHILS # BLD AUTO: 1.16 K/UL
NEUTROPHILS NFR BLD AUTO: 35.7 %
PLATELET # BLD AUTO: 50 K/UL
RBC # BLD: 2.76 M/UL
RBC # FLD: 18.3 %
WBC # FLD AUTO: 3.25 K/UL

## 2023-02-13 LAB
FERRITIN SERPL-MCNC: 193 NG/ML
IRON SATN MFR SERPL: 19 %
IRON SERPL-MCNC: 54 UG/DL
TIBC SERPL-MCNC: 286 UG/DL
UIBC SERPL-MCNC: 232 UG/DL

## 2023-02-14 ENCOUNTER — NON-APPOINTMENT (OUTPATIENT)
Age: 73
End: 2023-02-14

## 2023-02-14 LAB
BASOPHILS # BLD AUTO: 0.01 K/UL
BASOPHILS NFR BLD AUTO: 0.3 %
EOSINOPHIL # BLD AUTO: 0.02 K/UL
EOSINOPHIL NFR BLD AUTO: 0.6 %
HCT VFR BLD CALC: 25.7 %
HGB BLD-MCNC: 7.5 G/DL
IMM GRANULOCYTES NFR BLD AUTO: 0.9 %
LYMPHOCYTES # BLD AUTO: 1.47 K/UL
LYMPHOCYTES NFR BLD AUTO: 46.4 %
MAN DIFF?: NORMAL
MCHC RBC-ENTMCNC: 29.2 GM/DL
MCHC RBC-ENTMCNC: 29.4 PG
MCV RBC AUTO: 100.8 FL
MONOCYTES # BLD AUTO: 0.64 K/UL
MONOCYTES NFR BLD AUTO: 20.2 %
NEUTROPHILS # BLD AUTO: 1 K/UL
NEUTROPHILS NFR BLD AUTO: 31.6 %
PLATELET # BLD AUTO: 44 K/UL
RBC # BLD: 2.55 M/UL
RBC # BLD: 2.55 M/UL
RBC # FLD: 18.8 %
RETICS # AUTO: 2.7 %
RETICS AGGREG/RBC NFR: 68.8 K/UL
WBC # FLD AUTO: 3.17 K/UL

## 2023-02-21 ENCOUNTER — EMERGENCY (EMERGENCY)
Facility: HOSPITAL | Age: 73
LOS: 1 days | Discharge: HOME | End: 2023-02-21
Attending: INTERNAL MEDICINE | Admitting: INTERNAL MEDICINE
Payer: MEDICARE

## 2023-02-21 VITALS
OXYGEN SATURATION: 99 % | RESPIRATION RATE: 17 BRPM | TEMPERATURE: 98 F | HEART RATE: 56 BPM | DIASTOLIC BLOOD PRESSURE: 74 MMHG | SYSTOLIC BLOOD PRESSURE: 138 MMHG

## 2023-02-21 VITALS
OXYGEN SATURATION: 100 % | HEART RATE: 73 BPM | HEIGHT: 71 IN | DIASTOLIC BLOOD PRESSURE: 71 MMHG | TEMPERATURE: 97 F | RESPIRATION RATE: 20 BRPM | SYSTOLIC BLOOD PRESSURE: 193 MMHG | WEIGHT: 181 LBS

## 2023-02-21 DIAGNOSIS — Z98.89 OTHER SPECIFIED POSTPROCEDURAL STATES: Chronic | ICD-10-CM

## 2023-02-21 DIAGNOSIS — Z98.890 OTHER SPECIFIED POSTPROCEDURAL STATES: Chronic | ICD-10-CM

## 2023-02-21 DIAGNOSIS — Z90.2 ACQUIRED ABSENCE OF LUNG [PART OF]: Chronic | ICD-10-CM

## 2023-02-21 LAB
ABO RH CONFIRMATION: SIGNIFICANT CHANGE UP
ALBUMIN SERPL ELPH-MCNC: 3.1 G/DL — LOW (ref 3.3–5)
ALP SERPL-CCNC: 80 U/L — SIGNIFICANT CHANGE UP (ref 40–120)
ALT FLD-CCNC: 15 U/L — SIGNIFICANT CHANGE UP (ref 10–45)
ANION GAP SERPL CALC-SCNC: 9 MMOL/L — SIGNIFICANT CHANGE UP (ref 5–17)
AST SERPL-CCNC: 19 U/L — SIGNIFICANT CHANGE UP (ref 10–40)
BASOPHILS # BLD AUTO: 0.03 K/UL — SIGNIFICANT CHANGE UP (ref 0–0.2)
BASOPHILS NFR BLD AUTO: 1 % — SIGNIFICANT CHANGE UP (ref 0–2)
BILIRUB SERPL-MCNC: 0.4 MG/DL — SIGNIFICANT CHANGE UP (ref 0.2–1.2)
BLD GP AB SCN SERPL QL: SIGNIFICANT CHANGE UP
BUN SERPL-MCNC: 23 MG/DL — SIGNIFICANT CHANGE UP (ref 7–23)
CALCIUM SERPL-MCNC: 8.7 MG/DL — SIGNIFICANT CHANGE UP (ref 8.4–10.5)
CHLORIDE SERPL-SCNC: 105 MMOL/L — SIGNIFICANT CHANGE UP (ref 96–108)
CO2 SERPL-SCNC: 27 MMOL/L — SIGNIFICANT CHANGE UP (ref 22–31)
CREAT SERPL-MCNC: 1.04 MG/DL — SIGNIFICANT CHANGE UP (ref 0.5–1.3)
EGFR: 77 ML/MIN/1.73M2 — SIGNIFICANT CHANGE UP
EOSINOPHIL # BLD AUTO: 0 K/UL — SIGNIFICANT CHANGE UP (ref 0–0.5)
EOSINOPHIL NFR BLD AUTO: 0 % — SIGNIFICANT CHANGE UP (ref 0–6)
GLUCOSE SERPL-MCNC: 104 MG/DL — HIGH (ref 70–99)
HCT VFR BLD CALC: 25.1 % — LOW (ref 39–50)
HGB BLD-MCNC: 7.4 G/DL — LOW (ref 13–17)
LYMPHOCYTES # BLD AUTO: 1.19 K/UL — SIGNIFICANT CHANGE UP (ref 1–3.3)
LYMPHOCYTES # BLD AUTO: 42 % — SIGNIFICANT CHANGE UP (ref 13–44)
MANUAL SMEAR VERIFICATION: SIGNIFICANT CHANGE UP
MCHC RBC-ENTMCNC: 29.5 GM/DL — LOW (ref 32–36)
MCHC RBC-ENTMCNC: 30 PG — SIGNIFICANT CHANGE UP (ref 27–34)
MCV RBC AUTO: 101.6 FL — HIGH (ref 80–100)
MONOCYTES # BLD AUTO: 0.42 K/UL — SIGNIFICANT CHANGE UP (ref 0–0.9)
MONOCYTES NFR BLD AUTO: 15 % — HIGH (ref 2–14)
NEUTROPHILS # BLD AUTO: 1.19 K/UL — LOW (ref 1.8–7.4)
NEUTROPHILS NFR BLD AUTO: 39 % — LOW (ref 43–77)
NEUTS BAND # BLD: 3 % — SIGNIFICANT CHANGE UP (ref 0–8)
NRBC # BLD: 0 /100 — SIGNIFICANT CHANGE UP (ref 0–0)
OB PNL STL: NEGATIVE — SIGNIFICANT CHANGE UP
PLAT MORPH BLD: NORMAL — SIGNIFICANT CHANGE UP
PLATELET # BLD AUTO: 41 K/UL — LOW (ref 150–400)
POTASSIUM SERPL-MCNC: 4.4 MMOL/L — SIGNIFICANT CHANGE UP (ref 3.5–5.3)
POTASSIUM SERPL-SCNC: 4.4 MMOL/L — SIGNIFICANT CHANGE UP (ref 3.5–5.3)
PROT SERPL-MCNC: 8.1 G/DL — SIGNIFICANT CHANGE UP (ref 6–8.3)
RBC # BLD: 2.47 M/UL — LOW (ref 4.2–5.8)
RBC # FLD: 19 % — HIGH (ref 10.3–14.5)
RBC BLD AUTO: SIGNIFICANT CHANGE UP
SODIUM SERPL-SCNC: 141 MMOL/L — SIGNIFICANT CHANGE UP (ref 135–145)
WBC # BLD: 2.83 K/UL — LOW (ref 3.8–10.5)
WBC # FLD AUTO: 2.83 K/UL — LOW (ref 3.8–10.5)

## 2023-02-21 PROCEDURE — 93010 ELECTROCARDIOGRAM REPORT: CPT

## 2023-02-21 PROCEDURE — 93005 ELECTROCARDIOGRAM TRACING: CPT

## 2023-02-21 PROCEDURE — 86900 BLOOD TYPING SEROLOGIC ABO: CPT

## 2023-02-21 PROCEDURE — 36430 TRANSFUSION BLD/BLD COMPNT: CPT

## 2023-02-21 PROCEDURE — 99285 EMERGENCY DEPT VISIT HI MDM: CPT | Mod: 25

## 2023-02-21 PROCEDURE — 99285 EMERGENCY DEPT VISIT HI MDM: CPT

## 2023-02-21 PROCEDURE — 86923 COMPATIBILITY TEST ELECTRIC: CPT

## 2023-02-21 PROCEDURE — 82272 OCCULT BLD FECES 1-3 TESTS: CPT

## 2023-02-21 PROCEDURE — 36415 COLL VENOUS BLD VENIPUNCTURE: CPT

## 2023-02-21 PROCEDURE — 85025 COMPLETE CBC W/AUTO DIFF WBC: CPT

## 2023-02-21 PROCEDURE — P9016: CPT

## 2023-02-21 PROCEDURE — 86901 BLOOD TYPING SEROLOGIC RH(D): CPT

## 2023-02-21 PROCEDURE — 80053 COMPREHEN METABOLIC PANEL: CPT

## 2023-02-21 PROCEDURE — 86850 RBC ANTIBODY SCREEN: CPT

## 2023-02-21 RX ORDER — SODIUM CHLORIDE 9 MG/ML
500 INJECTION INTRAMUSCULAR; INTRAVENOUS; SUBCUTANEOUS ONCE
Refills: 0 | Status: COMPLETED | OUTPATIENT
Start: 2023-02-21 | End: 2023-02-21

## 2023-02-21 RX ADMIN — SODIUM CHLORIDE 500 MILLILITER(S): 9 INJECTION INTRAMUSCULAR; INTRAVENOUS; SUBCUTANEOUS at 14:26

## 2023-02-21 RX ADMIN — SODIUM CHLORIDE 1000 MILLILITER(S): 9 INJECTION INTRAMUSCULAR; INTRAVENOUS; SUBCUTANEOUS at 14:05

## 2023-02-21 NOTE — ED PROVIDER NOTE - CLINICAL SUMMARY MEDICAL DECISION MAKING FREE TEXT BOX
78-year-old with acute anemia hemoglobin 7.4 plan to give 1 unit of blood transfusion as per oncology recommendation and will discharge 78-year-old with acute anemia hemoglobin 7.4 plan to give 1 unit of blood transfusion as per oncology recommendation and will discharge  Patient was transfused 1 unit of PRBCs plan is to discharge patient

## 2023-02-21 NOTE — ED ADULT TRIAGE NOTE - SPO2 (%)
"              After Visit Summary   11/8/2017    Edel Kramer    MRN: [id number]           Patient Information     Date Of Birth          1980        Visit Information        Provider Department      11/8/2017 8:30 AM Salvador Epps MD [facility]        Today's Diagnoses     Cervicalgia    -  1       Follow-ups after your visit        Additional Services     PHYSICAL THERAPY REFERRAL       *This therapy referral will be filtered to a centralized scheduling office at [facility] and the patient will receive a call to schedule an appointment at a [facility] location most convenient for them. *     [facility] provides Physical Therapy evaluation and treatment and many specialty services across the [facility] system.  If requesting a specialty program, please choose from the list below.    If you have not heard from the scheduling office within 2 business days, please call [phone] for all locations, with the exception of [place], please call [phone].  Treatment: Evaluation & Treatment  Special Instructions/Modalities: none  Special Programs: None  Pt will go to [facility]    Please be aware that coverage of these services is subject to the terms and limitations of your health insurance plan.  Call member services at your health plan with any benefit or coverage questions.      **Note to Provider:  If you are referring outside of [facility] for the therapy appointment, please list the name of the location in the \"special instructions\" above, print the referral and give to the patient to schedule the appointment.                  Follow-up notes from your care team     Return if symptoms worsen or fail to improve.      Who to contact     If you have questions or need follow up information about today's clinic visit or your schedule please contact [facility] directly at [phone].  Normal or non-critical lab and imaging " "results will be communicated to you by MyChart, letter or phone within 4 business days after the clinic has received the results. If you do not hear from us within 7 days, please contact the clinic through [employer] or phone. If you have a critical or abnormal lab result, we will notify you by phone as soon as possible.  Submit refill requests through TimeCast or call your pharmacy and they will forward the refill request to us. Please allow 3 business days for your refill to be completed.          Additional Information About Your Visit        TimeCast Information     TimeCast is an electronic gateway that provides easy, online access to your medical records. With TimeCast, you can request a clinic appointment, read your test results, renew a prescription or communicate with your care team.     To sign up for TimeCast visit the website at [email or web address]   You will be asked to enter the access code listed below, as well as some personal information. Please follow the directions to create your username and password.     Your access code is: [email or web address]  Expires: 2018  9:15 AM     Your access code will  in 90 days. If you need help or a new code, please contact your [facility] or call [phone] for assistance.        Care EveryWhere ID     This is your Care EveryWhere ID. This could be used by other organizations to access your [place] medical records  [id number]        Your Vitals Were     Pulse Temperature Height Pulse Oximetry BMI (Body Mass Index)       82 97.9  F (36.6  C) (Oral) 1.6 m (5' 3\") 100% 25.47 kg/m2        Blood Pressure from Last 3 Encounters:   13 109/61    Weight from Last 3 Encounters:   17 65.2 kg (143 lb 12.8 oz)              We Performed the Following     PHYSICAL THERAPY REFERRAL        Primary Care Provider Office Phone # Fax #    Leona Kulkarni [phone]       [place] [address]  [facility] [place] [address]        Equal Access to Services     [facility] MARIBEL : Hadii cristina ivy josephjose alfredo Sobetseyali, waaxda luqadaha, qaybta kaalmadustin acostaeugeniedustin, nadya arnettclarawagner williamson. So [facility] [phone].    ATENCIÓN: Si habla español, tiene a carrero disposición servicios gratuitos de asistencia lingüística. [facility] [phone].    We comply with applicable federal civil rights laws and [place] laws. We do not discriminate on the basis of race, color, national origin, age, disability, sex, sexual orientation, or gender identity.            Thank you!     Thank you for choosing [facility]  for your care. Our goal is always to provide you with excellent care. Hearing back from our patients is one way we can continue to improve our services. Please take a few minutes to complete the written survey that you may receive in the mail after your visit with us. Thank you!             Your Updated Medication List - Protect others around you: Learn how to safely use, store and throw away your medicines at www.disposemymeds.org.          This list is accurate as of: 11/8/17  9:15 AM.  Always use your most recent med list.                   Brand Name Dispense Instructions for use Diagnosis    ADDERALL PO      Take 20 mg by mouth 2 times daily.        Curcumin Extract Powd           DOSTINEX PO      Take 0.05 mg by mouth twice a week        IMITREX 100 MG tablet   Generic drug:  SUMAtriptan      Take 100 mg by mouth at onset of headache.        IRON SUPPLEMENT PO           L-GLUTAMINE PO           Magnesium 500 MG Caps      Take 2 capsules by mouth daily        Melatonin 10 MG Caps           milk thistle extract 140 MG Caps capsule           MULTIVITAMIN ADULT PO           NUTRITIONAL SUPPLEMENT PO      Take 2 tablets by mouth 2 times daily        OMEGA 3 PO           PROBIOTIC ADVANCED PO           saccharomyces boulardii 250 MG capsule    FLORASTOR     Take 250 mg by mouth 2 times daily         Theanine 50 MG Tbdp           TOPAMAX PO      Take 150 mg by mouth daily        VITAMIN K2-VITAMIN D3 PO      Take 10,000 Units by mouth        ZOLOFT PO      Take 50 mg by mouth daily.        ZOMIG PO      Spray 5 mg in nostril as needed for migraine           100

## 2023-02-21 NOTE — ED PROVIDER NOTE - PHYSICAL EXAMINATION
General:     NAD, well-nourished, well-appearing  Head:     NC/AT, EOMI, oral mucosa pale  Neck:     trachea midline  Lungs:     CTA b/l, no w/r/r  CVS:     S1S2, RRR, no m/g/r  Abd:     +BS, s/nt/nd, no organomegaly  Ext:    2+ radial and pedal pulses, no c/c/e  Neuro: AAOx3, no sensory/motor deficits

## 2023-02-21 NOTE — ED PROVIDER NOTE - NSFOLLOWUPINSTRUCTIONS_ED_ALL_ED_FT
Follow up with your PMD within 1-2 days.  Rest, increase your fluids, advance your activity as tolerated.   Take all of your other medications as previously prescribed.   Worsening, continued or ANY new concerning symptoms return to the emergency department. Follow up with your PMD within 1-2 days.  Rest, increase your fluids, advance your activity as tolerated.   Take all of your other medications as previously prescribed.   Worsening, continued or ANY new concerning symptoms return to the emergency department. Dr. Estrlela from [facility] patient went r the private oncologist

## 2023-02-21 NOTE — ED PROVIDER NOTE - PATIENT PORTAL LINK FT
You can access the FollowMyHealth Patient Portal offered by [facility] by registering at the following website: [email or web address]. By joining [employer]’s FollowMyHealth portal, you will also be able to view your health information using other applications (apps) compatible with our system.

## 2023-02-21 NOTE — ED ADULT TRIAGE NOTE - CHIEF COMPLAINT QUOTE
Pt had low Hg on lab draw 1 week ago. Pt has history of lung cancer.  Dr Estrella instructed pt to go to ER for transfusion.

## 2023-02-21 NOTE — ED PROVIDER NOTE - OBJECTIVE STATEMENT
Admit 72-year-old male with past history of lung cancer lobectomy also history of anemia the last blood transfusion 1 year ago no recent chemo or radiation chief complaint is generalized weakness been going on for about a month patient was tested for low hemoglobin which was 7.5 sent here by Dr. Estrella for blood transfusion

## 2023-02-28 NOTE — CHART NOTE - NSCHARTNOTEFT_GEN_A_CORE
SW placed call to patient to discuss and assist with follow up care.  Patient presented to ED on 2/21/23 for abnormal lab.  Patient reports he will be getting repeat blood work tomorrow 3/1/23 and declined needing further assistance at this time. Patient encouraged to call ED SW if further assistance is needed.

## 2023-03-03 ENCOUNTER — LABORATORY RESULT (OUTPATIENT)
Age: 73
End: 2023-03-03

## 2023-03-05 ENCOUNTER — EMERGENCY (EMERGENCY)
Facility: HOSPITAL | Age: 73
LOS: 1 days | Discharge: HOME | End: 2023-03-05
Attending: EMERGENCY MEDICINE | Admitting: EMERGENCY MEDICINE
Payer: MEDICARE

## 2023-03-05 VITALS
RESPIRATION RATE: 17 BRPM | HEIGHT: 71 IN | HEART RATE: 79 BPM | TEMPERATURE: 98 F | SYSTOLIC BLOOD PRESSURE: 159 MMHG | OXYGEN SATURATION: 100 % | DIASTOLIC BLOOD PRESSURE: 74 MMHG | WEIGHT: 179.9 LBS

## 2023-03-05 DIAGNOSIS — Z98.89 OTHER SPECIFIED POSTPROCEDURAL STATES: Chronic | ICD-10-CM

## 2023-03-05 DIAGNOSIS — Z98.890 OTHER SPECIFIED POSTPROCEDURAL STATES: Chronic | ICD-10-CM

## 2023-03-05 DIAGNOSIS — Z90.2 ACQUIRED ABSENCE OF LUNG [PART OF]: Chronic | ICD-10-CM

## 2023-03-05 PROCEDURE — 74019 RADEX ABDOMEN 2 VIEWS: CPT

## 2023-03-05 PROCEDURE — 99284 EMERGENCY DEPT VISIT MOD MDM: CPT | Mod: FS

## 2023-03-05 PROCEDURE — 96372 THER/PROPH/DIAG INJ SC/IM: CPT

## 2023-03-05 PROCEDURE — 74019 RADEX ABDOMEN 2 VIEWS: CPT | Mod: 26

## 2023-03-05 PROCEDURE — 99284 EMERGENCY DEPT VISIT MOD MDM: CPT

## 2023-03-05 RX ORDER — LIDOCAINE 4 G/100G
1 CREAM TOPICAL ONCE
Refills: 0 | Status: COMPLETED | OUTPATIENT
Start: 2023-03-05 | End: 2023-03-05

## 2023-03-05 RX ORDER — LIDOCAINE 4 G/100G
1 CREAM TOPICAL
Qty: 30 | Refills: 0
Start: 2023-03-05 | End: 2023-04-03

## 2023-03-05 RX ORDER — METHOCARBAMOL 500 MG/1
1 TABLET, FILM COATED ORAL
Qty: 15 | Refills: 0
Start: 2023-03-05 | End: 2023-03-09

## 2023-03-05 RX ORDER — IBUPROFEN 200 MG
1 TABLET ORAL
Qty: 20 | Refills: 0
Start: 2023-03-05 | End: 2023-03-09

## 2023-03-05 RX ORDER — KETOROLAC TROMETHAMINE 30 MG/ML
15 SYRINGE (ML) INJECTION ONCE
Refills: 0 | Status: DISCONTINUED | OUTPATIENT
Start: 2023-03-05 | End: 2023-03-05

## 2023-03-05 RX ADMIN — LIDOCAINE 1 PATCH: 4 CREAM TOPICAL at 10:02

## 2023-03-05 RX ADMIN — Medication 15 MILLIGRAM(S): at 10:03

## 2023-03-05 NOTE — ED PROVIDER NOTE - ATTENDING APP SHARED VISIT CONTRIBUTION OF CARE
Eloy with [facility]. 72-year-old male with a pertinent medical history including lung cancer status post left lobe resection in 2015, anemia received blood transfusion last month, complaining of lower back pain after slip and fall in the snow on Tuesday, approximately 6 days ago, denies any head strike, LOC or any other injury, reports the back pain persisting despite taking Tylenol and Advil, also notes no bowel movement or urge to have a bowel movement since this since the fall.  Denies any blood per rectum, denies any dysuria, hematuria, incontinence, denies any numbness or tingling, denies any abdominal pain, nausea, vomiting, chest pain, shortness of breath, any other injury or complaint. Has not taken anything for constipation, reports attempting advil 1-2 tabs per day and tylenol for pain. Last advil was one capsule yesterday.    On examination good rectal tone no stool in the vault, neurovascularly intact, no midline tenderness to the spine, paravertebral lumbar muscle tenderness noted.    Abdominal x-ray, lidocaine patch, Toradol, reassess.    No abundance of stool on xray. No Air/Fluid levels noted. No other abnormal findings appreciated.   Plan for arrangement of f/u with PCP and Spine. Pt with normal rectal tone and no saddle anesthesia. No incontinence. Pt ambulatory.   Advise Mscle relaxant, NSAID, Lido patch. Pt took senna last night however endorses that he not eating or hydrating normally. Advised to resume a normal diet and perhaps his BMs will normalize as well.     Worsening, continued or ANY new concerning symptoms return to the emergency department.    I performed a face to face bedside interview with patient regarding history of present illness, review of symptoms and past medical history. I completed an independent physical exam.  I have discussed the patient's plan of care with Physician Assistant (PA). I agree with note as stated above, having amended the EMR as needed to reflect my findings.   This includes History of Present Illness, HIV, Past Medical/Surgical/Family/Social History, Allergies and Home Medications, Review of Systems, Physical Exam, and any Progress Notes during the time I functioned as the attending physician for this patient.

## 2023-03-05 NOTE — ED PROVIDER NOTE - CHPI ED SYMPTOMS NEG
no anorexia/no bladder dysfunction/no neck tenderness/no numbness/no tingling/no difficulty bearing weight/no motor function loss

## 2023-03-05 NOTE — ED PROVIDER NOTE - NEUROLOGICAL, MLM
Alert and oriented, no focal deficits, no motor or sensory deficits. Lower Extremity DTRs 2+, equal b.l. Pain illicited with active straight leg raise b/l at approx. 30-40 degrees.

## 2023-03-05 NOTE — ED ADULT TRIAGE NOTE - HEIGHT IN INCHES
11 Patient seen and examined with house-staff during bedside rounds.  Resident note read, including vitals, physical findings, laboratory data, and radiological reports.   Revisions included below.  Direct personal management at bed side and extensive interpretation of the data.  Plan was outlined and discussed in details with the housestaff.  Decision making of high complexity  Action taken for acute disease activity to reflect the level of care provided:  - medication reconciliation  - review laboratory data    ENT was consulted and I discussed the case with ENT.  I also ultrasound of the thyroid and nuclear parathyroid scan.      Porfirio the case with nephrology regarding the hypercalcemia.  The patient did not respond to calcitonin.  IV fluids are off overnight.  The condition could be related to parathyroid but also to underlying malignancy.  CT scan of the chest was reviewed and there is anterior mediastinal mass and paratracheal and origin which could be a cyst versus lymph node.  PET scan.  The patient will require thoracic surgery consult

## 2023-03-05 NOTE — ED PROVIDER NOTE - CLINICAL SUMMARY MEDICAL DECISION MAKING FREE TEXT BOX
72-year-old male with a pertinent medical history including lung cancer status post left lobe resection in 2015, anemia received blood transfusion last month, complaining of lower back pain after slip and fall in the snow on Tuesday, approximately 6 days ago, denies any head strike, LOC or any other injury, reports the back pain persisting despite taking Tylenol and Advil, also notes no bowel movement or urge to have a bowel movement since this since the fall.  Denies any blood per rectum, denies any dysuria, hematuria, incontinence, denies any numbness or tingling, denies any abdominal pain, nausea, vomiting, chest pain, shortness of breath, any other injury or complaint. Has not taken anything for constipation, reports attempting advil 1-2 tabs per day and tylenol for pain. Last advil was one capsule yesterday.    On examination good rectal tone no stool in the vault, neurovascularly intact, no midline tenderness to the spine, paravertebral lumbar muscle tenderness noted.    Abdominal x-ray, lidocaine patch, Toradol, reassess. 72-year-old male with a pertinent medical history including lung cancer status post left lobe resection in 2015, anemia received blood transfusion last month, complaining of lower back pain after slip and fall in the snow on Tuesday, approximately 6 days ago, denies any head strike, LOC or any other injury, reports the back pain persisting despite taking Tylenol and Advil, also notes no bowel movement or urge to have a bowel movement since this since the fall.  Denies any blood per rectum, denies any dysuria, hematuria, incontinence, denies any numbness or tingling, denies any abdominal pain, nausea, vomiting, chest pain, shortness of breath, any other injury or complaint. Has not taken anything for constipation, reports attempting advil 1-2 tabs per day and tylenol for pain. Last advil was one capsule yesterday.    On examination good rectal tone no stool in the vault, neurovascularly intact, no midline tenderness to the spine, paravertebral lumbar muscle tenderness noted.    Abdominal x-ray, lidocaine patch, Toradol, reassess.    No abundance of stool on xray. No Air/Fluid levels noted. No other abnormal findings appreciated.   Plan for arrangement of f/u with PCP and Spine. Pt with normal rectal tone and no saddle anesthesia. No incontinence. Pt ambulatory.   Advise Mscle relaxant, NSAID, Lido patch. Pt took senna last night however endorses that he not eating or hydrating normally. Advised to resume a normal diet and perhaps his BMs will normalize as well.     Worsening, continued or ANY new concerning symptoms return to the emergency department.

## 2023-03-05 NOTE — ED PROVIDER NOTE - NS CPE EDP MUSC LUMBAR LOC
NO midline tenderness, deformity, instability or step-off.  b/l lower lumbar paravertebral muscle tenderness to palpation, left > right.

## 2023-03-05 NOTE — ED PROVIDER NOTE - OBJECTIVE STATEMENT
72-year-old male with a pertinent medical history including lung cancer status post left lobe resection in 2015, anemia received blood transfusion last month, complaining of lower back pain after slip and fall in the snow on Tuesday, approximately 6 days ago, denies any head strike, LOC or any other injury, reports the back pain persisting despite taking Tylenol and Advil, also notes no bowel movement or urge to have a bowel movement since this since the fall.  Denies any blood per rectum, denies any dysuria, hematuria, incontinence, denies any numbness or tingling, denies any abdominal pain, nausea, vomiting, chest pain, shortness of breath, any other injury or complaint. Reports good appetite. Patient reports intermittent dull pain in the b/l lower back, intermittent sharp pain with twisting and movement.

## 2023-03-05 NOTE — ED ADULT NURSE NOTE - OBJECTIVE STATEMENT
s/p fall Tuesday, reporting low back pain and difficulty moving his bowels. Awake alert Skin warm dry. Abdomen soft, denies nausea.

## 2023-03-05 NOTE — ED PROVIDER NOTE - NSFOLLOWUPINSTRUCTIONS_ED_ALL_ED_FT
Back Pain    Follow up with your primary doctor and a spine doctor. No concerning findings on your assessment today.     In the Emergency Department today, we did not appreciate any abnormal findings on your xray. We will submit to our radiologist for FINAL review. If there are any new findings or concerning findings that were missed in the Emergency Department, we will notify you at the number provided upon registration.   We recommend medications as needed for your back pain.  Also, it would be helpful to use a heating pad or warm compress.     Back pain is very common in adults. The cause of back pain is rarely dangerous and the pain often gets better over time. The cause of your back pain may not be known and may include strain of muscles or ligaments, degeneration of the spinal disks, or arthritis. Occasionally the pain may radiate down your leg(s). Over-the-counter medicines to reduce pain and inflammation are often the most helpful. Stretching and remaining active frequently helps the healing process.     SEEK IMMEDIATE MEDICAL CARE IF YOU HAVE ANY OF THE FOLLOWING SYMPTOMS: bowel or bladder control problems, unusual weakness or numbness in your arms or legs, nausea or vomiting, abdominal pain, fever, dizziness/lightheadedness.     Constipation is when a person has fewer than three bowel movements a week, has difficulty having a bowel movement, or has stools that are dry, hard, or larger than normal. Other symptoms can include abdominal pain or bloating. As people grow older, constipation is more common. A low-fiber diet, not taking in enough fluids, and taking certain medicines, including opioid painkillers, may make constipation worse. Treatment varies but may include dietary modifications (more fiber-rich foods), lifestyle modifications, and possible medications. Maintain a normal diet to assess whether your bowel movement patterns are changing. If you are adhering to your normal diet and hydrating well, your bowel patterns should return to normal. If anything else, contact your primary doctor for further recommendations.     SEEK IMMEDIATE MEDICAL CARE IF YOU HAVE ANY OF THE FOLLOWING SYMPTOMS: bright red blood in your stool, constipation for longer than 4 days, abdominal or rectal pain, unexplained weight loss, or inability to pass gas.

## 2023-03-06 LAB
BASOPHILS # BLD AUTO: 0.03 K/UL
BASOPHILS NFR BLD AUTO: 1 %
EOSINOPHIL # BLD AUTO: 0.02 K/UL
EOSINOPHIL NFR BLD AUTO: 0.7 %
HCT VFR BLD CALC: 27.2 %
HGB BLD-MCNC: 8 G/DL
IMM GRANULOCYTES NFR BLD AUTO: 1.3 %
LYMPHOCYTES # BLD AUTO: 1.25 K/UL
LYMPHOCYTES NFR BLD AUTO: 40.8 %
MAN DIFF?: NORMAL
MCHC RBC-ENTMCNC: 29.4 GM/DL
MCHC RBC-ENTMCNC: 29.5 PG
MCV RBC AUTO: 100.4 FL
MONOCYTES # BLD AUTO: 0.63 K/UL
MONOCYTES NFR BLD AUTO: 20.6 %
NEUTROPHILS # BLD AUTO: 1.09 K/UL
NEUTROPHILS NFR BLD AUTO: 35.6 %
PLATELET # BLD AUTO: 39 K/UL
RBC # BLD: 2.71 M/UL
RBC # FLD: 18.7 %
WBC # FLD AUTO: 3.06 K/UL

## 2023-03-06 NOTE — CHART NOTE - NSCHARTNOTEFT_GEN_A_CORE
SW met with the pt and family at bedside to assesses for social work needs and discuss home assessment of safety.  Pt is a 71 y/o male presented to ED for fall and back pain.    Emergency Department Social Work Geriatric Initial Assessment  Cognitive Mental Status :- Alert and oriented X3  Primary Caregiver Information –Wife Kiera [phone]  Emergency Contact Information –as above  Primary Care Physician / Specialists :- Dr. Kendrick Sky (needed an appt), Pt also needs Spine appt; would like to have morning appt  Functional Status Prior to ED Visit - Totally independent  Family and Social Support –Strong support   Living Arrangement -In a private house with wife.  Services Present on Admission – Pt is stable with benefits, family support and medication and has not identified any barriers.    Assistive Devices (Durable Medical Equipment) –none   ADLs & Degree of [place] – Independent  Barriers to obtain medications -None  Barriers to attend medical appointments -Needed SW assistance to schedule.  ISAR Score –1  Advanced Directives – Full code  Follow up care – Attending recommended follow with both primary and spine specialist.  Discharge Transportation – Family   Summary/Recommendations/Referrals -Pt reportedly feels safe at home and has no safety concerns, Sw has not identified any other concerns, encouraged pt to call SW if further assistance is needed.  SW will call the pt with an appt on Monday.
SW called patient to discuss and assist with follow up. Patient reports he will call and schedule PMD follow up.  Patient in agreement for assistance with orthopedist appointment.  KODY called [facility]ist [phone] and scheduled follow up for 3/8/23 at 9 AM at [address], [place] with Dr Sorensen.  Patient made aware.  Patient provided with office number if any changes are needed.

## 2023-03-14 ENCOUNTER — APPOINTMENT (OUTPATIENT)
Dept: ORTHOPEDIC SURGERY | Facility: CLINIC | Age: 73
End: 2023-03-14
Payer: MEDICARE

## 2023-03-14 VITALS
HEART RATE: 84 BPM | SYSTOLIC BLOOD PRESSURE: 144 MMHG | TEMPERATURE: 97.3 F | DIASTOLIC BLOOD PRESSURE: 68 MMHG | BODY MASS INDEX: 25.48 KG/M2 | WEIGHT: 182 LBS | OXYGEN SATURATION: 98 % | HEIGHT: 71 IN

## 2023-03-14 PROCEDURE — 72100 X-RAY EXAM L-S SPINE 2/3 VWS: CPT

## 2023-03-14 PROCEDURE — 99204 OFFICE O/P NEW MOD 45 MIN: CPT

## 2023-03-14 NOTE — REASON FOR VISIT
[Initial Visit] : an initial visit for [Back Pain] : back pain [FreeTextEntry2] : Compression fracture of L1

## 2023-03-14 NOTE — HISTORY OF PRESENT ILLNESS
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  He denies a history of prior back problems.  He was constipated for 5 days after the fall which is very suggestive of the ileus frequently seen with a compression fracture.  He was seen in the emergency room was x-rays of the spine but no lateral x-ray.  The x-rays suggested some decreased height of L1.  He has not had associated neurologic symptoms in his legs of numbness, paresthesias or weakness and there is no Valsalva effect.  He has had night pain.  Surprisingly the back pain is improved 50% over the last 2 weeks.  The pain is worse with bending, lifting and change of position especially getting in and out of bed.  Ibuprofen was recommended in the emergency room. [Pain Location] : pain [4] : a maximum pain level of 4/10

## 2023-03-14 NOTE — PHYSICAL EXAM
[de-identified] : He is fully alert and oriented with a normal mood and affect.  He is in no acute distress as a take the history and he relates no pain as he is sitting.  He transfers slowly at out of a chair but ambulates normally.  Straight leg raising is negative to 90 degrees bilaterally. [de-identified] : I reviewed the prior CT scan of the abdomen and pelvis from November 2021.  There are degenerative changes with anterior osteophytes in the lumbar spine and no evidence of a compression fracture.\par \par I reviewed x-rays from the emergency room including APs of the thoracic and lumbar spine and they suggest the possibility has some decreased height of L1 but no lateral x-rays were taken.\par \par In light of his complaints I obtained AP and lateral x-rays of the lumbar spine in the office today and there is a moderate compression fracture of L1.  This appears to be a benign fracture.  The pedicles are intact.

## 2023-03-14 NOTE — REVIEW OF SYSTEMS
[Negative] : Gastrointestinal [FreeTextEntry6] : Prior squamous cell lung cancer with a left pneumonectomy [FreeTextEntry8] : Prior lithotripsy

## 2023-03-14 NOTE — DISCUSSION/SUMMARY
[Medication Risks Reviewed] : Medication risks reviewed [de-identified] : He will rest and restrict his bending and lifting activities.  With a very low platelet count I have recommended he not take the ibuprofen recommended in the emergency room and he will use extra strength Tylenol for pain control.  I will see him for follow-up in 6 weeks.  He will call if there is a worsening of his symptoms.

## 2023-04-03 ENCOUNTER — LABORATORY RESULT (OUTPATIENT)
Age: 73
End: 2023-04-03

## 2023-04-03 ENCOUNTER — NON-APPOINTMENT (OUTPATIENT)
Age: 73
End: 2023-04-03

## 2023-04-03 LAB
BASOPHILS # BLD AUTO: 0 K/UL
BASOPHILS NFR BLD AUTO: 0 %
EOSINOPHIL # BLD AUTO: 0.03 K/UL
EOSINOPHIL NFR BLD AUTO: 1 %
HCT VFR BLD CALC: 23.1 %
HGB BLD-MCNC: 6.6 G/DL
LYMPHOCYTES # BLD AUTO: 1.76 K/UL
LYMPHOCYTES NFR BLD AUTO: 53 %
MAN DIFF?: NORMAL
MCHC RBC-ENTMCNC: 28.6 GM/DL
MCHC RBC-ENTMCNC: 30.1 PG
MCV RBC AUTO: 105.5 FL
MONOCYTES # BLD AUTO: 0.73 K/UL
MONOCYTES NFR BLD AUTO: 22 %
NEUTROPHILS # BLD AUTO: 0.8 K/UL
NEUTROPHILS NFR BLD AUTO: 24 %
PLATELET # BLD AUTO: 35 K/UL
RBC # BLD: 2.19 M/UL
RBC # FLD: 20.4 %
WBC # FLD AUTO: 3.32 K/UL

## 2023-04-04 ENCOUNTER — NON-APPOINTMENT (OUTPATIENT)
Age: 73
End: 2023-04-04

## 2023-04-04 ENCOUNTER — EMERGENCY (EMERGENCY)
Facility: HOSPITAL | Age: 73
LOS: 1 days | Discharge: HOME | End: 2023-04-04
Attending: EMERGENCY MEDICINE | Admitting: EMERGENCY MEDICINE
Payer: MEDICARE

## 2023-04-04 VITALS
OXYGEN SATURATION: 97 % | SYSTOLIC BLOOD PRESSURE: 190 MMHG | HEART RATE: 83 BPM | DIASTOLIC BLOOD PRESSURE: 76 MMHG | TEMPERATURE: 97 F | WEIGHT: 179.9 LBS | RESPIRATION RATE: 19 BRPM | HEIGHT: 71 IN

## 2023-04-04 DIAGNOSIS — Z98.890 OTHER SPECIFIED POSTPROCEDURAL STATES: Chronic | ICD-10-CM

## 2023-04-04 DIAGNOSIS — Z98.89 OTHER SPECIFIED POSTPROCEDURAL STATES: Chronic | ICD-10-CM

## 2023-04-04 DIAGNOSIS — Z90.2 ACQUIRED ABSENCE OF LUNG [PART OF]: Chronic | ICD-10-CM

## 2023-04-04 LAB
ALBUMIN SERPL ELPH-MCNC: 3 G/DL — LOW (ref 3.3–5)
ALP SERPL-CCNC: 91 U/L — SIGNIFICANT CHANGE UP (ref 40–120)
ALT FLD-CCNC: 9 U/L — LOW (ref 10–45)
ANION GAP SERPL CALC-SCNC: 7 MMOL/L — SIGNIFICANT CHANGE UP (ref 5–17)
AST SERPL-CCNC: 10 U/L — SIGNIFICANT CHANGE UP (ref 10–40)
BASOPHILS # BLD AUTO: 0 K/UL — SIGNIFICANT CHANGE UP (ref 0–0.2)
BASOPHILS NFR BLD AUTO: 0 % — SIGNIFICANT CHANGE UP (ref 0–2)
BILIRUB SERPL-MCNC: 0.5 MG/DL — SIGNIFICANT CHANGE UP (ref 0.2–1.2)
BLD GP AB SCN SERPL QL: SIGNIFICANT CHANGE UP
BUN SERPL-MCNC: 28 MG/DL — HIGH (ref 7–23)
CALCIUM SERPL-MCNC: 8.8 MG/DL
CALCIUM SERPL-MCNC: 9 MG/DL — SIGNIFICANT CHANGE UP (ref 8.4–10.5)
CHLORIDE SERPL-SCNC: 108 MMOL/L — SIGNIFICANT CHANGE UP (ref 96–108)
CO2 SERPL-SCNC: 28 MMOL/L — SIGNIFICANT CHANGE UP (ref 22–31)
CREAT SERPL-MCNC: 1.03 MG/DL — SIGNIFICANT CHANGE UP (ref 0.5–1.3)
CREAT SERPL-MCNC: 1.09 MG/DL
DEPRECATED KAPPA LC FREE/LAMBDA SER: 5.43 RATIO
DEPRECATED KAPPA LC FREE/LAMBDA SER: 5.43 RATIO
EGFR: 73 ML/MIN/1.73M2
EGFR: 78 ML/MIN/1.73M2 — SIGNIFICANT CHANGE UP
EOSINOPHIL # BLD AUTO: 0.03 K/UL — SIGNIFICANT CHANGE UP (ref 0–0.5)
EOSINOPHIL NFR BLD AUTO: 1 % — SIGNIFICANT CHANGE UP (ref 0–6)
GLUCOSE SERPL-MCNC: 111 MG/DL — HIGH (ref 70–99)
HCT VFR BLD CALC: 21.3 % — LOW (ref 39–50)
HGB BLD-MCNC: 6.3 G/DL — CRITICAL LOW (ref 13–17)
IGA SER QL IEP: 138 MG/DL
IGG SER QL IEP: 920 MG/DL
IGM SER QL IEP: 1949 MG/DL
KAPPA LC CSF-MCNC: 2.15 MG/DL
KAPPA LC CSF-MCNC: 2.15 MG/DL
KAPPA LC SERPL-MCNC: 11.68 MG/DL
KAPPA LC SERPL-MCNC: 11.68 MG/DL
LDH SERPL-CCNC: 213 U/L
LYMPHOCYTES # BLD AUTO: 1.26 K/UL — SIGNIFICANT CHANGE UP (ref 1–3.3)
LYMPHOCYTES # BLD AUTO: 39 % — SIGNIFICANT CHANGE UP (ref 13–44)
MANUAL SMEAR VERIFICATION: SIGNIFICANT CHANGE UP
MCHC RBC-ENTMCNC: 29.6 GM/DL — LOW (ref 32–36)
MCHC RBC-ENTMCNC: 31 PG — SIGNIFICANT CHANGE UP (ref 27–34)
MCV RBC AUTO: 104.9 FL — HIGH (ref 80–100)
METAMYELOCYTES # FLD: 3 % — HIGH (ref 0–0)
MONOCYTES # BLD AUTO: 0.1 K/UL — SIGNIFICANT CHANGE UP (ref 0–0.9)
MONOCYTES NFR BLD AUTO: 3 % — SIGNIFICANT CHANGE UP (ref 2–14)
MYELOCYTES NFR BLD: 2 % — HIGH (ref 0–0)
NEUTROPHILS # BLD AUTO: 1.58 K/UL — LOW (ref 1.8–7.4)
NEUTROPHILS NFR BLD AUTO: 48 % — SIGNIFICANT CHANGE UP (ref 43–77)
NEUTS BAND # BLD: 1 % — SIGNIFICANT CHANGE UP (ref 0–8)
NRBC # BLD: 0 /100 — SIGNIFICANT CHANGE UP (ref 0–0)
PLAT MORPH BLD: NORMAL — SIGNIFICANT CHANGE UP
PLATELET # BLD AUTO: 33 K/UL — LOW (ref 150–400)
POTASSIUM SERPL-MCNC: 4.4 MMOL/L — SIGNIFICANT CHANGE UP (ref 3.5–5.3)
POTASSIUM SERPL-SCNC: 4.4 MMOL/L — SIGNIFICANT CHANGE UP (ref 3.5–5.3)
PROT SERPL-MCNC: 8.1 G/DL — SIGNIFICANT CHANGE UP (ref 6–8.3)
RBC # BLD: 2.03 M/UL — LOW (ref 4.2–5.8)
RBC # FLD: 20.3 % — HIGH (ref 10.3–14.5)
RBC BLD AUTO: SIGNIFICANT CHANGE UP
SODIUM SERPL-SCNC: 143 MMOL/L — SIGNIFICANT CHANGE UP (ref 135–145)
VARIANT LYMPHS # BLD: 3 % — SIGNIFICANT CHANGE UP (ref 0–6)
WBC # BLD: 3.23 K/UL — LOW (ref 3.8–10.5)
WBC # FLD AUTO: 3.23 K/UL — LOW (ref 3.8–10.5)

## 2023-04-04 PROCEDURE — 99285 EMERGENCY DEPT VISIT HI MDM: CPT | Mod: 25

## 2023-04-04 PROCEDURE — 86900 BLOOD TYPING SEROLOGIC ABO: CPT

## 2023-04-04 PROCEDURE — 86850 RBC ANTIBODY SCREEN: CPT

## 2023-04-04 PROCEDURE — P9016: CPT

## 2023-04-04 PROCEDURE — 86923 COMPATIBILITY TEST ELECTRIC: CPT

## 2023-04-04 PROCEDURE — 36430 TRANSFUSION BLD/BLD COMPNT: CPT

## 2023-04-04 PROCEDURE — 86901 BLOOD TYPING SEROLOGIC RH(D): CPT

## 2023-04-04 PROCEDURE — 80053 COMPREHEN METABOLIC PANEL: CPT

## 2023-04-04 PROCEDURE — 99285 EMERGENCY DEPT VISIT HI MDM: CPT

## 2023-04-04 PROCEDURE — 85025 COMPLETE CBC W/AUTO DIFF WBC: CPT

## 2023-04-04 PROCEDURE — 36415 COLL VENOUS BLD VENIPUNCTURE: CPT

## 2023-04-04 NOTE — ED PROVIDER NOTE - PROGRESS NOTE DETAILS
All results were explained to patient and/or family and a copy of all available results given.  case dw Dr. Estrella

## 2023-04-04 NOTE — ED PROVIDER NOTE - OBJECTIVE STATEMENT
Informed by Dr. Estrella (hem/onc) to ED for low Hg 6.8 yesterday.   No sob, cp, headache.  Mildly exertional sob worsen over the year.  No fever, n/v/d.  No other complaints.  Pt had blood transfusion 1 month ago here.  HO white coat syndrome. No other complaints.

## 2023-04-04 NOTE — ED ADULT TRIAGE NOTE - CHIEF COMPLAINT QUOTE
Patient states "my doctor called me and told me to come in for a blood transfusion, they said my hemoglobin was  a 6.8". Patient denies chest pain, SOB, headache, dizziness and blurry vision. Patient denies abnormal bleeding.

## 2023-04-04 NOTE — ED PROVIDER NOTE - PATIENT PORTAL LINK FT
You can access the FollowMyHealth Patient Portal offered by [facility] by registering at the following website: [email or web address]. By joining [employer]’s FollowMyHealth portal, you will also be able to view your health information using other applications (apps) compatible with our system.

## 2023-04-04 NOTE — ED ADULT NURSE NOTE - OBJECTIVE STATEMENT
Assumed pt care for a 72 yr old male alert and oriented x4 complaining of a low hemoglobin. As per Pt pcp called him yesterday and reported that his hemoglobin was 6.8 and to go to the ED for a transfusion. Pt reports more dyspnea than usual however denies any further complaints. iv established. Labs collected. Awaiting further disposition.

## 2023-04-04 NOTE — ED ADULT NURSE NOTE - NSFALLRSKPASTHIST_ED_ALL_ED
Returned call to pt regarding post op concerns. She states both breasts are red, hard, warm, and itchy below the nipple and around the incisions. Pt states areas are not painful, pt has had chills, no fever, has been feeling lightheaded. Pt states tape on both incisions has mostly fallen off on its own. Explained that this could be a potential allergic reaction to the tape vs. Infection. Regular protocol would be to send photos via [employer] for evaluation and if consistent with allergic reaction pt would moisturize tape, remove tape, cleanse skin with mild soap, and begin applying Westcort cream. Pt states she is already at the ED. Recommended pt continue with evaluation. Plan to follow up with pt in the morning. Pt states understanding. Michelle CRAWFORD RN, BSN    
no

## 2023-04-05 LAB
ALBUMIN MFR SERPL ELPH: 44.7 %
ALBUMIN SERPL-MCNC: 3.4 G/DL
ALBUMIN/GLOB SERPL: 0.8 RATIO
ALPHA1 GLOB MFR SERPL ELPH: 6 %
ALPHA1 GLOB SERPL ELPH-MCNC: 0.4 G/DL
ALPHA2 GLOB MFR SERPL ELPH: 13 %
ALPHA2 GLOB SERPL ELPH-MCNC: 1 G/DL
B-GLOBULIN MFR SERPL ELPH: 19.8 %
B-GLOBULIN SERPL ELPH-MCNC: 1.5 G/DL
GAMMA GLOB FLD ELPH-MCNC: 1.2 G/DL
GAMMA GLOB MFR SERPL ELPH: 16.5 %
INTERPRETATION SERPL IEP-IMP: NORMAL
M PROTEIN MFR SERPL ELPH: 18.7 %
MONOCLON BAND OBS SERPL: 1.4 G/DL
PROT SERPL-MCNC: 7.5 G/DL
PROT SERPL-MCNC: 7.5 G/DL

## 2023-04-07 ENCOUNTER — OUTPATIENT (OUTPATIENT)
Dept: OUTPATIENT SERVICES | Facility: HOSPITAL | Age: 73
LOS: 1 days | Discharge: HOME | End: 2023-04-07

## 2023-04-07 DIAGNOSIS — Z90.2 ACQUIRED ABSENCE OF LUNG [PART OF]: Chronic | ICD-10-CM

## 2023-04-07 DIAGNOSIS — Z98.89 OTHER SPECIFIED POSTPROCEDURAL STATES: Chronic | ICD-10-CM

## 2023-04-07 DIAGNOSIS — Z98.890 OTHER SPECIFIED POSTPROCEDURAL STATES: Chronic | ICD-10-CM

## 2023-04-07 DIAGNOSIS — C34.90 MALIGNANT NEOPLASM OF UNSPECIFIED PART OF UNSPECIFIED BRONCHUS OR LUNG: ICD-10-CM

## 2023-04-07 NOTE — CHART NOTE - NSCHARTNOTEFT_GEN_A_CORE
Patient presented to ED on 4/4/23 for abnormal lab result.  Patient has scheduled hematology/oncology follow up appointment scheduled for 4/11/23.

## 2023-04-10 LAB — VISCOSITY, SERUM: 2.1

## 2023-04-11 ENCOUNTER — RESULT REVIEW (OUTPATIENT)
Age: 73
End: 2023-04-11

## 2023-04-11 ENCOUNTER — NON-APPOINTMENT (OUTPATIENT)
Age: 73
End: 2023-04-11

## 2023-04-11 ENCOUNTER — APPOINTMENT (OUTPATIENT)
Dept: HEMATOLOGY ONCOLOGY | Facility: CLINIC | Age: 73
End: 2023-04-11
Payer: MEDICARE

## 2023-04-11 VITALS
HEART RATE: 78 BPM | OXYGEN SATURATION: 99 % | BODY MASS INDEX: 25.31 KG/M2 | WEIGHT: 180.78 LBS | RESPIRATION RATE: 16 BRPM | DIASTOLIC BLOOD PRESSURE: 66 MMHG | TEMPERATURE: 97 F | HEIGHT: 70.98 IN | SYSTOLIC BLOOD PRESSURE: 161 MMHG

## 2023-04-11 LAB
ALBUMIN SERPL ELPH-MCNC: 3.9 G/DL
ALP BLD-CCNC: 90 U/L
ALT SERPL-CCNC: <5 U/L
ANION GAP SERPL CALC-SCNC: 12 MMOL/L
AST SERPL-CCNC: 11 U/L
BASOPHILS # BLD AUTO: 0 K/UL — SIGNIFICANT CHANGE UP (ref 0–0.2)
BASOPHILS NFR BLD AUTO: 0 % — SIGNIFICANT CHANGE UP (ref 0–2)
BILIRUB SERPL-MCNC: 0.4 MG/DL
BUN SERPL-MCNC: 20 MG/DL
CALCIUM SERPL-MCNC: 9.2 MG/DL
CHLORIDE SERPL-SCNC: 106 MMOL/L
CO2 SERPL-SCNC: 25 MMOL/L
CREAT SERPL-MCNC: 0.93 MG/DL
EGFR: 88 ML/MIN/1.73M2
EOSINOPHIL # BLD AUTO: 0.02 K/UL — SIGNIFICANT CHANGE UP (ref 0–0.5)
EOSINOPHIL NFR BLD AUTO: 1 % — SIGNIFICANT CHANGE UP (ref 0–6)
GLUCOSE SERPL-MCNC: 94 MG/DL
HCT VFR BLD CALC: 24.3 % — LOW (ref 39–50)
HGB BLD-MCNC: 7.1 G/DL — LOW (ref 13–17)
LDH SERPL-CCNC: 217 U/L
LYMPHOCYTES # BLD AUTO: 1.07 K/UL — SIGNIFICANT CHANGE UP (ref 1–3.3)
LYMPHOCYTES # BLD AUTO: 44 % — SIGNIFICANT CHANGE UP (ref 13–44)
MCHC RBC-ENTMCNC: 29.2 G/DL — LOW (ref 32–36)
MCHC RBC-ENTMCNC: 29.5 PG — SIGNIFICANT CHANGE UP (ref 27–34)
MCV RBC AUTO: 100.8 FL — HIGH (ref 80–100)
METAMYELOCYTES # FLD: 1 % — HIGH (ref 0–0)
MONOCYTES # BLD AUTO: 0.39 K/UL — SIGNIFICANT CHANGE UP (ref 0–0.9)
MONOCYTES NFR BLD AUTO: 16 % — HIGH (ref 2–14)
NEUTROPHILS # BLD AUTO: 0.92 K/UL — LOW (ref 1.8–7.4)
NEUTROPHILS NFR BLD AUTO: 38 % — LOW (ref 43–77)
NRBC # BLD: 0 /100 — SIGNIFICANT CHANGE UP (ref 0–0)
NRBC # BLD: SIGNIFICANT CHANGE UP /100 WBCS (ref 0–0)
PLAT MORPH BLD: NORMAL — SIGNIFICANT CHANGE UP
PLATELET # BLD AUTO: 22 K/UL — LOW (ref 150–400)
POTASSIUM SERPL-SCNC: 4.5 MMOL/L
PROT SERPL-MCNC: 7.6 G/DL
RBC # BLD: 2.41 M/UL — LOW (ref 4.2–5.8)
RBC # FLD: 19.6 % — HIGH (ref 10.3–14.5)
RBC BLD AUTO: SIGNIFICANT CHANGE UP
SODIUM SERPL-SCNC: 143 MMOL/L
URATE SERPL-MCNC: 5 MG/DL
WBC # BLD: 2.43 K/UL — LOW (ref 3.8–10.5)
WBC # FLD AUTO: 2.43 K/UL — LOW (ref 3.8–10.5)

## 2023-04-11 PROCEDURE — 99215 OFFICE O/P EST HI 40 MIN: CPT

## 2023-04-11 NOTE — PHYSICAL EXAM
[Restricted in physically strenuous activity but ambulatory and able to carry out work of a light or sedentary nature] : Status 1- Restricted in physically strenuous activity but ambulatory and able to carry out work of a light or sedentary nature, e.g., light house work, office work [Thin] : thin [Normal] : affect appropriate [de-identified] : breathing appeared unlabored

## 2023-04-11 NOTE — ASSESSMENT
Addended by: Catrina Lynn on: 10/19/2017 01:56 PM     Modules accepted: Orders [FreeTextEntry1] : Lab work reviewed.\par #1) Patient with history of squamous cell lung cancer(LETY) diagnosed 2-2015-T3 N2 (Stage IIIa). He received neoadjuvant radiation and Taxol/Carboplatin chemotherapy followed by left pneumonectomy-No residual tumor at time of surgery. \par Course was complicated by abscess at pneumonectomy site. \par 9/14/2022 CT chest-in comparison with 3/17/2022, small amount of air within the left pneumonectomy space is resolved. The left upper posterior chest wall drainage catheter is removed. Otherwise, stable exam.\par Clinically stable at present. Continue expectant surveillance for his cancer. \par Patient will have next CAT scan of the chest per thoracic surgeon-planned in 9/2023.\par \par #2) Waldenström's macroglobulinemia-1/2022 BMB findings support a differential diagnosis which includes lymphoplasmacytic lymphoma/Waldenstroms macroglobulinemia (LPL/WM) versus marginal zone lymphoma.\par Additional testing showed +MYD88 mutation and -CXCR4 mutation analysis -favoring lymphoplasmacytic lymphoma/WM. 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 \par Reviewed potential complications with patient, and indications for treatment.  I am concerned regarding his cytopenias, and transfusional needs now.  Recommend treatment.  Treatment alternatives were reviewed with respective pros and cons. Patient refuses IV treatments at this time. He stated he will agree to taking PO medication-then t/c zanubrutinib (Brukinsa) as per NCCN guidelines-potential side effects explained including but not limited to HTN, rash, N/V/D, infection, cytopenias (and anticipated need for further transfusional support), increased creatinine, cough. Had explained that it may be more difficult to treat if treatment is needed on an emergency basis-he expressed his understanding-patient consents to treatment now.\par \par -valtrex prophylaxis; if tolerates new meds and pending course, may consider adding Bactrim DS TIW prophylaxis as well.\par Have advised ophthalmologic exam now and at least yearly (and as clinically indicated).\par \par Patient was given the opportunity to ask questions. His questions have been answered to his apparent satisfaction. \par
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
## 2023-04-12 LAB
HAV IGM SER QL: NONREACTIVE
HBV CORE IGM SER QL: NONREACTIVE
HBV SURFACE AG SER QL: NONREACTIVE
HCV AB SER QL: NONREACTIVE
HCV S/CO RATIO: 0.13 S/CO

## 2023-04-17 ENCOUNTER — LABORATORY RESULT (OUTPATIENT)
Age: 73
End: 2023-04-17

## 2023-04-17 ENCOUNTER — NON-APPOINTMENT (OUTPATIENT)
Age: 73
End: 2023-04-17

## 2023-04-18 ENCOUNTER — EMERGENCY (EMERGENCY)
Facility: HOSPITAL | Age: 73
LOS: 1 days | Discharge: HOME | End: 2023-04-18
Attending: INTERNAL MEDICINE | Admitting: INTERNAL MEDICINE
Payer: MEDICARE

## 2023-04-18 VITALS
OXYGEN SATURATION: 96 % | HEART RATE: 70 BPM | SYSTOLIC BLOOD PRESSURE: 161 MMHG | TEMPERATURE: 98 F | DIASTOLIC BLOOD PRESSURE: 70 MMHG | RESPIRATION RATE: 16 BRPM

## 2023-04-18 VITALS
WEIGHT: 179.9 LBS | HEIGHT: 71 IN | TEMPERATURE: 98 F | RESPIRATION RATE: 16 BRPM | HEART RATE: 78 BPM | OXYGEN SATURATION: 93 % | DIASTOLIC BLOOD PRESSURE: 75 MMHG | SYSTOLIC BLOOD PRESSURE: 185 MMHG

## 2023-04-18 DIAGNOSIS — Z98.89 OTHER SPECIFIED POSTPROCEDURAL STATES: Chronic | ICD-10-CM

## 2023-04-18 DIAGNOSIS — Z98.890 OTHER SPECIFIED POSTPROCEDURAL STATES: Chronic | ICD-10-CM

## 2023-04-18 DIAGNOSIS — Z90.2 ACQUIRED ABSENCE OF LUNG [PART OF]: Chronic | ICD-10-CM

## 2023-04-18 LAB
ALBUMIN SERPL ELPH-MCNC: 2.9 G/DL — LOW (ref 3.3–5)
ALP SERPL-CCNC: 86 U/L — SIGNIFICANT CHANGE UP (ref 40–120)
ALT FLD-CCNC: 13 U/L — SIGNIFICANT CHANGE UP (ref 10–45)
ANION GAP SERPL CALC-SCNC: 8 MMOL/L — SIGNIFICANT CHANGE UP (ref 5–17)
AST SERPL-CCNC: 11 U/L — SIGNIFICANT CHANGE UP (ref 10–40)
BASOPHILS # BLD AUTO: 0 K/UL — SIGNIFICANT CHANGE UP (ref 0–0.2)
BASOPHILS NFR BLD AUTO: 0 % — SIGNIFICANT CHANGE UP (ref 0–2)
BILIRUB SERPL-MCNC: 0.5 MG/DL — SIGNIFICANT CHANGE UP (ref 0.2–1.2)
BLD GP AB SCN SERPL QL: SIGNIFICANT CHANGE UP
BUN SERPL-MCNC: 21 MG/DL — SIGNIFICANT CHANGE UP (ref 7–23)
CALCIUM SERPL-MCNC: 8.4 MG/DL — SIGNIFICANT CHANGE UP (ref 8.4–10.5)
CHLORIDE SERPL-SCNC: 106 MMOL/L — SIGNIFICANT CHANGE UP (ref 96–108)
CO2 SERPL-SCNC: 29 MMOL/L — SIGNIFICANT CHANGE UP (ref 22–31)
CREAT SERPL-MCNC: 1.05 MG/DL — SIGNIFICANT CHANGE UP (ref 0.5–1.3)
DACRYOCYTES BLD QL SMEAR: SLIGHT — SIGNIFICANT CHANGE UP
EGFR: 76 ML/MIN/1.73M2 — SIGNIFICANT CHANGE UP
EOSINOPHIL # BLD AUTO: 0 K/UL — SIGNIFICANT CHANGE UP (ref 0–0.5)
EOSINOPHIL NFR BLD AUTO: 0 % — SIGNIFICANT CHANGE UP (ref 0–6)
GLUCOSE SERPL-MCNC: 106 MG/DL — HIGH (ref 70–99)
HCT VFR BLD CALC: 22.3 % — LOW (ref 39–50)
HGB BLD-MCNC: 6.7 G/DL — CRITICAL LOW (ref 13–17)
HYPOCHROMIA BLD QL: SIGNIFICANT CHANGE UP
LYMPHOCYTES # BLD AUTO: 1.07 K/UL — SIGNIFICANT CHANGE UP (ref 1–3.3)
LYMPHOCYTES # BLD AUTO: 46 % — HIGH (ref 13–44)
MACROCYTES BLD QL: SLIGHT — SIGNIFICANT CHANGE UP
MANUAL SMEAR VERIFICATION: SIGNIFICANT CHANGE UP
MCHC RBC-ENTMCNC: 30 GM/DL — LOW (ref 32–36)
MCHC RBC-ENTMCNC: 30.6 PG — SIGNIFICANT CHANGE UP (ref 27–34)
MCV RBC AUTO: 101.8 FL — HIGH (ref 80–100)
MONOCYTES # BLD AUTO: 0.46 K/UL — SIGNIFICANT CHANGE UP (ref 0–0.9)
MONOCYTES NFR BLD AUTO: 20 % — HIGH (ref 2–14)
NEUTROPHILS # BLD AUTO: 0.72 K/UL — LOW (ref 1.8–7.4)
NEUTROPHILS NFR BLD AUTO: 30 % — LOW (ref 43–77)
NEUTS BAND # BLD: 1 % — SIGNIFICANT CHANGE UP (ref 0–8)
NRBC # BLD: 0 /100 — SIGNIFICANT CHANGE UP (ref 0–0)
PLAT MORPH BLD: NORMAL — SIGNIFICANT CHANGE UP
PLATELET # BLD AUTO: 29 K/UL — LOW (ref 150–400)
POTASSIUM SERPL-MCNC: 4.6 MMOL/L — SIGNIFICANT CHANGE UP (ref 3.5–5.3)
POTASSIUM SERPL-SCNC: 4.6 MMOL/L — SIGNIFICANT CHANGE UP (ref 3.5–5.3)
PROT SERPL-MCNC: 7.7 G/DL — SIGNIFICANT CHANGE UP (ref 6–8.3)
RBC # BLD: 2.19 M/UL — LOW (ref 4.2–5.8)
RBC # FLD: 19.6 % — HIGH (ref 10.3–14.5)
RBC BLD AUTO: ABNORMAL
ROULEAUX BLD QL SMEAR: PRESENT
SARS-COV-2 RNA SPEC QL NAA+PROBE: SIGNIFICANT CHANGE UP
SODIUM SERPL-SCNC: 143 MMOL/L — SIGNIFICANT CHANGE UP (ref 135–145)
VARIANT LYMPHS # BLD: 3 % — SIGNIFICANT CHANGE UP (ref 0–6)
WBC # BLD: 2.32 K/UL — LOW (ref 3.8–10.5)
WBC # FLD AUTO: 2.32 K/UL — LOW (ref 3.8–10.5)

## 2023-04-18 PROCEDURE — P9016: CPT

## 2023-04-18 PROCEDURE — 86901 BLOOD TYPING SEROLOGIC RH(D): CPT

## 2023-04-18 PROCEDURE — 86900 BLOOD TYPING SEROLOGIC ABO: CPT

## 2023-04-18 PROCEDURE — 93010 ELECTROCARDIOGRAM REPORT: CPT

## 2023-04-18 PROCEDURE — P9040: CPT

## 2023-04-18 PROCEDURE — 93005 ELECTROCARDIOGRAM TRACING: CPT

## 2023-04-18 PROCEDURE — 99284 EMERGENCY DEPT VISIT MOD MDM: CPT | Mod: 25

## 2023-04-18 PROCEDURE — 99284 EMERGENCY DEPT VISIT MOD MDM: CPT

## 2023-04-18 PROCEDURE — 36430 TRANSFUSION BLD/BLD COMPNT: CPT

## 2023-04-18 PROCEDURE — 86923 COMPATIBILITY TEST ELECTRIC: CPT

## 2023-04-18 PROCEDURE — 87635 SARS-COV-2 COVID-19 AMP PRB: CPT

## 2023-04-18 PROCEDURE — 80053 COMPREHEN METABOLIC PANEL: CPT

## 2023-04-18 PROCEDURE — 85025 COMPLETE CBC W/AUTO DIFF WBC: CPT

## 2023-04-18 PROCEDURE — 86850 RBC ANTIBODY SCREEN: CPT

## 2023-04-18 PROCEDURE — 36415 COLL VENOUS BLD VENIPUNCTURE: CPT

## 2023-04-18 NOTE — ED ADULT NURSE NOTE - OBJECTIVE STATEMENT
Assumed pt care for a 72 yr old male alert and oriented x3 complaining of abnormal lab result. Pt reports hemoglobin was low and he is more short of breath than usual. Pt denies any further complaints. Awaiting further disposition.

## 2023-04-18 NOTE — ED PROVIDER NOTE - PHYSICAL EXAMINATION
General:     NAD, well-nourished, well-appearing  Head:     NC/AT, EOMI, oral mucosa moist pale  Neck:     trachea midline  Lungs:     CTA b/l, no w/r/r  CVS:     S1S2, RRR, no m/g/r  Abd:     +BS, s/nt/nd, no organomegaly  Ext:    2+ radial and pedal pulses, no c/c/e  Neuro: AAOx3, no sensory/motor deficits

## 2023-04-18 NOTE — ED PROVIDER NOTE - CLINICAL SUMMARY MEDICAL DECISION MAKING FREE TEXT BOX
72-year-old male with past history of lung cancer also anemia and received multiple blood transfusions in the past.came to the emergency room with chief complaint hemoglobin 6.7 no symptoms  Patient treated with 2 units of PRBC plan to discharge patient

## 2023-04-18 NOTE — ED PROVIDER NOTE - PATIENT PORTAL LINK FT
You can access the FollowMyHealth Patient Portal offered by [facility] by registering at the following website: [email or web address]. By joining [employer]’s FollowMyHealth portal, you will also be able to view your health information using other applications (apps) compatible with our system. positive S1/positive S2

## 2023-04-20 NOTE — CHART NOTE - NSCHARTNOTEFT_GEN_A_CORE
SW placed call to patient to discuss and assist with follow up care.  Patient presented to ED on 4/18/23 for abnormal labs.  Patient reports he was told to come to ED by hematologist and will be following up with her tele-health.  Declined needing any other assistance at this time.

## 2023-04-24 ENCOUNTER — LABORATORY RESULT (OUTPATIENT)
Age: 73
End: 2023-04-24

## 2023-04-25 ENCOUNTER — APPOINTMENT (OUTPATIENT)
Dept: ORTHOPEDIC SURGERY | Facility: CLINIC | Age: 73
End: 2023-04-25
Payer: MEDICARE

## 2023-04-25 VITALS — HEIGHT: 71 IN | WEIGHT: 180 LBS | BODY MASS INDEX: 25.2 KG/M2

## 2023-04-25 PROCEDURE — 99213 OFFICE O/P EST LOW 20 MIN: CPT

## 2023-04-25 NOTE — PHYSICAL EXAM
[de-identified] : He is comfortable sitting today and straight leg raising is negative to 90 degrees. [de-identified] : I reviewed his prior x-rays with him.

## 2023-04-25 NOTE — DISCUSSION/SUMMARY
[de-identified] : Despite the fact that he is doing this well I made it clear that the fracture is not healed at this point and he needs to avoid any bending and lifting activities.  I will see him for follow-up in 6 weeks and we will obtain repeat x-rays at that point.  He will call if there are is a worsening of his symptoms.

## 2023-04-25 NOTE — HISTORY OF PRESENT ILLNESS
[de-identified] : I saw him on March 14, 2 weeks after a fall, with complaints of acute bilateral lower back pain that was interestingly initially graded as a 7 or an 8 but was down to a 3 or a 4 at the time of his first visit with me.  Outside x-rays that did not include a lateral x-ray appeared to show a compression deformity of L1 that was not present on prior radiographic studies.  AP and lateral x-rays revealed an acute benign-appearing compression fracture of L1.  He has a history of [place]'s macro globin anemia and prior lung cancer.  A week ago he had transfusion of 2 units of packed cells for hemoglobin of 6.7.  His back pain at 2 months is dramatically better than we would normally see 2 months after a compression fracture.  He has a little bit more than momentary pain getting out of bed but other than that is doing very well.

## 2023-05-01 ENCOUNTER — LABORATORY RESULT (OUTPATIENT)
Age: 73
End: 2023-05-01

## 2023-05-02 ENCOUNTER — NON-APPOINTMENT (OUTPATIENT)
Age: 73
End: 2023-05-02

## 2023-05-08 ENCOUNTER — LABORATORY RESULT (OUTPATIENT)
Age: 73
End: 2023-05-08

## 2023-05-08 ENCOUNTER — NON-APPOINTMENT (OUTPATIENT)
Age: 73
End: 2023-05-08

## 2023-05-09 ENCOUNTER — APPOINTMENT (OUTPATIENT)
Dept: HEMATOLOGY ONCOLOGY | Facility: CLINIC | Age: 73
End: 2023-05-09

## 2023-05-10 ENCOUNTER — NON-APPOINTMENT (OUTPATIENT)
Age: 73
End: 2023-05-10

## 2023-05-10 ENCOUNTER — APPOINTMENT (OUTPATIENT)
Dept: HEMATOLOGY ONCOLOGY | Facility: CLINIC | Age: 73
End: 2023-05-10
Payer: MEDICARE

## 2023-05-10 ENCOUNTER — RESULT REVIEW (OUTPATIENT)
Age: 73
End: 2023-05-10

## 2023-05-10 VITALS
RESPIRATION RATE: 20 BRPM | DIASTOLIC BLOOD PRESSURE: 69 MMHG | SYSTOLIC BLOOD PRESSURE: 154 MMHG | BODY MASS INDEX: 24.72 KG/M2 | HEIGHT: 70.98 IN | OXYGEN SATURATION: 99 % | WEIGHT: 176.59 LBS | TEMPERATURE: 97 F | HEART RATE: 72 BPM

## 2023-05-10 LAB
BASOPHILS # BLD AUTO: 0.01 K/UL — SIGNIFICANT CHANGE UP (ref 0–0.2)
BASOPHILS NFR BLD AUTO: 0.4 % — SIGNIFICANT CHANGE UP (ref 0–2)
EOSINOPHIL # BLD AUTO: 0.03 K/UL — SIGNIFICANT CHANGE UP (ref 0–0.5)
EOSINOPHIL NFR BLD AUTO: 1.3 % — SIGNIFICANT CHANGE UP (ref 0–6)
HCT VFR BLD CALC: 26.9 % — LOW (ref 39–50)
HGB BLD-MCNC: 8 G/DL — LOW (ref 13–17)
IMM GRANULOCYTES NFR BLD AUTO: 1.7 % — HIGH (ref 0–0.9)
LYMPHOCYTES # BLD AUTO: 0.8 K/UL — LOW (ref 1–3.3)
LYMPHOCYTES # BLD AUTO: 34 % — SIGNIFICANT CHANGE UP (ref 13–44)
MCHC RBC-ENTMCNC: 29.7 G/DL — LOW (ref 32–36)
MCHC RBC-ENTMCNC: 32.3 PG — SIGNIFICANT CHANGE UP (ref 27–34)
MCV RBC AUTO: 107.9 FL — HIGH (ref 80–100)
MONOCYTES # BLD AUTO: 0.66 K/UL — SIGNIFICANT CHANGE UP (ref 0–0.9)
MONOCYTES NFR BLD AUTO: 28.1 % — HIGH (ref 2–14)
NEUTROPHILS # BLD AUTO: 0.81 K/UL — LOW (ref 1.8–7.4)
NEUTROPHILS NFR BLD AUTO: 34.5 % — LOW (ref 43–77)
NRBC # BLD: 0 /100 WBCS — SIGNIFICANT CHANGE UP (ref 0–0)
PLATELET # BLD AUTO: 44 K/UL — LOW (ref 150–400)
RBC # BLD: 2.48 M/UL — LOW (ref 4.2–5.8)
RBC # FLD: 22.8 % — HIGH (ref 10.3–14.5)
WBC # BLD: 2.35 K/UL — LOW (ref 3.8–10.5)
WBC # FLD AUTO: 2.35 K/UL — LOW (ref 3.8–10.5)

## 2023-05-10 PROCEDURE — 99214 OFFICE O/P EST MOD 30 MIN: CPT

## 2023-05-10 NOTE — PHYSICAL EXAM
[Restricted in physically strenuous activity but ambulatory and able to carry out work of a light or sedentary nature] : Status 1- Restricted in physically strenuous activity but ambulatory and able to carry out work of a light or sedentary nature, e.g., light house work, office work [Thin] : thin [Normal] : affect appropriate [de-identified] : breathing appeared unlabored

## 2023-05-10 NOTE — ASSESSMENT
[FreeTextEntry1] : Lab work reviewed.\par #1) Patient with history of squamous cell lung cancer(LETY) diagnosed 2-2015-T3 N2 (Stage IIIa). He received neoadjuvant radiation and Taxol/Carboplatin chemotherapy followed by left pneumonectomy-No residual tumor at time of surgery. \par Course was complicated by abscess at pneumonectomy site. \par 9/14/2022 CT chest-in comparison with 3/17/2022, small amount of air within the left pneumonectomy space is resolved. The left upper posterior chest wall drainage catheter is removed. Otherwise, stable exam.\par Clinically stable at present. Continue expectant surveillance. \par Patient will have next CAT scan of the chest per thoracic surgeon-planned in 9/2023.\par \par #2) Waldenström's macroglobulinemia-1/2022 BMB findings support a differential diagnosis which includes lymphoplasmacytic lymphoma/Waldenstroms macroglobulinemia (LPL/WM) versus marginal zone lymphoma.\par Additional testing showed +MYD88 mutation and -CXCR4 mutation analysis -favoring lymphoplasmacytic lymphoma/WM. 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 \par Reviewed potential complications with patient, and indications for treatment.  With refractory cytopenias, and transfusional needs, recommended treatment.  Patient refused IV treatments. \par He was agreeable to taking PO medication-began zanubrutinib (Brukinsa) as an option outlined per NCCN guidelines. Adjust dose as needed pending interval lab work. Follow CBC with diff. closely.\par -valtrex prophylaxis\par -Transfuse packed red blood cells for hemoglobin less than 7/related symptoms.\par Advised patient to go directly to the emergency department if any fever/concerns.\par \par Had advised ophthalmologic exam now and at least yearly (and as clinically indicated).\par \par Patient was given the opportunity to ask questions. His questions have been answered to his apparent satisfaction. \par

## 2023-05-10 NOTE — HISTORY OF PRESENT ILLNESS
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 He received neoadjuvant radiation and Taxol/Carboplatin chemotherapy followed by left pneumonectomy-No residual tumor at time of surgery. \par 11/2016-PET/CT scan showed minimally hypermetabolic right apical lung nodule, increasing in size on serial diagnostic CT scans. Poor visualization/nonvisualization of ground glass right upper lung opacities.  Several hypermetabolic left internal mammary nodes, increased in size and number with new FDG activity compared to prior PET/CT scan 1/2017- FNA of left internal thoracic lymph node was negative for malignant cells. Cytology suggestive, but not diagnostic of reactive process.  Patient underwent SRS of right upper lung nodule.\par 2/2017-PET/CT scan-marked increased size and FDG avidity of right apical nodule, concerning for malignancy. The nodule has increased in solidity since CT chest 7/2017, and not significantly changed since 11/2017. Decreased FDG avidity of left internal mammary lymph node. No evidence of distant FDG avid metastatic disease.\par 2/2018- S/P right VATS lung wedge resection for enlarging right apical lung nodule on imaging with pathology revealing scar with reactive changes c/w radiation atypia.\par \par 10/2016-Serum immunofixation showed 3 IgM kappa bands, with urine immunofixation showing a weak IgM kappa band identified, and Bence-Steen protein kappa type.  Bone marrow biopsy, and bone marrow aspirate showed a patchy, normocellular bone marrow with trilineage hematopoiesis and maturation, iron stores present. 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  Cytogenetics showed a normal male karyotype. \par \par 1/18/2022-Bone marrow biopsy, bone marrow clot, and bone marrow aspirate:-hypercellular bone marrow with marked B-cell lymphocytosis (50%of total cellularity), moderate plasmacytosis (5-9% of cellularity), increased mast cells and present iron stores. 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 No fevers. No LN complaints. No visual complaints. No abnormal bruising or bleeding reported. No fevers/sweats. No anorexia. No CP, SOB or light headedness.\par Maintaining usual activity level, ADL's.\par \par \par \par

## 2023-05-15 ENCOUNTER — LABORATORY RESULT (OUTPATIENT)
Age: 73
End: 2023-05-15

## 2023-05-19 NOTE — REVIEW OF SYSTEMS
no new weakness/sensory deficit; no vision change/no change in level of consciousness
[Negative] : Respiratory

## 2023-05-22 ENCOUNTER — LABORATORY RESULT (OUTPATIENT)
Age: 73
End: 2023-05-22

## 2023-05-23 LAB
BASOPHILS # BLD AUTO: 0.01 K/UL
BASOPHILS NFR BLD AUTO: 0.4 %
EOSINOPHIL # BLD AUTO: 0.02 K/UL
EOSINOPHIL NFR BLD AUTO: 0.9 %
HCT VFR BLD CALC: 33.7 %
HGB BLD-MCNC: 9.9 G/DL
IMM GRANULOCYTES NFR BLD AUTO: 0.4 %
LYMPHOCYTES # BLD AUTO: 1.18 K/UL
LYMPHOCYTES NFR BLD AUTO: 50.6 %
MAN DIFF?: NORMAL
MCHC RBC-ENTMCNC: 29.4 GM/DL
MCHC RBC-ENTMCNC: 34.5 PG
MCV RBC AUTO: 117.4 FL
MONOCYTES # BLD AUTO: 0.47 K/UL
MONOCYTES NFR BLD AUTO: 20.2 %
NEUTROPHILS # BLD AUTO: 0.64 K/UL
NEUTROPHILS NFR BLD AUTO: 27.5 %
PLATELET # BLD AUTO: 58 K/UL
RBC # BLD: 2.87 M/UL
RBC # FLD: 20.8 %
WBC # FLD AUTO: 2.33 K/UL

## 2023-05-30 ENCOUNTER — LABORATORY RESULT (OUTPATIENT)
Age: 73
End: 2023-05-30

## 2023-05-30 LAB
BASOPHILS # BLD AUTO: 0.02 K/UL
BASOPHILS NFR BLD AUTO: 1.1 %
EOSINOPHIL # BLD AUTO: 0.03 K/UL
EOSINOPHIL NFR BLD AUTO: 1.7 %
HCT VFR BLD CALC: 34.4 %
HGB BLD-MCNC: 10.2 G/DL
IMM GRANULOCYTES NFR BLD AUTO: 1.1 %
LYMPHOCYTES # BLD AUTO: 0.89 K/UL
LYMPHOCYTES NFR BLD AUTO: 50.9 %
MAN DIFF?: NORMAL
MCHC RBC-ENTMCNC: 29.7 GM/DL
MCHC RBC-ENTMCNC: 35.2 PG
MCV RBC AUTO: 118.6 FL
MONOCYTES # BLD AUTO: 0.36 K/UL
MONOCYTES NFR BLD AUTO: 20.6 %
NEUTROPHILS # BLD AUTO: 0.43 K/UL
NEUTROPHILS NFR BLD AUTO: 24.6 %
PLATELET # BLD AUTO: 62 K/UL
RBC # BLD: 2.9 M/UL
RBC # FLD: 18.5 %
WBC # FLD AUTO: 1.75 K/UL

## 2023-05-31 ENCOUNTER — NON-APPOINTMENT (OUTPATIENT)
Age: 73
End: 2023-05-31

## 2023-06-02 ENCOUNTER — OUTPATIENT (OUTPATIENT)
Dept: OUTPATIENT SERVICES | Facility: HOSPITAL | Age: 73
LOS: 1 days | Discharge: HOME | End: 2023-06-02

## 2023-06-02 DIAGNOSIS — Z98.890 OTHER SPECIFIED POSTPROCEDURAL STATES: Chronic | ICD-10-CM

## 2023-06-02 DIAGNOSIS — Z90.2 ACQUIRED ABSENCE OF LUNG [PART OF]: Chronic | ICD-10-CM

## 2023-06-02 DIAGNOSIS — Z98.89 OTHER SPECIFIED POSTPROCEDURAL STATES: Chronic | ICD-10-CM

## 2023-06-02 DIAGNOSIS — C34.90 MALIGNANT NEOPLASM OF UNSPECIFIED PART OF UNSPECIFIED BRONCHUS OR LUNG: ICD-10-CM

## 2023-06-06 ENCOUNTER — LABORATORY RESULT (OUTPATIENT)
Age: 73
End: 2023-06-06

## 2023-06-07 ENCOUNTER — APPOINTMENT (OUTPATIENT)
Dept: ORTHOPEDIC SURGERY | Facility: CLINIC | Age: 73
End: 2023-06-07
Payer: MEDICARE

## 2023-06-07 ENCOUNTER — NON-APPOINTMENT (OUTPATIENT)
Age: 73
End: 2023-06-07

## 2023-06-07 DIAGNOSIS — M54.50 LOW BACK PAIN, UNSPECIFIED: ICD-10-CM

## 2023-06-07 DIAGNOSIS — S32.010A WEDGE COMPRESSION FRACTURE OF FIRST LUMBAR VERTEBRA, INITIAL ENCOUNTER FOR CLOSED FRACTURE: ICD-10-CM

## 2023-06-07 PROCEDURE — 99213 OFFICE O/P EST LOW 20 MIN: CPT

## 2023-06-07 PROCEDURE — 72100 X-RAY EXAM L-S SPINE 2/3 VWS: CPT

## 2023-06-07 NOTE — DISCUSSION/SUMMARY
[de-identified] : I discussed with him that a male his age with a fall from a standing height should not have a fracture unless there is some underlying weakness of the bones.  His primary care physician should proceed with a bone density study and if he is osteoporotic it needs to be treated.  I will see him for follow-up for his back symptoms on a as needed basis.

## 2023-06-07 NOTE — PHYSICAL EXAM
[de-identified] : He is comfortable sitting today and straight leg raising is negative to 90 degrees. [de-identified] : AP and lateral x-rays of the lumbar spine are obtained and the fracture looks to be healed at this point despite the fact that he is only little over 3 months after the fracture.

## 2023-06-07 NOTE — HISTORY OF PRESENT ILLNESS
[de-identified] : He had a fall from standing height in his driveway on February 28.  X-rays revealed a compression fracture of L1 and within 2 weeks he was surprisingly better.  He has [place]'s.  He is having minimal symptoms at this point and mostly just discomfort getting out of bed.

## 2023-06-15 ENCOUNTER — APPOINTMENT (OUTPATIENT)
Dept: HEMATOLOGY ONCOLOGY | Facility: CLINIC | Age: 73
End: 2023-06-15
Payer: MEDICARE

## 2023-06-15 VITALS
OXYGEN SATURATION: 99 % | BODY MASS INDEX: 24.58 KG/M2 | RESPIRATION RATE: 17 BRPM | WEIGHT: 176.15 LBS | TEMPERATURE: 96.7 F | HEART RATE: 68 BPM | SYSTOLIC BLOOD PRESSURE: 157 MMHG | DIASTOLIC BLOOD PRESSURE: 75 MMHG

## 2023-06-15 PROCEDURE — 99214 OFFICE O/P EST MOD 30 MIN: CPT

## 2023-06-15 NOTE — PHYSICAL EXAM
[Restricted in physically strenuous activity but ambulatory and able to carry out work of a light or sedentary nature] : Status 1- Restricted in physically strenuous activity but ambulatory and able to carry out work of a light or sedentary nature, e.g., light house work, office work [Thin] : thin [Normal] : affect appropriate [de-identified] : breathing appeared unlabored

## 2023-06-15 NOTE — HISTORY OF PRESENT ILLNESS
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 He received neoadjuvant radiation and Taxol/Carboplatin chemotherapy followed by left pneumonectomy-No residual tumor at time of surgery. \par 11/2016-PET/CT scan showed minimally hypermetabolic right apical lung nodule, increasing in size on serial diagnostic CT scans. Poor visualization/nonvisualization of ground glass right upper lung opacities.  Several hypermetabolic left internal mammary nodes, increased in size and number with new FDG activity compared to prior PET/CT scan 1/2017- FNA of left internal thoracic lymph node was negative for malignant cells. Cytology suggestive, but not diagnostic of reactive process.  Patient underwent SRS of right upper lung nodule.\par 2/2017-PET/CT scan-marked increased size and FDG avidity of right apical nodule, concerning for malignancy. The nodule has increased in solidity since CT chest 7/2017, and not significantly changed since 11/2017. Decreased FDG avidity of left internal mammary lymph node. No evidence of distant FDG avid metastatic disease.\par 2/2018- S/P right VATS lung wedge resection for enlarging right apical lung nodule on imaging with pathology revealing scar with reactive changes c/w radiation atypia.\par \par 10/2016-Serum immunofixation showed 3 IgM kappa bands, with urine immunofixation showing a weak IgM kappa band identified, and Bence-Steen protein kappa type.  Bone marrow biopsy, and bone marrow aspirate showed a patchy, normocellular bone marrow with trilineage hematopoiesis and maturation, iron stores present. 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  Cytogenetics showed a normal male karyotype. \par \par 1/18/2022-Bone marrow biopsy, bone marrow clot, and bone marrow aspirate:-hypercellular bone marrow with marked B-cell lymphocytosis (50%of total cellularity), moderate plasmacytosis (5-9% of cellularity), increased mast cells and present iron stores. 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 No fevers. No LN complaints. No visual complaints. No abnormal bruising or bleeding reported. No fevers/sweats. No anorexia. No CP, SOB or light headedness.\par Maintaining usual activity level, ADL's.\par \par \par \par

## 2023-06-15 NOTE — ASSESSMENT
[FreeTextEntry1] : Lab work reviewed.\par #1) Patient with history of squamous cell lung cancer(LETY) diagnosed 2-2015-T3 N2 (Stage IIIa). He received neoadjuvant radiation and Taxol/Carboplatin chemotherapy followed by left pneumonectomy-No residual tumor at time of surgery. \par Course was complicated by abscess at pneumonectomy site. \par 9/14/2022 CT chest-in comparison with 3/17/2022, small amount of air within the left pneumonectomy space is resolved. The left upper posterior chest wall drainage catheter is removed. Otherwise, stable exam.\par Clinically stable at present. Expectant surveillance. \par Patient will have next CAT scan of the chest per thoracic surgeon-planned in 9/2023.\par \par #2) Waldenström's macroglobulinemia-1/2022 BMB findings support a differential diagnosis which includes lymphoplasmacytic lymphoma/Waldenstroms macroglobulinemia (LPL/WM) versus marginal zone lymphoma.\par Additional testing showed +MYD88 mutation and -CXCR4 mutation analysis -favoring lymphoplasmacytic lymphoma/WM. 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 \par Have reviewed potential complications with patient, and indications for treatment.  With refractory cytopenias, and transfusional needs, recommended treatment.  Patient refused IV treatments. \par He was agreeable to taking PO medication-began zanubrutinib (Brukinsa) 4/2023, as an option outlined per NCCN guidelines. Adjust dose as needed pending interval lab work. Follow CBC with diff. closely.\par -valtrex prophylaxis\par -Transfuse packed red blood cells for hemoglobin less than 7/related symptoms.\par \par Had advised ophthalmologic exam now and at least yearly (and as clinically indicated).\par \par Patient was given the opportunity to ask questions. His questions have been answered to his apparent satisfaction. \par

## 2023-06-19 NOTE — ASU PREOP CHECKLIST - BP NONINVASIVE SYSTOLIC (MM HG)
Call [phone] with any questions, concerns, or to schedule future visits.     Date: 6/20/23  Time: 1:35 pm  Side of penis (corporal body): left  Amount injected:  0.4 ml (40 units labeled on syringe)  Length of effectiveness:  Highest % of erection achieved: at 2:00 pm, a 10-15% erection was achieved.    Trimix injection instructions:    Step 1:  Wash hands.    Step 2: Alcohol top of vial. Gently swirl the vial to mix the medication. Make sure the medication is not frozen. If it is, must warm between hands and make sure all medicine is Liquifed before using.    Step 3: Pull back plunger on syringe, hold vial upside down, put 0.5 cc of air in syringe. Pull back 0.5 cc of medication. Get all the air bubbles out.     Step 4: Where NOT to inject: NOT on top of penis, NOT on bottom of penis, NOT on head of penis, NOT at base of penis, and NOT in veins of penis.  Where to inject: On either side of penis at mid shaft where there are no vessels. Do NOT twist penis when injecting.   You also want to rotate sites of injections. You do NOT want to inject in the same place every time as that can cause penile scar tissue to form.     Step 5: Alcohol wipe area on penis you want to inject.     Step 6: Inject the needle straight into penis. Do NOT angle needle up or down.     Step 7: Push all the medication into the penis.     Step 8: Hold light pressure on injection site for 1-2 minutes to prevent bleeding or oozing from site.     Start injecting with 0.5 cc. Your goal is to have an erection that is rigid enough for penetration but does not last for longer than 2.5 hours.  Do not inject when you have taken Viagra or Cialis within the last 24-36 hours.   May inject up to 3 times per week. Please do not inject more than one consecutive day. Take a day or days off in between injections.     Usually, patients can get 6 uses from each vial of medication.  As the medication sits in fridge, it loses its potency. You may have to  increase the amount injected by 0.1 cc and titrate up per injection until you get desired effect.   Keeping medication in the freezer allows the potency to last longer, usually about 6 months.  Keeping medication in the refrigerator allows the potency to last about 3 months.    Erection is usually good for one to two hours.    Risk of priapism was discussed:    If you have a prolonged erection of over 3 hours:  Have Sudafed (pseudoephedrine) 60 mg tablets at home to take one if needed for prolonged erection. (May take 1-2 tablets)   Put ice packs on penis to reduce erection.  May take cold shower to reduce erection.    If erection lasts longer than 4 hours:  Please go to ER immediately.     Call clinic if you need refills or want to discuss a stronger dose.   Follow up in Urology on 8/9/23 with Dr. Ferreira for scheduled follow up visit.    150

## 2023-06-26 ENCOUNTER — LABORATORY RESULT (OUTPATIENT)
Age: 73
End: 2023-06-26

## 2023-07-09 ENCOUNTER — RX RENEWAL (OUTPATIENT)
Age: 73
End: 2023-07-09

## 2023-07-10 ENCOUNTER — LABORATORY RESULT (OUTPATIENT)
Age: 73
End: 2023-07-10

## 2023-07-10 LAB
ALBUMIN SERPL ELPH-MCNC: 4 G/DL
ALP BLD-CCNC: 63 U/L
ALT SERPL-CCNC: <5 U/L
ANION GAP SERPL CALC-SCNC: 10 MMOL/L
AST SERPL-CCNC: 10 U/L
BILIRUB SERPL-MCNC: 0.4 MG/DL
BUN SERPL-MCNC: 16 MG/DL
CALCIUM SERPL-MCNC: 8.4 MG/DL
CHLORIDE SERPL-SCNC: 108 MMOL/L
CO2 SERPL-SCNC: 27 MMOL/L
CREAT SERPL-MCNC: 1.06 MG/DL
EGFR: 75 ML/MIN/1.73M2
GLUCOSE SERPL-MCNC: 130 MG/DL
POTASSIUM SERPL-SCNC: 4.2 MMOL/L
PROT SERPL-MCNC: 6.9 G/DL
SODIUM SERPL-SCNC: 144 MMOL/L

## 2023-07-13 ENCOUNTER — APPOINTMENT (OUTPATIENT)
Dept: HEMATOLOGY ONCOLOGY | Facility: CLINIC | Age: 73
End: 2023-07-13
Payer: MEDICARE

## 2023-07-13 VITALS
DIASTOLIC BLOOD PRESSURE: 81 MMHG | HEART RATE: 68 BPM | RESPIRATION RATE: 20 BRPM | OXYGEN SATURATION: 95 % | SYSTOLIC BLOOD PRESSURE: 165 MMHG | WEIGHT: 178.55 LBS | BODY MASS INDEX: 25 KG/M2 | TEMPERATURE: 97.2 F | HEIGHT: 70.98 IN

## 2023-07-13 PROCEDURE — 99214 OFFICE O/P EST MOD 30 MIN: CPT

## 2023-07-13 NOTE — HISTORY OF PRESENT ILLNESS
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 He received neoadjuvant radiation and Taxol/Carboplatin chemotherapy followed by left pneumonectomy-No residual tumor at time of surgery. \par 11/2016-PET/CT scan showed minimally hypermetabolic right apical lung nodule, increasing in size on serial diagnostic CT scans. Poor visualization/nonvisualization of ground glass right upper lung opacities.  Several hypermetabolic left internal mammary nodes, increased in size and number with new FDG activity compared to prior PET/CT scan 1/2017- FNA of left internal thoracic lymph node was negative for malignant cells. Cytology suggestive, but not diagnostic of reactive process.  Patient underwent SRS of right upper lung nodule.\par 2/2017-PET/CT scan-marked increased size and FDG avidity of right apical nodule, concerning for malignancy. The nodule has increased in solidity since CT chest 7/2017, and not significantly changed since 11/2017. Decreased FDG avidity of left internal mammary lymph node. No evidence of distant FDG avid metastatic disease.\par 2/2018- S/P right VATS lung wedge resection for enlarging right apical lung nodule on imaging with pathology revealing scar with reactive changes c/w radiation atypia.\par \par 10/2016-Serum immunofixation showed 3 IgM kappa bands, with urine immunofixation showing a weak IgM kappa band identified, and Bence-Steen protein kappa type.  Bone marrow biopsy, and bone marrow aspirate showed a patchy, normocellular bone marrow with trilineage hematopoiesis and maturation, iron stores present. 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  Cytogenetics showed a normal male karyotype. \par \par 1/18/2022-Bone marrow biopsy, bone marrow clot, and bone marrow aspirate:-hypercellular bone marrow with marked B-cell lymphocytosis (50%of total cellularity), moderate plasmacytosis (5-9% of cellularity), increased mast cells and present iron stores. 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 No fevers. No LN complaints. No visual complaints. No abnormal bruising or bleeding reported. No fevers/sweats. No CP, SOB or light headedness. No c/o bone pain.\par Maintaining usual activity level, ADL's.\par \par \par \par

## 2023-07-13 NOTE — PHYSICAL EXAM
[Restricted in physically strenuous activity but ambulatory and able to carry out work of a light or sedentary nature] : Status 1- Restricted in physically strenuous activity but ambulatory and able to carry out work of a light or sedentary nature, e.g., light house work, office work [Thin] : thin [Normal] : affect appropriate [de-identified] : breathing appeared unlabored

## 2023-07-13 NOTE — ASSESSMENT
[FreeTextEntry1] : Lab work reviewed.\par #1) Patient with history of squamous cell lung cancer(LETY) diagnosed 2-2015-T3 N2 (Stage IIIa). He received neoadjuvant radiation and Taxol/Carboplatin chemotherapy followed by left pneumonectomy-No residual tumor at time of surgery. \par Course was complicated by abscess at pneumonectomy site. \par 9/14/2022 CT chest-in comparison with 3/17/2022, small amount of air within the left pneumonectomy space is resolved. The left upper posterior chest wall drainage catheter is removed. Otherwise, stable exam.\par Clinically stable currently. Expectant surveillance. \par Patient will have next CAT scan of the chest per thoracic surgeon-planned in 9/2023.\par \par #2) Waldenström's macroglobulinemia-1/2022 BMB findings support a differential diagnosis which includes lymphoplasmacytic lymphoma/Waldenstroms macroglobulinemia (LPL/WM) versus marginal zone lymphoma.\par Additional testing showed +MYD88 mutation and -CXCR4 mutation analysis -favoring lymphoplasmacytic lymphoma/WM. 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 \par Had reviewed potential complications with patient, and indications for treatment.  With refractory cytopenias, and transfusional needs, recommended treatment.  Patient refused IV treatments. \par He was agreeable to taking PO medication-began zanubrutinib (Brukinsa) 4/2023, as an option outlined per NCCN guidelines. Adjust dose as needed pending interval lab work. Heme status stable at present. Follow CBC with diff. closely.\par -valtrex prophylaxis\par -Transfuse packed red blood cells for hemoglobin less than 7/related symptoms.\par \par Have again advised ophthalmologic exam now and at least yearly (and as clinically indicated).\par \par Patient was given the opportunity to ask questions. His questions have been answered to his apparent satisfaction. \par

## 2023-07-14 NOTE — ED PROVIDER NOTE - PATIENT PORTAL LINK FT
[facility]  [place] [address] [facility]  Scheduled Appointment: 08/09/2023    
You can access the FollowMyHealth Patient Portal offered by [facility] by registering at the following website: [email or web address]. By joining [facility]’s FollowMyHealth portal, you will also be able to view your health information using other applications (apps) compatible with our system.

## 2023-08-07 ENCOUNTER — LABORATORY RESULT (OUTPATIENT)
Age: 73
End: 2023-08-07

## 2023-08-07 LAB
ALBUMIN SERPL ELPH-MCNC: 4 G/DL
ALP BLD-CCNC: 60 U/L
ALT SERPL-CCNC: 5 U/L
ANION GAP SERPL CALC-SCNC: 10 MMOL/L
AST SERPL-CCNC: 9 U/L
BILIRUB SERPL-MCNC: 0.5 MG/DL
BUN SERPL-MCNC: 18 MG/DL
CALCIUM SERPL-MCNC: 8.9 MG/DL
CHLORIDE SERPL-SCNC: 106 MMOL/L
CO2 SERPL-SCNC: 29 MMOL/L
CREAT SERPL-MCNC: 1.01 MG/DL
EGFR: 79 ML/MIN/1.73M2
GLUCOSE SERPL-MCNC: 140 MG/DL
POTASSIUM SERPL-SCNC: 4.3 MMOL/L
PROT SERPL-MCNC: 6.9 G/DL
SODIUM SERPL-SCNC: 144 MMOL/L

## 2023-08-14 ENCOUNTER — LABORATORY RESULT (OUTPATIENT)
Age: 73
End: 2023-08-14

## 2023-08-21 ENCOUNTER — LABORATORY RESULT (OUTPATIENT)
Age: 73
End: 2023-08-21

## 2023-09-05 ENCOUNTER — OUTPATIENT (OUTPATIENT)
Dept: OUTPATIENT SERVICES | Facility: HOSPITAL | Age: 73
LOS: 1 days | Discharge: HOME | End: 2023-09-05

## 2023-09-05 DIAGNOSIS — Z98.890 OTHER SPECIFIED POSTPROCEDURAL STATES: Chronic | ICD-10-CM

## 2023-09-05 DIAGNOSIS — Z90.2 ACQUIRED ABSENCE OF LUNG [PART OF]: Chronic | ICD-10-CM

## 2023-09-05 DIAGNOSIS — Z98.89 OTHER SPECIFIED POSTPROCEDURAL STATES: Chronic | ICD-10-CM

## 2023-09-05 DIAGNOSIS — C34.90 MALIGNANT NEOPLASM OF UNSPECIFIED PART OF UNSPECIFIED BRONCHUS OR LUNG: ICD-10-CM

## 2023-09-06 NOTE — H&P PST ADULT - BSA (M2)
[facility]  [id number] [place] [id number]  Phone: [phone]  Primary Provider: Alice Hameed  Pre-op Performing Provider: ALICE HAMEED      PREOPERATIVE EVALUATION:  Today's date: 9/6/2023    Damien Vallecillo is a 87 year old male who presents for a preoperative evaluation.      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 Chino Ny  Surgery Date: 09/20/2023  Time of Surgery: 7/50am  Where patient plans to recover: At home with family  Fax number for surgical facility: Note does not need to be faxed, will be available electronically in Epic.    Assessment & Plan     The proposed surgical procedure is considered INTERMEDIATE risk.    Problem List Items Addressed This Visit       Elevated serum creatinine     Not CKD.  Monitor.         Gastric stromal tumor (H)    Gout of multiple sites     Quiescent. No therapies. monitor         Heart murmur     No.  Grade 2 left upper sternal border.  Broaden database.  This will not delay his surgery         Relevant Orders    Echocardiogram Complete with Lumason    RESOLVED: Pre-op exam    Preop general physical exam - Primary    Relevant Orders    Hemoglobin A1c (Completed)    EKG 12-lead complete w/read - Clinics (Completed)    CBC with platelets and differential (Completed)    Basic metabolic panel  (Ca, Cl, CO2, Creat, Gluc, K, Na, BUN)    Type 2 diabetes mellitus without complication, unspecified whether long term insulin use (H)     Recently diagnosed.  Diabetic education following his surgery         Relevant Orders    Albumin Random Urine Quantitative with Creat Ratio    Hemoglobin A1c (Completed)    AMB Adult Diabetes Educator Referral          Implanted Device:      Risks and Recommendations:  The patient has the  following additional risks and recommendations for perioperative complications:  Diet controlled diabetes, RENAE    Antiplatelet or Anticoagulation Medication Instructions:   - Patient is on no antiplatelet or anticoagulation medications.  He will hold fish oil and any nonsteroidals for 10 days prior to procedure  Additional Medication Instructions:  Patient is to take all scheduled medications on the day of surgery EXCEPT for modifications listed below:  Furosemide  RECOMMENDATION:  APPROVAL GIVEN to proceed with proposed procedure, without further diagnostic evaluation.            Subjective       HPI related to upcoming procedure: gastric stromal tumor           No data to display              Health Care Directive:  Patient has a Health Care Directive on file      Preoperative Review of :   reviewed - no record of controlled substances prescribed.          Review of Systems  CONSTITUTIONAL: NEGATIVE for fever, chills, change in weight  ENT/MOUTH: NEGATIVE for ear, mouth and throat problems  RESP: NEGATIVE for significant cough or SOB  CV: NEGATIVE for chest pain, palpitations or peripheral edema  HEME/ALLERGY/IMMUNE: NEGATIVE for bleeding problems    Patient Active Problem List    Diagnosis Date Noted    Pre-op exam 07/10/2023     Priority: Medium    Fe deficiency anemia 07/10/2023     Priority: Medium    Hematochezia 06/12/2023     Priority: Medium    History of GI diverticular bleed 06/12/2023     Priority: Medium    Midline low back pain without sciatica, unspecified chronicity 06/02/2023     Priority: Medium    High priority for 2019-nCoV vaccine 06/02/2023     Priority: Medium    Anemia due to blood loss, acute 05/25/2023     Priority: Medium    RENAE (obstructive sleep apnea) 03/08/2023     Priority: Medium    Fall 03/08/2023     Priority: Medium    Physical deconditioning 03/08/2023     Priority: Medium    Hypersomnia 03/07/2023     Priority: Medium    Encounter for immunization 09/21/2022     Priority:  Medium    External hemorrhoids 09/21/2022     Priority: Medium    Benign prostatic hyperplasia with urinary frequency 03/29/2022     Priority: Medium    Hematuria, unspecified type 09/16/2021     Priority: Medium    Special screening for malignant neoplasm of prostate 09/16/2021     Priority: Medium    Falls frequently 09/16/2021     Priority: Medium    Cognitive attention deficit 09/16/2021     Priority: Medium    Fecal incontinence 09/16/2020     Priority: Medium    Dislocation of right patella 09/16/2020     Priority: Medium    Hyperlipidemia LDL goal <160 11/13/2019     Priority: Medium    Elevated serum creatinine 11/13/2019     Priority: Medium     GFR Estimate   Date Value Ref Range Status   09/30/2019 84 >60 mL/min/[1.73_m2] Final     Comment:     Non  GFR Calc  Starting 12/18/2018, serum creatinine based estimated GFR (eGFR) will be   calculated using the Chronic Kidney Disease Epidemiology Collaboration   (CKD-EPI) equation.               Peripheral polyneuropathy 09/30/2019     Priority: Medium    Rosacea 09/28/2018     Priority: Medium    Cheilosis 09/28/2018     Priority: Medium    Rhinophyma 09/28/2018     Priority: Medium    Pedal edema 01/18/2018     Priority: Medium    History of arthroplasty of right knee 08/17/2017     Priority: Medium    S/P total knee arthroplasty 06/19/2017     Priority: Medium    Right knee pain 05/08/2017     Priority: Medium    Left knee pain, unspecified chronicity 05/08/2017     Priority: Medium    RBC microcytosis 02/14/2017     Priority: Medium    Prediabetes 10/21/2016     Priority: Medium    Nonintractable epilepsy with complex partial seizures (H) 10/21/2016     Priority: Medium     Last 2011      Morbid obesity due to excess calories (H) 10/21/2016     Priority: Medium    Hammer toe of left foot 10/21/2016     Priority: Medium    Venous stasis dermatitis of both lower extremities 10/21/2016     Priority: Medium    ACP (advance care planning)  "03/14/2012     Priority: Medium     ordered today.      Gout      Priority: Medium     Problem list name updated by automated process. Provider to review        Past Medical History:   Diagnosis Date    Acute suppurative arthritis (H)     Acute, but ill-defined, cerebrovascular disease     Anemia due to blood loss, acute 5/25/2023    Ankle fracture 12/28/2018    Arthritis     Benign prostatic hyperplasia with urinary frequency 3/29/2022    Cheilosis 9/28/2018    Chronic headaches     Closed right ankle fracture 12/29/2018    Cognitive attention deficit 9/16/2021    Diabetes (H)     \"Pre-diabetes\"    Dislocation of right patella 9/16/2020    Elevated serum creatinine 11/13/2019    GFR Estimate Date Value Ref Range Status 09/30/2019 84 >60 mL/min/[1.73_m2] Final   Comment:   Non  GFR Calc Starting 12/18/2018, serum creatinine based estimated GFR (eGFR) will be  calculated using the Chronic Kidney Disease Epidemiology Collaboration  (CKD-EPI) equation.        Encounter for immunization 9/21/2022    External hemorrhoids 9/21/2022    Fall 3/8/2023    Fe deficiency anemia 7/10/2023    Fecal incontinence 9/16/2020    Glucose intolerance (impaired glucose tolerance) 5/31/2013    Gout, unspecified     Grieving 9/16/2021    Hammer toe of left foot 10/21/2016    Hematuria     History of arthroplasty of right knee 08/17/2017    History of blood transfusion     Hyperlipidemia LDL goal <160 11/13/2019    Hypersomnia 3/7/2023    Left foot pain 9/30/2019    Left knee pain, unspecified chronicity 5/8/2017    Lentigo maligna (H)     Malignant neoplasm of rectosigmoid junction (H)     Morbid obesity due to excess calories (H) 10/21/2016    RENAE (obstructive sleep apnea) 3/8/2023    Other convulsions     last seizure 7-8 years ago...not certain if these were true seizres or not..    Pedal edema 1/18/2018    Peripheral polyneuropathy 9/30/2019    Physical deconditioning 3/8/2023    Pre-diabetes 10/21/2016    RBC " microcytosis 2/14/2017    Rhinophyma 9/28/2018    Right knee pain, unspecified chronicity 5/8/2017    Rosacea 9/28/2018    Syncope     Tubulovillous adenoma of colon     Venous stasis dermatitis of both lower extremities 10/21/2016     Past Surgical History:   Procedure Laterality Date    ARTHROPLASTY KNEE Right 6/19/2017    Procedure: ARTHROPLASTY KNEE;  Right total knee arthroplasty, Hammer toe repair toe 2,3,4,5 left foot with osteotomy;  Surgeon: Alec Raymond MD;  Location:  OR    COLONOSCOPY N/A 6/23/2015    Procedure: COMBINED COLONOSCOPY, SINGLE OR MULTIPLE BIOPSY/POLYPECTOMY BY BIOPSY;  Surgeon: Kimberly Alfaro MD;  Location:  GI    COLONOSCOPY N/A 7/30/2015    Procedure: COLONOSCOPY;  Surgeon: Enrique Salazar MD;  Location:  OR    COLONOSCOPY N/A 7/31/2018    Procedure: COLONOSCOPY;  Colonoscopy;  Surgeon: Tiffany Cheema MD;  Location:  GI    COLONOSCOPY N/A 5/26/2023    Procedure: Colonoscopy;  Surgeon: Juan Grimaldo MD;  Location:  GI    COLONOSCOPY N/A 6/15/2023    Procedure: colonoscopy;   no cecum as pt has had partial colectomy;  Surgeon: Sherif Escalona MD;  Location:  GI    ENDOSCOPIC ULTRASOUND UPPER GASTROINTESTINAL TRACT (GI) N/A 7/13/2023    Procedure: Endoscopic ultrasound upper gastrointestinal tract with fine needle aspiration;  Surgeon: Kae Mccall MD;  Location:  OR    ESOPHAGOSCOPY, GASTROSCOPY, DUODENOSCOPY (EGD), COMBINED N/A 5/26/2023    Procedure: Esophagoscopy, gastroscopy, duodenoscopy (EGD), combined;  Surgeon: Juan Grimaldo MD;  Location:  GI    HERNIORRHAPHY EPIGASTRIC  4/27/2012    Procedure:HERNIORRHAPHY EPIGASTRIC; Epigastric Hernia Repair with mesh ; Surgeon:BOLIVAR OLIVEIRA; Location: OR    LAPAROSCOPIC ASSISTED COLECTOMY Right 7/30/2015    Procedure: LAPAROSCOPIC ASSISTED COLECTOMY;  Surgeon: Ernique Salazar MD;  Location:  OR    ORTHOPEDIC SURGERY      lt knee arthroplasty    REALIGN PATELLA Right  10/9/2017    Procedure: REALIGN PATELLA;  Revision right total knee arthroplasty;  Surgeon: Alec Raymond MD;  Location: [facility] OR    REPAIR HAMMER TOE Left 6/19/2017    Procedure: REPAIR HAMMER TOE;  Hammer toe repair toe 2,3,4,5 left foot with osteotomy   ;  Surgeon: Beverly Kim DPM, Podiatry/Foot and Ankle Surgery;  Location: [facility] OR    SIGMOIDOSCOPY FLEXIBLE  5/29/2013    Procedure: SIGMOIDOSCOPY FLEXIBLE;  SIGMOIDOSCOPY FLEXIBLE (FV) Needs blood sugar ck'd;  Surgeon: Enrique Salazar MD;  Location:  GI    skin cancer excision      ZZC NONSPECIFIC PROCEDURE      right heel surgery; spur removed.     Current Outpatient Medications   Medication Sig Dispense Refill    acetaminophen (TYLENOL) 500 MG tablet Take 500-1,000 mg by mouth every 6 hours as needed for mild pain      atorvastatin (LIPITOR) 20 MG tablet Take 20 mg by mouth every evening      CINNAMON PO Take 1 tablet by mouth daily       diclofenac (VOLTAREN) 1 % topical gel Apply 2 g topically 2 times daily      ferrous sulfate (FEROSUL) 325 (65 Fe) MG tablet Take 1 tablet (325 mg) by mouth daily (with breakfast)      fish oil-omega-3 fatty acids 1000 MG capsule Take 1 g by mouth daily      Flaxseed, Linseed, (FLAXSEED OIL) 1000 MG CAPS Take 1,000 mg by mouth daily      furosemide (LASIX) 20 MG tablet Take 1 tablet (20 mg) by mouth 2 times daily 180 tablet 1    glucosamine-chondroitin 500-400 MG CAPS per capsule Take 1 capsule by mouth daily      metroNIDAZOLE (METROGEL) 0.75 % external gel APPLY THIN LAYER TO FACE TWICE A DAY FOR ROSACEA      Multiple Vitamins-Minerals (MULTIVITAMIN ADULT PO) Take 1 tablet by mouth daily Reported on 5/12/2017      order for DME 1: Gradient Compression Wraps; 2: Cast Boots; 3: BLE knee or thigh high 20-30 mm Hg compression stocking; 4: BLE knee or thigh high custom compression stocking; 5: Alternative BLE knee high or thigh compression garments (velcro/buckling) 1 each 0    order for DME Equipment being ordered:  "Walker Wheels () and Walker ()  Treatment Diagnosis: Impaired functional mobility 1 each 0    pramox-pe-glycerin-petrolatum (PREPARATION H) 1-0.25-14.4-15 % CREA cream Place rectally 4 times daily as needed for hemorrhoids Use as directed      tamsulosin (FLOMAX) 0.4 MG capsule Take 1 capsule (0.4 mg) by mouth daily 90 capsule 3       Allergies   Allergen Reactions    Levetiracetam [Keppra] Rash    Oxcarbazepine [Oxcarbazepine] Rash        Social History     Tobacco Use    Smoking status: Never     Passive exposure: Never    Smokeless tobacco: Never   Substance Use Topics    Alcohol use: Not Currently       History   Drug Use No         Objective     BP (!) 160/76 (BP Location: Right arm, Patient Position: Sitting, Cuff Size: Adult Large)   Pulse 50   Temp 97.5  F (36.4  C) (Oral)   Resp 18   Ht 1.778 m (5' 10\")   Wt 121.1 kg (267 lb)   SpO2 98%   BMI 38.31 kg/m      Physical Exam  Constitutional:       Appearance: Normal appearance.   HENT:      Head: Atraumatic.      Right Ear: Tympanic membrane normal.      Left Ear: Tympanic membrane normal.      Nose: Nose normal.      Mouth/Throat:      Mouth: Mucous membranes are moist.   Eyes:      Pupils: Pupils are equal, round, and reactive to light.   Cardiovascular:      Rate and Rhythm: Normal rate and regular rhythm.      Heart sounds: Murmur heard.   Pulmonary:      Effort: Pulmonary effort is normal.      Breath sounds: Normal breath sounds.   Abdominal:      General: Bowel sounds are normal.      Palpations: Abdomen is soft.   Musculoskeletal:      Cervical back: Neck supple.      Right lower leg: Edema present.      Left lower leg: Edema present.   Skin:     General: Skin is warm and dry.      Comments: R hallux major medial nail with callus medial.  Proximal end with punctate eschar, not infected.   Neurological:      General: No focal deficit present.      Mental Status: He is alert.   Psychiatric:         Mood and Affect: Mood normal. "           Recent Labs   Lab Test 08/22/23  0828 07/10/23  1327 06/27/23  1539 06/14/23  2201 06/14/23  0640 05/25/23  1624 05/25/23  1038 03/07/23  1557 09/21/22  1207   HGB  --  10.9* 8.8*   < > 7.8*   < > 9.4*  --   --    PLT  --  250 244  --  193   < > 177  --   --    INR  --   --   --   --   --   --  1.24*  --   --    NA  --  141  --   --  141   < > 140   < > 139   POTASSIUM  --  4.3  --   --  4.0   < > 3.8   < > 4.1   CR 0.9 0.93  --   --  0.87   < > 0.89   < > 0.83   A1C  --   --   --   --   --   --  6.7*  --  6.5*    < > = values in this interval not displayed.        Diagnostics:  Labs pending at this time.  Results will be reviewed when available.   EKG: sinus bradycardia, normal axis, normal intervals, no acute ST/T changes c/w ischemia, no LVH by voltage criteria, unchanged from previous tracings    Revised Cardiac Risk Index (RCRI):  The patient has the following serious cardiovascular risks for perioperative complications:   - No serious cardiac risks = 0 points     RCRI Interpretation: 0 points: Class I (very low risk - 0.4% complication rate)         Signed Electronically by: Obi Strange MD  Copy of this evaluation report is provided to requesting physician.       2.08

## 2023-09-10 NOTE — PHYSICAL EXAM
[Restricted in physically strenuous activity but ambulatory and able to carry out work of a light or sedentary nature] : Status 1- Restricted in physically strenuous activity but ambulatory and able to carry out work of a light or sedentary nature, e.g., light house work, office work [Thin] : thin [Normal] : affect appropriate [de-identified] : breathing appeared unlabored

## 2023-09-10 NOTE — HISTORY OF PRESENT ILLNESS
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 He received neoadjuvant radiation and Taxol/Carboplatin chemotherapy followed by left pneumonectomy-No residual tumor at time of surgery.  11/2016-PET/CT scan showed minimally hypermetabolic right apical lung nodule, increasing in size on serial diagnostic CT scans. Poor visualization/nonvisualization of ground glass right upper lung opacities.  Several hypermetabolic left internal mammary nodes, increased in size and number with new FDG activity compared to prior PET/CT scan 1/2017- FNA of left internal thoracic lymph node was negative for malignant cells. Cytology suggestive, but not diagnostic of reactive process.  Patient underwent SRS of right upper lung nodule. 2/2017-PET/CT scan-marked increased size and FDG avidity of right apical nodule, concerning for malignancy. The nodule has increased in solidity since CT chest 7/2017, and not significantly changed since 11/2017. Decreased FDG avidity of left internal mammary lymph node. No evidence of distant FDG avid metastatic disease. 2/2018- S/P right VATS lung wedge resection for enlarging right apical lung nodule on imaging with pathology revealing scar with reactive changes c/w radiation atypia.  10/2016-Serum immunofixation showed 3 IgM kappa bands, with urine immunofixation showing a weak IgM kappa band identified, and Bence-Steen protein kappa type.  Bone marrow biopsy, and bone marrow aspirate showed a patchy, normocellular bone marrow with trilineage hematopoiesis and maturation, iron stores present. Monoclonal B cells less than 10% and plasma cells 5-10%, without forming aggregates. Bone marrow aspirate flow cytometry findings consistent with CD5 negative, CD10 negative, B-cell lymphoproliferative disorder/lymphoma. Plasmacytic differentiation.  Cytogenetics showed a normal male karyotype.   1/18/2022-Bone marrow biopsy, bone marrow clot, and bone marrow aspirate:-hypercellular bone marrow with marked B-cell lymphocytosis (50%of total cellularity), moderate plasmacytosis (5-9% of cellularity), increased mast cells and present iron stores. Flow cytometry shows a very small monotypic population of B-cells (CD5 negative, CD10 negative) and too few plasma cells for definitive evaluation of clonality.  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 Cytogenetics-normal male karyotype. NGS-CXR4, MYD88, BRAF mutations.  4/2023-Began [place] (Zanubrutinib)  [de-identified] : squamous cell cancer [de-identified] :   --Feels well. Last transfusion was in April. No complaints of cough. No fevers. No LN complaints. No visual complaints. No abnormal bruising or bleeding reported. No fevers/sweats. No CP, SOB or light headedness. No c/o bone pain. Maintaining usual activity level, ADL's.

## 2023-09-10 NOTE — ASSESSMENT
[FreeTextEntry1] : Lab work reviewed. #1) Patient with history of squamous cell lung cancer(LETY) diagnosed 2-2015-T3 N2 (Stage IIIa). He received neoadjuvant radiation and Taxol/Carboplatin chemotherapy followed by left pneumonectomy-No residual tumor at time of surgery.  Course was complicated by abscess at pneumonectomy site.  9/14/2022 CT chest-in comparison with 3/17/2022, small amount of air within the left pneumonectomy space is resolved. The left upper posterior chest wall drainage catheter is removed. Otherwise, stable exam. Clinically stable currently. Expectant surveillance.  Patient will have next CAT scan of the chest per thoracic surgeon-planned this month.  #2) Waldenstrom's macroglobulinemia-1/2022 BMB findings support a differential diagnosis which includes lymphoplasmacytic lymphoma/Waldenstroms macroglobulinemia (LPL/WM) versus marginal zone lymphoma. Additional testing showed +MYD88 mutation and -CXCR4 mutation analysis -favoring lymphoplasmacytic lymphoma/WM.  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 NGS-CXR4, MYD88, BRAF mutations.  Patient is currently ebnlqkexcrap-Hhiy-Mdrhrn Risk calculator used revealed a score of 4.0815, placing patient in the high risk group with median TTP of 1.8 years.  Had reviewed potential complications with patient, and indications for treatment.  With refractory cytopenias, and transfusional needs, recommended treatment.  Patient refused IV treatments.  He was agreeable to taking PO medication-began zanubrutinib (Brukinsa) 4/2023, as an option outlined per NCCN guidelines. Continue to adjust dose as needed pending interval lab work. Follow CBC with diff. closely. -valtrex prophylaxis -Transfuse packed red blood cells for hemoglobin less than 7/related symptoms.  Have again advised ophthalmologic exam now and at least yearly (and as clinically indicated).  Patient was given the opportunity to ask questions. His questions have been answered to his apparent satisfaction.   -->CBC with diff. q month; Brukinsa; RTO 3 months.

## 2023-09-11 ENCOUNTER — RESULT REVIEW (OUTPATIENT)
Age: 73
End: 2023-09-11

## 2023-09-11 ENCOUNTER — APPOINTMENT (OUTPATIENT)
Dept: CT IMAGING | Facility: HOSPITAL | Age: 73
End: 2023-09-11
Payer: MEDICARE

## 2023-09-11 ENCOUNTER — OUTPATIENT (OUTPATIENT)
Dept: OUTPATIENT SERVICES | Facility: HOSPITAL | Age: 73
LOS: 1 days | End: 2023-09-11
Payer: MEDICARE

## 2023-09-11 ENCOUNTER — LABORATORY RESULT (OUTPATIENT)
Age: 73
End: 2023-09-11

## 2023-09-11 DIAGNOSIS — C34.90 MALIGNANT NEOPLASM OF UNSPECIFIED PART OF UNSPECIFIED BRONCHUS OR LUNG: ICD-10-CM

## 2023-09-11 DIAGNOSIS — Z98.89 OTHER SPECIFIED POSTPROCEDURAL STATES: Chronic | ICD-10-CM

## 2023-09-11 DIAGNOSIS — Z90.2 ACQUIRED ABSENCE OF LUNG [PART OF]: Chronic | ICD-10-CM

## 2023-09-11 LAB
ALBUMIN SERPL ELPH-MCNC: 4.1 G/DL
ALP BLD-CCNC: 68 U/L
ALT SERPL-CCNC: <5 U/L
ANION GAP SERPL CALC-SCNC: 6 MMOL/L
AST SERPL-CCNC: 8 U/L
BILIRUB SERPL-MCNC: 0.6 MG/DL
BUN SERPL-MCNC: 18 MG/DL
CALCIUM SERPL-MCNC: 8.6 MG/DL
CHLORIDE SERPL-SCNC: 104 MMOL/L
CO2 SERPL-SCNC: 27 MMOL/L
CREAT SERPL-MCNC: 1.07 MG/DL
EGFR: 74 ML/MIN/1.73M2
GLUCOSE SERPL-MCNC: 100 MG/DL
POTASSIUM SERPL-SCNC: 4.5 MMOL/L
PROT SERPL-MCNC: 7 G/DL
SODIUM SERPL-SCNC: 137 MMOL/L

## 2023-09-11 PROCEDURE — 71250 CT THORAX DX C-: CPT | Mod: 26,MH

## 2023-09-11 PROCEDURE — 71250 CT THORAX DX C-: CPT | Mod: MH

## 2023-09-14 ENCOUNTER — APPOINTMENT (OUTPATIENT)
Dept: HEMATOLOGY ONCOLOGY | Facility: CLINIC | Age: 73
End: 2023-09-14
Payer: MEDICARE

## 2023-09-14 VITALS
DIASTOLIC BLOOD PRESSURE: 71 MMHG | WEIGHT: 176.37 LBS | SYSTOLIC BLOOD PRESSURE: 166 MMHG | OXYGEN SATURATION: 99 % | BODY MASS INDEX: 24.61 KG/M2 | RESPIRATION RATE: 20 BRPM | TEMPERATURE: 97.5 F | HEART RATE: 61 BPM

## 2023-09-14 PROCEDURE — 99214 OFFICE O/P EST MOD 30 MIN: CPT

## 2023-09-18 ENCOUNTER — APPOINTMENT (OUTPATIENT)
Dept: THORACIC SURGERY | Facility: CLINIC | Age: 73
End: 2023-09-18
Payer: MEDICARE

## 2023-09-18 VITALS
WEIGHT: 176 LBS | HEIGHT: 71 IN | BODY MASS INDEX: 24.64 KG/M2 | RESPIRATION RATE: 18 BRPM | SYSTOLIC BLOOD PRESSURE: 192 MMHG | DIASTOLIC BLOOD PRESSURE: 71 MMHG | HEART RATE: 70 BPM | OXYGEN SATURATION: 99 %

## 2023-09-18 PROCEDURE — 99213 OFFICE O/P EST LOW 20 MIN: CPT

## 2023-10-09 ENCOUNTER — LABORATORY RESULT (OUTPATIENT)
Age: 73
End: 2023-10-09

## 2023-10-09 LAB
BASOPHILS # BLD AUTO: 0.02 K/UL
BASOPHILS NFR BLD AUTO: 1.3 %
EOSINOPHIL # BLD AUTO: 0.04 K/UL
EOSINOPHIL NFR BLD AUTO: 2.5 %
HCT VFR BLD CALC: 40 %
HGB BLD-MCNC: 12.8 G/DL
IMM GRANULOCYTES NFR BLD AUTO: 0 %
LYMPHOCYTES # BLD AUTO: 0.85 K/UL
LYMPHOCYTES NFR BLD AUTO: 53.8 %
MAN DIFF?: NORMAL
MCHC RBC-ENTMCNC: 32 GM/DL
MCHC RBC-ENTMCNC: 35.3 PG
MCV RBC AUTO: 110.2 FL
MONOCYTES # BLD AUTO: 0.56 K/UL
MONOCYTES NFR BLD AUTO: 35.4 %
NEUTROPHILS # BLD AUTO: 0.11 K/UL
NEUTROPHILS NFR BLD AUTO: 7 %
PLATELET # BLD AUTO: 105 K/UL
RBC # BLD: 3.63 M/UL
RBC # FLD: 14 %
WBC # FLD AUTO: 1.58 K/UL

## 2023-10-13 ENCOUNTER — APPOINTMENT (OUTPATIENT)
Dept: FAMILY MEDICINE | Facility: CLINIC | Age: 73
End: 2023-10-13
Payer: MEDICARE

## 2023-10-13 DIAGNOSIS — Z23 ENCOUNTER FOR IMMUNIZATION: ICD-10-CM

## 2023-10-13 PROCEDURE — G0008: CPT

## 2023-10-13 PROCEDURE — 90662 IIV NO PRSV INCREASED AG IM: CPT

## 2023-10-16 ENCOUNTER — LABORATORY RESULT (OUTPATIENT)
Age: 73
End: 2023-10-16

## 2023-10-16 LAB
BASOPHILS # BLD AUTO: 0.02 K/UL
BASOPHILS NFR BLD AUTO: 1.1 %
EOSINOPHIL # BLD AUTO: 0.02 K/UL
EOSINOPHIL NFR BLD AUTO: 1.1 %
HCT VFR BLD CALC: 39.6 %
HGB BLD-MCNC: 12.6 G/DL
IMM GRANULOCYTES NFR BLD AUTO: 0.6 %
LYMPHOCYTES # BLD AUTO: 0.6 K/UL
LYMPHOCYTES NFR BLD AUTO: 34.1 %
MAN DIFF?: NORMAL
MCHC RBC-ENTMCNC: 31.8 GM/DL
MCHC RBC-ENTMCNC: 34.6 PG
MCV RBC AUTO: 108.8 FL
MONOCYTES # BLD AUTO: 0.83 K/UL
MONOCYTES NFR BLD AUTO: 47.2 %
NEUTROPHILS # BLD AUTO: 0.28 K/UL
NEUTROPHILS NFR BLD AUTO: 15.9 %
PLATELET # BLD AUTO: 82 K/UL
RBC # BLD: 3.64 M/UL
RBC # FLD: 13.5 %
WBC # FLD AUTO: 1.76 K/UL

## 2023-10-23 ENCOUNTER — LABORATORY RESULT (OUTPATIENT)
Age: 73
End: 2023-10-23

## 2023-10-23 LAB
BASOPHILS # BLD AUTO: 0 K/UL
BASOPHILS NFR BLD AUTO: 0 %
EOSINOPHIL # BLD AUTO: 0.03 K/UL
EOSINOPHIL NFR BLD AUTO: 0.8 %
HCT VFR BLD CALC: 39.1 %
HGB BLD-MCNC: 12.3 G/DL
LYMPHOCYTES # BLD AUTO: 1.49 K/UL
LYMPHOCYTES NFR BLD AUTO: 44 %
MAN DIFF?: NORMAL
MCHC RBC-ENTMCNC: 31.5 GM/DL
MCHC RBC-ENTMCNC: 34.6 PG
MCV RBC AUTO: 109.8 FL
MONOCYTES # BLD AUTO: 0.56 K/UL
MONOCYTES NFR BLD AUTO: 16.4 %
NEUTROPHILS # BLD AUTO: 1.23 K/UL
NEUTROPHILS NFR BLD AUTO: 36.2 %
PLATELET # BLD AUTO: 90 K/UL
RBC # BLD: 3.56 M/UL
RBC # FLD: 13.4 %
WBC # FLD AUTO: 3.39 K/UL

## 2023-10-30 ENCOUNTER — LABORATORY RESULT (OUTPATIENT)
Age: 73
End: 2023-10-30

## 2023-10-30 LAB
BASOPHILS # BLD AUTO: 0.05 K/UL
BASOPHILS NFR BLD AUTO: 1 %
EOSINOPHIL # BLD AUTO: 0.05 K/UL
EOSINOPHIL NFR BLD AUTO: 1 %
HCT VFR BLD CALC: 40.6 %
HGB BLD-MCNC: 12.7 G/DL
IMM GRANULOCYTES NFR BLD AUTO: 0.8 %
LYMPHOCYTES # BLD AUTO: 1.79 K/UL
LYMPHOCYTES NFR BLD AUTO: 36.1 %
MAN DIFF?: NORMAL
MCHC RBC-ENTMCNC: 31.3 GM/DL
MCHC RBC-ENTMCNC: 34.3 PG
MCV RBC AUTO: 109.7 FL
MONOCYTES # BLD AUTO: 0.62 K/UL
MONOCYTES NFR BLD AUTO: 12.5 %
NEUTROPHILS # BLD AUTO: 2.41 K/UL
NEUTROPHILS NFR BLD AUTO: 48.6 %
PLATELET # BLD AUTO: 107 K/UL
RBC # BLD: 3.7 M/UL
RBC # FLD: 13.9 %
WBC # FLD AUTO: 4.96 K/UL

## 2023-11-06 LAB
BASOPHILS # BLD AUTO: 0.03 K/UL
BASOPHILS NFR BLD AUTO: 0.9 %
EOSINOPHIL # BLD AUTO: 0.05 K/UL
EOSINOPHIL NFR BLD AUTO: 1.5 %
HCT VFR BLD CALC: 41.2 %
HGB BLD-MCNC: 12.6 G/DL
IMM GRANULOCYTES NFR BLD AUTO: 0.3 %
LYMPHOCYTES # BLD AUTO: 1.22 K/UL
LYMPHOCYTES NFR BLD AUTO: 37.5 %
MAN DIFF?: NORMAL
MCHC RBC-ENTMCNC: 30.6 GM/DL
MCHC RBC-ENTMCNC: 34.1 PG
MCV RBC AUTO: 111.4 FL
MONOCYTES # BLD AUTO: 0.49 K/UL
MONOCYTES NFR BLD AUTO: 15.1 %
NEUTROPHILS # BLD AUTO: 1.45 K/UL
NEUTROPHILS NFR BLD AUTO: 44.7 %
PLATELET # BLD AUTO: 102 K/UL
RBC # BLD: 3.7 M/UL
RBC # FLD: 14.2 %
WBC # FLD AUTO: 3.25 K/UL

## 2023-12-01 NOTE — HISTORY OF PRESENT ILLNESS
[FreeTextEntry1] : 71 year old male. He has a history of squamous cell lung Ca stage III (T3 N2) of LETY with neoadjuvant chemo/RT, followed by Left VATS, left thoracotomy, pneumonectomy, resection of first, second, third ribs on 06/23/2015, pathology revealed [id number]. In January of 2017 he completed SBRT to a RUL nodule.  He is s/p right VATS, RUL lung wedge resection on 2/23/18, pathology revealed scar with reactive changes consistent with radiation atypia.\par \par Patient was seen on 09/20/2021 with stable scan, I recommended patient to RTC in 1 yr with CT chest w/o contrast and F/u with urologist for renal US. \par \par Patient went to ED in 11/2021 c/o generalized "shaking" with lethargy, weight loss, patient stated to experience tender fluctuant area at old surgical site for ~ 4 weeks. \par \par Patient is here today for a follow up. Patient c/o SOB on exertion, c/o pain at prior left thoracotomy site, 10/10, taking Advil q4hrs, denies cough, chest pain, fever, chills.\par \par 
intact

## 2023-12-04 ENCOUNTER — LABORATORY RESULT (OUTPATIENT)
Age: 73
End: 2023-12-04

## 2023-12-04 LAB
ALBUMIN SERPL ELPH-MCNC: 4.2 G/DL
ALP BLD-CCNC: 75 U/L
ALT SERPL-CCNC: 5 U/L
ANION GAP SERPL CALC-SCNC: 11 MMOL/L
AST SERPL-CCNC: 10 U/L
BILIRUB SERPL-MCNC: 0.4 MG/DL
BUN SERPL-MCNC: 21 MG/DL
CALCIUM SERPL-MCNC: 8.7 MG/DL
CHLORIDE SERPL-SCNC: 105 MMOL/L
CO2 SERPL-SCNC: 26 MMOL/L
CREAT SERPL-MCNC: 1.07 MG/DL
EGFR: 74 ML/MIN/1.73M2
GLUCOSE SERPL-MCNC: 134 MG/DL
POTASSIUM SERPL-SCNC: 4.5 MMOL/L
PROT SERPL-MCNC: 7.5 G/DL
SODIUM SERPL-SCNC: 141 MMOL/L

## 2023-12-05 LAB
ALBUMIN MFR SERPL ELPH: 49.4 %
ALBUMIN SERPL-MCNC: 3.7 G/DL
ALBUMIN/GLOB SERPL: 1 RATIO
ALPHA1 GLOB MFR SERPL ELPH: 4.8 %
ALPHA1 GLOB SERPL ELPH-MCNC: 0.4 G/DL
ALPHA2 GLOB MFR SERPL ELPH: 10.2 %
ALPHA2 GLOB SERPL ELPH-MCNC: 0.8 G/DL
B-GLOBULIN MFR SERPL ELPH: 8.3 %
B-GLOBULIN SERPL ELPH-MCNC: 0.6 G/DL
BASOPHILS # BLD AUTO: 0.02 K/UL
BASOPHILS NFR BLD AUTO: 0.5 %
DEPRECATED KAPPA LC FREE/LAMBDA SER: 8.36 RATIO
EOSINOPHIL # BLD AUTO: 0.06 K/UL
EOSINOPHIL NFR BLD AUTO: 1.6 %
GAMMA GLOB FLD ELPH-MCNC: 2 G/DL
GAMMA GLOB MFR SERPL ELPH: 27.3 %
HCT VFR BLD CALC: 40.9 %
HGB BLD-MCNC: 12.8 G/DL
IGA SER QL IEP: 76 MG/DL
IGG SER QL IEP: 587 MG/DL
IGM SER QL IEP: 1387 MG/DL
IMM GRANULOCYTES NFR BLD AUTO: 1.8 %
INTERPRETATION SERPL IEP-IMP: NORMAL
KAPPA LC CSF-MCNC: 1.18 MG/DL
KAPPA LC SERPL-MCNC: 9.86 MG/DL
LYMPHOCYTES # BLD AUTO: 1.24 K/UL
LYMPHOCYTES NFR BLD AUTO: 32.7 %
M PROTEIN MFR SERPL ELPH: 19.9 %
MAN DIFF?: NORMAL
MCHC RBC-ENTMCNC: 31.3 GM/DL
MCHC RBC-ENTMCNC: 34.5 PG
MCV RBC AUTO: 110.2 FL
MONOCLON BAND OBS SERPL: 1.5 G/DL
MONOCYTES # BLD AUTO: 0.68 K/UL
MONOCYTES NFR BLD AUTO: 17.9 %
NEUTROPHILS # BLD AUTO: 1.72 K/UL
NEUTROPHILS NFR BLD AUTO: 45.5 %
PLATELET # BLD AUTO: 93 K/UL
PROT SERPL-MCNC: 7.5 G/DL
PROT SERPL-MCNC: 7.5 G/DL
RBC # BLD: 3.71 M/UL
RBC # FLD: 14.1 %
WBC # FLD AUTO: 3.79 K/UL

## 2023-12-06 ENCOUNTER — OUTPATIENT (OUTPATIENT)
Dept: OUTPATIENT SERVICES | Facility: HOSPITAL | Age: 73
LOS: 1 days | Discharge: HOME | End: 2023-12-06

## 2023-12-06 DIAGNOSIS — Z98.89 OTHER SPECIFIED POSTPROCEDURAL STATES: Chronic | ICD-10-CM

## 2023-12-06 DIAGNOSIS — Z98.890 OTHER SPECIFIED POSTPROCEDURAL STATES: Chronic | ICD-10-CM

## 2023-12-06 DIAGNOSIS — C34.90 MALIGNANT NEOPLASM OF UNSPECIFIED PART OF UNSPECIFIED BRONCHUS OR LUNG: ICD-10-CM

## 2023-12-06 DIAGNOSIS — Z90.2 ACQUIRED ABSENCE OF LUNG [PART OF]: Chronic | ICD-10-CM

## 2023-12-07 ENCOUNTER — OUTPATIENT (OUTPATIENT)
Dept: OUTPATIENT SERVICES | Facility: HOSPITAL | Age: 73
LOS: 1 days | End: 2023-12-07
Payer: MEDICARE

## 2023-12-07 ENCOUNTER — APPOINTMENT (OUTPATIENT)
Dept: CT IMAGING | Facility: HOSPITAL | Age: 73
End: 2023-12-07
Payer: MEDICARE

## 2023-12-07 DIAGNOSIS — Z98.89 OTHER SPECIFIED POSTPROCEDURAL STATES: Chronic | ICD-10-CM

## 2023-12-07 DIAGNOSIS — Z90.2 ACQUIRED ABSENCE OF LUNG [PART OF]: Chronic | ICD-10-CM

## 2023-12-07 DIAGNOSIS — C34.90 MALIGNANT NEOPLASM OF UNSPECIFIED PART OF UNSPECIFIED BRONCHUS OR LUNG: ICD-10-CM

## 2023-12-07 DIAGNOSIS — Z98.890 OTHER SPECIFIED POSTPROCEDURAL STATES: Chronic | ICD-10-CM

## 2023-12-07 PROCEDURE — 71250 CT THORAX DX C-: CPT | Mod: 26,MH

## 2023-12-07 PROCEDURE — 71250 CT THORAX DX C-: CPT

## 2023-12-11 LAB — VISCOSITY, SERUM: 1.7

## 2023-12-18 ENCOUNTER — APPOINTMENT (OUTPATIENT)
Dept: THORACIC SURGERY | Facility: CLINIC | Age: 73
End: 2023-12-18
Payer: MEDICARE

## 2023-12-18 VITALS
HEIGHT: 71 IN | BODY MASS INDEX: 25.2 KG/M2 | TEMPERATURE: 98.1 F | WEIGHT: 180 LBS | HEART RATE: 76 BPM | OXYGEN SATURATION: 100 % | RESPIRATION RATE: 18 BRPM

## 2023-12-18 VITALS — SYSTOLIC BLOOD PRESSURE: 156 MMHG | DIASTOLIC BLOOD PRESSURE: 61 MMHG

## 2023-12-18 PROCEDURE — 99213 OFFICE O/P EST LOW 20 MIN: CPT

## 2023-12-18 NOTE — ASSESSMENT
[FreeTextEntry1] : 73 year old male. 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 In January of 2017 he completed SBRT to a RUL nodule. He is s/p right VATS, RUL lung wedge resection on 2/23/18, pathology revealed scar with reactive changes consistent with radiation atypia.  At outpatient follow up visit on 1/10/2022, he c/o lethargy, weight loss and pain at L thoracotomy site, and was found to have fluctuant collection. CT chest showing new 3.0 x 12.4 x 10.1 cm lesion centered along the left subscapularis muscle. The lesion extends along the chest wall posterior to and communicates with the pneumonectomy bed, just inferior to the second rib resection and along the posterior left third, fourth, and fifth rib interspaces. s/p placement of drain by IR w/ 500 ml purulent drainage.  Fluid positive for strep mitis, ID consulted; PICC placed and plan for 4 week course of Rocephin.  s/p bronchoscopy on 01/14/2022; The left pneumonectomy stump was noted to be intact. The stump was flush with the va. It was irrigated. No bubbles were noted.   F/u with Hem/Onc for Waldenstrom macroglobulinemia, pancytopenia, s/p bone marrow bx on 01/18/2022. Path revealed hypercellular bone marrow with marked B cell lymphocytosis, moderated plasmacytosis, increased mast cells and present iron stores. These findings along with the most current lab findings, support a differential diagnosis which includes lymphoplasmacytic lymphoma/Waldenstrom macroglobulinemia vs marginal zone lymphoma.   Patient is s/p US guided left thoracentesis on 01/27/2022, 60cc of purulent fluid was then aspirated, and a sample of fluid was sent for microbiological and chemical analysis. Culture and AFB no growth at 1 week.   F/u with Dr. Espinoza, started on PO abx on 02/24/2022 and completed on 3/10/22. Patient has a drain to his Lt upper back, it is currently draining ~50ml output daily.   Patient was directly admitted on 03/28/2022 to 8T for chest tube placement by IR for drainage and irrigation and plan for thoracotomy with muscle flap. IR placed 2 pigtail cath through which he was receiving Vancomycin intrapleurally and 10 day course of Zosyn and discharged home on 04/07/2022 with Augmentin for 1 month.   CT chest on 09/12/2022:  In comparison with 3/17/2022, small amount of air within the left pneumonectomy space is resolved. The left upper posterior chest wall drainage catheter is removed. Otherwise, stable exam.  Waldenstrom's macroglobulinemia, F/u with Dr. Recio, began zanubrutinib (Brukinsa) 4/2023.   I have reviewed the patient's medical records and diagnostic images at time of this office consultation and have made the following recommendation: 1. CT Chest reviewed with patient, resolution of RLL nodule. I recommend he returns to clinic in 6 months with repeat CT Chest without contrast. He is agreeable.   Recommendations reviewed with patient during this office visit, and all questions answered; Patient instructed on the importance of follow up and verbalizes understanding.    I, LAUREN Leach, personally performed the evaluation and management (E/M) services for this established patient. That E/M includes conducting the examination, assessing all new/exacerbated conditions, and establishing a new plan of care. Today, my ACP, Massimo Coles NP, was here to observe my evaluation and management services for this new problem/exacerbated condition to be followed going forward.

## 2023-12-18 NOTE — CONSULT LETTER
[Dear  ___] : Dear  [unfilled], [Courtesy Letter:] : I had the pleasure of seeing your patient, [unfilled], in my office today. [Please see my note below.] : Please see my note below. [Sincerely,] : Sincerely, [FreeTextEntry2] : Dr. Clara Estrella (Onc)\par  Dr. Mauro Mckeon (RadOnc)\par  Dr. Kendrick Deleon (PCP)  [FreeTextEntry3] : Jan Sims MD \par  Attending Surgeon \par  Division of Thoracic Surgery \par  , Cardiovascular and Thoracic Surgery \par  [facility]\par  \par

## 2023-12-18 NOTE — HISTORY OF PRESENT ILLNESS
[FreeTextEntry1] : 73 year old male. He has a history of squamous cell lung Ca stage III (T3 N2) of LETY with neoadjuvant chemo/RT, followed by Left VATS, left thoracotomy, pneumonectomy, resection of first, second, third ribs on 06/23/2015, pathology revealed [id number]. In January of 2017 he completed SBRT to a RUL nodule. He is s/p right VATS, RUL lung wedge resection on 2/23/18, pathology revealed scar with reactive changes consistent with radiation atypia.  At outpatient follow up visit on 1/10/2022, he c/o lethargy, weight loss and pain at L thoracotomy site, and was found to have fluctuant collection. CT chest showing new 3.0 x 12.4 x 10.1 cm lesion centered along the left subscapularis muscle. The lesion extends along the chest wall posterior to and communicates with the pneumonectomy bed, just inferior to the second rib resection and along the posterior left third, fourth, and fifth rib interspaces. s/p placement of drain by IR w/ 500 ml purulent drainage.  Fluid positive for strep mitis, ID consulted; PICC placed and plan for 4 week course of Rocephin.  s/p bronchoscopy on 01/14/2022; The left pneumonectomy stump was noted to be intact. The stump was flush with the va. It was irrigated. No bubbles were noted.   F/u with Hem/Onc for Waldenstrom macroglobulinemia, pancytopenia, s/p bone marrow bx on 01/18/2022. Path revealed hypercellular bone marrow with marked B cell lymphocytosis, moderated plasmacytosis, increased mast cells and present iron stores. These findings along with the most current lab findings, support a differential diagnosis which includes lymphoplasmacytic lymphoma/Waldenstrom macroglobulinemia vs marginal zone lymphoma.   Patient is s/p US guided left thoracentesis on 01/27/2022, 60cc of purulent fluid was then aspirated, and a sample of fluid was sent for microbiological and chemical analysis. Culture and AFB no growth at 1 week.   F/u with Dr. Espinoza, started on PO abx on 02/24/2022 and completed on 3/10/22. Patient has a drain to his Lt upper back, it is currently draining ~50ml output daily.   Patient was directly admitted on 03/28/2022 to 8T for chest tube placement by IR for drainage and irrigation and plan for thoracotomy with muscle flap. IR placed 2 pigtail cath through which he was receiving Vancomycin intrapleurally and 10 day course of Zosyn and discharged home on 04/07/2022 with Augmentin for 1 month.   CT chest on 09/12/2022:  In comparison with 3/17/2022, small amount of air within the left pneumonectomy space is resolved. The left upper posterior chest wall drainage catheter is removed. Otherwise, stable exam.  Waldenstrom's macroglobulinemia, F/u with Dr. Recio, began zanubrutinib (Brukinsa) 4/2023.   CT chest on 09/11/2023:  - A 0.4 cm nodule in the right lower lobe, series 3 image 113, new from prior exam.  - Status post left pneumonectomy with fluid opacification of the left hemithorax not significantly changed from the prior exam.  - Status post right upper lobe wedge resection with stable postsurgical changes. - Centrilobular emphysema within the right lung. - Minimal right medial basilar linear atelectasis and/or scarring.  - Previously noted 0.4 cm nodule in the periphery of the right lower lobe is no longer identified.  On 09/18/2023, CT Chest reviewed with patient, revealing new right lower lobe nodule of unknown etiology. I recommend he returns to clinic in 3 months with repeat CT Chest without contrast for re-evaluation.  CT chest on 12/07/2023: -  Unchanged left pneumonectomy cavity.  - Right upper lobe wedge resection.  - Mild emphysema.  - New mild indistinct peripheral groundglass in the right lower lobe. - Resolved previously new right lower lobe nodule.  Patient presents today for follow up. Overall, he reports to be feeling well. Denies any chest pain, shortness of breath, cough, or hemoptysis.

## 2023-12-20 NOTE — ED PROVIDER NOTE - OBJECTIVE STATEMENT
Patient is coming in for further evaluation of her right hip pain and tightness.  She states the pain is in the front of her hip.  She recently has been treated with therapy for her back pain. The back pain has improved mildly.  No relief with the right iliopsoas tendon injection.      Right hip exam reveals tightness with the hip flexed and internally rotated in the front of her hip.  No pain with resisted straight leg raise.  Negative straight leg raise.  Neurovascularly intact distally with no appreciable deficits    Right hip pain concerning for labral tear    After verbal consent was obtained.  The  Patient was placed in a supine position.  Patient's RIght hip was visualized under ultrasound guidance.  The skin was marked cleansed and 3 cc of 1% lidocaine were injected into the skin and subcutaneous tissue for pain control.  Next under ultrasound guidance and a spinal needle was inserted into the hip joint.  Followed by injection of 40 mg of depomedrol and 4 cc of 0.25% bupivacaine which was seen filling the hip joint on images. This ultrasound images were saved to the patients medical chart. The Needle was removed sterile bandage was applied and patient tolerated the procedure well.      After adequate time was allowed for the anesthetic to take effect patient was reexamined.  She really had no relief in symptoms.  Palpating her anterior hip there was no pain.  More pain noted over the abdominal wall.  She truly has a hard time describing exactly where the symptoms are.  At this point it has not her hip joint leading to the symptoms her right side.  I would have the general surgery team evaluate her abdominal wall for potential injury there.  I will see her back on an as-needed basis  
72-year-old male with past history of lung cancer also anemia and received multiple blood transfusions in the past.came to the emergency room with chief complaint hemoglobin 6.7 no symptoms

## 2023-12-21 ENCOUNTER — APPOINTMENT (OUTPATIENT)
Dept: HEMATOLOGY ONCOLOGY | Facility: CLINIC | Age: 73
End: 2023-12-21
Payer: MEDICARE

## 2023-12-21 VITALS
RESPIRATION RATE: 16 BRPM | BODY MASS INDEX: 25.12 KG/M2 | SYSTOLIC BLOOD PRESSURE: 193 MMHG | TEMPERATURE: 97.5 F | DIASTOLIC BLOOD PRESSURE: 75 MMHG | WEIGHT: 180.1 LBS | OXYGEN SATURATION: 99 % | HEART RATE: 69 BPM

## 2023-12-21 PROCEDURE — 99214 OFFICE O/P EST MOD 30 MIN: CPT

## 2023-12-21 NOTE — ASSESSMENT
[FreeTextEntry1] : Lab work, 12/18/2023 thoracic surgery note, CT chest report reviewed. #1) Patient with history of squamous cell lung cancer(LETY) diagnosed 2-2015-T3 N2 (Stage IIIa). He received neoadjuvant radiation and Taxol/Carboplatin chemotherapy followed by left pneumonectomy-No residual tumor at time of surgery. Course was complicated by abscess at pneumonectomy site. 9/14/2022 CT chest-in comparison with 3/17/2022, small amount of air within the left pneumonectomy space is resolved. The left upper posterior chest wall drainage catheter is removed. Otherwise, stable exam. 12/7/2023-CT chest-Since 9/11/2023: Resolved right lower lobe nodule that was new on prior. New mild right lower lobe groundglass, likely infectious or inflammatory. Unchanged left pneumonectomy cavity. --Clinically stable currently. Expectant surveillance. Patient will have next CAT scan of the chest per thoracic surgeon-planned 6/2024.  #2) Waldenstrom's macroglobulinemia-1/2022 BMB findings support a differential diagnosis which includes lymphoplasmacytic lymphoma/Waldenstroms macroglobulinemia (LPL/WM) versus marginal zone lymphoma. Additional testing showed +MYD88 mutation and -CXCR4 mutation analysis -favoring lymphoplasmacytic lymphoma/WM. 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 NGS-CXR4, MYD88, BRAF mutations.  Patient is currently [id number] Risk calculator used revealed a score of 4.0815, placing patient in the high risk group with median TTP of 1.8 years. Had reviewed potential complications with patient, and indications for treatment. With refractory cytopenias, and transfusional needs, recommended treatment. Patient refused IV treatments. He was agreeable to taking PO medication-began zanubrutinib (Brukinsa) 4/2023, as an option outlined per NCCN guidelines. Continue to adjust dose as needed pending interval lab work. Follow CBC with diff. closely. -valtrex prophylaxis -Transfuse packed red blood cells for hemoglobin less than 7/related symptoms. Last transfusion PRBC was 4/2023.  Have advised ophthalmologic exam, and at least yearly (and as clinically indicated)-patient expressed his understanding.  Patient was given the opportunity to ask questions. His questions have been answered to his apparent satisfaction.  -->CBC with diff. q month; Brukinsa 160 mg PO daily; RTO 3 months.

## 2023-12-21 NOTE — PHYSICAL EXAM
[Restricted in physically strenuous activity but ambulatory and able to carry out work of a light or sedentary nature] : Status 1- Restricted in physically strenuous activity but ambulatory and able to carry out work of a light or sedentary nature, e.g., light house work, office work [Thin] : thin [Normal] : affect appropriate [de-identified] : breathing appeared unlabored; no BS on left

## 2023-12-21 NOTE — HISTORY OF PRESENT ILLNESS
[facility] Emergency Department  [address]  [facility] [address]  Phone:  [phone]  Fax:  [phone]                                    Ashley Cordon   MRN: [id number]    Department:  [facility] Emergency Department   Date of Visit:  12/4/2019           After Visit Summary Signature Page    I have received my discharge instructions, and my questions have been answered. I have discussed any challenges I see with this plan with the nurse or doctor.    ..........................................................................................................................................  Patient/Patient Representative Signature      ..........................................................................................................................................  Patient Representative Print Name and Relationship to Patient    ..................................................               ................................................  Date                                   Time    ..........................................................................................................................................  Reviewed by Signature/Title    ...................................................              ..............................................  Date                                               Time          22EPIC Rev 08/18        [Disease: _____________________] : Disease: [unfilled] [T: ___] : T[unfilled] [N: ___] : N[unfilled] [M: ___] : M[unfilled] [AJCC Stage: ____] : AJCC Stage: [unfilled] [de-identified] : Patient with history of squamous cell lung cancer(LETY) diagnosed 2-2015-T3 N2 (Stage IIIa). He received neoadjuvant radiation and Taxol/Carboplatin chemotherapy followed by left pneumonectomy-No residual tumor at time of surgery.  11/2016-PET/CT scan showed minimally hypermetabolic right apical lung nodule, increasing in size on serial diagnostic CT scans. Poor visualization/nonvisualization of ground glass right upper lung opacities.  Several hypermetabolic left internal mammary nodes, increased in size and number with new FDG activity compared to prior PET/CT scan 1/2017- FNA of left internal thoracic lymph node was negative for malignant cells. Cytology suggestive, but not diagnostic of reactive process.  Patient underwent SRS of right upper lung nodule. 2/2017-PET/CT scan-marked increased size and FDG avidity of right apical nodule, concerning for malignancy. The nodule has increased in solidity since CT chest 7/2017, and not significantly changed since 11/2017. Decreased FDG avidity of left internal mammary lymph node. No evidence of distant FDG avid metastatic disease. 2/2018- S/P right VATS lung wedge resection for enlarging right apical lung nodule on imaging with pathology revealing scar with reactive changes c/w radiation atypia.  10/2016-Serum immunofixation showed 3 IgM kappa bands, with urine immunofixation showing a weak IgM kappa band identified, and Bence-Steen protein kappa type.  Bone marrow biopsy, and bone marrow aspirate showed a patchy, normocellular bone marrow with trilineage hematopoiesis and maturation, iron stores present. Monoclonal B cells less than 10% and plasma cells 5-10%, without forming aggregates. Bone marrow aspirate flow cytometry findings consistent with CD5 negative, CD10 negative, B-cell lymphoproliferative disorder/lymphoma. Plasmacytic differentiation.  Cytogenetics showed a normal male karyotype.   1/18/2022-Bone marrow biopsy, bone marrow clot, and bone marrow aspirate:-hypercellular bone marrow with marked B-cell lymphocytosis (50%of total cellularity), moderate plasmacytosis (5-9% of cellularity), increased mast cells and present iron stores. Flow cytometry shows a very small monotypic population of B-cells (CD5 negative, CD10 negative) and too few plasma cells for definitive evaluation of clonality.  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 Cytogenetics-normal male karyotype. NGS-CXR4, MYD88, BRAF mutations.  4/2023-Began Fawn (Zanubrutinib)  [de-identified] : squamous cell cancer [de-identified] : Feeling very well. Good appetite. Last transfusion was in April/2023. No complaints of cough. No fevers. No LN complaints. No visual complaints. No abnormal bruising or bleeding reported. No fevers/sweats. No CP, SOB or light headedness. No c/o bone pain. Maintaining usual activity level, ADL's.

## 2024-01-11 ENCOUNTER — LABORATORY RESULT (OUTPATIENT)
Age: 74
End: 2024-01-11

## 2024-01-12 ENCOUNTER — RX RENEWAL (OUTPATIENT)
Age: 74
End: 2024-01-12

## 2024-02-09 ENCOUNTER — LABORATORY RESULT (OUTPATIENT)
Age: 74
End: 2024-02-09

## 2024-02-10 LAB
BASOPHILS # BLD AUTO: 0 K/UL
BASOPHILS NFR BLD AUTO: 0 %
EOSINOPHIL # BLD AUTO: 0.03 K/UL
EOSINOPHIL NFR BLD AUTO: 0.9 %
HCT VFR BLD CALC: 41 %
HGB BLD-MCNC: 13.1 G/DL
LYMPHOCYTES # BLD AUTO: 1.12 K/UL
LYMPHOCYTES NFR BLD AUTO: 32.2 %
MAN DIFF?: NORMAL
MCHC RBC-ENTMCNC: 32 GM/DL
MCHC RBC-ENTMCNC: 33.9 PG
MCV RBC AUTO: 106.2 FL
MONOCYTES # BLD AUTO: 0.73 K/UL
MONOCYTES NFR BLD AUTO: 20.8 %
NEUTROPHILS # BLD AUTO: 1.58 K/UL
NEUTROPHILS NFR BLD AUTO: 45.2 %
PLATELET # BLD AUTO: 103 K/UL
RBC # BLD: 3.86 M/UL
RBC # FLD: 13.2 %
WBC # FLD AUTO: 3.49 K/UL

## 2024-02-23 ENCOUNTER — OUTPATIENT (OUTPATIENT)
Dept: OUTPATIENT SERVICES | Facility: HOSPITAL | Age: 74
LOS: 1 days | Discharge: HOME | End: 2024-02-23

## 2024-02-23 DIAGNOSIS — C34.90 MALIGNANT NEOPLASM OF UNSPECIFIED PART OF UNSPECIFIED BRONCHUS OR LUNG: ICD-10-CM

## 2024-02-23 DIAGNOSIS — Z98.89 OTHER SPECIFIED POSTPROCEDURAL STATES: Chronic | ICD-10-CM

## 2024-02-23 DIAGNOSIS — Z98.890 OTHER SPECIFIED POSTPROCEDURAL STATES: Chronic | ICD-10-CM

## 2024-02-23 DIAGNOSIS — Z90.2 ACQUIRED ABSENCE OF LUNG [PART OF]: Chronic | ICD-10-CM

## 2024-03-04 ENCOUNTER — LABORATORY RESULT (OUTPATIENT)
Age: 74
End: 2024-03-04

## 2024-03-04 LAB
ALBUMIN SERPL ELPH-MCNC: 4.1 G/DL
ALP BLD-CCNC: 75 U/L
ALT SERPL-CCNC: <5 U/L
ANION GAP SERPL CALC-SCNC: 9 MMOL/L
AST SERPL-CCNC: 9 U/L
BASOPHILS # BLD AUTO: 0.03 K/UL
BASOPHILS NFR BLD AUTO: 0.8 %
BILIRUB SERPL-MCNC: 0.4 MG/DL
BUN SERPL-MCNC: 24 MG/DL
CALCIUM SERPL-MCNC: 9.3 MG/DL
CHLORIDE SERPL-SCNC: 104 MMOL/L
CO2 SERPL-SCNC: 28 MMOL/L
CREAT SERPL-MCNC: 1.2 MG/DL
DEPRECATED KAPPA LC FREE/LAMBDA SER: 8.53 RATIO
EGFR: 64 ML/MIN/1.73M2
EOSINOPHIL # BLD AUTO: 0.06 K/UL
EOSINOPHIL NFR BLD AUTO: 1.5 %
GLUCOSE SERPL-MCNC: 123 MG/DL
HCT VFR BLD CALC: 42.4 %
HGB BLD-MCNC: 13.4 G/DL
IGA SER QL IEP: 78 MG/DL
IGG SER QL IEP: 610 MG/DL
IGM SER QL IEP: 1782 MG/DL
IMM GRANULOCYTES NFR BLD AUTO: 0.3 %
KAPPA LC CSF-MCNC: 1.02 MG/DL
KAPPA LC SERPL-MCNC: 8.7 MG/DL
LDH SERPL-CCNC: 129 U/L
LYMPHOCYTES # BLD AUTO: 1.05 K/UL
LYMPHOCYTES NFR BLD AUTO: 27.1 %
MAN DIFF?: NORMAL
MCHC RBC-ENTMCNC: 31.6 GM/DL
MCHC RBC-ENTMCNC: 33.8 PG
MCV RBC AUTO: 107.1 FL
MONOCYTES # BLD AUTO: 0.82 K/UL
MONOCYTES NFR BLD AUTO: 21.1 %
NEUTROPHILS # BLD AUTO: 1.91 K/UL
NEUTROPHILS NFR BLD AUTO: 49.2 %
PLATELET # BLD AUTO: 109 K/UL
POTASSIUM SERPL-SCNC: 4.8 MMOL/L
PROT SERPL-MCNC: 7.1 G/DL
RBC # BLD: 3.96 M/UL
RBC # FLD: 13.2 %
SODIUM SERPL-SCNC: 141 MMOL/L
WBC # FLD AUTO: 3.88 K/UL

## 2024-03-06 LAB
ALBUMIN MFR SERPL ELPH: 50.3 %
ALBUMIN SERPL-MCNC: 3.6 G/DL
ALBUMIN/GLOB SERPL: 1 RATIO
ALPHA1 GLOB MFR SERPL ELPH: 4.7 %
ALPHA1 GLOB SERPL ELPH-MCNC: 0.3 G/DL
ALPHA2 GLOB MFR SERPL ELPH: 9.9 %
ALPHA2 GLOB SERPL ELPH-MCNC: 0.7 G/DL
B-GLOBULIN MFR SERPL ELPH: 8.3 %
B-GLOBULIN SERPL ELPH-MCNC: 0.6 G/DL
GAMMA GLOB FLD ELPH-MCNC: 1.9 G/DL
GAMMA GLOB MFR SERPL ELPH: 26.8 %
INTERPRETATION SERPL IEP-IMP: NORMAL
M PROTEIN MFR SERPL ELPH: 17.6 %
MONOCLON BAND OBS SERPL: 1.2 G/DL
PROT SERPL-MCNC: 7.1 G/DL
PROT SERPL-MCNC: 7.1 G/DL

## 2024-03-07 ENCOUNTER — APPOINTMENT (OUTPATIENT)
Dept: HEMATOLOGY ONCOLOGY | Facility: CLINIC | Age: 74
End: 2024-03-07
Payer: MEDICARE

## 2024-03-07 VITALS
TEMPERATURE: 97.7 F | HEART RATE: 69 BPM | OXYGEN SATURATION: 98 % | SYSTOLIC BLOOD PRESSURE: 161 MMHG | BODY MASS INDEX: 25.62 KG/M2 | RESPIRATION RATE: 16 BRPM | WEIGHT: 182.98 LBS | HEIGHT: 70.98 IN | DIASTOLIC BLOOD PRESSURE: 87 MMHG

## 2024-03-07 PROCEDURE — 99214 OFFICE O/P EST MOD 30 MIN: CPT

## 2024-03-07 NOTE — ASSESSMENT
[FreeTextEntry1] : Lab work reviewed. #1) Patient with history of squamous cell lung cancer(LETY) diagnosed 2-2015-T3 N2 (Stage IIIa). He received neoadjuvant radiation and Taxol/Carboplatin chemotherapy followed by left pneumonectomy-No residual tumor at time of surgery. Course was complicated by abscess at pneumonectomy site. 9/14/2022 CT chest-in comparison with 3/17/2022, small amount of air within the left pneumonectomy space is resolved. The left upper posterior chest wall drainage catheter is removed. Otherwise, stable exam. 12/7/2023-CT chest-Since 9/11/2023: Resolved right lower lobe nodule that was new on prior. New mild right lower lobe groundglass, likely infectious or inflammatory. Unchanged left pneumonectomy cavity. --Clinically stable currently. Expectant surveillance. Patient will have next CAT scan of the chest per thoracic surgeon-planned 6/2024.  #2) Waldenstrom's macroglobulinemia-1/2022 BMB findings support a differential diagnosis which includes lymphoplasmacytic lymphoma/Waldenstroms macroglobulinemia (LPL/WM) versus marginal zone lymphoma. Additional testing showed +MYD88 mutation and -CXCR4 mutation analysis -favoring lymphoplasmacytic lymphoma/WM. 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 NGS-CXR4, MYD88, BRAF mutations.  Patient is currently [id number]-[id number] Risk calculator used revealed a score of 4.0815, placing patient in the high risk group with median TTP of 1.8 years. Had reviewed potential complications with patient, and indications for treatment. With refractory cytopenias, and transfusional needs, recommended treatment. Patient refused IV treatments. He was agreeable to taking PO medication-began zanubrutinib (Brukinsa) 4/2023, as an option outlined per NCCN guidelines. Continue to adjust dose as needed pending interval lab work. Follow CBC with diff. closely. -valtrex prophylaxis -Transfuse packed red blood cells for hemoglobin less than 7/related symptoms. Last transfusion PRBC was 4/2023. --clinically stable at present --Have advised ophthalmologic exam, and at least yearly (and as clinically indicated)-patient expressed his understanding.  Patient was given the opportunity to ask questions. His questions have been answered to his apparent satisfaction.  -->CBC with diff. q month; Brukinsa 80 mg PO daily; RTO 3 months.

## 2024-03-07 NOTE — HISTORY OF PRESENT ILLNESS
[Disease: _____________________] : Disease: [unfilled] [T: ___] : T[unfilled] [N: ___] : N[unfilled] [M: ___] : M[unfilled] [AJCC Stage: ____] : AJCC Stage: [unfilled] [de-identified] : squamous cell cancer [de-identified] : Patient with history of squamous cell lung cancer(LETY) diagnosed 2-2015-T3 N2 (Stage IIIa). He received neoadjuvant radiation and Taxol/Carboplatin chemotherapy followed by left pneumonectomy-No residual tumor at time of surgery. 11/2016-PET/CT scan showed minimally hypermetabolic right apical lung nodule, increasing in size on serial diagnostic CT scans. Poor visualization/nonvisualization of ground glass right upper lung opacities. Several hypermetabolic left internal mammary nodes, increased in size and number with new FDG activity compared to prior PET/CT scan 1/2017- FNA of left internal thoracic lymph node was negative for malignant cells. Cytology suggestive, but not diagnostic of reactive process. Patient underwent SRS of right upper lung nodule. 2/2017-PET/CT scan-marked increased size and FDG avidity of right apical nodule, concerning for malignancy. The nodule has increased in solidity since CT chest 7/2017, and not significantly changed since 11/2017. Decreased FDG avidity of left internal mammary lymph node. No evidence of distant FDG avid metastatic disease. 2/2018- S/P right VATS lung wedge resection for enlarging right apical lung nodule on imaging with pathology revealing scar with reactive changes c/w radiation atypia.  10/2016-Serum immunofixation showed 3 IgM kappa bands, with urine immunofixation showing a weak IgM kappa band identified, and Bence-Steen protein kappa type. Bone marrow biopsy, and bone marrow aspirate showed a patchy, normocellular bone marrow with trilineage hematopoiesis and maturation, iron stores present. Monoclonal B cells less than 10% and plasma cells 5-10%, without forming aggregates. Bone marrow aspirate flow cytometry findings consistent with CD5 negative, CD10 negative, B-cell lymphoproliferative disorder/lymphoma. Plasmacytic differentiation. Cytogenetics showed a normal male karyotype.  1/18/2022-Bone marrow biopsy, bone marrow clot, and bone marrow aspirate:-hypercellular bone marrow with marked B-cell lymphocytosis (50%of total cellularity), moderate plasmacytosis (5-9% of cellularity), increased mast cells and present iron stores. Flow cytometry shows a very small monotypic population of B-cells (CD5 negative, CD10 negative) and too few plasma cells for definitive evaluation of clonality.  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 Cytogenetics-normal male karyotype. NGS-CXR4, MYD88, BRAF mutations.  4/2023-Juice Augustine (Zanubrutinib).   [de-identified] : S/P basal cell cancer excised from scalp-Dr. Nuno Abel (#[phone]). Good appetite. Last transfusion was in April/2023. No complaints of cough. No fevers. No LN complaints. No visual complaints. No abnormal bruising or bleeding reported. No fevers/sweats. No CP, SOB or light headedness. No c/o bone pain. Maintaining usual activity level, ADL's.

## 2024-03-07 NOTE — PHYSICAL EXAM
[Thin] : thin [Restricted in physically strenuous activity but ambulatory and able to carry out work of a light or sedentary nature] : Status 1- Restricted in physically strenuous activity but ambulatory and able to carry out work of a light or sedentary nature, e.g., light house work, office work [Normal] : affect appropriate [de-identified] : breathing appeared unlabored; no BS on left

## 2024-03-07 NOTE — CONSULT LETTER
[Dear  ___] : Dear  [unfilled], [Courtesy Letter:] : I had the pleasure of seeing your patient, [unfilled], in my office today. [Consult Closing:] : Thank you very much for allowing me to participate in the care of this patient.  If you have any questions, please do not hesitate to contact me. [Please see my note below.] : Please see my note below. [Sincerely,] : Sincerely, [FreeTextEntry3] : Clara Recio MD

## 2024-03-08 NOTE — ASU PATIENT PROFILE, ADULT - NSALCOHOLPROBLEMSRELYN_GEN_A_CORE_SD
Patient came to clinic for anticoagulation monitoring.  Last INR on 2/9/24 was 2.8.  Dose maintained.   Today's INR is 2.1 and is within goal range.    Current warfarin total weekly dose of 23 mg verified.  Informed the INR result is within therapeutic range and instructed to maintain current dose per protocol. Discussed dose and return date of 4/5/24 for next INR. See Anticoagulation flowsheet.    Dr. Varghese is in the office today supervising the treatment.    Instructed to contact the clinic with any unusual bleeding or bruising, any changes in medications, diet, health status, lifestyle, or any other changes, questions or concerns. Verbalized understanding of all discussed.     Declined After Visit Summary.    Referring Provider: Wallace Lange MD   Order Expiration Date: 11/8/24    
no

## 2024-04-02 LAB
BASOPHILS # BLD AUTO: 0.03 K/UL
BASOPHILS NFR BLD AUTO: 0.8 %
EOSINOPHIL # BLD AUTO: 0.07 K/UL
EOSINOPHIL NFR BLD AUTO: 1.9 %
HCT VFR BLD CALC: 43.4 %
HGB BLD-MCNC: 13.4 G/DL
IMM GRANULOCYTES NFR BLD AUTO: 0.3 %
LYMPHOCYTES # BLD AUTO: 1.24 K/UL
LYMPHOCYTES NFR BLD AUTO: 34.2 %
MAN DIFF?: NORMAL
MCHC RBC-ENTMCNC: 30.9 GM/DL
MCHC RBC-ENTMCNC: 34 PG
MCV RBC AUTO: 110.2 FL
MONOCYTES # BLD AUTO: 0.74 K/UL
MONOCYTES NFR BLD AUTO: 20.4 %
NEUTROPHILS # BLD AUTO: 1.54 K/UL
NEUTROPHILS NFR BLD AUTO: 42.4 %
PLATELET # BLD AUTO: 91 K/UL
RBC # BLD: 3.94 M/UL
RBC # FLD: 13.2 %
WBC # FLD AUTO: 3.63 K/UL

## 2024-04-12 ENCOUNTER — APPOINTMENT (OUTPATIENT)
Dept: FAMILY MEDICINE | Facility: CLINIC | Age: 74
End: 2024-04-12
Payer: MEDICARE

## 2024-04-12 VITALS
HEIGHT: 71 IN | RESPIRATION RATE: 20 BRPM | DIASTOLIC BLOOD PRESSURE: 70 MMHG | SYSTOLIC BLOOD PRESSURE: 126 MMHG | WEIGHT: 183 LBS | BODY MASS INDEX: 25.62 KG/M2 | HEART RATE: 68 BPM

## 2024-04-12 DIAGNOSIS — Z86.2 PERSONAL HISTORY OF DISEASES OF THE BLOOD AND BLOOD-FORMING ORGANS AND CERTAIN DISORDERS INVOLVING THE IMMUNE MECHANISM: ICD-10-CM

## 2024-04-12 DIAGNOSIS — R73.01 IMPAIRED FASTING GLUCOSE: ICD-10-CM

## 2024-04-12 PROCEDURE — 99214 OFFICE O/P EST MOD 30 MIN: CPT

## 2024-04-12 PROCEDURE — 36415 COLL VENOUS BLD VENIPUNCTURE: CPT

## 2024-04-12 NOTE — ASSESSMENT
[FreeTextEntry1] : Hemodynamically stable with acceptable BP Pulmonary status stable Lab profiles drawn in office and sent

## 2024-04-12 NOTE — HISTORY OF PRESENT ILLNESS
[de-identified] : Presents for BP check, labs, and general follow-up.  States feeling generally well; trying to remain active; denies new breathing issues.

## 2024-04-13 LAB
ALBUMIN SERPL ELPH-MCNC: 4.3 G/DL
ALP BLD-CCNC: 76 U/L
ALT SERPL-CCNC: 5 U/L
ANION GAP SERPL CALC-SCNC: 13 MMOL/L
AST SERPL-CCNC: 9 U/L
BILIRUB SERPL-MCNC: 0.4 MG/DL
BUN SERPL-MCNC: 19 MG/DL
CALCIUM SERPL-MCNC: 9.1 MG/DL
CHLORIDE SERPL-SCNC: 105 MMOL/L
CHOLEST SERPL-MCNC: 98 MG/DL
CO2 SERPL-SCNC: 25 MMOL/L
CREAT SERPL-MCNC: 1.03 MG/DL
EGFR: 77 ML/MIN/1.73M2
ESTIMATED AVERAGE GLUCOSE: 111 MG/DL
FOLATE SERPL-MCNC: 6.4 NG/ML
GLUCOSE SERPL-MCNC: 101 MG/DL
HBA1C MFR BLD HPLC: 5.5 %
HCT VFR BLD CALC: 39.8 %
HDLC SERPL-MCNC: 46 MG/DL
HGB BLD-MCNC: 12.8 G/DL
LDLC SERPL CALC-MCNC: 41 MG/DL
MCHC RBC-ENTMCNC: 32.2 GM/DL
MCHC RBC-ENTMCNC: 34 PG
MCV RBC AUTO: 105.9 FL
NONHDLC SERPL-MCNC: 52 MG/DL
PLATELET # BLD AUTO: 115 K/UL
POTASSIUM SERPL-SCNC: 4.8 MMOL/L
PROT SERPL-MCNC: 7.4 G/DL
PSA SERPL-MCNC: 0.62 NG/ML
RBC # BLD: 3.76 M/UL
RBC # FLD: 13.2 %
SODIUM SERPL-SCNC: 143 MMOL/L
T4 FREE SERPL-MCNC: 1.3 NG/DL
TRIGL SERPL-MCNC: 41 MG/DL
TSH SERPL-ACNC: 6.49 UIU/ML
VIT B12 SERPL-MCNC: 491 PG/ML
WBC # FLD AUTO: 3.73 K/UL

## 2024-04-23 RX ORDER — VALACYCLOVIR 500 MG/1
500 TABLET, FILM COATED ORAL
Qty: 60 | Refills: 2 | Status: ACTIVE | COMMUNITY
Start: 2023-04-11 | End: 1900-01-01

## 2024-05-01 ENCOUNTER — LABORATORY RESULT (OUTPATIENT)
Age: 74
End: 2024-05-01

## 2024-05-01 LAB
BASOPHILS # BLD AUTO: 0.02 K/UL
BASOPHILS NFR BLD AUTO: 0.6 %
EOSINOPHIL # BLD AUTO: 0.06 K/UL
EOSINOPHIL NFR BLD AUTO: 1.7 %
HCT VFR BLD CALC: 40.4 %
HGB BLD-MCNC: 12.8 G/DL
IMM GRANULOCYTES NFR BLD AUTO: 0.3 %
LYMPHOCYTES # BLD AUTO: 1 K/UL
LYMPHOCYTES NFR BLD AUTO: 28.7 %
MAN DIFF?: NORMAL
MCHC RBC-ENTMCNC: 31.7 GM/DL
MCHC RBC-ENTMCNC: 34 PG
MCV RBC AUTO: 107.4 FL
MONOCYTES # BLD AUTO: 0.6 K/UL
MONOCYTES NFR BLD AUTO: 17.2 %
NEUTROPHILS # BLD AUTO: 1.79 K/UL
NEUTROPHILS NFR BLD AUTO: 51.5 %
PLATELET # BLD AUTO: 106 K/UL
RBC # BLD: 3.76 M/UL
RBC # FLD: 13.5 %
WBC # FLD AUTO: 3.48 K/UL

## 2024-05-30 ENCOUNTER — OUTPATIENT (OUTPATIENT)
Dept: OUTPATIENT SERVICES | Facility: HOSPITAL | Age: 74
LOS: 1 days | Discharge: HOME | End: 2024-05-30

## 2024-05-30 DIAGNOSIS — Z98.89 OTHER SPECIFIED POSTPROCEDURAL STATES: Chronic | ICD-10-CM

## 2024-05-30 DIAGNOSIS — C34.90 MALIGNANT NEOPLASM OF UNSPECIFIED PART OF UNSPECIFIED BRONCHUS OR LUNG: ICD-10-CM

## 2024-05-30 DIAGNOSIS — Z98.890 OTHER SPECIFIED POSTPROCEDURAL STATES: Chronic | ICD-10-CM

## 2024-05-30 DIAGNOSIS — Z90.2 ACQUIRED ABSENCE OF LUNG [PART OF]: Chronic | ICD-10-CM

## 2024-06-02 PROBLEM — D69.6 THROMBOCYTOPENIA: Status: ACTIVE | Noted: 2020-09-23

## 2024-06-03 ENCOUNTER — LABORATORY RESULT (OUTPATIENT)
Age: 74
End: 2024-06-03

## 2024-06-03 LAB
ALBUMIN SERPL ELPH-MCNC: 4.1 G/DL
ALP BLD-CCNC: 73 U/L
ALT SERPL-CCNC: 8 U/L
ANION GAP SERPL CALC-SCNC: 12 MMOL/L
AST SERPL-CCNC: 10 U/L
BASOPHILS # BLD AUTO: 0.03 K/UL
BASOPHILS NFR BLD AUTO: 0.7 %
BILIRUB SERPL-MCNC: 0.4 MG/DL
BUN SERPL-MCNC: 17 MG/DL
CALCIUM SERPL-MCNC: 8.8 MG/DL
CHLORIDE SERPL-SCNC: 106 MMOL/L
CO2 SERPL-SCNC: 24 MMOL/L
CREAT SERPL-MCNC: 1.21 MG/DL
EGFR: 64 ML/MIN/1.73M2
EOSINOPHIL # BLD AUTO: 0.07 K/UL
EOSINOPHIL NFR BLD AUTO: 1.7 %
GLUCOSE SERPL-MCNC: 115 MG/DL
HCT VFR BLD CALC: 41.9 %
HGB BLD-MCNC: 13.4 G/DL
IMM GRANULOCYTES NFR BLD AUTO: 0.5 %
LYMPHOCYTES # BLD AUTO: 1.16 K/UL
LYMPHOCYTES NFR BLD AUTO: 28.9 %
MAN DIFF?: NORMAL
MCHC RBC-ENTMCNC: 32 GM/DL
MCHC RBC-ENTMCNC: 34.1 PG
MCV RBC AUTO: 106.6 FL
MONOCYTES # BLD AUTO: 0.62 K/UL
MONOCYTES NFR BLD AUTO: 15.5 %
NEUTROPHILS # BLD AUTO: 2.11 K/UL
NEUTROPHILS NFR BLD AUTO: 52.7 %
PLATELET # BLD AUTO: 125 K/UL
POTASSIUM SERPL-SCNC: 4.8 MMOL/L
PROT SERPL-MCNC: 7.3 G/DL
RBC # BLD: 3.93 M/UL
RBC # FLD: 14 %
SODIUM SERPL-SCNC: 142 MMOL/L
WBC # FLD AUTO: 4.01 K/UL

## 2024-06-06 ENCOUNTER — APPOINTMENT (OUTPATIENT)
Dept: HEMATOLOGY ONCOLOGY | Facility: CLINIC | Age: 74
End: 2024-06-06
Payer: MEDICARE

## 2024-06-06 VITALS
SYSTOLIC BLOOD PRESSURE: 163 MMHG | TEMPERATURE: 97.8 F | RESPIRATION RATE: 16 BRPM | WEIGHT: 181 LBS | BODY MASS INDEX: 25.34 KG/M2 | OXYGEN SATURATION: 98 % | HEART RATE: 78 BPM | DIASTOLIC BLOOD PRESSURE: 92 MMHG | HEIGHT: 70.98 IN

## 2024-06-06 DIAGNOSIS — C34.90 MALIGNANT NEOPLASM OF UNSPECIFIED PART OF UNSPECIFIED BRONCHUS OR LUNG: ICD-10-CM

## 2024-06-06 DIAGNOSIS — D69.6 THROMBOCYTOPENIA, UNSPECIFIED: ICD-10-CM

## 2024-06-06 DIAGNOSIS — C88.0 WALDENSTROM MACROGLOBULINEMIA: ICD-10-CM

## 2024-06-06 PROCEDURE — 99214 OFFICE O/P EST MOD 30 MIN: CPT

## 2024-06-06 PROCEDURE — G2211 COMPLEX E/M VISIT ADD ON: CPT

## 2024-06-06 NOTE — HISTORY OF PRESENT ILLNESS
[Disease: _____________________] : Disease: [unfilled] [T: ___] : T[unfilled] [N: ___] : N[unfilled] [M: ___] : M[unfilled] [AJCC Stage: ____] : AJCC Stage: [unfilled] [de-identified] : squamous cell cancer [de-identified] : Patient with history of squamous cell lung cancer(LETY) diagnosed 2-2015-T3 N2 (Stage IIIa). He received neoadjuvant radiation and Taxol/Carboplatin chemotherapy followed by left pneumonectomy-No residual tumor at time of surgery. 11/2016-PET/CT scan showed minimally hypermetabolic right apical lung nodule, increasing in size on serial diagnostic CT scans. Poor visualization/nonvisualization of ground glass right upper lung opacities. Several hypermetabolic left internal mammary nodes, increased in size and number with new FDG activity compared to prior PET/CT scan 1/2017- FNA of left internal thoracic lymph node was negative for malignant cells. Cytology suggestive, but not diagnostic of reactive process. Patient underwent SRS of right upper lung nodule. 2/2017-PET/CT scan-marked increased size and FDG avidity of right apical nodule, concerning for malignancy. The nodule has increased in solidity since CT chest 7/2017, and not significantly changed since 11/2017. Decreased FDG avidity of left internal mammary lymph node. No evidence of distant FDG avid metastatic disease. 2/2018- S/P right VATS lung wedge resection for enlarging right apical lung nodule on imaging with pathology revealing scar with reactive changes c/w radiation atypia.  10/2016-Serum immunofixation showed 3 IgM kappa bands, with urine immunofixation showing a weak IgM kappa band identified, and Bence-Steen protein kappa type. Bone marrow biopsy, and bone marrow aspirate showed a patchy, normocellular bone marrow with trilineage hematopoiesis and maturation, iron stores present. Monoclonal B cells less than 10% and plasma cells 5-10%, without forming aggregates. Bone marrow aspirate flow cytometry findings consistent with CD5 negative, CD10 negative, B-cell lymphoproliferative disorder/lymphoma. Plasmacytic differentiation. Cytogenetics showed a normal male karyotype.  1/18/2022-Bone marrow biopsy, bone marrow clot, and bone marrow aspirate:-hypercellular bone marrow with marked B-cell lymphocytosis (50%of total cellularity), moderate plasmacytosis (5-9% of cellularity), increased mast cells and present iron stores. Flow cytometry shows a very small monotypic population of B-cells (CD5 negative, CD10 negative) and too few plasma cells for definitive evaluation of clonality.  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 Cytogenetics-normal male karyotype. NGS-CXR4, MYD88, BRAF mutations.  4/2023-Juice Augustine (Zanubrutinib).   [de-identified] : Feels well. Good appetite. Last transfusion was in April/2023. No current complaints of cough. No fevers. No LN complaints. No visual complaints. No abnormal bruising or bleeding reported. No fevers/sweats. No CP, SOB or light headedness. No c/o bone pain. Maintaining usual activity level, ADL's.

## 2024-06-06 NOTE — ASSESSMENT
[FreeTextEntry1] : Lab work, 4/12/24 medicine note reviewed. #1) Patient with history of squamous cell lung cancer(LETY) diagnosed 2-2015-T3 N2 (Stage IIIa). He received neoadjuvant radiation and Taxol/Carboplatin chemotherapy followed by left pneumonectomy-No residual tumor at time of surgery. Course was complicated by abscess at pneumonectomy site. 9/14/2022 CT chest-in comparison with 3/17/2022, small amount of air within the left pneumonectomy space is resolved. The left upper posterior chest wall drainage catheter is removed. Otherwise, stable exam. 12/7/2023-CT chest-Since 9/11/2023: Resolved right lower lobe nodule that was new on prior. New mild right lower lobe groundglass, likely infectious or inflammatory. Unchanged left pneumonectomy cavity.  --Clinically stable currently. Expectant surveillance. --Patient will have next CAT scan of the chest per thoracic surgeon-planned 6/2024.  #2) Waldenstrom's macroglobulinemia-1/2022 BMB findings support a differential diagnosis which includes lymphoplasmacytic lymphoma/Waldenstroms macroglobulinemia (LPL/WM) versus marginal zone lymphoma. Additional testing showed +MYD88 mutation and -CXCR4 mutation analysis -favoring lymphoplasmacytic lymphoma/WM. 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 NGS-CXR4, MYD88, BRAF mutations.  Patient is currently [id number] Risk calculator used 11/2022 revealed a score of 4.0815, placing patient in the high risk group with median TTP of 1.8 years. Had reviewed potential complications with patient, and indications for treatment. With refractory cytopenias, and transfusional needs, recommended treatment. Patient refused IV treatments. He was agreeable to taking PO medication-began zanubrutinib (Brukinsa) 4/2023, as an option outlined per NCCN guidelines. Continue to adjust dose as needed pending interval lab work. Follow CBC with diff. closely.  -valtrex prophylaxis -Transfuse packed red blood cells for hemoglobin less than 7/related symptoms. Last transfusion PRBC was 4/2023. --clinically stable currently --Have advised ophthalmologic exam at least yearly (and as clinically indicated)-patient has expressed his understanding-states he will update eval.  Patient was given the opportunity to ask questions. His questions have been answered to his apparent satisfaction.  -->CBC with diff. q month; Brukinsa 80 mg PO daily; RTO 3 months.

## 2024-06-06 NOTE — PHYSICAL EXAM
[Restricted in physically strenuous activity but ambulatory and able to carry out work of a light or sedentary nature] : Status 1- Restricted in physically strenuous activity but ambulatory and able to carry out work of a light or sedentary nature, e.g., light house work, office work [Thin] : thin [Normal] : affect appropriate [de-identified] : breathing appeared unlabored; no BS on left

## 2024-06-11 ENCOUNTER — OUTPATIENT (OUTPATIENT)
Dept: OUTPATIENT SERVICES | Facility: HOSPITAL | Age: 74
LOS: 1 days | End: 2024-06-11
Payer: MEDICARE

## 2024-06-11 ENCOUNTER — APPOINTMENT (OUTPATIENT)
Dept: CT IMAGING | Facility: HOSPITAL | Age: 74
End: 2024-06-11
Payer: MEDICARE

## 2024-06-11 DIAGNOSIS — Z98.89 OTHER SPECIFIED POSTPROCEDURAL STATES: Chronic | ICD-10-CM

## 2024-06-11 DIAGNOSIS — C34.90 MALIGNANT NEOPLASM OF UNSPECIFIED PART OF UNSPECIFIED BRONCHUS OR LUNG: ICD-10-CM

## 2024-06-11 DIAGNOSIS — Z90.2 ACQUIRED ABSENCE OF LUNG [PART OF]: Chronic | ICD-10-CM

## 2024-06-11 DIAGNOSIS — Z98.890 OTHER SPECIFIED POSTPROCEDURAL STATES: Chronic | ICD-10-CM

## 2024-06-11 DIAGNOSIS — R89.9 UNSPECIFIED ABNORMAL FINDING IN SPECIMENS FROM OTHER ORGANS, SYSTEMS AND TISSUES: ICD-10-CM

## 2024-06-11 PROCEDURE — 71250 CT THORAX DX C-: CPT | Mod: 26,MH

## 2024-06-11 PROCEDURE — 71250 CT THORAX DX C-: CPT

## 2024-06-11 RX ORDER — ZANUBRUTINIB 80 MG/1
80 CAPSULE, GELATIN COATED ORAL
Qty: 30 | Refills: 1 | Status: ACTIVE | COMMUNITY
Start: 2023-04-11 | End: 1900-01-01

## 2024-06-17 ENCOUNTER — NON-APPOINTMENT (OUTPATIENT)
Age: 74
End: 2024-06-17

## 2024-06-19 ENCOUNTER — APPOINTMENT (OUTPATIENT)
Dept: THORACIC SURGERY | Facility: CLINIC | Age: 74
End: 2024-06-19
Payer: MEDICARE

## 2024-06-19 VITALS
WEIGHT: 185.38 LBS | OXYGEN SATURATION: 91 % | BODY MASS INDEX: 25.95 KG/M2 | HEIGHT: 70.98 IN | HEART RATE: 71 BPM | SYSTOLIC BLOOD PRESSURE: 183 MMHG | DIASTOLIC BLOOD PRESSURE: 79 MMHG

## 2024-06-19 DIAGNOSIS — C34.90 MALIGNANT NEOPLASM OF UNSPECIFIED PART OF UNSPECIFIED BRONCHUS OR LUNG: ICD-10-CM

## 2024-06-19 PROCEDURE — 99213 OFFICE O/P EST LOW 20 MIN: CPT

## 2024-06-19 PROCEDURE — G2211 COMPLEX E/M VISIT ADD ON: CPT

## 2024-06-19 NOTE — ASSESSMENT
[FreeTextEntry1] : 73 year old male. He has a history of squamous cell lung Ca stage III (T3 N2) of LETY with neoadjuvant chemo/RT, followed by Left VATS, left thoracotomy, pneumonectomy, resection of first, second, third ribs on 06/23/2015, pathology revealed [id number]. In January of 2017 he completed SBRT to a RUL nodule. He is s/p right VATS, RUL lung wedge resection on 2/23/18, pathology revealed scar with reactive changes consistent with radiation atypia.  At outpatient follow up visit on 1/10/2022, he c/o lethargy, weight loss and pain at L thoracotomy site, and was found to have fluctuant collection. CT chest showing new 3.0 x 12.4 x 10.1 cm lesion centered along the left subscapularis muscle. The lesion extends along the chest wall posterior to and communicates with the pneumonectomy bed, just inferior to the second rib resection and along the posterior left third, fourth, and fifth rib interspaces. s/p placement of drain by IR w/ 500 ml purulent drainage.  Fluid positive for strep mitis, ID consulted; PICC placed and plan for 4 week course of Rocephin.  s/p bronchoscopy on 01/14/2022; The left pneumonectomy stump was noted to be intact. The stump was flush with the va. It was irrigated. No bubbles were noted.   F/u with Hem/Onc for Waldenstrom macroglobulinemia, pancytopenia, s/p bone marrow bx on 01/18/2022. Path revealed hypercellular bone marrow with marked B cell lymphocytosis, moderated plasmacytosis, increased mast cells and present iron stores. These findings along with the most current lab findings, support a differential diagnosis which includes lymphoplasmacytic lymphoma/Waldenstrom macroglobulinemia vs marginal zone lymphoma.   Patient is s/p US guided left thoracentesis on 01/27/2022, 60cc of purulent fluid was then aspirated, and a sample of fluid was sent for microbiological and chemical analysis. Culture and AFB no growth at 1 week.   F/u with Dr. Espinoza, started on PO abx on 02/24/2022 and completed on 3/10/22. Patient has a drain to his Lt upper back, it is currently draining ~50ml output daily.   Patient was directly admitted on 03/28/2022 to 8T for chest tube placement by IR for drainage and irrigation and plan for thoracotomy with muscle flap. IR placed 2 pigtail cath through which he was receiving Vancomycin intrapleurally and 10 day course of Zosyn and discharged home on 04/07/2022 with Augmentin for 1 month.   CT chest on 09/12/2022:  In comparison with 3/17/2022, small amount of air within the left pneumonectomy space is resolved. The left upper posterior chest wall drainage catheter is removed. Otherwise, stable exam.  Waldenstrom's macroglobulinemia, F/u with Dr. Recio, began zanubrutinib (Brukinsa) 4/2023.   I have reviewed the patient's medical records and diagnostic images at time of this office consultation and have made the following recommendation: 1. CT image reviewed and discussed with patient, stable findings. Recommended patient to repeat CT chest in 6 months to re-evaluate.  2. Patient has expiratory wheezing, refer to Dr. Sampson (pulm) 3. F/u with PCP for elevated BP.   I, LAUREN Leach, personally performed the evaluation and management (E/M) services for this established patient who presents today with (a) new problem(s)/exacerbation of (an) existing condition(s).  That E/M includes conducting the examination, assessing all new/exacerbated conditions, and establishing a new plan of care.  Today, my ACP, CECY Marx, was here to observe my evaluation and management services for this new problem/exacerbated condition to be followed going forward.

## 2024-06-19 NOTE — HISTORY OF PRESENT ILLNESS
[FreeTextEntry1] : 73 year old male. He has a history of squamous cell lung Ca stage III (T3 N2) of LETY with neoadjuvant chemo/RT, followed by Left VATS, left thoracotomy, pneumonectomy, resection of first, second, third ribs on 06/23/2015, pathology revealed [id number]. In January of 2017 he completed SBRT to a RUL nodule. He is s/p right VATS, RUL lung wedge resection on 2/23/18, pathology revealed scar with reactive changes consistent with radiation atypia.  At outpatient follow up visit on 1/10/2022, he c/o lethargy, weight loss and pain at L thoracotomy site, and was found to have fluctuant collection. CT chest showing new 3.0 x 12.4 x 10.1 cm lesion centered along the left subscapularis muscle. The lesion extends along the chest wall posterior to and communicates with the pneumonectomy bed, just inferior to the second rib resection and along the posterior left third, fourth, and fifth rib interspaces. s/p placement of drain by IR w/ 500 ml purulent drainage.  Fluid positive for strep mitis, ID consulted; PICC placed and plan for 4 week course of Rocephin.  s/p bronchoscopy on 01/14/2022; The left pneumonectomy stump was noted to be intact. The stump was flush with the va. It was irrigated. No bubbles were noted.   F/u with Hem/Onc for Waldenstrom macroglobulinemia, pancytopenia, s/p bone marrow bx on 01/18/2022. Path revealed hypercellular bone marrow with marked B cell lymphocytosis, moderated plasmacytosis, increased mast cells and present iron stores. These findings along with the most current lab findings, support a differential diagnosis which includes lymphoplasmacytic lymphoma/Waldenstrom macroglobulinemia vs marginal zone lymphoma.   Patient is s/p US guided left thoracentesis on 01/27/2022, 60cc of purulent fluid was then aspirated, and a sample of fluid was sent for microbiological and chemical analysis. Culture and AFB no growth at 1 week.   F/u with Dr. Espinoza, started on PO abx on 02/24/2022 and completed on 3/10/22. Patient has a drain to his Lt upper back, it is currently draining ~50ml output daily.   Patient was directly admitted on 03/28/2022 to 8T for chest tube placement by IR for drainage and irrigation and plan for thoracotomy with muscle flap. IR placed 2 pigtail cath through which he was receiving Vancomycin intrapleurally and 10 day course of Zosyn and discharged home on 04/07/2022 with Augmentin for 1 month.   CT chest on 09/12/2022:  In comparison with 3/17/2022, small amount of air within the left pneumonectomy space is resolved. The left upper posterior chest wall drainage catheter is removed. Otherwise, stable exam.  Waldenstrom's macroglobulinemia, F/u with Dr. Recio, began zanubrutinib (Brukinsa) 4/2023.   CT chest on 09/11/2023:  - A 0.4 cm nodule in the right lower lobe, series 3 image 113, new from prior exam.  - Status post left pneumonectomy with fluid opacification of the left hemithorax not significantly changed from the prior exam.  - Status post right upper lobe wedge resection with stable postsurgical changes. - Centrilobular emphysema within the right lung. - Minimal right medial basilar linear atelectasis and/or scarring.  - Previously noted 0.4 cm nodule in the periphery of the right lower lobe is no longer identified.  On 09/18/2023, CT Chest reviewed with patient, revealing new right lower lobe nodule of unknown etiology. I recommend he returns to clinic in 3 months with repeat CT Chest without contrast for re-evaluation.  CT chest on 12/07/2023: -  Unchanged left pneumonectomy cavity.  - Right upper lobe wedge resection.  - Mild emphysema.  - New mild indistinct peripheral groundglass in the right lower lobe. - Resolved previously new right lower lobe nodule.  CT chest on 06/11/2024:  - Status post left pneumonectomy with fluid in the pneumonectomy bed. Intact bronchial stump. Status post right upper lobe wedge resection. Emphysema is present. No right pleural effusion. Persistent although improved patchy groundglass opacity in the peripheral right lower lobe.  Patient presents today for follow up. He denies any SOB, CP or cough. BP is elevated, states that whenever he drives to [employer] or [employer] his BP goes up, was seen by PCP 2 wks ago and states BP was normal at that time.

## 2024-06-19 NOTE — CONSULT LETTER
[FreeTextEntry2] : Dr. Clara Estrella (Onc)\par  Dr. Mauro Mckeon (RadOnc)\par  Dr. Kendrick Deleon (PCP)  [FreeTextEntry3] : Jan Sims MD \par  Attending Surgeon \par  Division of Thoracic Surgery \par  , Cardiovascular and Thoracic Surgery \par  [facility] [place]\par  \par

## 2024-06-19 NOTE — PHYSICAL EXAM
[] : no respiratory distress [Heart Rate And Rhythm] : heart rate was normal and rhythm regular [Examination Of The Chest] : the chest was normal in appearance [Chest Visual Inspection Thoracic Asymmetry] : no chest asymmetry [Diminished Respiratory Excursion] : normal chest expansion [FreeTextEntry1] : expiratory wheezes

## 2024-07-02 ENCOUNTER — LABORATORY RESULT (OUTPATIENT)
Age: 74
End: 2024-07-02

## 2024-07-02 LAB
HCT VFR BLD CALC: 40.2 %
HGB BLD-MCNC: 13.1 G/DL
MCHC RBC-ENTMCNC: 32.6 GM/DL
MCHC RBC-ENTMCNC: 34.7 PG
MCV RBC AUTO: 106.6 FL
PLATELET # BLD AUTO: 104 K/UL
RBC # BLD: 3.77 M/UL
RBC # FLD: 13.8 %
WBC # FLD AUTO: 3.8 K/UL

## 2024-07-16 ENCOUNTER — RX RENEWAL (OUTPATIENT)
Age: 74
End: 2024-07-16

## 2024-07-24 ENCOUNTER — APPOINTMENT (OUTPATIENT)
Dept: PULMONOLOGY | Facility: CLINIC | Age: 74
End: 2024-07-24
Payer: MEDICARE

## 2024-07-24 VITALS
OXYGEN SATURATION: 97 % | HEIGHT: 70.98 IN | HEART RATE: 72 BPM | BODY MASS INDEX: 26.46 KG/M2 | WEIGHT: 189 LBS | DIASTOLIC BLOOD PRESSURE: 82 MMHG | SYSTOLIC BLOOD PRESSURE: 186 MMHG

## 2024-07-24 DIAGNOSIS — Z98.890 OTHER SPECIFIED POSTPROCEDURAL STATES: ICD-10-CM

## 2024-07-24 DIAGNOSIS — C34.90 MALIGNANT NEOPLASM OF UNSPECIFIED PART OF UNSPECIFIED BRONCHUS OR LUNG: ICD-10-CM

## 2024-07-24 DIAGNOSIS — J44.9 CHRONIC OBSTRUCTIVE PULMONARY DISEASE, UNSPECIFIED: ICD-10-CM

## 2024-07-24 PROCEDURE — ZZZZZ: CPT

## 2024-07-24 PROCEDURE — 94726 PLETHYSMOGRAPHY LUNG VOLUMES: CPT

## 2024-07-24 PROCEDURE — 94729 DIFFUSING CAPACITY: CPT

## 2024-07-24 PROCEDURE — 94060 EVALUATION OF WHEEZING: CPT

## 2024-07-24 PROCEDURE — 99203 OFFICE O/P NEW LOW 30 MIN: CPT | Mod: 25

## 2024-07-24 RX ORDER — FLUTICASONE FUROATE, UMECLIDINIUM BROMIDE AND VILANTEROL TRIFENATATE 100; 62.5; 25 UG/1; UG/1; UG/1
100-62.5-25 POWDER RESPIRATORY (INHALATION)
Qty: 1 | Refills: 2 | Status: ACTIVE | COMMUNITY
Start: 2024-07-24 | End: 1900-01-01

## 2024-07-24 NOTE — PHYSICAL EXAM
[No Acute Distress] : no acute distress [Normal Oropharynx] : normal oropharynx [Normal Appearance] : normal appearance [No Neck Mass] : no neck mass [Normal Rate/Rhythm] : normal rate/rhythm [Normal S1, S2] : normal s1, s2 [No Murmurs] : no murmurs [No Resp Distress] : no resp distress [No Abnormalities] : no abnormalities [Benign] : benign [Normal Gait] : normal gait [No Clubbing] : no clubbing [No Cyanosis] : no cyanosis [No Edema] : no edema [FROM] : FROM [Normal Color/ Pigmentation] : normal color/ pigmentation [No Focal Deficits] : no focal deficits [Oriented x3] : oriented x3 [Normal Affect] : normal affect [TextBox_68] : Clear on right no wheezing

## 2024-07-24 NOTE — HISTORY OF PRESENT ILLNESS
[TextBox_4] : Patient referred for pulmonary assessment has history of pneumonectomy for lung cancer. Reports exertional dyspnea gets short of breath walking about 2 blocks.  Cannot walk briskly.  History of recurring bronchitis does not take inhaler medications. History of lung cancer currently in remission History of Waldenstrom's macroglobulinemia

## 2024-07-24 NOTE — ASSESSMENT
[FreeTextEntry1] : History of pneumonectomy former smoker some exertional dyspnea appears to have element of COPD will start on Trelegy inhaler  Proper use and technique for inhaler demonstrated and explained.

## 2024-08-05 ENCOUNTER — LABORATORY RESULT (OUTPATIENT)
Age: 74
End: 2024-08-05

## 2024-08-23 ENCOUNTER — OUTPATIENT (OUTPATIENT)
Dept: OUTPATIENT SERVICES | Facility: HOSPITAL | Age: 74
LOS: 1 days | Discharge: HOME | End: 2024-08-23

## 2024-08-23 DIAGNOSIS — Z98.89 OTHER SPECIFIED POSTPROCEDURAL STATES: Chronic | ICD-10-CM

## 2024-08-23 DIAGNOSIS — Z98.890 OTHER SPECIFIED POSTPROCEDURAL STATES: Chronic | ICD-10-CM

## 2024-08-23 DIAGNOSIS — C34.90 MALIGNANT NEOPLASM OF UNSPECIFIED PART OF UNSPECIFIED BRONCHUS OR LUNG: ICD-10-CM

## 2024-08-23 DIAGNOSIS — Z90.2 ACQUIRED ABSENCE OF LUNG [PART OF]: Chronic | ICD-10-CM

## 2024-09-03 ENCOUNTER — LABORATORY RESULT (OUTPATIENT)
Age: 74
End: 2024-09-03

## 2024-09-05 ENCOUNTER — EMERGENCY (EMERGENCY)
Facility: HOSPITAL | Age: 74
LOS: 1 days | Discharge: HOME | End: 2024-09-05
Attending: EMERGENCY MEDICINE | Admitting: EMERGENCY MEDICINE
Payer: MEDICARE

## 2024-09-05 ENCOUNTER — APPOINTMENT (OUTPATIENT)
Dept: HEMATOLOGY ONCOLOGY | Facility: CLINIC | Age: 74
End: 2024-09-05
Payer: MEDICARE

## 2024-09-05 VITALS
BODY MASS INDEX: 26.21 KG/M2 | OXYGEN SATURATION: 99 % | TEMPERATURE: 97.9 F | SYSTOLIC BLOOD PRESSURE: 202 MMHG | WEIGHT: 187.83 LBS | HEART RATE: 72 BPM | RESPIRATION RATE: 16 BRPM | DIASTOLIC BLOOD PRESSURE: 84 MMHG

## 2024-09-05 VITALS
OXYGEN SATURATION: 97 % | DIASTOLIC BLOOD PRESSURE: 76 MMHG | RESPIRATION RATE: 18 BRPM | HEART RATE: 83 BPM | HEIGHT: 71 IN | WEIGHT: 184.97 LBS | SYSTOLIC BLOOD PRESSURE: 185 MMHG | TEMPERATURE: 99 F

## 2024-09-05 VITALS
DIASTOLIC BLOOD PRESSURE: 78 MMHG | RESPIRATION RATE: 18 BRPM | HEART RATE: 74 BPM | OXYGEN SATURATION: 96 % | SYSTOLIC BLOOD PRESSURE: 158 MMHG

## 2024-09-05 DIAGNOSIS — D69.6 THROMBOCYTOPENIA, UNSPECIFIED: ICD-10-CM

## 2024-09-05 DIAGNOSIS — Z98.890 OTHER SPECIFIED POSTPROCEDURAL STATES: Chronic | ICD-10-CM

## 2024-09-05 DIAGNOSIS — Z90.2 ACQUIRED ABSENCE OF LUNG [PART OF]: Chronic | ICD-10-CM

## 2024-09-05 DIAGNOSIS — Z98.89 OTHER SPECIFIED POSTPROCEDURAL STATES: Chronic | ICD-10-CM

## 2024-09-05 DIAGNOSIS — C34.90 MALIGNANT NEOPLASM OF UNSPECIFIED PART OF UNSPECIFIED BRONCHUS OR LUNG: ICD-10-CM

## 2024-09-05 DIAGNOSIS — C88.0 WALDENSTROM MACROGLOBULINEMIA: ICD-10-CM

## 2024-09-05 LAB
ALBUMIN SERPL ELPH-MCNC: 3.4 G/DL — SIGNIFICANT CHANGE UP (ref 3.3–5)
ALP SERPL-CCNC: 69 U/L — SIGNIFICANT CHANGE UP (ref 40–120)
ALT FLD-CCNC: 12 U/L — SIGNIFICANT CHANGE UP (ref 10–45)
ANION GAP SERPL CALC-SCNC: 11 MMOL/L — SIGNIFICANT CHANGE UP (ref 5–17)
APTT BLD: 29.8 SEC — SIGNIFICANT CHANGE UP (ref 24.5–35.6)
AST SERPL-CCNC: 15 U/L — SIGNIFICANT CHANGE UP (ref 10–40)
BASOPHILS # BLD AUTO: 0.03 K/UL — SIGNIFICANT CHANGE UP (ref 0–0.2)
BASOPHILS NFR BLD AUTO: 1.1 % — SIGNIFICANT CHANGE UP (ref 0–2)
BILIRUB SERPL-MCNC: 0.6 MG/DL — SIGNIFICANT CHANGE UP (ref 0.2–1.2)
BUN SERPL-MCNC: 13 MG/DL — SIGNIFICANT CHANGE UP (ref 7–23)
CALCIUM SERPL-MCNC: 9 MG/DL — SIGNIFICANT CHANGE UP (ref 8.4–10.5)
CHLORIDE SERPL-SCNC: 106 MMOL/L — SIGNIFICANT CHANGE UP (ref 96–108)
CO2 SERPL-SCNC: 28 MMOL/L — SIGNIFICANT CHANGE UP (ref 22–31)
CREAT SERPL-MCNC: 1.12 MG/DL — SIGNIFICANT CHANGE UP (ref 0.5–1.3)
EGFR: 70 ML/MIN/1.73M2 — SIGNIFICANT CHANGE UP
EOSINOPHIL # BLD AUTO: 0.01 K/UL — SIGNIFICANT CHANGE UP (ref 0–0.5)
EOSINOPHIL NFR BLD AUTO: 0.4 % — SIGNIFICANT CHANGE UP (ref 0–6)
GLUCOSE SERPL-MCNC: 131 MG/DL — HIGH (ref 70–99)
HCT VFR BLD CALC: 40.3 % — SIGNIFICANT CHANGE UP (ref 39–50)
HGB BLD-MCNC: 13.5 G/DL — SIGNIFICANT CHANGE UP (ref 13–17)
IMM GRANULOCYTES NFR BLD AUTO: 0.4 % — SIGNIFICANT CHANGE UP (ref 0–0.9)
INR BLD: 1 RATIO — SIGNIFICANT CHANGE UP (ref 0.85–1.18)
LIDOCAIN IGE QN: 40 U/L — SIGNIFICANT CHANGE UP (ref 16–77)
LYMPHOCYTES # BLD AUTO: 0.55 K/UL — LOW (ref 1–3.3)
LYMPHOCYTES # BLD AUTO: 19.4 % — SIGNIFICANT CHANGE UP (ref 13–44)
MCHC RBC-ENTMCNC: 33.5 GM/DL — SIGNIFICANT CHANGE UP (ref 32–36)
MCHC RBC-ENTMCNC: 35.3 PG — HIGH (ref 27–34)
MCV RBC AUTO: 105.5 FL — HIGH (ref 80–100)
MONOCYTES # BLD AUTO: 0.59 K/UL — SIGNIFICANT CHANGE UP (ref 0–0.9)
MONOCYTES NFR BLD AUTO: 20.8 % — HIGH (ref 2–14)
NEUTROPHILS # BLD AUTO: 1.65 K/UL — LOW (ref 1.8–7.4)
NEUTROPHILS NFR BLD AUTO: 57.9 % — SIGNIFICANT CHANGE UP (ref 43–77)
NRBC # BLD: 0 /100 WBCS — SIGNIFICANT CHANGE UP (ref 0–0)
PLATELET # BLD AUTO: 110 K/UL — LOW (ref 150–400)
POTASSIUM SERPL-MCNC: 3.9 MMOL/L — SIGNIFICANT CHANGE UP (ref 3.5–5.3)
POTASSIUM SERPL-SCNC: 3.9 MMOL/L — SIGNIFICANT CHANGE UP (ref 3.5–5.3)
PROT SERPL-MCNC: 7.5 G/DL — SIGNIFICANT CHANGE UP (ref 6–8.3)
PROTHROM AB SERPL-ACNC: 11.4 SEC — SIGNIFICANT CHANGE UP (ref 9.5–13)
RBC # BLD: 3.82 M/UL — LOW (ref 4.2–5.8)
RBC # FLD: 13.8 % — SIGNIFICANT CHANGE UP (ref 10.3–14.5)
SODIUM SERPL-SCNC: 145 MMOL/L — SIGNIFICANT CHANGE UP (ref 135–145)
TROPONIN I, HIGH SENSITIVITY RESULT: 5.9 NG/L — SIGNIFICANT CHANGE UP
WBC # BLD: 2.84 K/UL — LOW (ref 3.8–10.5)
WBC # FLD AUTO: 2.84 K/UL — LOW (ref 3.8–10.5)

## 2024-09-05 PROCEDURE — 85025 COMPLETE CBC W/AUTO DIFF WBC: CPT

## 2024-09-05 PROCEDURE — 36415 COLL VENOUS BLD VENIPUNCTURE: CPT

## 2024-09-05 PROCEDURE — G2211 COMPLEX E/M VISIT ADD ON: CPT

## 2024-09-05 PROCEDURE — 80053 COMPREHEN METABOLIC PANEL: CPT

## 2024-09-05 PROCEDURE — 71045 X-RAY EXAM CHEST 1 VIEW: CPT

## 2024-09-05 PROCEDURE — 84484 ASSAY OF TROPONIN QUANT: CPT

## 2024-09-05 PROCEDURE — 99285 EMERGENCY DEPT VISIT HI MDM: CPT | Mod: 25

## 2024-09-05 PROCEDURE — 85730 THROMBOPLASTIN TIME PARTIAL: CPT

## 2024-09-05 PROCEDURE — 93010 ELECTROCARDIOGRAM REPORT: CPT

## 2024-09-05 PROCEDURE — 85610 PROTHROMBIN TIME: CPT

## 2024-09-05 PROCEDURE — 71045 X-RAY EXAM CHEST 1 VIEW: CPT | Mod: 26

## 2024-09-05 PROCEDURE — 83690 ASSAY OF LIPASE: CPT

## 2024-09-05 PROCEDURE — 99284 EMERGENCY DEPT VISIT MOD MDM: CPT

## 2024-09-05 PROCEDURE — 99215 OFFICE O/P EST HI 40 MIN: CPT

## 2024-09-05 PROCEDURE — 93005 ELECTROCARDIOGRAM TRACING: CPT

## 2024-09-05 RX ORDER — AMLODIPINE BESYLATE 10 MG/1
10 TABLET ORAL ONCE
Refills: 0 | Status: DISCONTINUED | OUTPATIENT
Start: 2024-09-05 | End: 2024-09-05

## 2024-09-05 NOTE — ED ADULT NURSE NOTE - SUICIDE SCREENING QUESTION 3
Follow up with audiology for hearing aid adjustment  Consider Cochlear implant for at least the LEFT ear  Repeat hearing test in 6 monhts.    No

## 2024-09-05 NOTE — ED PROVIDER NOTE - NSFOLLOWUPINSTRUCTIONS_ED_ALL_ED_FT
please follow-up with your doctor in 1 to 3 days to reassess symptoms     return to the nearest emergency medicine department immediately if any new/worsening symptoms

## 2024-09-05 NOTE — ED ADULT TRIAGE NOTE - ARRIVAL FROM
Immediate Brief Procedure Note   Nina Avila  7/9/2019    Preoperative Diagnosis:  GERD/dysphagia    Procedure(s):  EGD with biopsy-dilatation 15-18 CRE balloon    Postoperative Diagnosis:  Esophageal stricture    Procedural Physician:  Jose Antonio Maza MD    Assistant:  None    Sedation:  MAC Sedation     Findings: Gastritis    Complications:  None    Specimen(s):  Esophageal / Gastric / Duodenal    Estimated Blood:  None      Endoscopy with biopsy.  No blood loss.     Esophagogastroduodenoscopy with biopsy/dilatation:  18 CRE balloon    An informed consent was obtained. The procedure with risks, benefits, side effects including bleeding, perforation, cardiopulmonary compromise are discussed with the patient. MAC anesthesia administered in incremental doses. Vital signs and oxygenation are carefully monitored.    The tip of the scope was inserted through the upper esophageal sphincter into the duodenum. The GE junction is located at 35 cm- with distal esophageal stricture which is dilated from 15-18 CRE balloon and is pulled up all the way up to the cervical esophagus with mucosal splits noted.  Biopsies taken to rule out eosinophilic esophagitis.     Cardia greater and lesser curvature and angularis has evidence of gastritis which is biopsied for H pylori.     Pylorus and the second part of the duodenum looks normal. Stomach is easily distensible on anteflexed and retroflexed view. Duodenal biopsies are taken to rule out celiac disease.    Finally scope was withdrawn and air is evacuated and patient tolerates the procedure pretty well. Patient will be observed in the GI unit and discharged home with instructions.    Impression-esophageal stricture dilated.    Plan-continue maintenance PPI.    Jose Antonio Maza MD, FACP, FACG, FASGE, AGAF    
Home

## 2024-09-05 NOTE — PHYSICAL EXAM
[Restricted in physically strenuous activity but ambulatory and able to carry out work of a light or sedentary nature] : Status 1- Restricted in physically strenuous activity but ambulatory and able to carry out work of a light or sedentary nature, e.g., light house work, office work [Thin] : thin [Normal] : affect appropriate [de-identified] : papery thin with scattered ecchymoses on UE [de-identified] : breathing appeared unlabored; no BS on left

## 2024-09-05 NOTE — ED PROVIDER NOTE - CLINICAL SUMMARY MEDICAL DECISION MAKING FREE TEXT BOX
74-year-old male with elevated blood pressure, asymptomatic, labs reviewed–acceptable, blood pressure improved to 140s over 90s, patient remained stable, asymptomatic, was advised to follow-up with her primary care to repeat blood pressure and he voiced good understanding of the plan

## 2024-09-05 NOTE — ED PROVIDER NOTE - CPE EDP GASTRO NORM
Patient called the office requesting a refill of Hydrocodone. He states that he was trying to ween down and he is willing for it to be decreased. Please advise.    Last seen: 12/03/19  Last filled: 12/03/19   normal...

## 2024-09-05 NOTE — ED ADULT NURSE NOTE - BEFAST ARM SIDE DRIFT
Biopsychosocial and Barriers Assessment Survivorship     Home/Cell Phone: [phone]  Emergency Contact: Collin Ewing  Marital Status:   Interpretation concerns, speaks another language, preferred language: English  Cultural concerns: none  Ability to read or write: yes    Housing: apartment  Home Setup: 1 step to enter  Lives With: spouse  Daily Living Activities: independent  Durable Medical Equipment: none  Ambulation: independent    Preferred Pharmacy: [facility]    Medication coverage: yes    Employment: not currently employed  [employer] Status/Location: n/a  Ability to pay bills: yes, getting difficult she is looking for a job  POA/LW/AD: none    Additional Comments:  OSW placed outreach TC to pt this afternoon using  # [id number]  OSW introduced self  Pt completed a Dt, where she scored a 0/10  She reports feeling fatigued  She attributes this to her medications  Pt states she is currently looking for a job, to assist her spouse with the bills  Pt is unsure if she should apply for disability  OSW expressed that she should call the local social security agency and speak to someone to see if she qualifies  She was appreciative of the information and the outreach 
No

## 2024-09-05 NOTE — ED PROVIDER NOTE - PATIENT PORTAL LINK FT
You can access the FollowMyHealth Patient Portal offered by [facility] by registering at the following website: [email or web address]. By joining [employer]’s FollowMyHealth portal, you will also be able to view your health information using other applications (apps) compatible with our system.

## 2024-09-05 NOTE — ASSESSMENT
[FreeTextEntry1] : Lab work, 6/19/24 thoracic surgery note, 6/24/24 pulmonary note reviewed. #1) Patient with history of squamous cell lung cancer(LETY) diagnosed 2-2015-T3 N2 (Stage IIIa). He received neoadjuvant radiation and Taxol/Carboplatin chemotherapy followed by left pneumonectomy-No residual tumor at time of surgery. Course was complicated by abscess at pneumonectomy site. 9/14/2022 CT chest-in comparison with 3/17/2022, small amount of air within the left pneumonectomy space is resolved. The left upper posterior chest wall drainage catheter is removed. Otherwise, stable exam. 12/7/2023-CT chest-Since 9/11/2023: Resolved right lower lobe nodule that was new on prior. New mild right lower lobe groundglass, likely infectious or inflammatory. Unchanged left pneumonectomy cavity. 6/11/2024-CT chest-Persistent, improved patchy groundglass opacity in the peripheral right lower lobe, likely inflammatory. Otherwise, no new disease in the chest. --Clinically stable at present. Expectant surveillance. --Patient will have next CAT scan of the chest per thoracic surgeon-planned 12/2024.  #2) Waldenstrom's macroglobulinemia, thrombocytopenia-1/2022 BMB findings support a differential diagnosis which includes lymphoplasmacytic lymphoma/Waldenstroms macroglobulinemia (LPL/WM) versus marginal zone lymphoma. Additional testing showed +MYD88 mutation and -CXCR4 mutation analysis -favoring lymphoplasmacytic lymphoma/WM. 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 NGS-CXR4, MYD88, BRAF mutations.  Patient is currently vrqvezvehetx-Qobj-Zzmuge Risk calculator used 11/2022 revealed a score of 4.0815, placing patient in the high risk group with median TTP of 1.8 years. Had reviewed potential complications with patient, and indications for treatment. With refractory cytopenias, and transfusional needs, recommended treatment. Patient refused IV treatments. He was agreeable to taking PO medication-began zanubrutinib (Brukinsa) 4/2023, as an option outlined per NCCN guidelines. Continue to adjust dose as needed pending interval lab work. Follow CBC with diff. closely.  --valtrex prophylaxis --Transfuse packed red blood cells for hemoglobin less than 7/related symptoms. Last transfusion PRBC was 4/2023. --clinically stable currently --again advised ophthalmologic exam at least yearly (and as clinically indicated)-patient has expressed his understanding   #3) HTN-potential complications d/w patient, and advised he got to ED. I spoke with patient's PCP Dr. Deleon via phone and d/w him-he also advised patient go to ED for uncontrolled BP-patient expressed his understanding.  Patient was given the opportunity to ask questions. His questions have been answered to his apparent satisfaction.  -->CBC with diff. q 6 weeks; Brukinsa 80 mg PO daily; RTO 3 months.

## 2024-09-05 NOTE — HISTORY OF PRESENT ILLNESS
[Disease: _____________________] : Disease: [unfilled] [T: ___] : T[unfilled] [N: ___] : N[unfilled] [M: ___] : M[unfilled] [AJCC Stage: ____] : AJCC Stage: [unfilled] [de-identified] : squamous cell cancer [de-identified] : Patient with history of squamous cell lung cancer(LETY) diagnosed 2-2015-T3 N2 (Stage IIIa). He received neoadjuvant radiation and Taxol/Carboplatin chemotherapy followed by left pneumonectomy-No residual tumor at time of surgery. 11/2016-PET/CT scan showed minimally hypermetabolic right apical lung nodule, increasing in size on serial diagnostic CT scans. Poor visualization/nonvisualization of ground glass right upper lung opacities. Several hypermetabolic left internal mammary nodes, increased in size and number with new FDG activity compared to prior PET/CT scan 1/2017- FNA of left internal thoracic lymph node was negative for malignant cells. Cytology suggestive, but not diagnostic of reactive process. Patient underwent SRS of right upper lung nodule. 2/2017-PET/CT scan-marked increased size and FDG avidity of right apical nodule, concerning for malignancy. The nodule has increased in solidity since CT chest 7/2017, and not significantly changed since 11/2017. Decreased FDG avidity of left internal mammary lymph node. No evidence of distant FDG avid metastatic disease. 2/2018- S/P right VATS lung wedge resection for enlarging right apical lung nodule on imaging with pathology revealing scar with reactive changes c/w radiation atypia.  10/2016-Serum immunofixation showed 3 IgM kappa bands, with urine immunofixation showing a weak IgM kappa band identified, and Bence-Steen protein kappa type. Bone marrow biopsy, and bone marrow aspirate showed a patchy, normocellular bone marrow with trilineage hematopoiesis and maturation, iron stores present. Monoclonal B cells less than 10% and plasma cells 5-10%, without forming aggregates. Bone marrow aspirate flow cytometry findings consistent with CD5 negative, CD10 negative, B-cell lymphoproliferative disorder/lymphoma. Plasmacytic differentiation. Cytogenetics showed a normal male karyotype.  1/18/2022-Bone marrow biopsy, bone marrow clot, and bone marrow aspirate:-hypercellular bone marrow with marked B-cell lymphocytosis (50%of total cellularity), moderate plasmacytosis (5-9% of cellularity), increased mast cells and present iron stores. Flow cytometry shows a very small monotypic population of B-cells (CD5 negative, CD10 negative) and too few plasma cells for definitive evaluation of clonality.  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 Cytogenetics-normal male karyotype. NGS-CXR4, MYD88, BRAF mutations.  4/2023-Juice Augustine (Zanubrutinib).   [de-identified] : No new complaints. Saw thoracic surgeon in f/u-referred to pulmonary MD Dr. Sampson. Good appetite. Last transfusion was in April/2023. No current complaints of cough. No fevers. No H/A. No LN complaints. No visual complaints. No abnormal bruising or bleeding reported. No fevers/sweats. No CP, SOB or light headedness. No c/o bone pain. Maintaining usual activity level, ADL's.

## 2024-09-05 NOTE — HISTORY OF PRESENT ILLNESS
[Disease: _____________________] : Disease: [unfilled] [T: ___] : T[unfilled] [N: ___] : N[unfilled] [M: ___] : M[unfilled] [AJCC Stage: ____] : AJCC Stage: [unfilled] [de-identified] : squamous cell cancer [de-identified] : Patient with history of squamous cell lung cancer(LETY) diagnosed 2-2015-T3 N2 (Stage IIIa). He received neoadjuvant radiation and Taxol/Carboplatin chemotherapy followed by left pneumonectomy-No residual tumor at time of surgery. 11/2016-PET/CT scan showed minimally hypermetabolic right apical lung nodule, increasing in size on serial diagnostic CT scans. Poor visualization/nonvisualization of ground glass right upper lung opacities. Several hypermetabolic left internal mammary nodes, increased in size and number with new FDG activity compared to prior PET/CT scan 1/2017- FNA of left internal thoracic lymph node was negative for malignant cells. Cytology suggestive, but not diagnostic of reactive process. Patient underwent SRS of right upper lung nodule. 2/2017-PET/CT scan-marked increased size and FDG avidity of right apical nodule, concerning for malignancy. The nodule has increased in solidity since CT chest 7/2017, and not significantly changed since 11/2017. Decreased FDG avidity of left internal mammary lymph node. No evidence of distant FDG avid metastatic disease. 2/2018- S/P right VATS lung wedge resection for enlarging right apical lung nodule on imaging with pathology revealing scar with reactive changes c/w radiation atypia.  10/2016-Serum immunofixation showed 3 IgM kappa bands, with urine immunofixation showing a weak IgM kappa band identified, and Bence-Steen protein kappa type. Bone marrow biopsy, and bone marrow aspirate showed a patchy, normocellular bone marrow with trilineage hematopoiesis and maturation, iron stores present. Monoclonal B cells less than 10% and plasma cells 5-10%, without forming aggregates. Bone marrow aspirate flow cytometry findings consistent with CD5 negative, CD10 negative, B-cell lymphoproliferative disorder/lymphoma. Plasmacytic differentiation. Cytogenetics showed a normal male karyotype.  1/18/2022-Bone marrow biopsy, bone marrow clot, and bone marrow aspirate:-hypercellular bone marrow with marked B-cell lymphocytosis (50%of total cellularity), moderate plasmacytosis (5-9% of cellularity), increased mast cells and present iron stores. Flow cytometry shows a very small monotypic population of B-cells (CD5 negative, CD10 negative) and too few plasma cells for definitive evaluation of clonality.  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 Cytogenetics-normal male karyotype. NGS-CXR4, MYD88, BRAF mutations.  4/2023-Juice Augustine (Zanubrutinib).   [de-identified] : No new complaints. Saw thoracic surgeon in f/u-referred to pulmonary MD Dr. Sampson. Good appetite. Last transfusion was in April/2023. No current complaints of cough. No fevers. No H/A. No LN complaints. No visual complaints. No abnormal bruising or bleeding reported. No fevers/sweats. No CP, SOB or light headedness. No c/o bone pain. Maintaining usual activity level, ADL's.

## 2024-09-05 NOTE — ED PROVIDER NOTE - CONSTITUTIONAL, MLM
dw acp [facility]; she was plpaced on bipap here; I don't think she needs it: she nly uses o2 athome: she would need outpt pSG [facility]; she was placed on bipap here; I don't think she needs it: she nly uses o2 athome: she would need outpt pSG normal... Well appearing, awake, alert, oriented to person, place, time/situation and in no apparent distress.

## 2024-09-05 NOTE — ED ADULT NURSE NOTE - CAS EDP DISCH TYPE
Quality 110: Preventive Care And Screening: Influenza Immunization: Influenza Immunization Administered during Influenza season 2.02 Quality 111:Pneumonia Vaccination Status For Older Adults: Pneumococcal Vaccination Previously Received Detail Level: Detailed Home

## 2024-09-05 NOTE — HISTORY OF PRESENT ILLNESS
[Disease: _____________________] : Disease: [unfilled] [T: ___] : T[unfilled] [N: ___] : N[unfilled] [M: ___] : M[unfilled] [AJCC Stage: ____] : AJCC Stage: [unfilled] [de-identified] : squamous cell cancer [de-identified] : Patient with history of squamous cell lung cancer(LETY) diagnosed 2-2015-T3 N2 (Stage IIIa). He received neoadjuvant radiation and Taxol/Carboplatin chemotherapy followed by left pneumonectomy-No residual tumor at time of surgery. 11/2016-PET/CT scan showed minimally hypermetabolic right apical lung nodule, increasing in size on serial diagnostic CT scans. Poor visualization/nonvisualization of ground glass right upper lung opacities. Several hypermetabolic left internal mammary nodes, increased in size and number with new FDG activity compared to prior PET/CT scan 1/2017- FNA of left internal thoracic lymph node was negative for malignant cells. Cytology suggestive, but not diagnostic of reactive process. Patient underwent SRS of right upper lung nodule. 2/2017-PET/CT scan-marked increased size and FDG avidity of right apical nodule, concerning for malignancy. The nodule has increased in solidity since CT chest 7/2017, and not significantly changed since 11/2017. Decreased FDG avidity of left internal mammary lymph node. No evidence of distant FDG avid metastatic disease. 2/2018- S/P right VATS lung wedge resection for enlarging right apical lung nodule on imaging with pathology revealing scar with reactive changes c/w radiation atypia.  10/2016-Serum immunofixation showed 3 IgM kappa bands, with urine immunofixation showing a weak IgM kappa band identified, and Bence-Steen protein kappa type. Bone marrow biopsy, and bone marrow aspirate showed a patchy, normocellular bone marrow with trilineage hematopoiesis and maturation, iron stores present. Monoclonal B cells less than 10% and plasma cells 5-10%, without forming aggregates. Bone marrow aspirate flow cytometry findings consistent with CD5 negative, CD10 negative, B-cell lymphoproliferative disorder/lymphoma. Plasmacytic differentiation. Cytogenetics showed a normal male karyotype.  1/18/2022-Bone marrow biopsy, bone marrow clot, and bone marrow aspirate:-hypercellular bone marrow with marked B-cell lymphocytosis (50%of total cellularity), moderate plasmacytosis (5-9% of cellularity), increased mast cells and present iron stores. Flow cytometry shows a very small monotypic population of B-cells (CD5 negative, CD10 negative) and too few plasma cells for definitive evaluation of clonality.  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 Cytogenetics-normal male karyotype. NGS-CXR4, MYD88, BRAF mutations.  4/2023-Juice Augustine (Zanubrutinib).   [de-identified] : No new complaints. Saw thoracic surgeon in f/u-referred to pulmonary MD Dr. Sampson. Good appetite. Last transfusion was in April/2023. No current complaints of cough. No fevers. No H/A. No LN complaints. No visual complaints. No abnormal bruising or bleeding reported. No fevers/sweats. No CP, SOB or light headedness. No c/o bone pain. Maintaining usual activity level, ADL's.

## 2024-09-05 NOTE — ASSESSMENT
[FreeTextEntry1] : Lab work, 6/19/24 thoracic surgery note, 6/24/24 pulmonary note reviewed. #1) Patient with history of squamous cell lung cancer(LETY) diagnosed 2-2015-T3 N2 (Stage IIIa). He received neoadjuvant radiation and Taxol/Carboplatin chemotherapy followed by left pneumonectomy-No residual tumor at time of surgery. Course was complicated by abscess at pneumonectomy site. 9/14/2022 CT chest-in comparison with 3/17/2022, small amount of air within the left pneumonectomy space is resolved. The left upper posterior chest wall drainage catheter is removed. Otherwise, stable exam. 12/7/2023-CT chest-Since 9/11/2023: Resolved right lower lobe nodule that was new on prior. New mild right lower lobe groundglass, likely infectious or inflammatory. Unchanged left pneumonectomy cavity. 6/11/2024-CT chest-Persistent, improved patchy groundglass opacity in the peripheral right lower lobe, likely inflammatory. Otherwise, no new disease in the chest. --Clinically stable at present. Expectant surveillance. --Patient will have next CAT scan of the chest per thoracic surgeon-planned 12/2024.  #2) Waldenstrom's macroglobulinemia, thrombocytopenia-1/2022 BMB findings support a differential diagnosis which includes lymphoplasmacytic lymphoma/Waldenstroms macroglobulinemia (LPL/WM) versus marginal zone lymphoma. Additional testing showed +MYD88 mutation and -CXCR4 mutation analysis -favoring lymphoplasmacytic lymphoma/WM. 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 NGS-CXR4, MYD88, BRAF mutations.  Patient is currently tdygwctcykfd-Flaj-Zcbozq Risk calculator used 11/2022 revealed a score of 4.0815, placing patient in the high risk group with median TTP of 1.8 years. Had reviewed potential complications with patient, and indications for treatment. With refractory cytopenias, and transfusional needs, recommended treatment. Patient refused IV treatments. He was agreeable to taking PO medication-began zanubrutinib (Brukinsa) 4/2023, as an option outlined per NCCN guidelines. Continue to adjust dose as needed pending interval lab work. Follow CBC with diff. closely.  --valtrex prophylaxis --Transfuse packed red blood cells for hemoglobin less than 7/related symptoms. Last transfusion PRBC was 4/2023. --clinically stable currently --again advised ophthalmologic exam at least yearly (and as clinically indicated)-patient has expressed his understanding   #3) HTN-potential complications d/w patient, and advised he got to ED. I spoke with patient's PCP Dr. Deleon via phone and d/w him-he also advised patient go to ED for uncontrolled BP-patient expressed his understanding.  Patient was given the opportunity to ask questions. His questions have been answered to his apparent satisfaction.  -->CBC with diff. q 6 weeks; Brukinsa 80 mg PO daily; RTO 3 months.

## 2024-09-05 NOTE — ED ADULT NURSE NOTE - OBJECTIVE STATEMENT
Pt presents to the ER from his doctors office complaining of high blood pressure. Pt states that his blood pressure was 200/90s at his doctors office so they told him to come here for further evaluation. Pt states he has a history of lung cancer. A&OX4. Pt denies SOB, chest pain, nausea, vomiting, dizziness or headache.

## 2024-09-05 NOTE — PHYSICAL EXAM
[Restricted in physically strenuous activity but ambulatory and able to carry out work of a light or sedentary nature] : Status 1- Restricted in physically strenuous activity but ambulatory and able to carry out work of a light or sedentary nature, e.g., light house work, office work [Thin] : thin [Normal] : affect appropriate [de-identified] : breathing appeared unlabored; no BS on left [de-identified] : papery thin with scattered ecchymoses on UE

## 2024-09-05 NOTE — PHYSICAL EXAM
[Restricted in physically strenuous activity but ambulatory and able to carry out work of a light or sedentary nature] : Status 1- Restricted in physically strenuous activity but ambulatory and able to carry out work of a light or sedentary nature, e.g., light house work, office work [Thin] : thin [Normal] : affect appropriate [de-identified] : papery thin with scattered ecchymoses on UE [de-identified] : breathing appeared unlabored; no BS on left

## 2024-09-05 NOTE — ED PROVIDER NOTE - OBJECTIVE STATEMENT
74-year-old male with elevated blood pressure at Office at 200/90 today.  Patient has history of lung cancer  S/P SURGERY,  had a routine visit today, and blood pressure was elevated.  Denies nausea, vomiting, diarrhea, change in vision, headache, chest/back/neck pain, focal weakness/numbness, shortness of breath.  Denies any other symptoms

## 2024-09-13 NOTE — CHART NOTE - NSCHARTNOTEFT_GEN_A_CORE
74-year-old male with elevated blood pressure at doctor's office at 200/90 today presenting to the ED with elevated blood pressure as per chart.  SW made a courtesy call to assist with scheduling the recommended primary care follow up appointment and received no answer.  Unable to leave contact information.

## 2024-10-14 ENCOUNTER — LABORATORY RESULT (OUTPATIENT)
Age: 74
End: 2024-10-14

## 2024-10-22 ENCOUNTER — RX RENEWAL (OUTPATIENT)
Age: 74
End: 2024-10-22

## 2024-10-30 ENCOUNTER — APPOINTMENT (OUTPATIENT)
Dept: PULMONOLOGY | Facility: CLINIC | Age: 74
End: 2024-10-30
Payer: MEDICARE

## 2024-10-30 VITALS
OXYGEN SATURATION: 97 % | HEIGHT: 70 IN | DIASTOLIC BLOOD PRESSURE: 84 MMHG | WEIGHT: 187 LBS | BODY MASS INDEX: 26.77 KG/M2 | SYSTOLIC BLOOD PRESSURE: 201 MMHG | HEART RATE: 66 BPM

## 2024-10-30 VITALS — SYSTOLIC BLOOD PRESSURE: 189 MMHG | DIASTOLIC BLOOD PRESSURE: 90 MMHG

## 2024-10-30 DIAGNOSIS — J44.9 CHRONIC OBSTRUCTIVE PULMONARY DISEASE, UNSPECIFIED: ICD-10-CM

## 2024-10-30 DIAGNOSIS — R06.00 DYSPNEA, UNSPECIFIED: ICD-10-CM

## 2024-10-30 PROCEDURE — 94010 BREATHING CAPACITY TEST: CPT

## 2024-10-30 PROCEDURE — 99213 OFFICE O/P EST LOW 20 MIN: CPT | Mod: 25

## 2024-10-30 RX ORDER — SEMAGLUTIDE 0.25 MG/.5ML
0.25 INJECTION, SOLUTION SUBCUTANEOUS
Refills: 0 | Status: ACTIVE | COMMUNITY

## 2024-10-30 RX ORDER — FLUTICASONE FUROATE AND VILANTEROL TRIFENATATE 100; 25 UG/1; UG/1
100-25 POWDER RESPIRATORY (INHALATION)
Qty: 1 | Refills: 3 | Status: ACTIVE | COMMUNITY
Start: 2024-10-30 | End: 1900-01-01

## 2024-11-22 ENCOUNTER — APPOINTMENT (OUTPATIENT)
Dept: FAMILY MEDICINE | Facility: CLINIC | Age: 74
End: 2024-11-22
Payer: MEDICARE

## 2024-11-22 VITALS
HEIGHT: 70 IN | WEIGHT: 190 LBS | HEART RATE: 68 BPM | BODY MASS INDEX: 27.2 KG/M2 | RESPIRATION RATE: 20 BRPM | DIASTOLIC BLOOD PRESSURE: 70 MMHG | SYSTOLIC BLOOD PRESSURE: 130 MMHG

## 2024-11-22 DIAGNOSIS — Z23 ENCOUNTER FOR IMMUNIZATION: ICD-10-CM

## 2024-11-22 DIAGNOSIS — R73.01 IMPAIRED FASTING GLUCOSE: ICD-10-CM

## 2024-11-22 DIAGNOSIS — Z86.2 PERSONAL HISTORY OF DISEASES OF THE BLOOD AND BLOOD-FORMING ORGANS AND CERTAIN DISORDERS INVOLVING THE IMMUNE MECHANISM: ICD-10-CM

## 2024-11-22 DIAGNOSIS — J44.9 CHRONIC OBSTRUCTIVE PULMONARY DISEASE, UNSPECIFIED: ICD-10-CM

## 2024-11-22 PROCEDURE — 36415 COLL VENOUS BLD VENIPUNCTURE: CPT

## 2024-11-22 PROCEDURE — G0008: CPT

## 2024-11-22 PROCEDURE — 99214 OFFICE O/P EST MOD 30 MIN: CPT

## 2024-11-22 PROCEDURE — 90662 IIV NO PRSV INCREASED AG IM: CPT

## 2024-11-22 PROCEDURE — G2211 COMPLEX E/M VISIT ADD ON: CPT

## 2024-11-23 LAB
ALBUMIN SERPL ELPH-MCNC: 4.3 G/DL
ALP BLD-CCNC: 79 U/L
ALT SERPL-CCNC: <5 U/L
ANION GAP SERPL CALC-SCNC: 12 MMOL/L
AST SERPL-CCNC: 8 U/L
BILIRUB SERPL-MCNC: 0.5 MG/DL
BUN SERPL-MCNC: 19 MG/DL
CALCIUM SERPL-MCNC: 9.5 MG/DL
CHLORIDE SERPL-SCNC: 106 MMOL/L
CHOLEST SERPL-MCNC: 103 MG/DL
CO2 SERPL-SCNC: 29 MMOL/L
CREAT SERPL-MCNC: 1.21 MG/DL
EGFR: 63 ML/MIN/1.73M2
ESTIMATED AVERAGE GLUCOSE: 111 MG/DL
GLUCOSE SERPL-MCNC: 108 MG/DL
HBA1C MFR BLD HPLC: 5.5 %
HCT VFR BLD CALC: 44.1 %
HDLC SERPL-MCNC: 48 MG/DL
HGB BLD-MCNC: 14.2 G/DL
LDLC SERPL CALC-MCNC: 44 MG/DL
MCHC RBC-ENTMCNC: 32.2 G/DL
MCHC RBC-ENTMCNC: 35.1 PG
MCV RBC AUTO: 108.9 FL
NONHDLC SERPL-MCNC: 56 MG/DL
PLATELET # BLD AUTO: 127 K/UL
POTASSIUM SERPL-SCNC: 4.2 MMOL/L
PROT SERPL-MCNC: 7.2 G/DL
PSA SERPL-MCNC: 0.93 NG/ML
RBC # BLD: 4.05 M/UL
RBC # FLD: 13.4 %
SODIUM SERPL-SCNC: 147 MMOL/L
T4 FREE SERPL-MCNC: 1.4 NG/DL
TRIGL SERPL-MCNC: 45 MG/DL
TSH SERPL-ACNC: 6.3 UIU/ML
WBC # FLD AUTO: 3.75 K/UL

## 2024-12-02 ENCOUNTER — LABORATORY RESULT (OUTPATIENT)
Age: 74
End: 2024-12-02

## 2024-12-02 ENCOUNTER — OUTPATIENT (OUTPATIENT)
Dept: OUTPATIENT SERVICES | Facility: HOSPITAL | Age: 74
LOS: 1 days | Discharge: HOME | End: 2024-12-02

## 2024-12-02 DIAGNOSIS — Z98.890 OTHER SPECIFIED POSTPROCEDURAL STATES: Chronic | ICD-10-CM

## 2024-12-02 DIAGNOSIS — Z98.89 OTHER SPECIFIED POSTPROCEDURAL STATES: Chronic | ICD-10-CM

## 2024-12-02 DIAGNOSIS — Z90.2 ACQUIRED ABSENCE OF LUNG [PART OF]: Chronic | ICD-10-CM

## 2024-12-02 DIAGNOSIS — C34.90 MALIGNANT NEOPLASM OF UNSPECIFIED PART OF UNSPECIFIED BRONCHUS OR LUNG: ICD-10-CM

## 2024-12-05 ENCOUNTER — OUTPATIENT (OUTPATIENT)
Dept: OUTPATIENT SERVICES | Facility: HOSPITAL | Age: 74
LOS: 1 days | End: 2024-12-05
Payer: MEDICARE

## 2024-12-05 ENCOUNTER — APPOINTMENT (OUTPATIENT)
Dept: CT IMAGING | Facility: HOSPITAL | Age: 74
End: 2024-12-05
Payer: MEDICARE

## 2024-12-05 DIAGNOSIS — C34.90 MALIGNANT NEOPLASM OF UNSPECIFIED PART OF UNSPECIFIED BRONCHUS OR LUNG: ICD-10-CM

## 2024-12-05 DIAGNOSIS — Z98.89 OTHER SPECIFIED POSTPROCEDURAL STATES: Chronic | ICD-10-CM

## 2024-12-05 DIAGNOSIS — Z90.2 ACQUIRED ABSENCE OF LUNG [PART OF]: Chronic | ICD-10-CM

## 2024-12-05 DIAGNOSIS — Z98.890 OTHER SPECIFIED POSTPROCEDURAL STATES: Chronic | ICD-10-CM

## 2024-12-05 PROCEDURE — 71250 CT THORAX DX C-: CPT

## 2024-12-05 PROCEDURE — 71250 CT THORAX DX C-: CPT | Mod: 26,MH

## 2024-12-09 ENCOUNTER — APPOINTMENT (OUTPATIENT)
Dept: HEMATOLOGY ONCOLOGY | Facility: CLINIC | Age: 74
End: 2024-12-09
Payer: MEDICARE

## 2024-12-09 VITALS
RESPIRATION RATE: 18 BRPM | SYSTOLIC BLOOD PRESSURE: 174 MMHG | DIASTOLIC BLOOD PRESSURE: 92 MMHG | BODY MASS INDEX: 27.05 KG/M2 | HEART RATE: 86 BPM | WEIGHT: 188.47 LBS | OXYGEN SATURATION: 99 % | TEMPERATURE: 97.2 F

## 2024-12-09 DIAGNOSIS — C34.90 MALIGNANT NEOPLASM OF UNSPECIFIED PART OF UNSPECIFIED BRONCHUS OR LUNG: ICD-10-CM

## 2024-12-09 DIAGNOSIS — D69.6 THROMBOCYTOPENIA, UNSPECIFIED: ICD-10-CM

## 2024-12-09 DIAGNOSIS — C88.00 WALDENSTROM MACROGLOBULINEMIA NOT HAVING ACHIEVED REMISSION: ICD-10-CM

## 2024-12-09 PROCEDURE — G2211 COMPLEX E/M VISIT ADD ON: CPT

## 2024-12-09 PROCEDURE — 99214 OFFICE O/P EST MOD 30 MIN: CPT

## 2024-12-18 ENCOUNTER — APPOINTMENT (OUTPATIENT)
Dept: THORACIC SURGERY | Facility: CLINIC | Age: 74
End: 2024-12-18
Payer: MEDICARE

## 2024-12-18 VITALS
BODY MASS INDEX: 27.63 KG/M2 | HEART RATE: 62 BPM | DIASTOLIC BLOOD PRESSURE: 83 MMHG | OXYGEN SATURATION: 99 % | WEIGHT: 193 LBS | HEIGHT: 70 IN | SYSTOLIC BLOOD PRESSURE: 199 MMHG

## 2024-12-18 DIAGNOSIS — R91.1 SOLITARY PULMONARY NODULE: ICD-10-CM

## 2024-12-18 DIAGNOSIS — C34.90 MALIGNANT NEOPLASM OF UNSPECIFIED PART OF UNSPECIFIED BRONCHUS OR LUNG: ICD-10-CM

## 2024-12-18 PROCEDURE — 99213 OFFICE O/P EST LOW 20 MIN: CPT

## 2024-12-18 PROCEDURE — G2211 COMPLEX E/M VISIT ADD ON: CPT

## 2024-12-18 RX ORDER — AMOXICILLIN AND CLAVULANATE POTASSIUM 875; 125 MG/1; MG/1
875-125 TABLET, COATED ORAL
Qty: 14 | Refills: 0 | Status: ACTIVE | COMMUNITY
Start: 2024-12-18 | End: 1900-01-01

## 2024-12-18 RX ORDER — AMOXICILLIN AND CLAVULANATE POTASSIUM 500; 125 MG/1; MG/1
500-125 TABLET, FILM COATED ORAL
Qty: 14 | Refills: 0 | Status: DISCONTINUED | COMMUNITY
Start: 2024-12-18 | End: 2024-12-18

## 2025-01-08 ENCOUNTER — OUTPATIENT (OUTPATIENT)
Dept: OUTPATIENT SERVICES | Facility: HOSPITAL | Age: 75
LOS: 1 days | End: 2025-01-08
Payer: MEDICARE

## 2025-01-08 ENCOUNTER — APPOINTMENT (OUTPATIENT)
Dept: CT IMAGING | Facility: HOSPITAL | Age: 75
End: 2025-01-08
Payer: MEDICARE

## 2025-01-08 DIAGNOSIS — R91.1 SOLITARY PULMONARY NODULE: ICD-10-CM

## 2025-01-08 DIAGNOSIS — Z98.89 OTHER SPECIFIED POSTPROCEDURAL STATES: Chronic | ICD-10-CM

## 2025-01-08 DIAGNOSIS — Z98.890 OTHER SPECIFIED POSTPROCEDURAL STATES: Chronic | ICD-10-CM

## 2025-01-08 DIAGNOSIS — Z90.2 ACQUIRED ABSENCE OF LUNG [PART OF]: Chronic | ICD-10-CM

## 2025-01-08 PROCEDURE — 71250 CT THORAX DX C-: CPT

## 2025-01-08 PROCEDURE — 71250 CT THORAX DX C-: CPT | Mod: 26,MH

## 2025-01-14 ENCOUNTER — RX RENEWAL (OUTPATIENT)
Age: 75
End: 2025-01-14

## 2025-01-20 ENCOUNTER — LABORATORY RESULT (OUTPATIENT)
Age: 75
End: 2025-01-20

## 2025-01-20 NOTE — HISTORY OF PRESENT ILLNESS
Please let the patient know she tested positive for flu.  She needs to make sure the surgeon knows about this result today.   [de-identified] : Presents for BP check, labs, and general follow-up.  Also requests flu vaccine.  No new complaints at this encounter.

## 2025-01-21 NOTE — ASSESSMENT
[facility]  [address], [address]  [place], [place]  P: [phone]  F: [phone]        Anh Segura  (1965)    1/21/2025    Subjective:     Anh Segura is 59 y.o. female who complains today of:    Chief Complaint   Patient presents with    Foot Pain     Right foot       HPI    Patient with history of CRPS and major depressive disorder. Was diagnosed at [facility]. On duloxetine. On diclofenac but it has given her upset stomach.   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 She underwent surgery, and then diagnosed with CRPS.   She also sees Dr Shell for hip issues, which startled all due to gait issues after foot injury.   She was referred to me by [employer] for help with depression over pain. Her main concern is depression.   Pain is chronic  Pain in the foot is described as burning and tingling.  Pain level today is rated 8 on a 10 on a NRS scale.  It is severe in nature.  Pain is affecting the patients ability to perform activities of daily living especially with regards to personal hygiene, household chores and self care.  With pain medication, the patient is better able to perform ADLs.  The patient is tolerating her pain medication (Gabapentin, Diflocenac, duloxetine) regimen without any adverse effects.  Pain is aggravated by walking and standing  Pain is not associated with fevers/chills/night sweats.  Bowel and bladder are working appropriately.  Balance remains unchanged from previous encounter.   No new paraesthesias noted.        Allergies:  Patient has no known allergies.    Past Medical History:   Diagnosis Date    Anxiety     Chronic gastritis 07/29/2024    Complex regional pain syndrome type 1 of right lower extremity 12/28/2023    Depression     GERD (gastroesophageal reflux disease) 9/23    Hashimoto's disease     Hashimoto's  [FreeTextEntry1] : Lab work, 6/19/24 thoracic surgery note, 6/24/24 pulmonary note reviewed. #1) Patient with history of squamous cell lung cancer(LETY) diagnosed 2-2015-T3 N2 (Stage IIIa). He received neoadjuvant radiation and Taxol/Carboplatin chemotherapy followed by left pneumonectomy-No residual tumor at time of surgery. Course was complicated by abscess at pneumonectomy site. 9/14/2022 CT chest-in comparison with 3/17/2022, small amount of air within the left pneumonectomy space is resolved. The left upper posterior chest wall drainage catheter is removed. Otherwise, stable exam. 12/7/2023-CT chest-Since 9/11/2023: Resolved right lower lobe nodule that was new on prior. New mild right lower lobe groundglass, likely infectious or inflammatory. Unchanged left pneumonectomy cavity. 6/11/2024-CT chest-Persistent, improved patchy groundglass opacity in the peripheral right lower lobe, likely inflammatory. Otherwise, no new disease in the chest. --Clinically stable at present. Expectant surveillance. --Patient will have next CAT scan of the chest per thoracic surgeon-planned 12/2024.  #2) Waldenstrom's macroglobulinemia, thrombocytopenia-1/2022 BMB findings support a differential diagnosis which includes lymphoplasmacytic lymphoma/Waldenstroms macroglobulinemia (LPL/WM) versus marginal zone lymphoma. Additional testing showed +MYD88 mutation and -CXCR4 mutation analysis -favoring lymphoplasmacytic lymphoma/WM. 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 NGS-CXR4, MYD88, BRAF mutations.  Patient is currently zbbrvsubskkh-Lsqu-Bimdza Risk calculator used 11/2022 revealed a score of 4.0815, placing patient in the high risk group with median TTP of 1.8 years. Had reviewed potential complications with patient, and indications for treatment. With refractory cytopenias, and transfusional needs, recommended treatment. Patient refused IV treatments. He was agreeable to taking PO medication-began zanubrutinib (Brukinsa) 4/2023, as an option outlined per NCCN guidelines. Continue to adjust dose as needed pending interval lab work. Follow CBC with diff. closely.  --valtrex prophylaxis --Transfuse packed red blood cells for hemoglobin less than 7/related symptoms. Last transfusion PRBC was 4/2023. --clinically stable currently --again advised ophthalmologic exam at least yearly (and as clinically indicated)-patient has expressed his understanding   #3) HTN-potential complications d/w patient, and advised he got to ED. I spoke with patient's PCP Dr. Deleon via phone and d/w him-he also advised patient go to ED for uncontrolled BP-patient expressed his understanding.  Patient was given the opportunity to ask questions. His questions have been answered to his apparent satisfaction.  -->CBC with diff. q 6 weeks; Brukinsa 80 mg PO daily; RTO 3 months.

## 2025-01-22 ENCOUNTER — NON-APPOINTMENT (OUTPATIENT)
Age: 75
End: 2025-01-22

## 2025-01-22 ENCOUNTER — APPOINTMENT (OUTPATIENT)
Dept: THORACIC SURGERY | Facility: CLINIC | Age: 75
End: 2025-01-22
Payer: MEDICARE

## 2025-01-22 VITALS
BODY MASS INDEX: 26.48 KG/M2 | OXYGEN SATURATION: 99 % | HEART RATE: 78 BPM | DIASTOLIC BLOOD PRESSURE: 80 MMHG | HEIGHT: 70 IN | SYSTOLIC BLOOD PRESSURE: 182 MMHG | WEIGHT: 185 LBS

## 2025-01-22 DIAGNOSIS — C34.90 MALIGNANT NEOPLASM OF UNSPECIFIED PART OF UNSPECIFIED BRONCHUS OR LUNG: ICD-10-CM

## 2025-01-22 PROCEDURE — G2211 COMPLEX E/M VISIT ADD ON: CPT

## 2025-01-22 PROCEDURE — 99213 OFFICE O/P EST LOW 20 MIN: CPT

## 2025-01-29 ENCOUNTER — APPOINTMENT (OUTPATIENT)
Dept: PULMONOLOGY | Facility: CLINIC | Age: 75
End: 2025-01-29
Payer: MEDICARE

## 2025-01-29 VITALS
HEIGHT: 70 IN | WEIGHT: 185 LBS | HEART RATE: 60 BPM | BODY MASS INDEX: 26.48 KG/M2 | OXYGEN SATURATION: 97 % | DIASTOLIC BLOOD PRESSURE: 82 MMHG | SYSTOLIC BLOOD PRESSURE: 209 MMHG

## 2025-01-29 VITALS — DIASTOLIC BLOOD PRESSURE: 83 MMHG | SYSTOLIC BLOOD PRESSURE: 192 MMHG

## 2025-01-29 DIAGNOSIS — R93.89 ABNORMAL FINDINGS ON DIAGNOSTIC IMAGING OF OTHER SPECIFIED BODY STRUCTURES: ICD-10-CM

## 2025-01-29 DIAGNOSIS — C34.90 MALIGNANT NEOPLASM OF UNSPECIFIED PART OF UNSPECIFIED BRONCHUS OR LUNG: ICD-10-CM

## 2025-01-29 DIAGNOSIS — J44.9 CHRONIC OBSTRUCTIVE PULMONARY DISEASE, UNSPECIFIED: ICD-10-CM

## 2025-01-29 PROCEDURE — 94010 BREATHING CAPACITY TEST: CPT

## 2025-01-29 PROCEDURE — 99213 OFFICE O/P EST LOW 20 MIN: CPT | Mod: 25

## 2025-02-11 DIAGNOSIS — J18.9 PNEUMONIA, UNSPECIFIED ORGANISM: ICD-10-CM

## 2025-02-11 RX ORDER — CEFDINIR 300 MG/1
300 CAPSULE ORAL TWICE DAILY
Qty: 20 | Refills: 0 | Status: ACTIVE | COMMUNITY
Start: 2025-02-11 | End: 1900-01-01

## 2025-02-11 RX ORDER — DOXYCYCLINE 100 MG/1
100 CAPSULE ORAL TWICE DAILY
Qty: 20 | Refills: 0 | Status: ACTIVE | COMMUNITY
Start: 2025-02-11 | End: 1900-01-01

## 2025-02-20 ENCOUNTER — RX RENEWAL (OUTPATIENT)
Age: 75
End: 2025-02-20

## 2025-02-26 ENCOUNTER — LABORATORY RESULT (OUTPATIENT)
Age: 75
End: 2025-02-26

## 2025-02-28 ENCOUNTER — OUTPATIENT (OUTPATIENT)
Dept: OUTPATIENT SERVICES | Facility: HOSPITAL | Age: 75
LOS: 1 days | Discharge: HOME | End: 2025-02-28

## 2025-02-28 DIAGNOSIS — Z90.2 ACQUIRED ABSENCE OF LUNG [PART OF]: Chronic | ICD-10-CM

## 2025-02-28 DIAGNOSIS — Z98.890 OTHER SPECIFIED POSTPROCEDURAL STATES: Chronic | ICD-10-CM

## 2025-02-28 DIAGNOSIS — Z98.89 OTHER SPECIFIED POSTPROCEDURAL STATES: Chronic | ICD-10-CM

## 2025-02-28 DIAGNOSIS — C34.90 MALIGNANT NEOPLASM OF UNSPECIFIED PART OF UNSPECIFIED BRONCHUS OR LUNG: ICD-10-CM

## 2025-03-03 ENCOUNTER — APPOINTMENT (OUTPATIENT)
Dept: HEMATOLOGY ONCOLOGY | Facility: CLINIC | Age: 75
End: 2025-03-03
Payer: MEDICARE

## 2025-03-03 VITALS
BODY MASS INDEX: 24.83 KG/M2 | HEART RATE: 95 BPM | SYSTOLIC BLOOD PRESSURE: 181 MMHG | RESPIRATION RATE: 16 BRPM | TEMPERATURE: 97 F | OXYGEN SATURATION: 98 % | DIASTOLIC BLOOD PRESSURE: 73 MMHG | WEIGHT: 173.06 LBS

## 2025-03-03 DIAGNOSIS — D69.6 THROMBOCYTOPENIA, UNSPECIFIED: ICD-10-CM

## 2025-03-03 DIAGNOSIS — C88.00 WALDENSTROM MACROGLOBULINEMIA NOT HAVING ACHIEVED REMISSION: ICD-10-CM

## 2025-03-03 DIAGNOSIS — C34.90 MALIGNANT NEOPLASM OF UNSPECIFIED PART OF UNSPECIFIED BRONCHUS OR LUNG: ICD-10-CM

## 2025-03-03 PROCEDURE — 99214 OFFICE O/P EST MOD 30 MIN: CPT

## 2025-03-03 PROCEDURE — G2211 COMPLEX E/M VISIT ADD ON: CPT

## 2025-04-08 ENCOUNTER — APPOINTMENT (OUTPATIENT)
Dept: CT IMAGING | Facility: HOSPITAL | Age: 75
End: 2025-04-08
Payer: MEDICARE

## 2025-04-08 ENCOUNTER — OUTPATIENT (OUTPATIENT)
Dept: OUTPATIENT SERVICES | Facility: HOSPITAL | Age: 75
LOS: 1 days | End: 2025-04-08
Payer: MEDICARE

## 2025-04-08 DIAGNOSIS — Z98.89 OTHER SPECIFIED POSTPROCEDURAL STATES: Chronic | ICD-10-CM

## 2025-04-08 DIAGNOSIS — C34.90 MALIGNANT NEOPLASM OF UNSPECIFIED PART OF UNSPECIFIED BRONCHUS OR LUNG: ICD-10-CM

## 2025-04-08 DIAGNOSIS — Z90.2 ACQUIRED ABSENCE OF LUNG [PART OF]: Chronic | ICD-10-CM

## 2025-04-08 DIAGNOSIS — Z98.890 OTHER SPECIFIED POSTPROCEDURAL STATES: Chronic | ICD-10-CM

## 2025-04-08 PROCEDURE — 71250 CT THORAX DX C-: CPT | Mod: 26

## 2025-04-08 PROCEDURE — 71250 CT THORAX DX C-: CPT

## 2025-04-16 ENCOUNTER — RX RENEWAL (OUTPATIENT)
Age: 75
End: 2025-04-16

## 2025-04-30 ENCOUNTER — APPOINTMENT (OUTPATIENT)
Dept: PULMONOLOGY | Facility: CLINIC | Age: 75
End: 2025-04-30
Payer: MEDICARE

## 2025-04-30 ENCOUNTER — APPOINTMENT (OUTPATIENT)
Dept: THORACIC SURGERY | Facility: CLINIC | Age: 75
End: 2025-04-30
Payer: MEDICARE

## 2025-04-30 VITALS
SYSTOLIC BLOOD PRESSURE: 159 MMHG | WEIGHT: 172 LBS | DIASTOLIC BLOOD PRESSURE: 82 MMHG | BODY MASS INDEX: 24.62 KG/M2 | HEIGHT: 70 IN | HEART RATE: 75 BPM | OXYGEN SATURATION: 98 %

## 2025-04-30 DIAGNOSIS — J44.9 CHRONIC OBSTRUCTIVE PULMONARY DISEASE, UNSPECIFIED: ICD-10-CM

## 2025-04-30 DIAGNOSIS — C34.90 MALIGNANT NEOPLASM OF UNSPECIFIED PART OF UNSPECIFIED BRONCHUS OR LUNG: ICD-10-CM

## 2025-04-30 PROCEDURE — 94010 BREATHING CAPACITY TEST: CPT

## 2025-04-30 PROCEDURE — 99214 OFFICE O/P EST MOD 30 MIN: CPT

## 2025-04-30 PROCEDURE — 99213 OFFICE O/P EST LOW 20 MIN: CPT | Mod: 25

## 2025-05-28 ENCOUNTER — LABORATORY RESULT (OUTPATIENT)
Age: 75
End: 2025-05-28

## 2025-06-06 ENCOUNTER — OUTPATIENT (OUTPATIENT)
Dept: OUTPATIENT SERVICES | Facility: HOSPITAL | Age: 75
LOS: 1 days | Discharge: HOME | End: 2025-06-06

## 2025-06-06 DIAGNOSIS — Z98.890 OTHER SPECIFIED POSTPROCEDURAL STATES: Chronic | ICD-10-CM

## 2025-06-06 DIAGNOSIS — Z90.2 ACQUIRED ABSENCE OF LUNG [PART OF]: Chronic | ICD-10-CM

## 2025-06-06 DIAGNOSIS — Z98.89 OTHER SPECIFIED POSTPROCEDURAL STATES: Chronic | ICD-10-CM

## 2025-06-06 DIAGNOSIS — C34.90 MALIGNANT NEOPLASM OF UNSPECIFIED PART OF UNSPECIFIED BRONCHUS OR LUNG: ICD-10-CM

## 2025-06-09 ENCOUNTER — NON-APPOINTMENT (OUTPATIENT)
Age: 75
End: 2025-06-09

## 2025-06-09 ENCOUNTER — APPOINTMENT (OUTPATIENT)
Dept: HEMATOLOGY ONCOLOGY | Facility: CLINIC | Age: 75
End: 2025-06-09
Payer: MEDICARE

## 2025-06-09 VITALS
BODY MASS INDEX: 23.72 KG/M2 | WEIGHT: 165.32 LBS | RESPIRATION RATE: 18 BRPM | OXYGEN SATURATION: 98 % | TEMPERATURE: 97.3 F | DIASTOLIC BLOOD PRESSURE: 91 MMHG | HEART RATE: 68 BPM | SYSTOLIC BLOOD PRESSURE: 165 MMHG

## 2025-06-09 DIAGNOSIS — C34.90 MALIGNANT NEOPLASM OF UNSPECIFIED PART OF UNSPECIFIED BRONCHUS OR LUNG: ICD-10-CM

## 2025-06-09 DIAGNOSIS — C88.00 WALDENSTROM MACROGLOBULINEMIA NOT HAVING ACHIEVED REMISSION: ICD-10-CM

## 2025-06-09 PROCEDURE — G2211 COMPLEX E/M VISIT ADD ON: CPT

## 2025-06-09 PROCEDURE — 99214 OFFICE O/P EST MOD 30 MIN: CPT

## 2025-06-13 NOTE — ED PROVIDER NOTE - NS ED MD DISPO DISCHARGE CCDA
What Type Of Note Output Would You Prefer (Optional)?: Bullet Format
How Severe Is Your Acne?: mild
Is This A New Presentation, Or A Follow-Up?: Acne
Females Only: When Was Your Last Menstrual Period?: 4/13/2025
Patient/Caregiver provided printed discharge information.

## 2025-06-25 ENCOUNTER — RX RENEWAL (OUTPATIENT)
Age: 75
End: 2025-06-25

## 2025-07-21 ENCOUNTER — LABORATORY RESULT (OUTPATIENT)
Age: 75
End: 2025-07-21

## 2025-09-02 ENCOUNTER — LABORATORY RESULT (OUTPATIENT)
Age: 75
End: 2025-09-02

## (undated) DEVICE — VALVE SUCTION EVIS 160/200/240

## (undated) DEVICE — DRAPE STERI-DRAPE MEDIUM W APERTURE

## (undated) DEVICE — TRAP SPECIMEN SPUTUM 40CC

## (undated) DEVICE — DRAPE INSTRUMENT POUCH 6.75" X 11"

## (undated) DEVICE — VENODYNE/SCD SLEEVE CALF MEDIUM

## (undated) DEVICE — ELCTR GROUNDING PAD ADULT COVIDIEN

## (undated) DEVICE — ADAPTER FIBEROPTIC BRONCHOSCOPE DUAL AXIS SWIVEL

## (undated) DEVICE — DRAPE 3/4 SHEET 52X76"

## (undated) DEVICE — TUBING SUCTION NONCONDUCTIVE 6MM X 12FT

## (undated) DEVICE — POSITIONER STRAP ARMBOARD VELCRO TS-30

## (undated) DEVICE — CHEST DRAIN OASIS DRY SUCTION WATER SEAL

## (undated) DEVICE — DRAPE LARGE SHEET 72X85"

## (undated) DEVICE — SYR SLIP 10CC

## (undated) DEVICE — VALVE BIOPSY BRONCHOVIDEOSCOPE

## (undated) DEVICE — WARMING BLANKET FULL ADULT

## (undated) DEVICE — NDL HYPO REGULAR BEVEL 22G X 1.5" (TURQUOISE)